# Patient Record
Sex: FEMALE | Race: ASIAN | NOT HISPANIC OR LATINO | Employment: OTHER | URBAN - METROPOLITAN AREA
[De-identification: names, ages, dates, MRNs, and addresses within clinical notes are randomized per-mention and may not be internally consistent; named-entity substitution may affect disease eponyms.]

---

## 2017-11-07 ENCOUNTER — OFFICE VISIT (OUTPATIENT)
Dept: NEUROSURGERY | Facility: CLINIC | Age: 37
End: 2017-11-07

## 2017-11-07 VITALS
WEIGHT: 81 LBS | BODY MASS INDEX: 15.9 KG/M2 | HEIGHT: 60 IN | SYSTOLIC BLOOD PRESSURE: 110 MMHG | HEART RATE: 111 BPM | DIASTOLIC BLOOD PRESSURE: 84 MMHG

## 2017-11-07 DIAGNOSIS — M50.120 CERVICAL DISC DISORDER WITH RADICULOPATHY OF MID-CERVICAL REGION: ICD-10-CM

## 2017-11-07 DIAGNOSIS — M54.2 NECK PAIN: Primary | ICD-10-CM

## 2017-11-07 PROCEDURE — 99213 OFFICE O/P EST LOW 20 MIN: CPT | Performed by: NURSE PRACTITIONER

## 2017-11-07 RX ORDER — TRAMADOL HYDROCHLORIDE 50 MG/1
50 TABLET ORAL EVERY 8 HOURS PRN
Qty: 21 TABLET | Refills: 0 | Status: SHIPPED | OUTPATIENT
Start: 2017-11-07 | End: 2018-08-06 | Stop reason: HOSPADM

## 2017-11-07 RX ORDER — DEXTROAMPHETAMINE SACCHARATE, AMPHETAMINE ASPARTATE, DEXTROAMPHETAMINE SULFATE AND AMPHETAMINE SULFATE 5; 5; 5; 5 MG/1; MG/1; MG/1; MG/1
15 TABLET ORAL DAILY
Refills: 0 | Status: ON HOLD | COMMUNITY
Start: 2017-10-30 | End: 2020-08-11

## 2017-11-07 RX ORDER — DOXEPIN HYDROCHLORIDE 10 MG/1
CAPSULE ORAL
Refills: 1 | COMMUNITY
Start: 2017-10-25 | End: 2018-08-06 | Stop reason: HOSPADM

## 2017-11-07 NOTE — PROGRESS NOTES
Subjective   Patient ID: Bienvenido Carter is a 37 y.o. female is here today for follow-up. She complains of a flare up of neck, arm, back pain. She completed PT last year with mild relief.  She has not had any recent treatments. Ms. Carter takes Gabapentin 100 mg HS for pain.     HPI Comments: Ms. Carter returns to the office today for reevaluation.  She has been evaluated by Dr. Hills several times in the past.  Her last visit was in 2016.  She is not had any cervical imaging since 2013 are 2014.  She has tried Flexeril and gabapentin as well as cervical epidural steroid injections and physical therapy.  Her symptoms have begun to worsen over the last several months.  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  The pain is located in her neck particularly on the right side.  She finds it difficult to turn her head left and right.  She is also noticing increasing pain in the bilateral trapezius region, interscapular region, and left arm.    Neck Pain    This is a recurrent problem. The current episode started more than 1 year ago. The problem occurs constantly. The problem has been gradually worsening. The pain is associated with nothing. The quality of the pain is described as aching. The pain is at a severity of 9/10. The pain is severe. The symptoms are aggravated by position.   Arm Pain    The pain is present in the left shoulder. The quality of the pain is described as aching. The pain radiates to the left arm and left hand. The pain is at a severity of 5/10. The pain is moderate. The pain has been intermittent since the incident. Treatments tried: PT 2016; cervical epidural steroid injections;       The following portions of the patient's history were reviewed and updated as appropriate: allergies, current medications, past family history, past medical history, past social history, past surgical history and problem  list.    Review of Systems   Musculoskeletal: Positive for arthralgias and neck pain (left arm pain).   All other systems reviewed and are negative.      Objective   Physical Exam   Constitutional: She is oriented to person, place, and time. She appears well-developed and well-nourished. She is cooperative. No distress.   HENT:   Head: Normocephalic and atraumatic.   Eyes: Conjunctivae are normal.   Neck: Normal range of motion. Neck supple.   Cardiovascular: Normal rate.    Pulmonary/Chest: Effort normal. No respiratory distress.   Abdominal: Soft. She exhibits no distension. There is no tenderness.   Musculoskeletal: Normal range of motion. She exhibits no edema.   Neurological: She is alert and oriented to person, place, and time. She has normal strength and normal reflexes. She displays normal reflexes. No sensory deficit. Coordination normal. GCS eye subscore is 4. GCS verbal subscore is 5. GCS motor subscore is 6.   Reflex Scores:       Tricep reflexes are 2+ on the right side and 2+ on the left side.       Bicep reflexes are 2+ on the right side and 2+ on the left side.       Brachioradialis reflexes are 2+ on the right side and 2+ on the left side.       Patellar reflexes are 2+ on the right side and 2+ on the left side.       Achilles reflexes are 2+ on the right side and 2+ on the left side.  Good upper and lower extremity motor strength with the exception of the left  which is 4+/5.  The patient describes decreased sensation in all the fingers of her left hand.  Negative Roy's; negative clonus   Skin: Skin is warm and dry. No rash noted. She is not diaphoretic.   Psychiatric: She has a normal mood and affect. Thought content normal.   Vitals reviewed.    Neurologic Exam     Mental Status   Oriented to person, place, and time.     Motor Exam     Strength   Strength 5/5 throughout.     Gait, Coordination, and Reflexes     Reflexes   Right brachioradialis: 2+  Left brachioradialis: 2+  Right biceps:  2+  Left biceps: 2+  Right triceps: 2+  Left triceps: 2+  Right patellar: 2+  Left patellar: 2+  Right achilles: 2+  Left achilles: 2+      Assessment/Plan     Medical Decision Making:      Ms. Carter was seen for the above complaints.  She is having worsening cervical pain which has not of gotten better with muscle relaxers.  The pain is inhibiting her from sleeping and she is having difficulty keeping up with her studies because of it.  Dr. Hills has given her tramadol in the past with good results.  I we will give her a prescription with a limited supply to try for now.  We will get an MRI of the cervical spine without contrast and she will return to the office thereafter for reevaluation.      Bienvenido was seen today for neck pain, arm pain, back pain and leg pain.    Diagnoses and all orders for this visit:    Neck pain  -     MRI Cervical Spine Without Contrast; Future    Cervical disc disorder with radiculopathy of mid-cervical region  -     MRI Cervical Spine Without Contrast; Future    Other orders  -     traMADol (ULTRAM) 50 MG tablet; Take 1 tablet by mouth Every 8 (Eight) Hours As Needed for Moderate Pain .      No Follow-up on file.

## 2017-11-19 ENCOUNTER — HOSPITAL ENCOUNTER (OUTPATIENT)
Dept: MRI IMAGING | Facility: HOSPITAL | Age: 37
Discharge: HOME | End: 2017-11-19

## 2017-11-29 ENCOUNTER — HOSPITAL ENCOUNTER (OUTPATIENT)
Dept: MRI IMAGING | Facility: HOSPITAL | Age: 37
Discharge: HOME | End: 2017-11-29
Admitting: NURSE PRACTITIONER

## 2017-11-29 DIAGNOSIS — M54.2 NECK PAIN: ICD-10-CM

## 2017-11-29 DIAGNOSIS — M50.120 CERVICAL DISC DISORDER WITH RADICULOPATHY OF MID-CERVICAL REGION: ICD-10-CM

## 2017-11-29 PROCEDURE — 72141 MRI NECK SPINE W/O DYE: CPT

## 2017-12-05 ENCOUNTER — OFFICE VISIT (OUTPATIENT)
Dept: NEUROSURGERY | Facility: CLINIC | Age: 37
End: 2017-12-05

## 2017-12-05 VITALS
HEART RATE: 93 BPM | BODY MASS INDEX: 15.31 KG/M2 | HEIGHT: 60 IN | SYSTOLIC BLOOD PRESSURE: 103 MMHG | WEIGHT: 78 LBS | DIASTOLIC BLOOD PRESSURE: 66 MMHG

## 2017-12-05 DIAGNOSIS — M54.2 NECK PAIN: ICD-10-CM

## 2017-12-05 DIAGNOSIS — M48.02 SPINAL STENOSIS OF CERVICAL REGION: Primary | ICD-10-CM

## 2017-12-05 PROCEDURE — 99214 OFFICE O/P EST MOD 30 MIN: CPT | Performed by: NURSE PRACTITIONER

## 2017-12-05 RX ORDER — CLOBETASOL PROPIONATE 0.5 MG/G
OINTMENT TOPICAL
Refills: 1 | COMMUNITY
Start: 2017-11-21 | End: 2018-08-06 | Stop reason: HOSPADM

## 2017-12-05 RX ORDER — ALBUTEROL SULFATE 90 UG/1
2 AEROSOL, METERED RESPIRATORY (INHALATION) EVERY 6 HOURS PRN
COMMUNITY
Start: 2017-11-10

## 2017-12-05 RX ORDER — MONTELUKAST SODIUM 10 MG/1
TABLET ORAL
Refills: 5 | COMMUNITY
Start: 2017-11-10 | End: 2017-12-05 | Stop reason: SDUPTHER

## 2017-12-05 RX ORDER — MONTELUKAST SODIUM 10 MG/1
10 TABLET ORAL
COMMUNITY
Start: 2017-11-10 | End: 2018-08-06 | Stop reason: HOSPADM

## 2017-12-05 NOTE — PROGRESS NOTES
Subjective   Patient ID: Bienvenido Carter is a 37 y.o. female is here today for follow-up on neck, arm and back pain. New MRI Cervical Spine scan on 11/29/2017. Pt is currently taking Gabapentin 100 mg prn for pain and Tramadol 50 mg prn pain. Pt states the neck pain is now starting to radiate to her thoracic spine area. Pt denies any injury.    HPI Comments: Ms. Carter returns to the office for follow-up with a new cervical MRI.  She has been taking the tramadol with no real relief in her neck pain symptoms.      Neck Pain    This is a recurrent problem. The current episode started more than 1 month ago. The problem occurs constantly. The problem has been gradually worsening. The pain is associated with nothing. The pain is present in the left side, right side and midline. The quality of the pain is described as aching and stabbing. The pain is at a severity of 9/10. The pain is severe. The symptoms are aggravated by coughing, sneezing, twisting and position. The pain is worse during the day. Stiffness is present all day. Associated symptoms include headaches, numbness (Left hand and arm) and tingling (Left arm and hand). Pertinent negatives include no chest pain. She has tried heat and ice for the symptoms. The treatment provided mild relief.       The following portions of the patient's history were reviewed and updated as appropriate: allergies, current medications, past family history, past medical history, past social history, past surgical history and problem list.    Review of Systems   Respiratory: Negative for chest tightness and shortness of breath.    Cardiovascular: Negative for chest pain.   Musculoskeletal: Positive for neck pain and neck stiffness.   Neurological: Positive for tingling (Left arm and hand), numbness (Left hand and arm) and headaches.   All other systems reviewed and are negative.      Objective   Physical Exam   Constitutional: She is oriented to person, place, and time. She  appears well-developed and well-nourished. She is cooperative. No distress.   HENT:   Head: Normocephalic and atraumatic.   Eyes: Conjunctivae and EOM are normal. Pupils are equal, round, and reactive to light.   Neck: Normal range of motion. Neck supple.   Cardiovascular: Normal rate.    Pulmonary/Chest: Effort normal. No respiratory distress.   Abdominal: Soft. She exhibits no distension. There is no tenderness.   Musculoskeletal: Normal range of motion. She exhibits no edema.   Neurological: She is alert and oriented to person, place, and time. She has normal strength and normal reflexes. She displays normal reflexes. No cranial nerve deficit or sensory deficit. Coordination and gait normal. GCS eye subscore is 4. GCS verbal subscore is 5. GCS motor subscore is 6.   Reflex Scores:       Tricep reflexes are 2+ on the right side and 2+ on the left side.       Bicep reflexes are 2+ on the right side and 2+ on the left side.       Brachioradialis reflexes are 2+ on the right side and 2+ on the left side.       Patellar reflexes are 2+ on the right side and 2+ on the left side.       Achilles reflexes are 2+ on the right side and 2+ on the left side.  Negative Roy's; negative clonus.   Skin: Skin is warm and dry. No rash noted. She is not diaphoretic.   Psychiatric: She has a normal mood and affect. Thought content normal.   Vitals reviewed.    Neurologic Exam     Mental Status   Oriented to person, place, and time.     Cranial Nerves     CN III, IV, VI   Pupils are equal, round, and reactive to light.  Extraocular motions are normal.     Motor Exam     Strength   Strength 5/5 throughout.     Gait, Coordination, and Reflexes     Reflexes   Right brachioradialis: 2+  Left brachioradialis: 2+  Right biceps: 2+  Left biceps: 2+  Right triceps: 2+  Left triceps: 2+  Right patellar: 2+  Left patellar: 2+  Right achilles: 2+  Left achilles: 2+      Assessment/Plan   Independent Review of Radiographic Studies:      I  reviewed the MRI images of the cervical spine today in the office and reviewed them with the patient.  The MRI showed straightening of the usual cervical lordosis as well as multilevel degenerative changes which were not significantly changed when compared to a previous MRI image 3 years ago.  There is a C4-5 disc osteophyte complex with a small left disc protrusion contributing to some moderate canal narrowing.  There is also mild cord flattening at C5-C6 with him mild to moderate canal narrowing.  There is also a right sided disc osteophyte complex at C3-4.  No abnormal cord signal.    Medical Decision Making:     Ms. Carter returns the office today with a new cervical MRI.  I reviewed the results with her.  There is certainly nothing surgical on this MRI.  The findings on exam do not indicate need for surgery either.  She is right handed.  Her symptoms are more significant on the left side of her neck and into the left trapezius and somewhat into the left scapular region.    Ms. Carter is a bit reluctant to consider injection therapies at this time.  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  She would like a referral to pain management for consideration of injection therapies and medical management for her nonoperative cervical pain.  I we will make a referral for physical therapy as well.  Ms. Contreras will call the office if she has any worsening symptoms otherwise I will see her back in 4 months.    Bienvenido was seen today for neck pain.    Diagnoses and all orders for this visit:    Spinal stenosis of cervical region    Neck pain      Return in about 4 months (around 4/5/2018).

## 2018-01-05 ENCOUNTER — HOSPITAL ENCOUNTER (OUTPATIENT)
Dept: PHYSICAL THERAPY | Facility: HOSPITAL | Age: 38
Setting detail: THERAPIES SERIES
Discharge: HOME | End: 2018-01-05

## 2018-01-05 DIAGNOSIS — M54.12 CERVICAL RADICULAR PAIN: ICD-10-CM

## 2018-01-05 DIAGNOSIS — M54.2 NECK PAIN ON LEFT SIDE: Primary | ICD-10-CM

## 2018-01-05 PROCEDURE — 97161 PT EVAL LOW COMPLEX 20 MIN: CPT | Performed by: PHYSICAL THERAPIST

## 2018-01-05 PROCEDURE — 97110 THERAPEUTIC EXERCISES: CPT | Performed by: PHYSICAL THERAPIST

## 2018-01-05 NOTE — THERAPY EVALUATION
Outpatient Physical Therapy Ortho Initial Evaluation  [facility]     Patient Name: Bienvenido Carter  : 1980  MRN: [id number]  Today's Date: 2018      Visit Date: 2018    Patient Active Problem List   Diagnosis   • Spinal stenosis of cervical region   • Cervical disc disorder with radiculopathy of mid-cervical region        Past Medical History:   Diagnosis Date   • Anxiety    • Depression    • H/O emotional problems         No past surgical history on file.    Visit Dx:     ICD-10-CM ICD-9-CM   1. Neck pain on left side M54.2 723.1             Patient History       18 0800          History    Chief Complaint Difficulty with daily activities;Headache;Joint stiffness;Muscle tenderness;Pain  -      Type of Pain Neck pain  -      Date Current Problem(s) Began 14  -      Brief Description of Current Complaint Pt has a history of neck pain, now bilaterally. 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 She is having injections today, but not epidurals.   -      Previous treatment for THIS PROBLEM Injections;Rehabilitation;Medication  -      Onset Date- PT 18  -      Patient/Caregiver Goals Relieve pain;Return to prior level of function;Improve mobility;Improve strength  -      Patient's Rating of General Health Very good  -      Hand Dominance right-handed  -      Occupation/sports/leisure activities student  -      How has patient tried to help current problem? meds, ice  -      What clinical tests have you had for this problem? MRI  -      Pain     Pain Location Neck  -      Pain at Present 8  -LH      Pain at Best 5  -LH      Pain at Worst 9  -      Pain Frequency Constant/continuous  -      Pain Description Aching;Throbbing;Tightness;Headache  -      What Performance Factors Make the Current Problem(s) WORSE? studying,  lifting, showering, looking up  -LH      What Performance Factors Make the Current Problem(s) BETTER? meds  -LH      Is your sleep disturbed? Yes  -LH      Is medication used to assist with sleep? Yes  -LH      Fall Risk Assessment    Any falls in the past year: Yes  -LH      Services    Prior Rehab/Home Health Experiences Yes  -LH      Daily Activities    Primary Language English  -      Teaching needs identified Home Exercise Program;Management of Condition  -      Patient is concerned about/has problems with Performing home management (household chores, shopping, care of dependents);Performing job responsibilities/community activities (work, school,  -LH      Pt Participated in POC and Goals Yes  -LH      Safety    Are you being hurt, hit, or frightened by anyone at home or in your life? No  -LH      Are you being neglected by a caregiver No  -        User Key  (r) = Recorded By, (t) = Taken By, (c) = Cosigned By    Initials Name Provider Type     Katie Medel, PT Physical Therapist                PT Ortho       01/05/18 0800    Posture/Observations    Cervical Lordosis Decreased  -LH    Thoracic Kyphosis Decreased  -LH    Rounded Shoulders Mild  -LH    Sensory Screen for Light Touch- Upper Quarter Clearing    C4 (posterior shoulder) Bilateral:;Intact  -LH    C5 (lateral upper arm) Left:;Diminished  -LH    C6 (tip of thumb) Left:;Diminished  -LH    C7 (tip of 3rd finger) Left:;Diminished  -LH    C8 (tip of 5th finger) Left:;Diminished  -LH    T1 (medial lower arm) Left:;Diminished  -LH    Myotomal Screen- Upper Quarter Clearing    Shoulder flexion (C5) Bilateral:;4 (Good)  -LH    Elbow flexion/wrist extension (C6) Bilateral:;4 (Good)  -LH    Elbow extension/wrist flexion (C7) Bilateral:;4 (Good)  -LH    Finger flexion/ (C8) Bilateral:;4 (Good)  -LH    Finger abduction (T1) Bilateral:;4 (Good)  -LH    Cervical Palpation    Suboccipital Bilateral:;Tender;Guarded/taut  -    SCM  Bilateral:;Tender;Guarded/taut;Trigger point  -LH    Levator Scapula Bilateral:;Tender;Guarded/taut;Trigger point  -LH    Upper Traps Bilateral:;Tender;Guarded/taut;Trigger point  -LH    Cervical Accessory Motions    Cervical Accessory Motions Tested? --   decreased mobility lower cervical, painful throughout  -    Neck    Flexion ROM Details 50%   painful base of neck, across UTs  -    Extension ROM Details 10%   painful base of skull  -    Lt Lat Flexion ROM Details 10%   painful and stiff on L  -LH    Rt Lateral Flexion ROM Details 30%   pain on R  -LH    Lt Rotation ROM Details 60%  -    Rt Rotation ROM Details 60%  -    MMT (Manual Muscle Testing)    General MMT Assessment Detail neck strength=4-/5  -      User Key  (r) = Recorded By, (t) = Taken By, (c) = Cosigned By    Initials Name Provider Type     Katie Medel, PT Physical Therapist                      Therapy Education  Given: HEP, Symptoms/condition management, Pain management  Program: New  How Provided: Verbal, Written  Provided to: Patient  Level of Understanding: Teach back education performed, Verbalized, Demonstrated           PT OP Goals       01/05/18 1200       PT Short Term Goals    STG 1 Patient will be independent with education for symptom management, joint protection and strategies to minimize stress on affected tissues  -     STG 1 Progress New  [facility]     STG 2 Pt to improve pain complaints to no more than 6/10 with ADLs and work duties  -     STG 2 Progress [facility]     Long Term Goals    LTG 1 Pt to improve pain complaints to no more than 4/10 with ADLs and work duties  -     LTG 1 Progress New  [facility]     LTG 2 Pt to improve NDI score by 20% for overall functional improvement  -     LTG 2 Progress New  [facility]     LTG 3 Pt to be independent with HEP for symptom management and strengthening  -     LTG 3 Progress New  -LH     Time Calculation    PT Goal Re-Cert Due Date 04/05/18  -       User Key  (r) = Recorded By,  (t) = Taken By, (c) = Cosigned By    Initials Name Provider Type     Katie Medel, PT Physical Therapist                PT Assessment/Plan       01/05/18 1245       PT Assessment    Functional Limitations Limitation in home management;Limitations in community activities;Limitations in functional capacity and performance;Performance in leisure activities;Performance in work activities  -     Impairments Range of motion;Sensation;Muscle strength;Impaired muscle length;Pain;Poor body mechanics;Posture;Joint mobility  -     Assessment Comments Pt presents to therapy with complaints of neck pain. She has a long history of neck issues. Most recently she has been in school and the reading/studying has flared her up. Also she has issues with lifting, grooming, and sleeping causing her pain. She does have a reduced cervical lordosis and thoracic kyphosis. She has decreased cervical ROM and pain with active flexion, ext, and R>L lateral flexion. She has decreased passive mobility of the spine with pain in the lower levels. She has tenderness and tightness over the UT, levators, SCM, and suboccipitals. Pt has decreased sensation on the L UE from C5-T1. Pt scored a 62% on the NDI, wth 0% being no disability. Pt would benefit from therapy to address these issues to help her improve her function.   -     Please refer to paper survey for additional self-reported information Yes  -     Rehab Potential Good  -     Patient/caregiver participated in establishment of treatment plan and goals Yes  -     Patient would benefit from skilled therapy intervention Yes  -     PT Plan    PT Frequency 2x/week  -     Predicted Duration of Therapy Intervention (days/wks) 4-6 weeks  -     Planned CPT's? PT EVAL LOW COMPLEXITY: 85550;PT THER PROC EA 15 MIN: 21402;PT HOT/COLD PACK WC NONMCARE: 16458;PT MANUAL THERAPY EA 15 MIN: 68046;PT ULTRASOUND EA 15 MIN: 49837  -     Physical Therapy Interventions (Optional Details) ROM  (Range of Motion);manual therapy techniques;strengthening;modalities;stretching;dry needling;home exercise program;joint mobilization;postural re-education;patient/family education  -     PT Plan Comments plan to see pt 2x week for ROM, strength, pain control and postural education  -       User Key  (r) = Recorded By, (t) = Taken By, (c) = Cosigned By    Initials Name Provider Type     Katie Medel, PT Physical Therapist                  Exercises       01/05/18 0900          Exercise 1    Exercise Name 1 cervical nodding and rotation supine  -      Sets 1 1  -LH      Reps 1 10  -LH      Time (Seconds) 1 5  -LH      Exercise 2    Exercise Name 2 scap ret and shoulder rolls  -      Sets 2 1  -LH      Reps 2 10  -LH      Exercise 3    Exercise Name 3 UT and levator stretches  -      Sets 3 1  -LH      Reps 3 3  -LH      Time (Seconds) 3 20  -LH        User Key  (r) = Recorded By, (t) = Taken By, (c) = Cosigned By    Initials Name Provider Type     Katie Medel, PT Physical Therapist                        Outcome Measure Options: Neck Disability Index (NDI)  Neck Disability Index  Neck Disability Index Comments: 62%      Time Calculation:   Start Time: 0800  Stop Time: 0845  Time Calculation (min): 45 min     Therapy Charges for Today     Code Description Service Date Service Provider Modifiers Qty    [id number]  PT EVAL LOW COMPLEXITY 2 1/5/2018 Katie Medel, PT GP 1    [id number]  PT THER PROC EA 15 MIN 1/5/2018 Katie Medel, PT GP 1          PT G-Codes  Outcome Measure Options: Neck Disability Index (NDI)         Katie Medel, PT  1/5/2018

## 2018-01-19 ENCOUNTER — HOSPITAL ENCOUNTER (OUTPATIENT)
Dept: PHYSICAL THERAPY | Facility: HOSPITAL | Age: 38
Setting detail: THERAPIES SERIES
Discharge: HOME | End: 2018-01-19

## 2018-01-19 DIAGNOSIS — M54.12 CERVICAL RADICULAR PAIN: ICD-10-CM

## 2018-01-19 DIAGNOSIS — M48.02 CERVICAL SPINAL STENOSIS: ICD-10-CM

## 2018-01-19 DIAGNOSIS — M54.2 NECK PAIN ON LEFT SIDE: Primary | ICD-10-CM

## 2018-01-19 PROCEDURE — 97110 THERAPEUTIC EXERCISES: CPT | Performed by: PHYSICAL THERAPIST

## 2018-01-19 PROCEDURE — 97035 APP MDLTY 1+ULTRASOUND EA 15: CPT | Performed by: PHYSICAL THERAPIST

## 2018-01-19 PROCEDURE — 97140 MANUAL THERAPY 1/> REGIONS: CPT | Performed by: PHYSICAL THERAPIST

## 2018-01-19 NOTE — THERAPY TREATMENT NOTE
Outpatient Physical Therapy Ortho Treatment Note  [facility]     Patient Name: Bienvenido Carter  : 1980  MRN: [id number]  Today's Date: 2018      Visit Date: 2018    Visit Dx:    ICD-10-CM ICD-9-CM   1. Neck pain on left side M54.2 723.1   2. Cervical radicular pain M54.12 723.4   3. Cervical spinal stenosis M48.02 723.0       Patient Active Problem List   Diagnosis   • Spinal stenosis of cervical region   • Cervical disc disorder with radiculopathy of mid-cervical region        Past Medical History:   Diagnosis Date   • Anxiety    • Depression    • H/O emotional problems         No past surgical history on file.          PT Ortho       18 0800    Subjective Comments    Subjective Comments pt had injections about 2 weeks ago.She had some relief for a few days. She has another round today. She didn't sleep well and she is sore and painful today.   -    Subjective Pain    Able to rate subjective pain? yes  -    Pre-Treatment Pain Level 8  -      User Key  (r) = Recorded By, (t) = Taken By, (c) = Cosigned By    Initials Name Provider Type     Katie Medel, PT Physical Therapist                            PT Assessment/Plan       18 0846       PT Assessment    Assessment Comments Pt had injections in her neck 2 weeks ago. She did hve some relief for several days, but her pain is still high. 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 and has neck pain increased from this  -     PT Plan    PT Frequency 1x/week  -     PT Plan Comments cont  -       User Key  (r) = Recorded By, (t) = Taken By, (c) = Cosigned By    Initials Name Provider Type     Katie Medel, PT Physical Therapist                Modalities       18 0800          Ultrasound 91620    Location B UT  -LH      Rx Minutes 9  -      Duty Cycle 100  -      Frequency 1.0 MHz  -      Intensity - Wts/cm 1.4  -        User Key  (r) = Recorded By, (t) = Taken By, (c) =  Cosigned By    Initials Name Provider Type     Katie Medel PT Physical Therapist                Exercises       01/19/18 0800          Subjective Comments    Subjective Comments pt had injections about 2 weeks ago.She had some relief for a few days. She has another round today. She didn't sleep well and she is sore and painful today.   -      Subjective Pain    Able to rate subjective pain? yes  -      Pre-Treatment Pain Level 8  -LH      Exercise 1    Exercise Name 1 cervical nodding and rotation supine  -LH      Sets 1 1  -LH      Reps 1 10  -LH      Time (Seconds) 1 5  -LH      Exercise 2    Exercise Name 2 scap ret and shoulder rolls  -LH      Sets 2 1  -LH      Reps 2 10  -LH      Exercise 3    Exercise Name 3 UT and levator stretches  -LH      Sets 3 1  -LH      Reps 3 3  -LH      Time (Seconds) 3 20  -LH      Exercise 4    Exercise Name 4 scap ret and shoulder ext  -LH      Sets 4 1  -LH      Reps 4 2  -LH      Additional Comments red  -LH        User Key  (r) = Recorded By, (t) = Taken By, (c) = Cosigned By    Initials Name Provider Type     Katie Medel PT Physical Therapist                        Manual Rx (last 36 hours)      Manual Treatments       01/19/18 0700          Manual Rx 1    Manual Rx 1 Location B UT, levator, cervical PVMs  -      Manual Rx 1 Type STM to above with ischemic pressure for trigger points and light manual distraction  -      Manual Rx 1 Duration 15  -LH        User Key  (r) = Recorded By, (t) = Taken By, (c) = Cosigned By    Initials Name Provider Type     Katie Medel PT Physical Therapist              Therapy Education  Given: HEP, Symptoms/condition management, Pain management  Program: New  How Provided: Verbal  Provided to: Patient  Level of Understanding: Teach back education performed              Time Calculation:   Start Time: 0800  Stop Time: 0845  Time Calculation (min): 45 min    Therapy Charges for Today     Code Description Service Date  Service Provider Modifiers Qty    92430337010  PT THER PROC EA 15 MIN 1/19/2018 Katie Medel, PT GP 1    24508203933 HC PT MANUAL THERAPY EA 15 MIN 1/19/2018 Katie Medel, PT GP 1    [id number]  PT ULTRASOUND EA 15 MIN 1/19/2018 Katie Medel, PT GP 1                    Katie Medel, PT  1/19/2018

## 2018-02-02 ENCOUNTER — HOSPITAL ENCOUNTER (OUTPATIENT)
Dept: PHYSICAL THERAPY | Facility: HOSPITAL | Age: 38
Setting detail: THERAPIES SERIES
Discharge: HOME | End: 2018-02-02

## 2018-02-02 DIAGNOSIS — M48.02 CERVICAL SPINAL STENOSIS: ICD-10-CM

## 2018-02-02 DIAGNOSIS — M54.2 NECK PAIN ON LEFT SIDE: Primary | ICD-10-CM

## 2018-02-02 DIAGNOSIS — M54.12 CERVICAL RADICULAR PAIN: ICD-10-CM

## 2018-02-02 PROCEDURE — 97035 APP MDLTY 1+ULTRASOUND EA 15: CPT | Performed by: PHYSICAL THERAPIST

## 2018-02-02 PROCEDURE — 97110 THERAPEUTIC EXERCISES: CPT | Performed by: PHYSICAL THERAPIST

## 2018-02-02 PROCEDURE — 97140 MANUAL THERAPY 1/> REGIONS: CPT | Performed by: PHYSICAL THERAPIST

## 2018-02-02 NOTE — THERAPY TREATMENT NOTE
Outpatient Physical Therapy Ortho Treatment Note  [facility]     Patient Name: Bienvenido Carter  : 1980  MRN: [id number]  Today's Date: 2018      Visit Date: 2018    Visit Dx:    ICD-10-CM ICD-9-CM   1. Neck pain on left side M54.2 723.1   2. Cervical radicular pain M54.12 723.4   3. Cervical spinal stenosis M48.02 723.0       Patient Active Problem List   Diagnosis   • Spinal stenosis of cervical region   • Cervical disc disorder with radiculopathy of mid-cervical region        Past Medical History:   Diagnosis Date   • Anxiety    • Depression    • H/O emotional problems         No past surgical history on file.          PT Ortho       18 0800    Subjective Comments    Subjective Comments Pt is having an ablation today at 11. The injections have helped for about a day or two when she has gotten then.   -    Subjective Pain    Able to rate subjective pain? yes  -    Pre-Treatment Pain Level 5  -      User Key  (r) = Recorded By, (t) = Taken By, (c) = Cosigned By    Initials Name Provider Type     Katie Medel, PT Physical Therapist                            PT Assessment/Plan       18 0846       PT Assessment    Assessment Comments Pt has been having injection over the past few weeks and is now having an ablation done today in the cervical spine. Her pain levels have been 5-7/10 on avg. She has increased pain with studying and computer work  -     PT Plan    PT Frequency 1x/week  -     PT Plan Comments cont with skilled therapy for ROM, strength, and pain control  -       User Key  (r) = Recorded By, (t) = Taken By, (c) = Cosigned By    Initials Name Provider Type     Katie Medel, PT Physical Therapist                Modalities       18 0800          Ultrasound 92644    Location B UT  -      Rx Minutes 8  -      Duty Cycle 100  -      Frequency 1.0 MHz  -      Intensity - Wts/cm 1.4  -        User Key  (r) = Recorded By, (t) = Taken  By, (c) = Cosigned By    Initials Name Provider Type     Katie Medel PT Physical Therapist                Exercises       02/02/18 0800          Subjective Comments    Subjective Comments Pt is having an ablation today at 11. The injections have helped for about a day or two when she has gotten then.   -LH      Subjective Pain    Able to rate subjective pain? yes  -LH      Pre-Treatment Pain Level 5  -LH      Exercise 1    Exercise Name 1 cervical nodding and rotation supine  -LH      Sets 1 1  -LH      Reps 1 10  -LH      Time (Seconds) 1 5  -LH      Exercise 2    Exercise Name 2 scap ret and shoulder rolls  -LH      Sets 2 1  -LH      Reps 2 10  -LH      Exercise 3    Exercise Name 3 UT and levator stretches  -LH      Sets 3 1  -LH      Reps 3 3  -LH      Time (Seconds) 3 20  -LH      Exercise 4    Exercise Name 4 scap ret and shoulder ext  -LH      Sets 4 1  -LH      Reps 4 20  -LH      Additional Comments red  -LH      Exercise 5    Exercise Name 5 supine horiz abd and D2 flexion  -LH      Sets 5 1  -LH      Reps 5 20  -LH      Additional Comments red  -LH        User Key  (r) = Recorded By, (t) = Taken By, (c) = Cosigned By    Initials Name Provider Type     Katie Medel PT Physical Therapist                        Manual Rx (last 36 hours)      Manual Treatments       02/02/18 0700          Manual Rx 1    Manual Rx 1 Location B UT, levator, cervical PVMs  -      Manual Rx 1 Type STM to above with ischemic pressure for trigger points and light manual distraction  -      Manual Rx 1 Duration 10  -LH        User Key  (r) = Recorded By, (t) = Taken By, (c) = Cosigned By    Initials Name Provider Type     Katie Medel PT Physical Therapist              Therapy Education  Given: HEP, Symptoms/condition management, Pain management  Program: New  How Provided: Verbal, Written  Provided to: Patient  Level of Understanding: Teach back education performed              Time Calculation:   Start  Time: 0800  Stop Time: 0845  Time Calculation (min): 45 min    Therapy Charges for Today     Code Description Service Date Service Provider Modifiers Qty    10734802921  PT THER PROC EA 15 MIN 2/2/2018 Katie Medel, PT GP 1    [id number]  PT MANUAL THERAPY EA 15 MIN 2/2/2018 Katie Medel, PT GP 1    [id number]  PT ULTRASOUND EA 15 MIN 2/2/2018 Katie Medel, PT GP 1                    Katie Medel, PT  2/2/2018

## 2018-02-16 ENCOUNTER — APPOINTMENT (OUTPATIENT)
Dept: PHYSICAL THERAPY | Facility: HOSPITAL | Age: 38
End: 2018-02-16

## 2018-03-21 ENCOUNTER — DOCUMENTATION (OUTPATIENT)
Dept: PHYSICAL THERAPY | Facility: HOSPITAL | Age: 38
End: 2018-03-21

## 2018-03-21 DIAGNOSIS — M54.12 CERVICAL RADICULAR PAIN: ICD-10-CM

## 2018-03-21 DIAGNOSIS — M54.2 NECK PAIN ON LEFT SIDE: Primary | ICD-10-CM

## 2018-03-21 DIAGNOSIS — M48.02 CERVICAL SPINAL STENOSIS: ICD-10-CM

## 2018-03-21 NOTE — THERAPY DISCHARGE NOTE
Outpatient Physical Therapy Discharge Summary         Patient Name: Bienvenido Carter  : 1980  MRN: [id number]    Today's Date: 3/21/2018    Visit Dx:    ICD-10-CM ICD-9-CM   1. Neck pain on left side M54.2 723.1   2. Cervical radicular pain M54.12 723.4   3. Cervical spinal stenosis M48.02 723.0             PT OP Goals     Row Name 18 1300          PT Short Term Goals    STG 1 Patient will be independent with education for symptom management, joint protection and strategies to minimize stress on affected tissues  -     STG 1 Progress Partially Met  -     STG 2 Pt to improve pain complaints to no more than 6/10 with ADLs and work duties  -     STG 2 Progress Not Met  -        Long Term Goals    LTG 1 Pt to improve pain complaints to no more than 4/10 with ADLs and work duties  -     LTG 1 Progress Not Met  -     LTG 2 Pt to improve NDI score by 20% for overall functional improvement  -     LTG 2 Progress Not Met  -     LTG 3 Pt to be independent with HEP for symptom management and strengthening  -     LTG 3 Progress Not Met  -       User Key  (r) = Recorded By, (t) = Taken By, (c) = Cosigned By    Initials Name Provider Type     Katie Medel, PT Physical Therapist          OP PT Discharge Summary  Date of Discharge: 18  Reason for Discharge: Independent  Outcomes Achieved: Unable to make functional progress toward goals at this time  Discharge Destination: Unknown  Discharge Instructions/Additional Comments: Pt was treated for 3 physical therapy visits. Her sessions included dry needling, manual therapy, US, and HEP for posture/body mechanics. She cancelled/no show her last 2 appointments and did not reschedule.       Time Calculation:                    Katie Medel, PT  3/21/2018

## 2018-04-11 NOTE — PROGRESS NOTES
Subjective   Patient ID: Bienvenido Carter is a 37 y.o. female is here today for a 4 month follow-up on neck, arm, back pain. She had a cervical spine MRI on 11/29/2017. Patient has been going to physical therapy and has been discharged.  She is still working with Dr. Solares for pain management.  She has completed right-sided facet injections and radiofrequency ablation.  She reports that she is feeling better.     Patient reports that she still is having neck, arm and back pain. She is taking Percocet  mg PRN.     Ms. Carter is here today for follow-up on cervical stenosis.  She has continued neck pain but it has gotten better with a radiofrequency ablation procedure.  She reports that she is now having more left sided neck pain whereas before the pain was worse on the right.  He has very little if any arm pain.  At any rate she states that the pain symptoms are not as severe as they were when I saw her last in December.  She is still working with Dr. Solares and has an appointment with him in May.  She still takes Percocet which is prescribed by Dr. Solares for intermittent neck and low back pain.      Neck Pain    This is a chronic problem. The current episode started more than 1 year ago. The problem occurs daily. The problem has been gradually worsening. The pain is associated with nothing. The quality of the pain is described as shooting and stabbing. The pain is at a severity of 5/10. The pain is moderate. The symptoms are aggravated by position. The pain is worse during the day. Associated symptoms include headaches, numbness and tingling. Pertinent negatives include no chest pain, visual change or weakness. Treatments tried: PT.   Back Pain   This is a chronic problem. The current episode started more than 1 year ago. The problem occurs daily. The problem has been gradually worsening since onset. The quality of the pain is described as shooting and stabbing. The pain does not radiate. The pain  is at a severity of 5/10. The pain is worse during the day. Associated symptoms include bladder incontinence, headaches, numbness and tingling. Pertinent negatives include no chest pain or weakness. Treatments tried: PT. The treatment provided mild relief.   Arm Pain    The incident occurred more than 1 week ago. There was no injury mechanism. The pain is present in the left clavicle, left elbow, left fingers, left forearm, left hand, left shoulder, left wrist and upper left arm. The quality of the pain is described as aching. The pain does not radiate. The pain is at a severity of 5/10. The pain has been worsening since the incident. Associated symptoms include muscle weakness, numbness and tingling. Pertinent negatives include no chest pain. The symptoms are aggravated by lifting. Treatments tried: PT. The treatment provided mild relief.       The following portions of the patient's history were reviewed and updated as appropriate: allergies, current medications, past family history, past medical history, past social history, past surgical history and problem list.    Review of Systems   Respiratory: Negative for chest tightness and shortness of breath.    Cardiovascular: Negative for chest pain.   Genitourinary: Positive for bladder incontinence.   Musculoskeletal: Positive for back pain and neck pain.   Neurological: Positive for tingling, numbness and headaches. Negative for weakness.   All other systems reviewed and are negative.      Objective   Physical Exam   Constitutional: She is oriented to person, place, and time. She appears well-developed and well-nourished. She is cooperative. No distress.   HENT:   Head: Normocephalic and atraumatic.   Eyes: Conjunctivae are normal.   Neck: Normal range of motion. Neck supple.   Cardiovascular: Normal rate.    Pulmonary/Chest: Effort normal. No respiratory distress.   Abdominal: Soft. She exhibits no distension. There is no tenderness.   Musculoskeletal: Normal  range of motion. She exhibits no edema.   Neurological: She is alert and oriented to person, place, and time. She has normal strength and normal reflexes. She displays normal reflexes. No sensory deficit. She exhibits normal muscle tone. Coordination normal. GCS eye subscore is 4. GCS verbal subscore is 5. GCS motor subscore is 6.   No motor deficits in the upper or lower extremities on exam. The patient does have some mild numbness to light touch in all 5 digits of the right hand.  Patient exhibits good cervical range of motion.  Spurling's is negative bilaterally.  DTRs are normal in the upper and lower extremities bilaterally.  Negative Roy's; negative clonus.    Skin: Skin is warm and dry. No rash noted. She is not diaphoretic.   Psychiatric: She has a normal mood and affect. Thought content normal.   Vitals reviewed.      Assessment/Plan   Independent Review of Radiographic Studies:     The previous MRI of the cervical spine performed November 29, 2017 today in the office.  There are multilevel degenerative changes particularly at C4-5 and C5-6 secondary to disc osteophyte complexes.  There is a small central, left disc protrusion with a moderate degree of canal narrowing.  Mild, moderate canal narrowing noted at C5-6.  Although there is some evidence of cord contact at C4-5 and C5-C6 there is no evidence of abnormal cord signal.     Medical Decision Making:      Ms. Carter returns to the office today for follow-up on cervical stenosis.  Her primary complaint has been of neck pain.  She has some intermittent numbness and tingling in the right fingers and also intermittent pain in the left arm in the C6 distribution.  The symptoms are not at all as severe as they were at the time of her last office visit.  Since that time she has completed cervical injections and ablation procedure with Dr. Solares.  She states that it has helped.  He has completed physical therapy which has also helped.  She does admit  that she has slacked off on her exercises and realizes the importance of keeping up with them.    Ms. Carter will continue working with Dr. Solares for pain management.  She has a follow-up in his office in May.  She may discuss injection therapies if the symptoms on the left side of her neck worsen.  She was offered a follow-up appointment the preferred to just call us if needed.    Bienvenido was seen today for follow-up, neck pain, back pain and arm pain.    Diagnoses and all orders for this visit:    Spinal stenosis of cervical region    Neck pain      Return if symptoms worsen or fail to improve.

## 2018-04-16 ENCOUNTER — OFFICE VISIT (OUTPATIENT)
Dept: NEUROSURGERY | Facility: CLINIC | Age: 38
End: 2018-04-16

## 2018-04-16 VITALS — HEIGHT: 60 IN | HEART RATE: 101 BPM | SYSTOLIC BLOOD PRESSURE: 117 MMHG | DIASTOLIC BLOOD PRESSURE: 83 MMHG

## 2018-04-16 DIAGNOSIS — M54.2 NECK PAIN: ICD-10-CM

## 2018-04-16 DIAGNOSIS — M48.02 SPINAL STENOSIS OF CERVICAL REGION: Primary | ICD-10-CM

## 2018-04-16 PROCEDURE — 99213 OFFICE O/P EST LOW 20 MIN: CPT | Performed by: NURSE PRACTITIONER

## 2018-04-16 RX ORDER — OXYCODONE AND ACETAMINOPHEN 10; 325 MG/1; MG/1
TABLET ORAL
COMMUNITY
Start: 2018-04-12 | End: 2018-08-06 | Stop reason: HOSPADM

## 2018-04-16 RX ORDER — BACLOFEN 10 MG/1
TABLET ORAL
Refills: 0 | COMMUNITY
Start: 2018-02-05 | End: 2018-08-06 | Stop reason: HOSPADM

## 2018-04-16 RX ORDER — NAPROXEN 500 MG/1
TABLET ORAL
Refills: 0 | COMMUNITY
Start: 2018-04-04 | End: 2018-08-06 | Stop reason: HOSPADM

## 2018-05-14 ENCOUNTER — APPOINTMENT (OUTPATIENT)
Dept: CT IMAGING | Facility: HOSPITAL | Age: 38
End: 2018-05-14

## 2018-05-14 ENCOUNTER — HOSPITAL ENCOUNTER (EMERGENCY)
Facility: HOSPITAL | Age: 38
Discharge: HOME | End: 2018-05-14
Attending: EMERGENCY MEDICINE | Admitting: EMERGENCY MEDICINE

## 2018-05-14 VITALS
BODY MASS INDEX: 15.55 KG/M2 | HEIGHT: 60 IN | SYSTOLIC BLOOD PRESSURE: 132 MMHG | RESPIRATION RATE: 15 BRPM | WEIGHT: 79.2 LBS | OXYGEN SATURATION: 96 % | HEART RATE: 67 BPM | DIASTOLIC BLOOD PRESSURE: 99 MMHG | TEMPERATURE: 97.9 F

## 2018-05-14 DIAGNOSIS — R10.84 GENERALIZED ABDOMINAL PAIN: Primary | ICD-10-CM

## 2018-05-14 DIAGNOSIS — N83.202 CYSTS OF BOTH OVARIES: ICD-10-CM

## 2018-05-14 DIAGNOSIS — N83.201 CYSTS OF BOTH OVARIES: ICD-10-CM

## 2018-05-14 DIAGNOSIS — R11.2 NON-INTRACTABLE VOMITING WITH NAUSEA, UNSPECIFIED VOMITING TYPE: ICD-10-CM

## 2018-05-14 LAB
ALBUMIN SERPL-MCNC: 4.2 G/DL (ref 3.5–5.2)
ALBUMIN/GLOB SERPL: 1.7 G/DL
ALP SERPL-CCNC: 38 U/L (ref 39–117)
ALT SERPL W P-5'-P-CCNC: 6 U/L (ref 1–33)
ANION GAP SERPL CALCULATED.3IONS-SCNC: 11.4 MMOL/L
AST SERPL-CCNC: 11 U/L (ref 1–32)
BASOPHILS # BLD AUTO: 0.06 10*3/MM3 (ref 0–0.2)
BASOPHILS NFR BLD AUTO: 1 % (ref 0–1.5)
BILIRUB SERPL-MCNC: 0.3 MG/DL (ref 0.1–1.2)
BILIRUB UR QL STRIP: NEGATIVE
BUN BLD-MCNC: 10 MG/DL (ref 6–20)
BUN/CREAT SERPL: 14.5 (ref 7–25)
CALCIUM SPEC-SCNC: 9.6 MG/DL (ref 8.6–10.5)
CHLORIDE SERPL-SCNC: 96 MMOL/L (ref 98–107)
CLARITY UR: CLEAR
CO2 SERPL-SCNC: 27.6 MMOL/L (ref 22–29)
COLOR UR: YELLOW
CREAT BLD-MCNC: 0.69 MG/DL (ref 0.57–1)
DEPRECATED RDW RBC AUTO: 40.9 FL (ref 37–54)
EOSINOPHIL # BLD AUTO: 0.14 10*3/MM3 (ref 0–0.7)
EOSINOPHIL NFR BLD AUTO: 2.3 % (ref 0.3–6.2)
ERYTHROCYTE [DISTWIDTH] IN BLOOD BY AUTOMATED COUNT: 12.7 % (ref 11.7–13)
GFR SERPL CREATININE-BSD FRML MDRD: 115 ML/MIN/1.73
GFR SERPL CREATININE-BSD FRML MDRD: 95 ML/MIN/1.73
GLOBULIN UR ELPH-MCNC: 2.5 GM/DL
GLUCOSE BLD-MCNC: 102 MG/DL (ref 65–99)
GLUCOSE UR STRIP-MCNC: NEGATIVE MG/DL
HCG SERPL QL: NEGATIVE
HCT VFR BLD AUTO: 40.1 % (ref 35.6–45.5)
HGB BLD-MCNC: 13.3 G/DL (ref 11.9–15.5)
HGB UR QL STRIP.AUTO: NEGATIVE
HOLD SPECIMEN: NORMAL
IMM GRANULOCYTES # BLD: 0 10*3/MM3 (ref 0–0.03)
IMM GRANULOCYTES NFR BLD: 0 % (ref 0–0.5)
KETONES UR QL STRIP: ABNORMAL
LEUKOCYTE ESTERASE UR QL STRIP.AUTO: NEGATIVE
LIPASE SERPL-CCNC: 14 U/L (ref 13–60)
LYMPHOCYTES # BLD AUTO: 2.76 10*3/MM3 (ref 0.9–4.8)
LYMPHOCYTES NFR BLD AUTO: 45.5 % (ref 19.6–45.3)
MCH RBC QN AUTO: 29.6 PG (ref 26.9–32)
MCHC RBC AUTO-ENTMCNC: 33.2 G/DL (ref 32.4–36.3)
MCV RBC AUTO: 89.1 FL (ref 80.5–98.2)
MONOCYTES # BLD AUTO: 0.72 10*3/MM3 (ref 0.2–1.2)
MONOCYTES NFR BLD AUTO: 11.9 % (ref 5–12)
NEUTROPHILS # BLD AUTO: 2.38 10*3/MM3 (ref 1.9–8.1)
NEUTROPHILS NFR BLD AUTO: 39.3 % (ref 42.7–76)
NITRITE UR QL STRIP: NEGATIVE
PH UR STRIP.AUTO: 7 [PH] (ref 5–8)
PLATELET # BLD AUTO: 210 10*3/MM3 (ref 140–500)
PMV BLD AUTO: 11.7 FL (ref 6–12)
POTASSIUM BLD-SCNC: 4.3 MMOL/L (ref 3.5–5.2)
PROT SERPL-MCNC: 6.7 G/DL (ref 6–8.5)
PROT UR QL STRIP: NEGATIVE
RBC # BLD AUTO: 4.5 10*6/MM3 (ref 3.9–5.2)
SODIUM BLD-SCNC: 135 MMOL/L (ref 136–145)
SP GR UR STRIP: 1.02 (ref 1–1.03)
UROBILINOGEN UR QL STRIP: ABNORMAL
WBC NRBC COR # BLD: 6.06 10*3/MM3 (ref 4.5–10.7)
WHOLE BLOOD HOLD SPECIMEN: NORMAL
WHOLE BLOOD HOLD SPECIMEN: NORMAL

## 2018-05-14 PROCEDURE — 84703 CHORIONIC GONADOTROPIN ASSAY: CPT

## 2018-05-14 PROCEDURE — 83690 ASSAY OF LIPASE: CPT

## 2018-05-14 PROCEDURE — 25010000002 IOPAMIDOL 61 % SOLUTION: Performed by: EMERGENCY MEDICINE

## 2018-05-14 PROCEDURE — 96375 TX/PRO/DX INJ NEW DRUG ADDON: CPT

## 2018-05-14 PROCEDURE — 74177 CT ABD & PELVIS W/CONTRAST: CPT

## 2018-05-14 PROCEDURE — 80053 COMPREHEN METABOLIC PANEL: CPT

## 2018-05-14 PROCEDURE — 96361 HYDRATE IV INFUSION ADD-ON: CPT

## 2018-05-14 PROCEDURE — 85025 COMPLETE CBC W/AUTO DIFF WBC: CPT

## 2018-05-14 PROCEDURE — 96374 THER/PROPH/DIAG INJ IV PUSH: CPT

## 2018-05-14 PROCEDURE — 25010000002 ONDANSETRON PER 1 MG: Performed by: NURSE PRACTITIONER

## 2018-05-14 PROCEDURE — 81003 URINALYSIS AUTO W/O SCOPE: CPT

## 2018-05-14 PROCEDURE — 99284 EMERGENCY DEPT VISIT MOD MDM: CPT

## 2018-05-14 PROCEDURE — 36415 COLL VENOUS BLD VENIPUNCTURE: CPT

## 2018-05-14 RX ORDER — DICYCLOMINE HCL 20 MG
20 TABLET ORAL EVERY 6 HOURS PRN
Qty: 20 TABLET | Refills: 0 | Status: SHIPPED | OUTPATIENT
Start: 2018-05-14 | End: 2018-08-06 | Stop reason: HOSPADM

## 2018-05-14 RX ORDER — SODIUM CHLORIDE 0.9 % (FLUSH) 0.9 %
10 SYRINGE (ML) INJECTION AS NEEDED
Status: DISCONTINUED | OUTPATIENT
Start: 2018-05-14 | End: 2018-05-14 | Stop reason: HOSPADM

## 2018-05-14 RX ORDER — ONDANSETRON 2 MG/ML
4 INJECTION INTRAMUSCULAR; INTRAVENOUS ONCE
Status: COMPLETED | OUTPATIENT
Start: 2018-05-14 | End: 2018-05-14

## 2018-05-14 RX ORDER — ONDANSETRON 4 MG/1
4 TABLET, ORALLY DISINTEGRATING ORAL EVERY 6 HOURS PRN
Qty: 10 TABLET | Refills: 0 | Status: SHIPPED | OUTPATIENT
Start: 2018-05-14 | End: 2018-08-06 | Stop reason: HOSPADM

## 2018-05-14 RX ORDER — FAMOTIDINE 10 MG/ML
20 INJECTION, SOLUTION INTRAVENOUS ONCE
Status: COMPLETED | OUTPATIENT
Start: 2018-05-14 | End: 2018-05-14

## 2018-05-14 RX ADMIN — SODIUM CHLORIDE 1000 ML: 9 INJECTION, SOLUTION INTRAVENOUS at 16:40

## 2018-05-14 RX ADMIN — FAMOTIDINE 20 MG: 10 INJECTION, SOLUTION INTRAVENOUS at 16:45

## 2018-05-14 RX ADMIN — ONDANSETRON 4 MG: 2 SOLUTION INTRAMUSCULAR; INTRAVENOUS at 16:42

## 2018-05-14 RX ADMIN — IOPAMIDOL 80 ML: 612 INJECTION, SOLUTION INTRAVENOUS at 17:37

## 2018-05-14 NOTE — ED PROVIDER NOTES
"EMERGENCY DEPARTMENT ENCOUNTER    Room Number:  13/13  Date seen:  5/14/2018  Time seen: 3:58 PM  PCP: Geovani Mcfadden MD    HPI:  Chief complaint:Dark Stools  Context:Bienvenido Carter is a 37 y.o. female with a h/o UTI who presents to the ED with c/o \"dark\" stools for the past several days, last episode earlier today. Patient also c/o abd pain, decreased sleep, night sweats, vomiting and generalized myalgias. Last week she was evaluated by Dr. Mcfadden for chest pain and loss of appetite. She was d/c with pepcid, which she states has not relieved her sx. When patient attempts to eat, she states it feels like \"it won't stay down.\" Patient smokes tobacco and drinks occasionally, but denies recreational drug use.     Timing:episodic  Duration: several days  Quality:\"dark\" stool  Intensity/Severity:moderate  Associated Symptoms: abd pain, decreased sleep, night sweats, generalized myalgias, appetite loss, chest pain, vomiting  Aggravating Factors:none stated  Alleviating Factors:none stated  Previous Episodes:patient has a h/o UTI  Treatment before arrival:patient has taken pepcid without symptoms relief    ALLERGIES  Review of patient's allergies indicates no known allergies.    PAST MEDICAL HISTORY  Active Ambulatory Problems     Diagnosis Date Noted   • Spinal stenosis of cervical region 05/27/2016   • Cervical disc disorder with radiculopathy of mid-cervical region 05/27/2016     Resolved Ambulatory Problems     Diagnosis Date Noted   • No Resolved Ambulatory Problems     Past Medical History:   Diagnosis Date   • Anxiety    • Bipolar    • Borderline personality disorder in adult    • Depression    • H/O emotional problems        PAST SURGICAL HISTORY  History reviewed. No pertinent surgical history.    FAMILY HISTORY  Family History   Problem Relation Age of Onset   • Adopted: Yes       SOCIAL HISTORY  Social History     Social History   • Marital status: Single     Spouse name: N/A   • Number of " "children: N/A   • Years of education: some college     Occupational History   • Not on file.     Social History Main Topics   • Smoking status: Current Every Day Smoker     Packs/day: 0.50     Types: Cigarettes   • Smokeless tobacco: Never Used   • Alcohol use Yes      Comment: rarely   • Drug use: No   • Sexual activity: Defer     Other Topics Concern   • Not on file     Social History Narrative   • No narrative on file       REVIEW OF SYSTEMS  Review of Systems   Constitutional: Positive for activity change (decreased sleep), appetite change (decreased) and diaphoresis (night sweats). Negative for fever.   HENT: Negative for trouble swallowing.    Eyes: Negative for visual disturbance.   Respiratory: Negative for cough, chest tightness, shortness of breath and wheezing.    Cardiovascular: Positive for chest pain. Negative for palpitations and leg swelling.   Gastrointestinal: Positive for abdominal pain, blood in stool (\"dark\" stools) and vomiting. Negative for diarrhea and nausea.   Genitourinary: Negative for dysuria.   Musculoskeletal: Positive for myalgias (generalized). Negative for back pain.   Skin: Negative for rash.   Neurological: Negative for dizziness, speech difficulty and light-headedness.       PHYSICAL EXAM  ED Triage Vitals   Temp Heart Rate Resp BP SpO2   05/14/18 1136 05/14/18 1136 05/14/18 1136 05/14/18 1143 05/14/18 1136   98 °F (36.7 °C) 88 14 104/72 98 %      Temp src Heart Rate Source Patient Position BP Location FiO2 (%)   05/14/18 1136 05/14/18 1136 05/14/18 1143 05/14/18 1143 --   Tympanic Monitor Sitting Right arm      Physical Exam   Constitutional: She is oriented to person, place, and time and well-developed, well-nourished, and in no distress. No distress.   HENT:   Head: Normocephalic and atraumatic.   Mouth/Throat: Uvula is midline and mucous membranes are normal.   Neck: Normal range of motion. Neck supple.   Cardiovascular: Normal rate, regular rhythm, S1 normal, S2 normal and " normal heart sounds.  Exam reveals no gallop and no friction rub.    No murmur heard.  Pulmonary/Chest: Effort normal and breath sounds normal. No respiratory distress. She has no decreased breath sounds. She has no wheezes. She has no rhonchi. She has no rales.   Abdominal: Soft. Normal appearance. There is tenderness (slight, generalized). There is no rebound and no guarding.   Genitourinary: Rectal exam shows no fissure, no mass and guaiac negative stool.   Genitourinary Comments: Rectal exam chaperoned by RN. No stool felt in rectal vault   Musculoskeletal: Normal range of motion.   Neurological: She is alert and oriented to person, place, and time.   Skin: Skin is warm, dry and intact.   Psychiatric: Affect and judgment normal.   Nursing note and vitals reviewed.      LAB RESULTS  Recent Results (from the past 24 hour(s))   Comprehensive Metabolic Panel    Collection Time: 05/14/18 12:05 PM   Result Value Ref Range    Glucose 102 (H) 65 - 99 mg/dL    BUN 10 6 - 20 mg/dL    Creatinine 0.69 0.57 - 1.00 mg/dL    Sodium 135 (L) 136 - 145 mmol/L    Potassium 4.3 3.5 - 5.2 mmol/L    Chloride 96 (L) 98 - 107 mmol/L    CO2 27.6 22.0 - 29.0 mmol/L    Calcium 9.6 8.6 - 10.5 mg/dL    Total Protein 6.7 6.0 - 8.5 g/dL    Albumin 4.20 3.50 - 5.20 g/dL    ALT (SGPT) 6 1 - 33 U/L    AST (SGOT) 11 1 - 32 U/L    Alkaline Phosphatase 38 (L) 39 - 117 U/L    Total Bilirubin 0.3 0.1 - 1.2 mg/dL    eGFR Non African Amer 95 >60 mL/min/1.73    eGFR  African Amer 115 >60 mL/min/1.73    Globulin 2.5 gm/dL    A/G Ratio 1.7 g/dL    BUN/Creatinine Ratio 14.5 7.0 - 25.0    Anion Gap 11.4 mmol/L   Lipase    Collection Time: 05/14/18 12:05 PM   Result Value Ref Range    Lipase 14 13 - 60 U/L   hCG, Serum, Qualitative    Collection Time: 05/14/18 12:05 PM   Result Value Ref Range    HCG Qualitative Negative Indeterminate, Negative   Light Blue Top    Collection Time: 05/14/18 12:05 PM   Result Value Ref Range    Extra Tube hold for add-on     Green Top (Gel)    Collection Time: 05/14/18 12:05 PM   Result Value Ref Range    Extra Tube Hold for add-ons.    Lavender Top    Collection Time: 05/14/18 12:05 PM   Result Value Ref Range    Extra Tube hold for add-on    CBC Auto Differential    Collection Time: 05/14/18 12:05 PM   Result Value Ref Range    WBC 6.06 4.50 - 10.70 10*3/mm3    RBC 4.50 3.90 - 5.20 10*6/mm3    Hemoglobin 13.3 11.9 - 15.5 g/dL    Hematocrit 40.1 35.6 - 45.5 %    MCV 89.1 80.5 - 98.2 fL    MCH 29.6 26.9 - 32.0 pg    MCHC 33.2 32.4 - 36.3 g/dL    RDW 12.7 11.7 - 13.0 %    RDW-SD 40.9 37.0 - 54.0 fl    MPV 11.7 6.0 - 12.0 fL    Platelets 210 140 - 500 10*3/mm3    Neutrophil % 39.3 (L) 42.7 - 76.0 %    Lymphocyte % 45.5 (H) 19.6 - 45.3 %    Monocyte % 11.9 5.0 - 12.0 %    Eosinophil % 2.3 0.3 - 6.2 %    Basophil % 1.0 0.0 - 1.5 %    Immature Grans % 0.0 0.0 - 0.5 %    Neutrophils, Absolute 2.38 1.90 - 8.10 10*3/mm3    Lymphocytes, Absolute 2.76 0.90 - 4.80 10*3/mm3    Monocytes, Absolute 0.72 0.20 - 1.20 10*3/mm3    Eosinophils, Absolute 0.14 0.00 - 0.70 10*3/mm3    Basophils, Absolute 0.06 0.00 - 0.20 10*3/mm3    Immature Grans, Absolute 0.00 0.00 - 0.03 10*3/mm3   Urinalysis With / Culture If Indicated - Urine, Clean Catch    Collection Time: 05/14/18 12:10 PM   Result Value Ref Range    Color, UA Yellow Yellow, Straw    Appearance, UA Clear Clear    pH, UA 7.0 5.0 - 8.0    Specific Gravity, UA 1.016 1.005 - 1.030    Glucose, UA Negative Negative    Ketones, UA Trace (A) Negative    Bilirubin, UA Negative Negative    Blood, UA Negative Negative    Protein, UA Negative Negative    Leuk Esterase, UA Negative Negative    Nitrite, UA Negative Negative    Urobilinogen, UA 0.2 E.U./dL 0.2 - 1.0 E.U./dL       I ordered the above labs and reviewed the results    RADIOLOGY  CT Abdomen Pelvis With Contrast   Preliminary Result   1. Nonspecific bowel gas with air-filled colonic loops. No wall   thickening or evidence for obstruction.   2. Two  adjacent right adnexal cystic lesions measuring 2.6 and 2.1 cm.   These could represent functional cysts, recommend follow-up with pelvic   and transvaginal sonogram in 4-6 weeks. There is mild free fluid in the   pelvis and may be physiologic.   3. Prominent veins within the left hemipelvis. This may be asymptomatic   though can also indicate a pelvic congestion syndrome in the proper   clinical setting.       Discussed with Skylar Castle in the emergency department on 05/14/2018 at   5:50 PM.        Radiation dose reduction techniques were utilized, including automated   exposure control and exposure modulation based on body size.                  I ordered the above noted radiological studies and reviewed the images on the PACS system.      MEDICATIONS GIVEN IN ER  Medications   sodium chloride 0.9 % flush 10 mL (not administered)   sodium chloride 0.9 % flush 10 mL (not administered)   sodium chloride 0.9 % bolus 1,000 mL (0 mL Intravenous Stopped 5/14/18 1810)   ondansetron (ZOFRAN) injection 4 mg (4 mg Intravenous Given 5/14/18 1642)   famotidine (PEPCID) injection 20 mg (20 mg Intravenous Given 5/14/18 1645)   iopamidol (ISOVUE-300) 61 % injection 100 mL (80 mL Intravenous Given 5/14/18 1737)     PROCEDURES  Procedures    COURSE & MEDICAL DECISION MAKING  Pertinent Labs and Imaging studies that were ordered and reviewed are noted above.  Results were reviewed/discussed with the patient and they were also made aware of online access.  Pt also made aware that some labs, such as cultures, will not be resulted during ER visit and follow up with PMD is necessary.     PROGRESS AND CONSULTS    Progress Notes:    ED Course     6:23 PM  Reviewed pt's history and workup with Dr. Gifford.  After a bedside evaluation, Dr. Gifford agrees with the plan of care.    6:28 PM  The patient's history, physical exam, and lab findings were discussed with the physician, who also performed a face to face history and physical exam.   "I discussed all results and noted any abnormalities with patient including ovarian cysts noted on CT Abd/Pelvis.  Discussed absoute need to recheck abnormalities with their family physician in 4-6 weeks.  I answered any of the patient's questions.  Discussed plan for discharge, as there is no emergent indication for admission.  Pt is agreeable and understands need for follow up and repeat testing.  Pt is aware that discharge does not mean that nothing is wrong but it indicates no emergency is present and they must continue care with their family physician.  Pt is discharged with instructions to follow up with primary care doctor to have their blood pressure rechecked.     Disposition vitals:  /88 (BP Location: Right arm)   Pulse 61   Temp 98 °F (36.7 °C) (Tympanic)   Resp 15   Ht 152.4 cm (60\")   Wt 35.9 kg (79 lb 3.2 oz)   SpO2 100%   BMI 15.47 kg/m²       DIAGNOSIS  Final diagnoses:   Generalized abdominal pain   Non-intractable vomiting with nausea, unspecified vomiting type   Cysts of both ovaries       FOLLOW UP   Geovani Mcfadden MD  [address] [facility] [address]  [facility] [id number] [phone]    Schedule an appointment as soon as possible for a visit in 1 week      Jeovanny Perez MD  [address] [facility] [place] [facility]  [phone]    Schedule an appointment as soon as possible for a visit   to be seen for evaluation      RX     Medication List      New Prescriptions    dicyclomine 20 MG tablet  Commonly known as:  BENTYL  Take 1 tablet by mouth Every 6 (Six) Hours As Needed (abdominal spasms).     ondansetron ODT 4 MG disintegrating tablet  Commonly known as:  ZOFRAN-ODT  Take 1 tablet by mouth Every 6 (Six) Hours As Needed for Nausea or   Vomiting.        Documentation assistance provided by cade Cordova for ZOHRA Sheffield.  Information recorded by the cade was done at my direction and has been verified and validated by me.  Electronically signed by Demi Cordova on " 5/14/2018 at time 6:29 PM       Demi Cordova  05/14/18 1831       Skylar Castle, ZOHRA  05/14/18 1837

## 2018-05-14 NOTE — ED PROVIDER NOTES
The MANUEL and I have discussed this patient's history, physical exam, and treatment plan.  I have reviewed the documentation and personally had a face to face interaction with the patient. I affirm the documentation and agree with the treatment and plan.  The attached note describes my personal findings.    HPI: Pt is a 37 y.o. female who presents to the ED c/o abd pain that began two weeks ago. She has associated N/V and malaise. Patient is only able to tolerate water. Patient saw her PCP and was prescribed Zanaflex. Patient has had recent weight loss, but is on Adderall. On exam, pt has RRR, no respiratory distres, mild diffuse suprapubic abd pain with no rebound, no guarding.  Neurologically patient has no weakness or sensory changes.  Patient looks well and in no acute distress    ED Course        I reviewed Lab fingings and radiological studies performed in the ED.    Discussion: I discussed lab results in the ED and need for outpatient follow up with PCP for outpatient follow up.     Documentation assistance provided by cade Palacios for Tonio Gifford MD.  Information recorded by the scribe was done at my direction and has been verified and validated by me.     Toshia Palacios  05/14/18 1152       Tonio Gifford MD  05/14/18 4012

## 2018-08-01 ENCOUNTER — TELEPHONE (OUTPATIENT)
Dept: NEUROSURGERY | Facility: CLINIC | Age: 38
End: 2018-08-01

## 2018-08-01 NOTE — TELEPHONE ENCOUNTER
Patient called and said she has been out of town for the past week. She said in the past week she has had increased neck pain along with bladder incontinence. She said that she always feels the urge to pee and she has actually urinated on herself a few times she said. I advised her to go to the ER and be seen. She was very hesitant about that because she said that they never do anything to help her. Per verbal conversation with Lindsey, I let her know what was going on and Lindsey said that she needs to go to the ER as well. Patient asked if she could go in the morning and I told her she needs to go today. She verbalized understanding and said she will go to the ER today and said she will call us tomorrow to tell us what is going on and to schedule an appointment.

## 2018-08-02 NOTE — TELEPHONE ENCOUNTER
I called to check on patient. She did not go to the ER. She said that he father is a neurologist and is friends with Dr. Hills. Her father said that everything was fine and she doesn't need to go to the ER. She said that she is feeling better. She is scheduled to come into the office on Monday, August 6th.

## 2018-08-06 ENCOUNTER — OFFICE VISIT (OUTPATIENT)
Dept: NEUROSURGERY | Facility: CLINIC | Age: 38
End: 2018-08-06

## 2018-08-06 VITALS — DIASTOLIC BLOOD PRESSURE: 80 MMHG | HEIGHT: 60 IN | SYSTOLIC BLOOD PRESSURE: 133 MMHG | HEART RATE: 89 BPM

## 2018-08-06 DIAGNOSIS — R20.0 NUMBNESS AND TINGLING OF BOTH LEGS: ICD-10-CM

## 2018-08-06 DIAGNOSIS — M54.16 RIGHT LUMBAR RADICULITIS: Primary | ICD-10-CM

## 2018-08-06 DIAGNOSIS — M54.50 LUMBAR SPINE PAINFUL ON MOVEMENT: ICD-10-CM

## 2018-08-06 DIAGNOSIS — R20.2 NUMBNESS AND TINGLING OF BOTH LEGS: ICD-10-CM

## 2018-08-06 PROCEDURE — 99213 OFFICE O/P EST LOW 20 MIN: CPT | Performed by: NURSE PRACTITIONER

## 2018-08-06 RX ORDER — OXYCODONE AND ACETAMINOPHEN 7.5; 325 MG/1; MG/1
TABLET ORAL
Refills: 0 | COMMUNITY
Start: 2018-07-19 | End: 2019-10-02

## 2018-08-06 NOTE — PROGRESS NOTES
Subjective   Patient ID: Bienvenido Carter is a 37 y.o. female is here today for follow-up on increased neck pain. Ms. Carter reports bladder incontinence issues. Patient currently takes Percocet 7.5-325 mg BID. Patient is complaining of having severe headaches 2-3 times a week.    Ms. Carter returns the office today for persistent complaints of neck pain and radiating shoulder and right arm pain.  She has been working with pain management and has undergone injection therapies.  She is in the process of proceeding with radiofrequency ablations.  She is here today for a new concern.  While visiting a friend in [place], she developed a sudden onset of urinary incontinence.  She also notes worsening pain in the low back with radiation into the right buttock and right leg.  The pain has been persistent and unrelenting.  She is still taking Percocet 7.5/325 mg prescribed by her pain management physician.  She states that since that time she has been having some episodes of urinary urgency but no incontinence. She denies any episodes of dysuria or symptoms of urinary retention.  She has some occasional leakage of bowel material but no incontinence there.  She does note persistent aversion to food and stomach upset.  She is scheduled for endoscopy procedures with GI in the future.  She also reports a weight loss but was able to gain about 7 pounds while vacationing in [place].She has also had some difficulties with managing her psychiatric medications.  She is seeing a new psychiatrist, Dr. Vida Otero.  She had been taking Librium which was not working for her and is now back on her previous medication regimen which includes Depakote and trazodone at night for sleep.        Neck Pain    This is a chronic problem. The current episode started more than 1 year ago. The problem occurs constantly. The problem has been gradually worsening. The pain is associated with nothing. The pain is present in the  right side and anterior neck. The quality of the pain is described as shooting and stabbing. The pain is moderate. The symptoms are aggravated by position. The pain is same all the time. Stiffness is present all day. Associated symptoms include headaches, numbness, pain with swallowing, tingling and trouble swallowing. Pertinent negatives include no chest pain, visual change or weakness. Treatments tried: PT, pain medication. The treatment provided mild relief.       The following portions of the patient's history were reviewed and updated as appropriate: allergies, current medications, past family history, past medical history, past social history, past surgical history and problem list.    Review of Systems   Constitutional: Positive for activity change and unexpected weight change.   HENT: Positive for trouble swallowing.    Respiratory: Negative for chest tightness and shortness of breath.    Cardiovascular: Negative for chest pain.   Musculoskeletal: Positive for arthralgias, back pain and neck pain.   Neurological: Positive for tingling, numbness and headaches. Negative for weakness.   Psychiatric/Behavioral: Positive for agitation and sleep disturbance. The patient is hyperactive.    All other systems reviewed and are negative.      Objective   Physical Exam   Constitutional: She is oriented to person, place, and time. She appears well-developed and well-nourished. She is cooperative. No distress.   HENT:   Head: Normocephalic and atraumatic.   Eyes: Conjunctivae are normal.   Neck: Normal range of motion. Neck supple.   Cardiovascular: Normal rate.    Pulmonary/Chest: Effort normal. No respiratory distress.   Abdominal: Soft. She exhibits no distension. There is no tenderness.   Musculoskeletal: Normal range of motion. She exhibits no edema.   Neurological: She is alert and oriented to person, place, and time. She has normal strength. She displays abnormal reflex. No cranial nerve deficit or sensory deficit.  She exhibits normal muscle tone. Coordination normal. GCS eye subscore is 4. GCS verbal subscore is 5. GCS motor subscore is 6.   Reflex Scores:       Tricep reflexes are 3+ on the right side and 3+ on the left side.       Bicep reflexes are 3+ on the right side and 3+ on the left side.       Brachioradialis reflexes are 3+ on the right side and 3+ on the left side.       Patellar reflexes are 3+ on the right side and 3+ on the left side.       Achilles reflexes are 3+ on the right side and 3+ on the left side.  No motor deficits on exam. Tender to palpation in the R low back, upper buttock. Some difficulty with heel and toe walking bilaterally. SLR + on the R at 30 degrees. Tender to palpation in the lower posterior cervical spine. DTR's 3+ in UE and LE. Negative Roy's; negative clonus.    Skin: Skin is warm and dry. No rash noted. She is not diaphoretic.   Psychiatric: She has a normal mood and affect. Thought content normal.   Vitals reviewed.    Neurologic Exam     Mental Status   Oriented to person, place, and time.     Motor Exam     Strength   Strength 5/5 throughout.     Gait, Coordination, and Reflexes     Reflexes   Right brachioradialis: 3+  Left brachioradialis: 3+  Right biceps: 3+  Left biceps: 3+  Right triceps: 3+  Left triceps: 3+  Right patellar: 3+  Left patellar: 3+  Right achilles: 3+  Left achilles: 3+      Assessment/Plan   Independent Review of Radiographic Studies:     Reviewed the previous MRI images of the cervical spine without contrast dated November 29, 2017 today in the office.  The MRI continues to show degenerative changes with a left central disc protrusion at C4-5 contributing to moderate canal narrowing.  There is also some canal narrowing at C5-C6 with very minimal cord flattening anteriorly.  No evidence of abnormal cord signal.      Medical Decision Making:      Ms. Carter returns to the office today for the above complaints.  She is very concerned about the episode of  urinary incontinence that she experienced while vacationing in [place].  She had been on a new regimen of psychiatric medications which included Librium.  I am not certain whether this plate any role in this or not.  At any rate the patient is no longer taking Librium and the episodes have diminished.  She denies any episodes of complete loss of bladder control other than the one that occurred in [place].  She is also experiencing worsening low back pain and worsening right-sided buttock and leg pain.  Is also having numbness and tingling in both legs from the knees down.  Her cervical issues remain constant problem but at this point these are the less of her worries right now.      I have ordered an MRI of the lumbar spine without contrast.  Ms. cruising normal return to the office thereafter for reevaluation.       Bienvenido was seen today for follow-up and neck pain.    Diagnoses and all orders for this visit:    Right lumbar radiculitis  -     MRI Lumbar Spine Without Contrast; Future    Lumbar spine painful on movement  -     MRI Lumbar Spine Without Contrast; Future    Numbness and tingling of both legs  -     MRI Lumbar Spine Without Contrast; Future      Return for after radiographic imaging.

## 2018-08-10 ENCOUNTER — HOSPITAL ENCOUNTER (OUTPATIENT)
Dept: MRI IMAGING | Facility: HOSPITAL | Age: 38
Discharge: HOME | End: 2018-08-10
Admitting: NURSE PRACTITIONER

## 2018-08-10 DIAGNOSIS — M54.16 RIGHT LUMBAR RADICULITIS: ICD-10-CM

## 2018-08-10 DIAGNOSIS — R20.2 NUMBNESS AND TINGLING OF BOTH LEGS: ICD-10-CM

## 2018-08-10 DIAGNOSIS — R20.0 NUMBNESS AND TINGLING OF BOTH LEGS: ICD-10-CM

## 2018-08-10 DIAGNOSIS — M54.50 LUMBAR SPINE PAINFUL ON MOVEMENT: ICD-10-CM

## 2018-08-10 PROCEDURE — 72148 MRI LUMBAR SPINE W/O DYE: CPT

## 2018-08-15 NOTE — PROGRESS NOTES
Subjective   Patient ID: Bienvenido Carter is a 37 y.o. female is here today for follow-up on neck pain. Patient is currently taking Percocet 7.5-325 mg BID. Ms. Carter is here today with a new lumbar MRI. Patient reports increase in neck pain, which goes into right shoulder and down arm, has numbness and tingling in right arm and hand. She also c/o headache   .      Ms. Carter returns to the office for re-evaluation with a new lumbar MRI. She continues to complain of rather severe R sided neck, R trapezius and R posterior arm pain. She is also noting weakness in the R arm and constant numbness and tingling in the last two fingers of the R hand and the R thumb. She is very concerned about these symptoms.       Neck Pain    This is a chronic problem. The current episode started more than 1 year ago. The problem occurs constantly. The problem has been gradually worsening. The pain is associated with nothing. The pain is present in the right side and anterior neck (Right trapezius, scapular regions and posterior right arm.). The quality of the pain is described as aching and stabbing. The pain is at a severity of 9/10. The pain is severe. The symptoms are aggravated by position. The pain is same all the time. Stiffness is present all day. Associated symptoms include headaches, numbness, pain with swallowing, tingling and weakness. Pertinent negatives include no visual change. Treatments tried: pain medication, PT, injection therapy. The treatment provided no relief.     The following portions of the patient's history were reviewed and updated as appropriate: allergies, current medications, past family history, past medical history, past social history, past surgical history and problem list.    Review of Systems   Constitutional: Positive for activity change.   Musculoskeletal: Positive for arthralgias, neck pain and neck stiffness.   Neurological: Positive for tingling, weakness, numbness and headaches.   All  other systems reviewed and are negative.      Objective   Physical Exam   Constitutional: She is oriented to person, place, and time. She appears well-developed and well-nourished. She is cooperative. No distress.   HENT:   Head: Normocephalic and atraumatic.   Eyes: Conjunctivae are normal.   Neck: Normal range of motion. Neck supple.   Cardiovascular: Normal rate.    Pulmonary/Chest: Effort normal. No respiratory distress.   Abdominal: Soft. She exhibits no distension. There is no tenderness.   Musculoskeletal: Normal range of motion. She exhibits tenderness (Tender to palpation in the right posterior cervical region and right trapezius region.). She exhibits no edema.   Neurological: She is alert and oriented to person, place, and time. She has normal strength. She displays normal reflexes. A sensory deficit (sensation is decreased to light touch in the right thumb and the last 2 fingers of the right hand.) is present. She exhibits normal muscle tone. Coordination normal. GCS eye subscore is 4. GCS verbal subscore is 5. GCS motor subscore is 6.   Right deltoid and right tricep weakness 4/5.  Giveaway weakness noted in the right .  Decreased sensation to light touch in the right thumb and last 2 fingers of the right hand.  Spurling's positive bilaterally; more so on the right.  DTR's normal. Negative Roy's; negative clonus. SLR and Noe's negative bilaterally.      Skin: Skin is warm and dry. No rash noted. She is not diaphoretic.   Psychiatric: She has a normal mood and affect. Thought content normal.   Vitals reviewed.    Neurologic Exam     Mental Status   Oriented to person, place, and time.     Motor Exam     Strength   Strength 5/5 throughout.       Assessment/Plan   Independent Review of Radiographic Studies:      I have independently reviewed the MRI images of the lumbar spine without contrast dated August 10, 2018 today in the office.  The lumbar MRI shows some multilevel mild degenerative  facet arthritis.  No evidence of disc herniation or severe canal narrowing.      I also reviewed the previous cervical MRI images from November 2017 today in the office.  The cervical MRI showed no significant changes when compared to previous MRI in 2014.  Multilevel degenerative changes and disc osteophyte complexes were noted in the cervical spine.  There is a broad-based X bulge at C5-C6 contributing to moderate canal narrowing.    Medical Decision Making:      Ms. Samuel returns to the office today for reevaluation with a new lumbar MRI.  Although she is still having some pain in the right low back and buttock, she is now complaining more so of right-sided neck and right arm pain.  She was relieved to know that there is nothing surgical in the lumbar spine.    Exam findings reveal some weakness in the right deltoid and right tricep region.  There is also sensory deficit in the last 2 fingers of the right hand as well as the right thumb.  Given these findings, I have described and recommended cervical myelography.    I have discussed the myelogram procedure at length with the patient. The risks of the procedure were explained. There is a risk of infection, bleeding, increased pain and positional headache possibly requiring a blood patch. The best way to avoid the positional headache is by taking it easy and participating in no strenuous activity for a couple of days following the myelogram. Drinking plenty of fluids including caffeinated beverages was encouraged. Should the patient develop a positional headache, I recommended calling the office for further instructions. The patient verbalized understanding of these risks and asked to proceed.           Bienvenido was seen today for neck pain.    Diagnoses and all orders for this visit:    Radiculopathy of cervicothoracic region  -     IR Myelogram Cervical Spine; Future  -     CT Cervical Spine Without Contrast; Future  -     XR Spine Cervical Flex & Ext  Only; Future  -     No Lab Testing Needed; Standing    Numbness and tingling in right hand  -     IR Myelogram Cervical Spine; Future  -     CT Cervical Spine Without Contrast; Future  -     XR Spine Cervical Flex & Ext Only; Future  -     No Lab Testing Needed; Standing    Right arm weakness      Return for after radiographic imaging with Dr. Hills.

## 2018-08-20 ENCOUNTER — OFFICE VISIT (OUTPATIENT)
Dept: NEUROSURGERY | Facility: CLINIC | Age: 38
End: 2018-08-20

## 2018-08-20 VITALS
WEIGHT: 78 LBS | DIASTOLIC BLOOD PRESSURE: 74 MMHG | SYSTOLIC BLOOD PRESSURE: 110 MMHG | BODY MASS INDEX: 15.31 KG/M2 | HEIGHT: 60 IN

## 2018-08-20 DIAGNOSIS — R20.0 NUMBNESS AND TINGLING IN RIGHT HAND: ICD-10-CM

## 2018-08-20 DIAGNOSIS — R29.898 RIGHT ARM WEAKNESS: ICD-10-CM

## 2018-08-20 DIAGNOSIS — R20.2 NUMBNESS AND TINGLING IN RIGHT HAND: ICD-10-CM

## 2018-08-20 DIAGNOSIS — M54.13 RADICULOPATHY OF CERVICOTHORACIC REGION: Primary | ICD-10-CM

## 2018-08-20 PROCEDURE — 99214 OFFICE O/P EST MOD 30 MIN: CPT | Performed by: NURSE PRACTITIONER

## 2018-09-11 ENCOUNTER — TELEPHONE (OUTPATIENT)
Dept: NEUROSURGERY | Facility: CLINIC | Age: 38
End: 2018-09-11

## 2018-09-11 NOTE — TELEPHONE ENCOUNTER
As we discussed today, patient can cancel myelogram and then f/u with Dr. Hills to discuss. She should already have an appointment.

## 2018-09-11 NOTE — TELEPHONE ENCOUNTER
----- Message from Bienvenido Carter sent at 9/11/2018  3:53 PM EDT -----  Regarding: Test Results Question  Hello, this is [place]. I recently, through my PCP, had an MRI of my cervical done due to much more increased pain, not only to my neck, but to my back, as well as to my right shoulder. My PCP has referred me to another center to be checked out. I do not have an appt with them yet, but will receive a call in the next week or two. I will be cancelling my myelogram, as my father has said I should not have that done just yet. I am getting a second opinion. Also, when I called the office just a few minutes ago, the  told me that I cannot see either you or Dr. Hills and just have the procedure done and then go to my appt. with the doctor. Can you please call me back today or this week to talk???

## 2018-09-11 NOTE — TELEPHONE ENCOUNTER
Patient is aware and wants to cancel her Myelogram still and will keep her appointment with Dr. Hills for now and will call to cancel if she decides she does not need the appointment.

## 2018-09-11 NOTE — TELEPHONE ENCOUNTER
I called the patient back she is not completley sure who she spoke with in our office but the lady was very hateful to her. Her symptoms have changed since seeing Katie on 8/20/2018. She is having difficulty with ADL's. She has had some changes in her symptoms. She had a new MRI at [facility] since her MRI here at [facility] that her PCP ordered. Her PCP sent a referral for her to see [facility] since this new imaging. The patient does not want to proceed with the Myelogram since she has had new imaging. The patient wanted to schedule an appointment to follow up on these new images she had done. She is now wanting to know if we can look at these new images and see what she should do.

## 2018-09-12 ENCOUNTER — TRANSCRIBE ORDERS (OUTPATIENT)
Dept: PHYSICAL THERAPY | Facility: HOSPITAL | Age: 38
End: 2018-09-12

## 2018-09-12 DIAGNOSIS — M75.41 IMPINGEMENT SYNDROME OF RIGHT SHOULDER: Primary | ICD-10-CM

## 2018-09-13 ENCOUNTER — HOSPITAL ENCOUNTER (OUTPATIENT)
Dept: PHYSICAL THERAPY | Facility: HOSPITAL | Age: 38
Setting detail: THERAPIES SERIES
Discharge: HOME | End: 2018-09-13

## 2018-09-13 DIAGNOSIS — M75.41 IMPINGEMENT SYNDROME OF RIGHT SHOULDER: ICD-10-CM

## 2018-09-13 DIAGNOSIS — G89.29 CHRONIC RIGHT SHOULDER PAIN: Primary | ICD-10-CM

## 2018-09-13 DIAGNOSIS — M25.511 CHRONIC RIGHT SHOULDER PAIN: Primary | ICD-10-CM

## 2018-09-13 PROCEDURE — 97162 PT EVAL MOD COMPLEX 30 MIN: CPT | Performed by: PHYSICAL THERAPIST

## 2018-09-13 PROCEDURE — 97110 THERAPEUTIC EXERCISES: CPT | Performed by: PHYSICAL THERAPIST

## 2018-09-13 NOTE — THERAPY EVALUATION
Outpatient Physical Therapy Ortho Initial Evaluation  [facility]     Patient Name: Bienvenido Carter  : 1980  MRN: [id number]  Today's Date: 2018      Visit Date: 2018    Patient Active Problem List   Diagnosis   • Spinal stenosis of cervical region   • Cervical disc disorder with radiculopathy of mid-cervical region        Past Medical History:   Diagnosis Date   • Anxiety    • Bipolar    • Borderline personality disorder in adult    • Depression    • H/O emotional problems         No past surgical history on file.    Visit Dx:     ICD-10-CM ICD-9-CM   1. Chronic right shoulder pain M25.511 719.41    G89.29 338.29   2. Impingement syndrome of right shoulder M75.41 726.2             Patient History     Row Name 18 0800             History    Chief Complaint Difficulty with daily activities;Pain;Muscle weakness;Muscle tenderness  -      Date Current Problem(s) Began 18  -      Brief Description of Current Complaint Pt started with R shoulder pain several months ago. She has a lot of neck issues and she thought that maybe it was that. She went to ortho. He is ordering possibly other tests. She is having pain down to the hands. She has some numbness into the hands  -      Previous treatment for THIS PROBLEM Injections;Medication  -      Patient/Caregiver Goals Relieve pain;Return to prior level of function;Improve mobility;Improve strength  -      Patient's Rating of General Health Very good  -      Hand Dominance right-handed  -      Patient seeing anyone else for problem(s)? ortho  -      How has patient tried to help current problem? injection, meds  -      What clinical tests have you had for this problem? X-ray  -         Pain     Pain Location Shoulder  -      Pain at Present 4  -LH      Pain at Best 3  -LH      Pain at Worst 8  -LH      Pain Frequency Constant/continuous;Intermittent  -      Pain Description Aching;Sharp;Shooting;Numbness  -       What Performance Factors Make the Current Problem(s) WORSE? driving, every movement, sleeping, dressing  -      What Performance Factors Make the Current Problem(s) BETTER? not much  -      Is your sleep disturbed? Yes  -         Fall Risk Assessment    Any falls in the past year: No  -LH         Services    Prior Rehab/Home Health Experiences Yes  -LH      Are you currently receiving Home Health services No  -LH         Daily Activities    Primary Language English  -      How does patient learn best? Reading  -      Teaching needs identified Home Exercise Program;Management of Condition  -      Patient is concerned about/has problems with Difficulty with self care (i.e. bathing, dressing, toileting:;Performing home management (household chores, shopping, care of dependents);Reaching over head;Repetitive movements of the hand, arm, shoulder  -      Does patient have problems with the following? Depression;Anxiety  -      Barriers to learning None  -      Pt Participated in POC and Goals Yes  -         Safety    Are you being hurt, hit, or frightened by anyone at home or in your life? No  -LH      Are you being neglected by a caregiver No  -        User Key  (r) = Recorded By, (t) = Taken By, (c) = Cosigned By    Initials Name Provider Type     Katie Medel, PT Physical Therapist                PT Ortho     Row Name 09/13/18 0800       Posture/Observations    Forward Head Mild;Moderate  -LH    Thoracic Kyphosis Decreased  -LH    Rounded Shoulders Mild  -LH       Shoulder Impingement/Rotator Cuff Special Tests    Pollock-Omar Test (RC Lesion vs. Bursitis) Right:;Positive  -    Neer Impingement Test (RC Lesion vs. Bursitis) Right:;Positive  -    Empty Can Test (RC Lesion) Right:;Positive  -    Drop Arm Test (Full Thickness RC Lesion) Right:;Negative  -LH       Shoulder Girdle Palpation    Subacromial Space Right:;Tender  -LH    Supraspinatus Insertion Right:;Tender  -LH    Long  Head of Biceps Right:;Tender  -LH       Right Upper Ext    Rt Shoulder Abduction AROM 70 deg  -LH    Rt Shoulder Abduction PROM 160 deg  -LH    Rt Shoulder Flexion AROM 80 deg  -LH    Rt Shoulder Flexion PROM 140 deg  -LH    Rt Shoulder External Rotation AROM side of head  -LH    Rt Shoulder External Rotation PROM 75 deg  -LH    Rt Shoulder Internal Rotation AROM ipsilateral side of buttock  -LH    Rt Shoulder Internal Rotation PROM 70 deg  -LH    Rt Upper Extremity Comments  pain with all active and passive motions  -LH       Left Upper Ext    Lt Shoulder Abduction AROM 153 deg  -LH    Lt Shoulder Abduction PROM 165 deg  -LH    Lt Shoulder Flexion AROM 140 deg  -LH    Lt Shoulder Flexion PROM 70 deg  -LH    Lt Shoulder External Rotation AROM T2  -LH    Lt Shoulder External Rotation PROM 90 deg  -LH    Lt Shoulder Internal Rotation AROM T10  -LH    Lt Shoulder Internal Rotation PROM 80 deg  -LH       MMT Right Upper Ext    Rt Upper Extremity Comments  MMT in neutral 3/5 with pain  -       MMT Left Upper Ext    Lt Shoulder Flexion MMT, Gross Movement (4/5) good  -LH    Lt Shoulder ABduction MMT, Gross Movement (4/5) good  -LH    Lt Shoulder Internal Rotation MMT, Gross Movement (4/5) good  -LH    Lt Shoulder External Rotation MMT, Gross Movement (4/5) good  -LH      User Key  (r) = Recorded By, (t) = Taken By, (c) = Cosigned By    Initials Name Provider Type     Katie Medel, PT Physical Therapist                      Therapy Education  Given: HEP, Symptoms/condition management, Pain management, Posture/body mechanics  Program: New  How Provided: Verbal, Demonstration, Written  Provided to: Patient  Level of Understanding: Teach back education performed, Verbalized, Demonstrated           PT OP Goals     Row Name 09/13/18 0800          PT Short Term Goals    STG 1 Patient will be independent with education for symptom management, joint protection and strategies to minimize stress on affected tissues  -      STG 1 Progress [facility]     STG 2 Pt to improve R shoulder ROM in flex=110/160 deg, abd=110/165 deg, ER=neck/80 deg and IR=L5/75 deg  -     STG 2 Progress [facility]     STG 3 Pt to improve pain complaints to no more than 6/10 with ADLs  -     STG 3 Progress [facility]        Long Term Goals    LTG 1 Pt to improve R shoulder ROM in flex=140/160 deg, abd=140/168 deg, ER=T1/85 deg and IR=L3/80 deg  -     LTG 1 Progress New  [facility]     LTG 2 Pt to improve pain complaints to no more than 4/10 with ADLs  -     LTG 2 Progress [facility]     LTG 3 Pt to improve DASH score from 73% to 40% for overall functional improvement  -     LTG 3 Progress [facility]     LTG 4 Pt to improve shoulder strength to 4/5  -     LTG 4 Progress [facility]     LTG 5 Patient will be independent and compliant with advanced home exercise program to facilitate self-management of symptoms.  -     LTG 5 Progress New  -LH        Time Calculation    PT Goal Re-Cert Due Date 12/13/18  [facility]       User Key  (r) = Recorded By, (t) = Taken By, (c) = Cosigned By    Initials Name Provider Type     Katie Medel, PT Physical Therapist                PT Assessment/Plan     Row Name 09/13/18 1231          PT Assessment    Functional Limitations Limitations in community activities;Limitation in home management;Performance in self-care ADL;Performance in leisure activities;Limitations in functional capacity and performance;Performance in work activities  -     Impairments Muscle strength;Pain;Joint mobility;Range of motion;Posture  -     Assessment Comments Pt presents to therapy with complaints of R shoulder pain that has been evolving over the past several months. She has a history of cervical stenosis as well and is R handed. Pt has decreased ROM and pain with all active and passive motions. She has decreased sterngth, tenderness over the subacromial area and LHbiceps tendon, and speciall tests that indicate shoulder impingement. Pt scred a 73% on the  DASH, with 0% being no disability. Pt would benefit from therapy to address her difficulty and pain with sleeping, dressing, driving, and lifting.   -     Please refer to paper survey for additional self-reported information Yes  -     Rehab Potential Good  -     Patient/caregiver participated in establishment of treatment plan and goals Yes  -     Patient would benefit from skilled therapy intervention Yes  -LH        PT Plan    PT Frequency 2x/week  -     Predicted Duration of Therapy Intervention (Therapy Eval) 4 wks  -     Planned CPT's? PT EVAL MOD COMPLELITY: 54595;PT THER PROC EA 15 MIN: 54260;PT MANUAL THERAPY EA 15 MIN: 76992;PT ELECTRICAL STIM ATTD EA 15 MIN: 31766;PT ULTRASOUND EA 15 MIN: 60606  -     Physical Therapy Interventions (Optional Details) joint mobilization;patient/family education;postural re-education;ROM (Range of Motion);modalities;strengthening;stretching;dry needling;home exercise program  -     PT Plan Comments plan to see pt 2x wk for ROM, sterngth, and pain reduction  -       User Key  (r) = Recorded By, (t) = Taken By, (c) = Cosigned By    Initials Name Provider Type     Katie Medel, PT Physical Therapist                  Exercises     Row Name 09/13/18 0800             Total Minutes    71268 - PT Therapeutic Exercise Minutes 10  -LH         Exercise 1    Exercise Name 1 shoulder rolls and scap ret  -      Sets 1 1  -      Reps 1 10  -LH         Exercise 2    Exercise Name 2 AAROM Er with cane  -      Sets 2 1  -LH      Reps 2 10  -LH      Time 2 10 sec  -LH         Exercise 3    Exercise Name 3 table slides  -      Sets 3 1  -      Reps 3 10  -LH      Time 3 sec  -LH         Exercise 4    Exercise Name 4 pendulums  -        User Key  (r) = Recorded By, (t) = Taken By, (c) = Cosigned By    Initials Name Provider Type     Katie Medel PT Physical Therapist                        Outcome Measure Options: Disabilities of the Arm, Shoulder,  and Hand (DASH)  DASH  Open a tight or new jar.: Moderate Difficulty  Write: Moderate Difficulty  Turn a key: Moderate Difficulty  Prepare a meal: Mild Difficulty  Push open a heavy door: Mild Difficulty  Place an object on a shelf above your head: Severe Difficulty  Do heavy household chores (e.g., wash walls, wash floors): Unable  Garden or do yard work: Severe Difficulty  Make a bed: Moderate Difficulty  Carry a shopping bag or briefcase: Moderate Difficulty  Carry a heavy object (over 10 lbs): Unable  Change a lightbulb overhead: Unable  Wash or blow dry your hair: Severe Difficulty  Wash your back: Unable  Put on a pullover sweater: Severe Difficulty  Use a knife to cut food: Mild Difficulty  Recreational activities in which require little effort (e.g., cardplaying, knitting, etc.): Mild Difficulty  Recreational activities in which you take some force or impact through your arm, should or hand (e.g. golf, hammering, tennis, etc.): Unable  Recreational Activities in which you move your arm freely (e.g., frisbee, badminton, etc.): Unable  Manage transportation needs (getting from one place to another): Moderate Difficulty  During the past week, to what extent has your arm, shoulder, or hand problem interfered with your normal social activites with family, friends, neighbors or groups?: Extremely  During the past week, were you limited in your work or other regular daily activities as a result of your arm, shoulder or hand problem?: Unable  Arm, Shoulder, or hand pain: Severe  Arm, shoulder or hand pain when you performed any specific activity: Extreme  Tingling (pins and needles) in your arm, shoulder, or hand: Extreme  Weakness in your arm, shoulder or hand: Extreme  Stiffness in your arm, shoulder or hand: Extreme  During the past week, how much difficulty have you had sleeping because of the pain in your arm, shoulder or hand?: Moderate Difficiculty  I feel less capable, less confident or less useful because  of my arm, shoulder or hand problem: Strongly Agree  DASH Sum : 114  Number of Questions Answered: 29  DASH Score: 73.28         Time Calculation:     Therapy Suggested Charges     Code   Minutes Charges    96718 (CPT®) Hc Pt Neuromusc Re Education Ea 15 Min      12564 (CPT®) Hc Pt Ther Proc Ea 15 Min 10 1    53965 (CPT®) Hc Gait Training Ea 15 Min      86877 (CPT®) Hc Pt Therapeutic Act Ea 15 Min      10534 (CPT®) Hc Pt Manual Therapy Ea 15 Min      26234 (CPT®) Hc Pt Ther Massage- Per 15 Min      59632 (CPT®) Hc Pt Iontophoresis Ea 15 Min      74950 (CPT®) Hc Pt Elec Stim Ea-Per 15 Min      92617 (CPT®) Hc Pt Ultrasound Ea 15 Min      77313 (CPT®) Hc Pt Self Care/Mgmt/Train Ea 15 Min      12185 (CPT®) Hc Pt Prosthetic (S) Train Initial Encounter, Each 15 Min      48385 (CPT®) Hc Orthotic(S) Mgmt/Train Initial Encounter, Each 15min      18661 (CPT®) Hc Pt Aquatic Therapy Ea 15 Min      04644 (CPT®) Hc Pt Orthotic(S)/Prosthetic(S) Encounter, Each 15 Min      Total  10 1          Start Time: 0800  Stop Time: 0845  Time Calculation (min): 45 min  Total Timed Code Minutes- PT: 43 minute(s)     Therapy Charges for Today     Code Description Service Date Service Provider Modifiers Qty    11182758370 HC PT THER PROC EA 15 MIN 9/13/2018 Katie Medel, PT GP 1    81330201260 HC PT EVAL MOD COMPLEXITY 2 9/13/2018 Katie Medel, PT GP 1          PT G-Codes  Outcome Measure Options: Disabilities of the Arm, Shoulder, and Hand (DASH)         Katie Medel, PT  9/13/2018

## 2018-09-18 ENCOUNTER — HOSPITAL ENCOUNTER (OUTPATIENT)
Dept: PHYSICAL THERAPY | Facility: HOSPITAL | Age: 38
Setting detail: THERAPIES SERIES
Discharge: HOME | End: 2018-09-18

## 2018-09-18 DIAGNOSIS — M75.41 IMPINGEMENT SYNDROME OF RIGHT SHOULDER: ICD-10-CM

## 2018-09-18 DIAGNOSIS — M25.511 CHRONIC RIGHT SHOULDER PAIN: Primary | ICD-10-CM

## 2018-09-18 DIAGNOSIS — G89.29 CHRONIC RIGHT SHOULDER PAIN: Primary | ICD-10-CM

## 2018-09-18 PROCEDURE — 97110 THERAPEUTIC EXERCISES: CPT | Performed by: PHYSICAL THERAPIST

## 2018-09-18 PROCEDURE — 97140 MANUAL THERAPY 1/> REGIONS: CPT | Performed by: PHYSICAL THERAPIST

## 2018-09-18 NOTE — THERAPY TREATMENT NOTE
Outpatient Physical Therapy Ortho Treatment Note  [facility]     Patient Name: Bienvenido Carter  : 1980  MRN: [id number]  Today's Date: 2018      Visit Date: 2018    Visit Dx:    ICD-10-CM ICD-9-CM   1. Chronic right shoulder pain M25.511 719.41    G89.29 338.29   2. Impingement syndrome of right shoulder M75.41 726.2       Patient Active Problem List   Diagnosis   • Spinal stenosis of cervical region   • Cervical disc disorder with radiculopathy of mid-cervical region        Past Medical History:   Diagnosis Date   • Anxiety    • Bipolar    • Borderline personality disorder in adult    • Depression    • H/O emotional problems         No past surgical history on file.          PT Ortho     Row Name 18 0800       Right Upper Ext    Rt Shoulder Abduction PROM 165 deg  -LH    Rt Shoulder Flexion PROM 160 deg  -LH    Rt Shoulder External Rotation PROM 80 deg  -LH    Rt Shoulder Internal Rotation PROM 80 deg  -LH      User Key  (r) = Recorded By, (t) = Taken By, (c) = Cosigned By    Initials Name Provider Type     Katie eMdel, PT Physical Therapist                            PT Assessment/Plan     Row Name 18 0941          PT Assessment    Assessment Comments Pt is starting to feel a little less pain with her activity, but sleeping on her R side is still painful. She has improved her PROM over the past week. Started light resistance for RC strengthening  -        PT Plan    PT Plan Comments cont  -LH       User Key  (r) = Recorded By, (t) = Taken By, (c) = Cosigned By    Initials Name Provider Type     Katie Medel, PT Physical Therapist                    Exercises     Row Name 18 0918 0800          Subjective Comments    Subjective Comments  -- I woke up this morning on my right side and it is sore. in general the pain is better from the injection  -        Subjective Pain    Able to rate subjective pain?  -- yes  -     Pre-Treatment Pain  Level  -- 4  -LH        Total Minutes    34334 - PT Therapeutic Exercise Minutes  -- 17  -LH     46567 - PT Manual Therapy Minutes 23  -LH  --        Exercise 1    Exercise Name 1  -- shoulder rolls and scap ret  -     Sets 1  -- 2  -LH     Reps 1  -- 10  -LH        Exercise 2    Exercise Name 2  -- AAROM Er with cane  -     Sets 2  -- 1  -LH     Reps 2  -- 10  -LH     Time 2  -- 10 sec  -LH        Exercise 3    Exercise Name 3  -- table slides  -     Cueing 3  -- Verbal;Demo  -     Sets 3  -- 1  -LH     Reps 3  -- 10  -LH     Time 3  -- sec  -LH        Exercise 4    Exercise Name 4  -- pendulums  -        Exercise 5    Exercise Name 5  -- isometrics with towel under arm(flex, Er, ext)  -     Cueing 5  -- Demo  -LH     Sets 5  -- 1  -LH     Reps 5  -- 10  -LH     Time 5  -- 5 sec  -LH       User Key  (r) = Recorded By, (t) = Taken By, (c) = Cosigned By    Initials Name Provider Type     Katie Medel, PT Physical Therapist                        Manual Rx (last 36 hours)      Manual Treatments     Row Name 09/18/18 0900             Total Minutes    87012 - PT Manual Therapy Minutes 23  -         Manual Rx 1    Manual Rx 1 Location R shoulder  -      Manual Rx 1 Type PROM in all planes with oscillations for pain and relaxation  -      Manual Rx 1 Duration 23  -LH        User Key  (r) = Recorded By, (t) = Taken By, (c) = Cosigned By    Initials Name Provider Type     Katie Medel PT Physical Therapist                             Time Calculation:   Start Time: 0845  Stop Time: 0925  Time Calculation (min): 40 min  Total Timed Code Minutes- PT: 40 minute(s)  Therapy Suggested Charges     Code   Minutes Charges    03578 (CPT®)  Pt Neuromusc Re Education Ea 15 Min      15454 (CPT®) Hc Pt Ther Proc Ea 15 Min 17 1    56864 (CPT®) Hc Gait Training Ea 15 Min      15663 (CPT®) Hc Pt Therapeutic Act Ea 15 Min      38710 (CPT®) Hc Pt Manual Therapy Ea 15 Min 23 2    33223 (CPT®)  Pt Ther  Massage- Per 15 Min      75026 (CPT®) Hc Pt Iontophoresis Ea 15 Min      68369 (CPT®) Hc Pt Elec Stim Ea-Per 15 Min      76176 (CPT®) Hc Pt Ultrasound Ea 15 Min      02517 (CPT®) Hc Pt Self Care/Mgmt/Train Ea 15 Min      92075 (CPT®) Hc Pt Prosthetic (S) Train Initial Encounter, Each 15 Min      54849 (CPT®) Hc Orthotic(S) Mgmt/Train Initial Encounter, Each 15min      42428 (CPT®) Hc Pt Aquatic Therapy Ea 15 Min      80750 (CPT®) Hc Pt Orthotic(S)/Prosthetic(S) Encounter, Each 15 Min      Total  40 3        Therapy Charges for Today     Code Description Service Date Service Provider Modifiers Qty    01540357635 HC PT THER PROC EA 15 MIN 9/18/2018 Katie Medel, PT GP 1    45457263160 HC PT MANUAL THERAPY EA 15 MIN 9/18/2018 Katie Medel, PT GP 2                    Katie Medel, PT  9/18/2018

## 2018-09-20 ENCOUNTER — HOSPITAL ENCOUNTER (OUTPATIENT)
Dept: PHYSICAL THERAPY | Facility: HOSPITAL | Age: 38
Setting detail: THERAPIES SERIES
Discharge: HOME | End: 2018-09-20

## 2018-09-20 DIAGNOSIS — M75.41 IMPINGEMENT SYNDROME OF RIGHT SHOULDER: ICD-10-CM

## 2018-09-20 DIAGNOSIS — M25.511 CHRONIC RIGHT SHOULDER PAIN: Primary | ICD-10-CM

## 2018-09-20 DIAGNOSIS — G89.29 CHRONIC RIGHT SHOULDER PAIN: Primary | ICD-10-CM

## 2018-09-20 PROCEDURE — 97035 APP MDLTY 1+ULTRASOUND EA 15: CPT | Performed by: PHYSICAL THERAPIST

## 2018-09-20 PROCEDURE — 97110 THERAPEUTIC EXERCISES: CPT | Performed by: PHYSICAL THERAPIST

## 2018-09-20 PROCEDURE — 97140 MANUAL THERAPY 1/> REGIONS: CPT | Performed by: PHYSICAL THERAPIST

## 2018-09-20 NOTE — THERAPY TREATMENT NOTE
Outpatient Physical Therapy Ortho Treatment Note  [facility]     Patient Name: Bienvenido Carter  : 1980  MRN: [id number]  Today's Date: 2018      Visit Date: 2018    Visit Dx:    ICD-10-CM ICD-9-CM   1. Chronic right shoulder pain M25.511 719.41    G89.29 338.29   2. Impingement syndrome of right shoulder M75.41 726.2       Patient Active Problem List   Diagnosis   • Spinal stenosis of cervical region   • Cervical disc disorder with radiculopathy of mid-cervical region        Past Medical History:   Diagnosis Date   • Anxiety    • Bipolar    • Borderline personality disorder in adult    • Depression    • H/O emotional problems         No past surgical history on file.          PT Ortho     Row Name 18 0800       Right Upper Ext    Rt Shoulder Abduction PROM 165 deg  -LH    Rt Shoulder Flexion PROM 160 deg  -LH    Rt Shoulder External Rotation PROM 80 deg  -LH    Rt Shoulder Internal Rotation PROM 80 deg  -LH      User Key  (r) = Recorded By, (t) = Taken By, (c) = Cosigned By    Initials Name Provider Type     Katie Medel, PT Physical Therapist                            PT Assessment/Plan     Row Name 18 0938          PT Assessment    Assessment Comments pt still c/o sharp pains in the shoulder with certain activities. She is improving her PROM without increased symptoms but h as tenderness over the subacromial area.  -        PT Plan    PT Plan Comments cont to work on shoulder strength, ROM, and pain reduction  -       User Key  (r) = Recorded By, (t) = Taken By, (c) = Cosigned By    Initials Name Provider Type    Katie Mares, PT Physical Therapist                Modalities     Row Name 18 0800             Ultrasound 22309    Location  R shoulder  -      Duty Cycle 100  -      Frequency 1.0 MHz  -      Intensity - Wts/cm 1.4  -      38181 - PT Ultrasound Minutes 9  -        User Key  (r) = Recorded By, (t) = Taken By, (c) = Cosigned By     Initials Name Provider Type     Katie Medel, PT Physical Therapist                Exercises     Row Name 09/20/18 0900 09/20/18 0800          Subjective Comments    Subjective Comments  -- I was driving and got it in a certain position and it really hurt  -        Subjective Pain    Able to rate subjective pain?  -- yes  -     Pre-Treatment Pain Level  -- 5  -LH        Total Minutes    51838 - PT Therapeutic Exercise Minutes  -- 15  -LH     27313 - PT Manual Therapy Minutes 18  -LH  --        Exercise 1    Exercise Name 1  -- shoulder rolls and scap ret  -     Sets 1  -- 2  -LH     Reps 1  -- 10  -LH        Exercise 2    Exercise Name 2  -- AAROM Er with cane  -     Sets 2  -- 1  -LH     Reps 2  -- 10  -LH     Time 2  -- 10 sec  -LH        Exercise 3    Exercise Name 3  -- table slides  -     Cueing 3  -- Verbal;Demo  -     Sets 3  -- 1  -LH     Reps 3  -- 10  -LH     Time 3  -- sec  -LH        Exercise 4    Exercise Name 4  -- pendulums  -        Exercise 5    Exercise Name 5  -- isometrics with towel under arm(flex, Er, ext)  -     Cueing 5  -- Demo  -     Sets 5  -- 1  -LH     Reps 5  -- 10  -LH     Time 5  -- 5 sec  -LH       User Key  (r) = Recorded By, (t) = Taken By, (c) = Cosigned By    Initials Name Provider Type     Katie Medel, PT Physical Therapist                        Manual Rx (last 36 hours)      Manual Treatments     Row Name 09/20/18 0900             Total Minutes    71203 - PT Manual Therapy Minutes 18  -LH         Manual Rx 1    Manual Rx 1 Location R shoulder  -      Manual Rx 1 Type PROM in all planes with oscillations for pain and relaxation  -      Manual Rx 1 Duration 18  -LH        User Key  (r) = Recorded By, (t) = Taken By, (c) = Cosigned By    Initials Name Provider Type     Katie Medel, PT Physical Therapist              Therapy Education  Given: HEP, Symptoms/condition management, Pain management, Posture/body mechanics  Program:  Reinforced  How Provided: Verbal  Provided to: Patient  Level of Understanding: Teach back education performed              Time Calculation:   Start Time: 0845  Stop Time: 0930  Time Calculation (min): 45 min  Total Timed Code Minutes- PT: 43 minute(s)  Therapy Suggested Charges     Code   Minutes Charges    11687 (CPT®) Hc Pt Neuromusc Re Education Ea 15 Min      63374 (CPT®) Hc Pt Ther Proc Ea 15 Min 15 1    99614 (CPT®) Hc Gait Training Ea 15 Min      83392 (CPT®) Hc Pt Therapeutic Act Ea 15 Min      00970 (CPT®) Hc Pt Manual Therapy Ea 15 Min 18 1    38192 (CPT®) Hc Pt Ther Massage- Per 15 Min      12850 (CPT®) Hc Pt Iontophoresis Ea 15 Min      19146 (CPT®) Hc Pt Elec Stim Ea-Per 15 Min      06233 (CPT®) Hc Pt Ultrasound Ea 15 Min 9 1    10037 (CPT®) Hc Pt Self Care/Mgmt/Train Ea 15 Min      21286 (CPT®) Hc Pt Prosthetic (S) Train Initial Encounter, Each 15 Min      50147 (CPT®) Hc Orthotic(S) Mgmt/Train Initial Encounter, Each 15min      34307 (CPT®) Hc Pt Aquatic Therapy Ea 15 Min      45704 (CPT®) Hc Pt Orthotic(S)/Prosthetic(S) Encounter, Each 15 Min      Total  42 3        Therapy Charges for Today     Code Description Service Date Service Provider Modifiers Qty    50845628727 HC PT THER PROC EA 15 MIN 9/20/2018 Katie Medel, PT GP 1    12760730451 HC PT MANUAL THERAPY EA 15 MIN 9/20/2018 Katie Medel, PT GP 1    85626644574 HC PT ULTRASOUND EA 15 MIN 9/20/2018 Katie Medel, PT GP 1                    Katie Medel, PT  9/20/2018

## 2018-09-25 ENCOUNTER — HOSPITAL ENCOUNTER (OUTPATIENT)
Dept: PHYSICAL THERAPY | Facility: HOSPITAL | Age: 38
Setting detail: THERAPIES SERIES
Discharge: HOME | End: 2018-09-25

## 2018-09-25 DIAGNOSIS — G89.29 CHRONIC RIGHT SHOULDER PAIN: Primary | ICD-10-CM

## 2018-09-25 DIAGNOSIS — M25.511 CHRONIC RIGHT SHOULDER PAIN: Primary | ICD-10-CM

## 2018-09-25 DIAGNOSIS — M75.41 IMPINGEMENT SYNDROME OF RIGHT SHOULDER: ICD-10-CM

## 2018-09-25 PROCEDURE — 97140 MANUAL THERAPY 1/> REGIONS: CPT | Performed by: PHYSICAL THERAPIST

## 2018-09-25 PROCEDURE — 97035 APP MDLTY 1+ULTRASOUND EA 15: CPT | Performed by: PHYSICAL THERAPIST

## 2018-09-25 PROCEDURE — 97110 THERAPEUTIC EXERCISES: CPT | Performed by: PHYSICAL THERAPIST

## 2018-09-25 NOTE — THERAPY TREATMENT NOTE
Outpatient Physical Therapy Ortho Treatment Note  [facility]     Patient Name: Bienvenido Carter  : 1980  MRN: [id number]  Today's Date: 2018      Visit Date: 2018    Visit Dx:    ICD-10-CM ICD-9-CM   1. Chronic right shoulder pain M25.511 719.41    G89.29 338.29   2. Impingement syndrome of right shoulder M75.41 726.2       Patient Active Problem List   Diagnosis   • Spinal stenosis of cervical region   • Cervical disc disorder with radiculopathy of mid-cervical region        Past Medical History:   Diagnosis Date   • Anxiety    • Bipolar    • Borderline personality disorder in adult    • Depression    • H/O emotional problems         No past surgical history on file.          PT Ortho     Row Name 18 0800       Right Upper Ext    Rt Shoulder Abduction PROM 165 deg  -LH    Rt Shoulder Flexion PROM 165 deg  -LH    Rt Shoulder External Rotation PROM 85 deg  -LH    Rt Shoulder Internal Rotation PROM 80 deg  -LH      User Key  (r) = Recorded By, (t) = Taken By, (c) = Cosigned By    Initials Name Provider Type    Katie Mares, PT Physical Therapist                            PT Assessment/Plan     Row Name 18 0937          PT Assessment    Assessment Comments Pt having intermittent sharp pains in the shoulder and c/o pain around the scapular area. Started with light resistive exercises and AROM today with minimal pain complaints  -LH        PT Plan    PT Plan Comments cont  -LH       User Key  (r) = Recorded By, (t) = Taken By, (c) = Cosigned By    Initials Name Provider Type    Katie Mares, PT Physical Therapist                Modalities     Row Name 18 0800             Ultrasound 64798    Location  R shoulder  -      Duty Cycle 100  -      Frequency 1.0 MHz  -      Intensity - Wts/cm 1.2  -      83409 - PT Ultrasound Minutes 9  -LH        User Key  (r) = Recorded By, (t) = Taken By, (c) = Cosigned By    Initials Name Provider Type    MANUEL Medel  Katie NICHOLS, PT Physical Therapist                Exercises     Row Name 09/25/18 0900 09/25/18 0800          Subjective Comments    Subjective Comments  -- neck is a little flared up right now. Some pain in the shoulder today  -        Subjective Pain    Able to rate subjective pain?  -- yes  -     Pre-Treatment Pain Level  -- 5  -LH        Total Minutes    98887 - PT Therapeutic Exercise Minutes  -- 20  -LH     02129 - PT Manual Therapy Minutes 12  -LH  --        Exercise 1    Exercise Name 1  -- shoulder rolls and scap ret  -LH     Sets 1  -- 2  -LH     Reps 1  -- 10  -LH        Exercise 2    Exercise Name 2  -- AAROM Er with cane  -     Sets 2  -- 1  -LH     Reps 2  -- 10  -LH     Time 2  -- 10 sec  -LH        Exercise 3    Exercise Name 3  -- table slides  -     Cueing 3  -- Verbal;Demo  -LH     Sets 3  -- 1  -LH     Reps 3  -- 10  -LH     Time 3  -- sec  -LH        Exercise 4    Exercise Name 4  -- scap ret and shoulder ext  -LH     Sets 4  -- 1  -LH     Reps 4  -- 12  -LH     Additional Comments  -- yellow  -LH        Exercise 5    Exercise Name 5  -- SL ER with towel  -     Cueing 5  -- Verbal  -LH     Sets 5  -- 2  -LH     Reps 5  -- 10  -LH        Exercise 6    Exercise Name 6  -- supine press ups  -LH     Sets 6  -- 2  -LH     Reps 6  -- 10  -LH       User Key  (r) = Recorded By, (t) = Taken By, (c) = Cosigned By    Initials Name Provider Type     Katie Medel, PT Physical Therapist                        Manual Rx (last 36 hours)      Manual Treatments     Row Name 09/25/18 0900             Total Minutes    90478 - PT Manual Therapy Minutes 12  -LH         Manual Rx 1    Manual Rx 1 Location R shoulder  -      Manual Rx 1 Type PROM in all planes with oscillations for pain and relaxation  -      Manual Rx 1 Duration 12  -LH        User Key  (r) = Recorded By, (t) = Taken By, (c) = Cosigned By    Initials Name Provider Type     Katie Medel, PT Physical Therapist               Therapy Education  Given: HEP, Symptoms/condition management, Pain management, Posture/body mechanics  Program: New  How Provided: Verbal, Written  Provided to: Patient  Level of Understanding: Teach back education performed, Verbalized, Demonstrated              Time Calculation:   Start Time: 0845  Stop Time: 0930  Time Calculation (min): 45 min  Total Timed Code Minutes- PT: 41 minute(s)  Therapy Suggested Charges     Code   Minutes Charges    84447 (CPT®) Hc Pt Neuromusc Re Education Ea 15 Min      41723 (CPT®) Hc Pt Ther Proc Ea 15 Min 20 1    00295 (CPT®) Hc Gait Training Ea 15 Min      45439 (CPT®) Hc Pt Therapeutic Act Ea 15 Min      27496 (CPT®) Hc Pt Manual Therapy Ea 15 Min 12 1    29139 (CPT®) Hc Pt Ther Massage- Per 15 Min      02382 (CPT®) Hc Pt Iontophoresis Ea 15 Min      32392 (CPT®) Hc Pt Elec Stim Ea-Per 15 Min      78400 (CPT®) Hc Pt Ultrasound Ea 15 Min 9 1    17657 (CPT®) Hc Pt Self Care/Mgmt/Train Ea 15 Min      46799 (CPT®) Hc Pt Prosthetic (S) Train Initial Encounter, Each 15 Min      82441 (CPT®) Hc Orthotic(S) Mgmt/Train Initial Encounter, Each 15min      55563 (CPT®) Hc Pt Aquatic Therapy Ea 15 Min      07588 (CPT®) Hc Pt Orthotic(S)/Prosthetic(S) Encounter, Each 15 Min      Total  41 3        Therapy Charges for Today     Code Description Service Date Service Provider Modifiers Qty    37671119909 HC PT THER PROC EA 15 MIN 9/25/2018 Katie Medel, PT GP 1    36639951577 HC PT MANUAL THERAPY EA 15 MIN 9/25/2018 Katie Medel, PT GP 1    67074662490 HC PT ULTRASOUND EA 15 MIN 9/25/2018 Katie Medel, PT GP 1                    Katie Medel, PT  9/25/2018

## 2018-09-27 ENCOUNTER — HOSPITAL ENCOUNTER (OUTPATIENT)
Dept: PHYSICAL THERAPY | Facility: HOSPITAL | Age: 38
Setting detail: THERAPIES SERIES
Discharge: HOME | End: 2018-09-27

## 2018-09-27 DIAGNOSIS — G89.29 CHRONIC RIGHT SHOULDER PAIN: Primary | ICD-10-CM

## 2018-09-27 DIAGNOSIS — M75.41 IMPINGEMENT SYNDROME OF RIGHT SHOULDER: ICD-10-CM

## 2018-09-27 DIAGNOSIS — M25.511 CHRONIC RIGHT SHOULDER PAIN: Primary | ICD-10-CM

## 2018-09-27 PROCEDURE — 97035 APP MDLTY 1+ULTRASOUND EA 15: CPT | Performed by: PHYSICAL THERAPIST

## 2018-09-27 PROCEDURE — 97110 THERAPEUTIC EXERCISES: CPT | Performed by: PHYSICAL THERAPIST

## 2018-09-27 PROCEDURE — 97140 MANUAL THERAPY 1/> REGIONS: CPT | Performed by: PHYSICAL THERAPIST

## 2018-09-27 NOTE — THERAPY TREATMENT NOTE
Outpatient Physical Therapy Ortho Treatment Note  [facility]     Patient Name: Bienvenido Carter  : 1980  MRN: [id number]  Today's Date: 2018      Visit Date: 2018    Visit Dx:    ICD-10-CM ICD-9-CM   1. Chronic right shoulder pain M25.511 719.41    G89.29 338.29   2. Impingement syndrome of right shoulder M75.41 726.2       Patient Active Problem List   Diagnosis   • Spinal stenosis of cervical region   • Cervical disc disorder with radiculopathy of mid-cervical region        Past Medical History:   Diagnosis Date   • Anxiety    • Bipolar    • Borderline personality disorder in adult    • Depression    • H/O emotional problems         No past surgical history on file.          PT Ortho     Row Name 18 0800       Right Upper Ext    Rt Shoulder Abduction PROM 165 deg  -LH    Rt Shoulder Flexion PROM 165 deg  -LH    Rt Shoulder External Rotation PROM 85 deg  -LH    Rt Shoulder Internal Rotation PROM 80 deg  -LH      User Key  (r) = Recorded By, (t) = Taken By, (c) = Cosigned By    Initials Name Provider Type     Katie Medel, PT Physical Therapist                            PT Assessment/Plan     Row Name 18 0958          PT Assessment    Assessment Comments Pt still having pain up to 8-9/10 at times. She has been doing some more computer work, studying. Discussed her posture while doing that. She has tightness in the UT/levator from compensation with activity  -        PT Plan    PT Plan Comments cont  -LH       User Key  (r) = Recorded By, (t) = Taken By, (c) = Cosigned By    Initials Name Provider Type     Katie Medel, PT Physical Therapist                Modalities     Row Name 18 0800             Ultrasound 73166    Location  R shoulder  -      Duty Cycle 100  -      Frequency 1.0 MHz  -      Intensity - Wts/cm 1.2  -      92464 - PT Ultrasound Minutes 9  -LH        User Key  (r) = Recorded By, (t) = Taken By, (c) = Cosigned By    Initials  Name Provider Type     Katie Medel, PT Physical Therapist                Exercises     Row Name 09/27/18 0900 09/27/18 0800          Subjective Comments    Subjective Comments  -- I was sore after the last session, not sure if it was the stretching or what  -        Subjective Pain    Able to rate subjective pain?  -- yes  -     Pre-Treatment Pain Level  -- 6  -LH        Total Minutes    10295 - PT Therapeutic Exercise Minutes  -- 20  -LH     11087 - PT Manual Therapy Minutes 12  -LH  --        Exercise 1    Exercise Name 1  -- shoulder rolls and scap ret  -LH     Sets 1  -- 2  -LH     Reps 1  -- 10  -LH        Exercise 2    Exercise Name 2  -- AAROM Er with cane  -     Sets 2  -- 1  -LH     Reps 2  -- 10  -LH     Time 2  -- 10 sec  -LH        Exercise 3    Exercise Name 3  -- table slides  -     Cueing 3  -- Verbal;Demo  -LH     Sets 3  -- 1  -LH     Reps 3  -- 10  -LH     Time 3  -- sec  -LH        Exercise 4    Exercise Name 4  -- scap ret and shoulder ext  -LH     Sets 4  -- 1  -LH     Reps 4  -- 12  -LH     Additional Comments  -- yellow  -LH        Exercise 5    Exercise Name 5  -- SL ER with towel  -     Cueing 5  -- Verbal  -LH     Sets 5  -- 2  -LH     Reps 5  -- 10  -LH        Exercise 6    Exercise Name 6  -- supine press ups  -     Sets 6  -- 2  -LH     Reps 6  -- 10  -LH       User Key  (r) = Recorded By, (t) = Taken By, (c) = Cosigned By    Initials Name Provider Type     Katie Medel, PT Physical Therapist                        Manual Rx (last 36 hours)      Manual Treatments     Row Name 09/27/18 0900             Total Minutes    63996 - PT Manual Therapy Minutes 12  -LH         Manual Rx 1    Manual Rx 1 Location R shoulder  -      Manual Rx 1 Type PROM in all planes with oscillations for pain and relaxation  -      Manual Rx 1 Duration 12  -LH        User Key  (r) = Recorded By, (t) = Taken By, (c) = Cosigned By    Initials Name Provider Type     Katie Medel,  PT Physical Therapist                PT OP Goals     Row Name 09/27/18 0800          PT Short Term Goals    STG 1 Patient will be independent with education for symptom management, joint protection and strategies to minimize stress on affected tissues  -     STG 1 Progress Ongoing  -     STG 2 Pt to improve R shoulder ROM in flex=110/160 deg, abd=110/165 deg, ER=neck/80 deg and IR=L5/75 deg  -     STG 2 Progress Partially Met  -     STG 3 Pt to improve pain complaints to no more than 6/10 with ADLs  -     STG 3 Progress Ongoing  -     STG 3 Progress Comments up to 8-9/10 at times over the past couple of days  -        Long Term Goals    LTG 1 Pt to improve R shoulder ROM in flex=140/165 deg, abd=140/168 deg, ER=T1/85 deg and IR=L3/80 deg  -     LTG 1 Progress Ongoing  -     LTG 2 Pt to improve pain complaints to no more than 4/10 with ADLs  -     LTG 2 Progress Ongoing  [facility]     LTG 3 Pt to improve DASH score from 73% to 40% for overall functional improvement  -     LTG 3 Progress Ongoing  -     LTG 4 Pt to improve shoulder strength to 4/5  -     LTG 4 Progress Ongoing  [facility]     LTG 5 Patient will be independent and compliant with advanced home exercise program to facilitate self-management of symptoms.  -     LTG 5 Progress Ongoing  -       User Key  (r) = Recorded By, (t) = Taken By, (c) = Cosigned By    Initials Name Provider Type     Katie Medel, PT Physical Therapist                         Time Calculation:   Start Time: 0845  Stop Time: 0930  Time Calculation (min): 45 min  Therapy Suggested Charges     Code   Minutes Charges    22077 (CPT®)  Pt Neuromusc Re Education Ea 15 Min      08448 (CPT®) Hc Pt Ther Proc Ea 15 Min 20 1    52204 (CPT®) Hc Gait Training Ea 15 Min      09892 (CPT®) Hc Pt Therapeutic Act Ea 15 Min      12895 (CPT®) Hc Pt Manual Therapy Ea 15 Min 12 1    57124 (CPT®) Hc Pt Ther Massage- Per 15 Min      11477 (CPT®)  Pt Iontophoresis Ea 15 Min       50131 (CPT®) Hc Pt Elec Stim Ea-Per 15 Min      65071 (CPT®) Hc Pt Ultrasound Ea 15 Min 9 1    21232 (CPT®) Hc Pt Self Care/Mgmt/Train Ea 15 Min      97599 (CPT®) Hc Pt Prosthetic (S) Train Initial Encounter, Each 15 Min      22791 (CPT®) Hc Orthotic(S) Mgmt/Train Initial Encounter, Each 15min      08159 (CPT®) Hc Pt Aquatic Therapy Ea 15 Min      57089 (CPT®) Hc Pt Orthotic(S)/Prosthetic(S) Encounter, Each 15 Min      Total  41 3        Therapy Charges for Today     Code Description Service Date Service Provider Modifiers Qty    71999324388 HC PT THER PROC EA 15 MIN 9/27/2018 Katie Medel, PT GP 1    33947547282 HC PT ULTRASOUND EA 15 MIN 9/27/2018 Katie Medel, PT GP 1    45056281477 HC PT MANUAL THERAPY EA 15 MIN 9/27/2018 Katie Medel, PT GP 1                    Katie Medel, PT  9/27/2018

## 2018-10-02 ENCOUNTER — HOSPITAL ENCOUNTER (OUTPATIENT)
Dept: PHYSICAL THERAPY | Facility: HOSPITAL | Age: 38
Setting detail: THERAPIES SERIES
End: 2018-10-02

## 2018-10-04 ENCOUNTER — HOSPITAL ENCOUNTER (OUTPATIENT)
Dept: PHYSICAL THERAPY | Facility: HOSPITAL | Age: 38
Setting detail: THERAPIES SERIES
Discharge: HOME | End: 2018-10-04

## 2018-10-04 DIAGNOSIS — M75.41 IMPINGEMENT SYNDROME OF RIGHT SHOULDER: ICD-10-CM

## 2018-10-04 DIAGNOSIS — G89.29 CHRONIC RIGHT SHOULDER PAIN: Primary | ICD-10-CM

## 2018-10-04 DIAGNOSIS — M25.511 CHRONIC RIGHT SHOULDER PAIN: Primary | ICD-10-CM

## 2018-10-04 PROCEDURE — 97140 MANUAL THERAPY 1/> REGIONS: CPT | Performed by: PHYSICAL THERAPIST

## 2018-10-04 PROCEDURE — 97110 THERAPEUTIC EXERCISES: CPT | Performed by: PHYSICAL THERAPIST

## 2018-10-04 NOTE — THERAPY TREATMENT NOTE
Outpatient Physical Therapy Ortho Treatment Note  [facility]     Patient Name: Bienvenido Carter  : 1980  MRN: [id number]  Today's Date: 10/4/2018      Visit Date: 10/04/2018    Visit Dx:    ICD-10-CM ICD-9-CM   1. Chronic right shoulder pain M25.511 719.41    G89.29 338.29   2. Impingement syndrome of right shoulder M75.41 726.2       Patient Active Problem List   Diagnosis   • Spinal stenosis of cervical region   • Cervical disc disorder with radiculopathy of mid-cervical region        Past Medical History:   Diagnosis Date   • Anxiety    • Bipolar    • Borderline personality disorder in adult    • Depression    • H/O emotional problems         No past surgical history on file.                          PT Assessment/Plan     Row Name 10/04/18 0913          PT Assessment    Assessment Comments Patient reports having trouble sleeping secondary to pain waking her (states 9/10 when it woke her up early this am).  She is progressing with shoulder PROM and note some improvement in active movement but still limited secondary to pain  -RA        PT Plan    PT Plan Comments Continue skilled therapy working on ROM, strength, and pain reduction   -RA       User Key  (r) = Recorded By, (t) = Taken By, (c) = Cosigned By    Initials Name Provider Type    RA Subha Tai, PT Physical Therapist                    Exercises     Row Name 10/04/18 0900 10/04/18 0800          Subjective Comments    Subjective Comments  — Still having some trouble sleeping, pain 9/10 when I woke up   -RA        Subjective Pain    Able to rate subjective pain?  — yes  -RA     Pre-Treatment Pain Level  — 3  -RA     Subjective Pain Comment  — with pain meds  -RA        Total Minutes    63910 - PT Therapeutic Exercise Minutes  — 21  -RA     78846 - PT Manual Therapy Minutes 13  -RA  —        Exercise 1    Exercise Name 1  — shoulder rolls and scap ret  -RA     Sets 1  — 2  -RA     Reps 1  — 10  -RA        Exercise 2    Exercise Name 2   — AAROM ER with cane  -RA     Sets 2  — 1  -RA     Reps 2  — 10  -RA     Time 2  — 10 sec  -RA        Exercise 3    Exercise Name 3  — table slides  -RA     Cueing 3  — Verbal;Demo  -RA     Sets 3  — 1  -RA     Reps 3  — 10  -RA     Time 3  — 10sec  -RA        Exercise 4    Exercise Name 4  — scap ret and shoulder ext  -RA     Reps 4  — 15  -RA     Additional Comments  — yellow  -RA        Exercise 5    Exercise Name 5  — SL ER with towel  -RA     Cueing 5  — Verbal  -RA     Sets 5  — 2  -RA     Reps 5  — 10  -RA        Exercise 6    Exercise Name 6  — supine press ups  -RA     Sets 6  — 2  -RA     Reps 6  — 10  -RA       User Key  (r) = Recorded By, (t) = Taken By, (c) = Cosigned By    Initials Name Provider Type    Subha Eagle, PT Physical Therapist                        Manual Rx (last 36 hours)      Manual Treatments     Row Name 10/04/18 0900             Total Minutes    65209 - PT Manual Therapy Minutes 13  -RA         Manual Rx 1    Manual Rx 1 Location R shoulder  -RA      Manual Rx 1 Type PROM in all planes with oscillations for pain and relaxation  -RA        User Key  (r) = Recorded By, (t) = Taken By, (c) = Cosigned By    Initials Name Provider Type    Subha Eagle, PT Physical Therapist              Therapy Education  Given: HEP, Symptoms/condition management, Pain management, Posture/body mechanics  Program: Reinforced  How Provided: Verbal, Demonstration  Provided to: Patient  Level of Understanding: Teach back education performed, Verbalized              Time Calculation:   Start Time: 0855  Stop Time: 0930  Time Calculation (min): 35 min  Therapy Suggested Charges     Code   Minutes Charges    05673 (CPT®) Hc Pt Neuromusc Re Education Ea 15 Min      41083 (CPT®) Hc Pt Ther Proc Ea 15 Min 21 1    81135 (CPT®) Hc Gait Training Ea 15 Min      55715 (CPT®) Hc Pt Therapeutic Act Ea 15 Min      85272 (CPT®) Hc Pt Manual Therapy Ea 15 Min 13 1    27907 (CPT®) Hc Pt Ther Massage- Per 15 Min       22265 (CPT®) Hc Pt Iontophoresis Ea 15 Min      99093 (CPT®) Hc Pt Elec Stim Ea-Per 15 Min      87864 (CPT®) Hc Pt Ultrasound Ea 15 Min      51686 (CPT®) Hc Pt Self Care/Mgmt/Train Ea 15 Min      59221 (CPT®) Hc Pt Prosthetic (S) Train Initial Encounter, Each 15 Min      32376 (CPT®) Hc Orthotic(S) Mgmt/Train Initial Encounter, Each 15min      00087 (CPT®) Hc Pt Aquatic Therapy Ea 15 Min      85061 (CPT®) Hc Pt Orthotic(S)/Prosthetic(S) Encounter, Each 15 Min       (CPT®) Hc Pt Electrical Stim Unattended      Total  34 2        Therapy Charges for Today     Code Description Service Date Service Provider Modifiers Qty    54264988217 HC PT THER PROC EA 15 MIN 10/4/2018 Subha Tai, PT GP 1    72442980623 HC PT MANUAL THERAPY EA 15 MIN 10/4/2018 Subha Tai, PT GP 1                    Subha Tai, PT  10/4/2018

## 2018-10-04 NOTE — THERAPY TREATMENT NOTE
Outpatient Physical Therapy Ortho Treatment Note  [facility]     Patient Name: Bienvenido Carter  : 1980  MRN: [id number]  Today's Date: 10/4/2018      Visit Date: 10/04/2018    Visit Dx:    ICD-10-CM ICD-9-CM   1. Chronic right shoulder pain M25.511 719.41    G89.29 338.29   2. Impingement syndrome of right shoulder M75.41 726.2       Patient Active Problem List   Diagnosis   • Spinal stenosis of cervical region   • Cervical disc disorder with radiculopathy of mid-cervical region        Past Medical History:   Diagnosis Date   • Anxiety    • Bipolar    • Borderline personality disorder in adult    • Depression    • H/O emotional problems         No past surgical history on file.                          PT Assessment/Plan     Row Name 10/04/18 0913          PT Assessment    Assessment Comments Patient reports having trouble sleeping secondary to pain waking her (states 9/10 when it woke her up early this am).  She is progressing with shoulder PROM and note some improvement in active movement but still limited secondary to pain  -RA        PT Plan    PT Plan Comments Continue skilled therapy working on ROM, strength, and pain reduction   -RA       User Key  (r) = Recorded By, (t) = Taken By, (c) = Cosigned By    Initials Name Provider Type    RA Subha Tai, PT Physical Therapist                    Exercises     Row Name 10/04/18 0900 10/04/18 0800          Subjective Comments    Subjective Comments  -- Still having some trouble sleeping, pain 9/10 when I woke up   -RA        Subjective Pain    Able to rate subjective pain?  -- yes  -RA     Pre-Treatment Pain Level  -- 3  -RA     Subjective Pain Comment  -- with pain meds  -RA        Total Minutes    25031 - PT Therapeutic Exercise Minutes  -- 21  -RA     93700 - PT Manual Therapy Minutes 13  -RA  --        Exercise 1    Exercise Name 1  -- shoulder rolls and scap ret  -RA     Sets 1  -- 2  -RA     Reps 1  -- 10  -RA        Exercise 2     Exercise Name 2  -- AAROM ER with cane  -RA     Sets 2  -- 1  -RA     Reps 2  -- 10  -RA     Time 2  -- 10 sec  -RA        Exercise 3    Exercise Name 3  -- table slides  -RA     Cueing 3  -- Verbal;Demo  -RA     Sets 3  -- 1  -RA     Reps 3  -- 10  -RA     Time 3  -- 10sec  -RA        Exercise 4    Exercise Name 4  -- scap ret and shoulder ext  -RA     Reps 4  -- 15  -RA     Additional Comments  -- yellow  -RA        Exercise 5    Exercise Name 5  -- SL ER with towel  -RA     Cueing 5  -- Verbal  -RA     Sets 5  -- 2  -RA     Reps 5  -- 10  -RA        Exercise 6    Exercise Name 6  -- supine press ups  -RA     Sets 6  -- 2  -RA     Reps 6  -- 10  -RA       User Key  (r) = Recorded By, (t) = Taken By, (c) = Cosigned By    Initials Name Provider Type    Subha Eagle, PT Physical Therapist                        Manual Rx (last 36 hours)      Manual Treatments     Row Name 10/04/18 0900             Total Minutes    00582 - PT Manual Therapy Minutes 13  -RA         Manual Rx 1    Manual Rx 1 Location R shoulder  -RA      Manual Rx 1 Type PROM in all planes with oscillations for pain and relaxation  -RA        User Key  (r) = Recorded By, (t) = Taken By, (c) = Cosigned By    Initials Name Provider Type    Subha Eagle PT Physical Therapist              Therapy Education  Given: HEP, Symptoms/condition management, Pain management, Posture/body mechanics  Program: Reinforced  How Provided: Verbal, Demonstration  Provided to: Patient  Level of Understanding: Teach back education performed, Verbalized              Time Calculation:   Start Time: 0855  Stop Time: 0930  Time Calculation (min): 35 min  Therapy Suggested Charges     Code   Minutes Charges    55256 (CPT®) Hc Pt Neuromusc Re Education Ea 15 Min      26795 (CPT®) Hc Pt Ther Proc Ea 15 Min 21 1    36593 (CPT®) Hc Gait Training Ea 15 Min      07553 (CPT®) Hc Pt Therapeutic Act Ea 15 Min      50815 (CPT®) Hc Pt Manual Therapy Ea 15 Min 13 1    69718  (CPT®) Hc Pt Ther Massage- Per 15 Min      39828 (CPT®) Hc Pt Iontophoresis Ea 15 Min      52084 (CPT®) Hc Pt Elec Stim Ea-Per 15 Min      69089 (CPT®) Hc Pt Ultrasound Ea 15 Min      70621 (CPT®) Hc Pt Self Care/Mgmt/Train Ea 15 Min      07074 (CPT®) Hc Pt Prosthetic (S) Train Initial Encounter, Each 15 Min      95825 (CPT®) Hc Orthotic(S) Mgmt/Train Initial Encounter, Each 15min      55551 (CPT®) Hc Pt Aquatic Therapy Ea 15 Min      59665 (CPT®) Hc Pt Orthotic(S)/Prosthetic(S) Encounter, Each 15 Min       (CPT®) Hc Pt Electrical Stim Unattended      Total  34 2        Therapy Charges for Today     Code Description Service Date Service Provider Modifiers Qty    36719247922 HC PT THER PROC EA 15 MIN 10/4/2018 Subha Tai, PT GP 1    32613912835 HC PT MANUAL THERAPY EA 15 MIN 10/4/2018 Subha Tai, PT GP 1                    Subha Tai, PT  10/4/2018

## 2018-10-18 NOTE — PROGRESS NOTES
Subjective   Patient ID: Bienvenido Carter is a 38 y.o. female is here today for follow-up. Neck pain which goes down into right shoulder and down arm , has numbness and tingling in right arm and hand, she is here today with a new MRI    Today patient reports, no change in neck pain has has had some flare up with the neck pain since end of July and has had bladder incontinence     Neck Pain    This is a chronic problem. The current episode started more than 1 month ago. The problem has been unchanged. The pain is at a severity of 2/10. The pain is mild. The symptoms are aggravated by coughing, sneezing, twisting and bending. Associated symptoms include numbness, tingling and weakness. She has tried oral narcotics for the symptoms. The treatment provided moderate relief.       The following portions of the patient's history were reviewed and updated as appropriate: allergies, current medications, past family history, past medical history, past social history, past surgical history and problem list.    Review of Systems   Constitutional: Positive for fatigue.   Genitourinary:        Incontinence of bladder   Musculoskeletal: Positive for neck pain.   Neurological: Positive for tingling, weakness and numbness.   Psychiatric/Behavioral: Positive for sleep disturbance.   All other systems reviewed and are negative.    She is here with her father, Dr. Carter. I have not seen her in 2 years although she did see our nurse practitioner a few weeks ago. When I saw her I had been following her for cervical stenosis and mainly chronic mechanical neck pain. She would have intermittent symptoms under her left arm. Since then, she had an episode when she was in [place] a few months ago of increased pain in her arms and her hands with numbness and tingling. There were also some episodes of bladder incontinence. We discussed the cervical MRI. She does also have some superimposed right shoulder problems and is seeing   Chris about this. There is a lot of tenderness in the shoulder area itself but she does have radiating pain down both arms which I think is neuropathic. She is not myelopathic and has no hyperreflexia or pathological reflexes. I told her I am not quite sure what to make of the bladder problem. I certainly do not think it is from the neck and she may want to touch base with her gynecologist. A urogynecology appointment might also be needed. No doubt, the radiating arm pain as well as neck pain is coming from the neck itself. She had a previous very negative experience with cervical epidural blocks so we will not go that route again. She has recently been put back on 100 mg t.i.d. by her pain group at [facility] for the neuropathic pain. She has not had any physical therapy anytime recently for the neck and so will go back to basics and try that. I told her to be patient as benefits from PT are not going to be immediate. Will see her in 2 months. Again, if worse comes to worse, we can consider an ACDF at C4-C5 and C5-C6. I think those are the 2 levels that are causing most of the root entrapment.       Objective   Physical Exam   Constitutional: She is oriented to person, place, and time. She appears well-developed and well-nourished.   HENT:   Head: Normocephalic and atraumatic.   Eyes: Pupils are equal, round, and reactive to light. Conjunctivae and EOM are normal.   Fundoscopic exam:       The right eye shows no papilledema. The right eye shows venous pulsations.        The left eye shows no papilledema. The left eye shows venous pulsations.   Neck: Carotid bruit is not present.   Neurological: She is oriented to person, place, and time. She has a normal Finger-Nose-Finger Test and a normal Heel to Shin Test. Gait normal.   Reflex Scores:       Tricep reflexes are 2+ on the right side and 2+ on the left side.       Bicep reflexes are 2+ on the right side and 2+ on the left side.       Brachioradialis reflexes are  2+ on the right side and 2+ on the left side.       Patellar reflexes are 2+ on the right side and 2+ on the left side.       Achilles reflexes are 2+ on the right side and 2+ on the left side.  Psychiatric: Her speech is normal.     Neurologic Exam     Mental Status   Oriented to person, place, and time.   Registration of memory: Good recent and remote memory.   Attention: normal. Concentration: normal.   Speech: speech is normal   Level of consciousness: alert  Knowledge: consistent with education.     Cranial Nerves     CN II   Visual fields full to confrontation.   Visual acuity: normal    CN III, IV, VI   Pupils are equal, round, and reactive to light.  Extraocular motions are normal.     CN V   Facial sensation intact.   Right corneal reflex: normal  Left corneal reflex: normal    CN VII   Facial expression full, symmetric.   Right facial weakness: none  Left facial weakness: none    CN VIII   Hearing: intact    CN IX, X   Palate: symmetric    CN XI   Right sternocleidomastoid strength: normal  Left sternocleidomastoid strength: normal    CN XII   Tongue: not atrophic  Tongue deviation: none    Motor Exam   Muscle bulk: normal  Right arm tone: normal  Left arm tone: normal  Right leg tone: normal  Left leg tone: normal    Strength   Strength 5/5 except as noted.     Sensory Exam   Light touch normal.     Gait, Coordination, and Reflexes     Gait  Gait: normal    Coordination   Finger to nose coordination: normal  Heel to shin coordination: normal    Reflexes   Right brachioradialis: 2+  Left brachioradialis: 2+  Right biceps: 2+  Left biceps: 2+  Right triceps: 2+  Left triceps: 2+  Right patellar: 2+  Left patellar: 2+  Right achilles: 2+  Left achilles: 2+  Right : 2+  Left : 2+      Assessment/Plan   Independent Review of Radiographic Studies:    I reviewed the new cervical MRI which did show some progression of the cervical disc disease at C3-C4, C4-C5, and C5-C6 in comparison to the 2014 scan.   There does seem to be bilateral root entrapment at C5-C6 and a little bit more left-sided entrapment at C4-C5.  Agree with the report.      Medical Decision Making:    Because she is mainly radiculopathic at this point without any motor deficits we will try avoidance of surgical treatment if we can.  We'll try some physical therapy and she'll continue the gabapentin at 100 mg 3 times a day.  Because of her previous negative experience with epidural blocks will hold off on that.  And we'll see her in 2 months.  If worse comes to worse, we can consider a two-level ACDF at C4-C5 and C5-C6 and lead C3-C4 alone.  But hopefully won't come to that.      Bienvenido was seen today for neck pain.    Diagnoses and all orders for this visit:    Cervical disc disorder at C4-C5 level with radiculopathy  -     Ambulatory Referral to Physical Therapy Evaluate and treat    Cervical disc disorder at C5-C6 level with radiculopathy  -     Ambulatory Referral to Physical Therapy Evaluate and treat    Chronic neck pain  -     Ambulatory Referral to Physical Therapy Evaluate and treat      Return in about 2 months (around 12/22/2018).

## 2018-10-22 ENCOUNTER — OFFICE VISIT (OUTPATIENT)
Dept: NEUROSURGERY | Facility: CLINIC | Age: 38
End: 2018-10-22

## 2018-10-22 VITALS
BODY MASS INDEX: 14.53 KG/M2 | DIASTOLIC BLOOD PRESSURE: 80 MMHG | HEIGHT: 60 IN | WEIGHT: 74 LBS | SYSTOLIC BLOOD PRESSURE: 107 MMHG | HEART RATE: 100 BPM

## 2018-10-22 DIAGNOSIS — M54.2 CHRONIC NECK PAIN: ICD-10-CM

## 2018-10-22 DIAGNOSIS — G89.29 CHRONIC NECK PAIN: ICD-10-CM

## 2018-10-22 DIAGNOSIS — M50.121 CERVICAL DISC DISORDER AT C4-C5 LEVEL WITH RADICULOPATHY: Primary | ICD-10-CM

## 2018-10-22 DIAGNOSIS — M50.122 CERVICAL DISC DISORDER AT C5-C6 LEVEL WITH RADICULOPATHY: ICD-10-CM

## 2018-10-22 PROCEDURE — 99214 OFFICE O/P EST MOD 30 MIN: CPT | Performed by: NEUROLOGICAL SURGERY

## 2018-10-22 RX ORDER — MELOXICAM 7.5 MG/1
7.5 TABLET ORAL DAILY
Status: ON HOLD | COMMUNITY
End: 2020-08-11

## 2018-10-22 RX ORDER — GABAPENTIN 600 MG/1
600 TABLET ORAL 3 TIMES DAILY
COMMUNITY
End: 2020-08-26

## 2018-10-22 RX ORDER — ARIPIPRAZOLE 20 MG/1
20 TABLET ORAL NIGHTLY
COMMUNITY
End: 2020-07-15

## 2018-11-01 ENCOUNTER — HOSPITAL ENCOUNTER (OUTPATIENT)
Dept: PHYSICAL THERAPY | Facility: HOSPITAL | Age: 38
Discharge: HOME | End: 2018-11-01
Attending: NEUROLOGICAL SURGERY | Admitting: NEUROLOGICAL SURGERY

## 2018-11-01 DIAGNOSIS — M54.12 CERVICAL RADICULAR PAIN: ICD-10-CM

## 2018-11-01 DIAGNOSIS — M54.2 NECK PAIN: Primary | ICD-10-CM

## 2018-11-01 PROCEDURE — 97110 THERAPEUTIC EXERCISES: CPT | Performed by: PHYSICAL THERAPIST

## 2018-11-01 PROCEDURE — 97162 PT EVAL MOD COMPLEX 30 MIN: CPT | Performed by: PHYSICAL THERAPIST

## 2018-11-01 NOTE — THERAPY EVALUATION
Outpatient Physical Therapy Ortho Initial Evaluation  [facility]     Patient Name: Bienvenido Carter  : 1980  MRN: [id number]  Today's Date: 2018      Visit Date: 2018    Patient Active Problem List   Diagnosis   • Spinal stenosis of cervical region   • Cervical disc disorder at C5-C6 level with radiculopathy   • Chronic neck pain        Past Medical History:   Diagnosis Date   • Anxiety    • Bipolar    • Borderline personality disorder in adult (CMS/HCC)    • Depression    • H/O emotional problems         No past surgical history on file.    Visit Dx:     ICD-10-CM ICD-9-CM   1. Neck pain M54.2 723.1   2. Cervical radicular pain M54.12 723.4             Patient History     Row Name 18 0800             History    Chief Complaint Difficulty with daily activities;Numbness;Tinglings;Pain;Muscle weakness;Joint stiffness;Headache  -      Date Current Problem(s) Began --   chronic  -      Brief Description of Current Complaint Pt has a long history of chronic neck pain that has progressed over the years. She has N&T into B hands, R>L, with tingling in all finger tips. She does drop things at times. She has pain in the R shoulder area down into the arm to the hand.   -      Previous treatment for THIS PROBLEM Injections;Rehabilitation;Medication;Pain Management   ablation  -      Patient/Caregiver Goals Relieve pain;Return to prior level of function;Improve mobility  -      Patient's Rating of General Health Good  -      Occupation/sports/leisure activities student  -      Patient seeing anyone else for problem(s)? pain management  -      What clinical tests have you had for this problem? MRI  -         Pain     Pain Location Neck;Arm  -      Pain at Present 5  -      Pain at Worst 9  -      Pain Frequency Constant/continuous  -      Pain Description Aching;Tingling;Numbness;Shooting  -      What Performance Factors Make the Current Problem(s) WORSE? studying,  sitting, computer, sleeping sometimes, lifting,   -LH      What Performance Factors Make the Current Problem(s) BETTER? meds help some, ice PRN to shoulder  -      Is your sleep disturbed? Yes  -LH      Is medication used to assist with sleep? Yes  -LH         Fall Risk Assessment    Any falls in the past year: No  -LH         Services    Prior Rehab/Home Health Experiences Yes  -LH         Daily Activities    Primary Language English  -      How does patient learn best? Reading  -      Teaching needs identified Home Exercise Program;Management of Condition  -      Patient is concerned about/has problems with Difficulty with self care (i.e. bathing, dressing, toileting:;Performing job responsibilities/community activities (work, school,;Performing home management (household chores, shopping, care of dependents);Reaching over head;Repetitive movements of the hand, arm, shoulder  -LH      Does patient have problems with the following? Depression;Anxiety;Panic Attack  -      Barriers to learning None  -LH      Pt Participated in POC and Goals Yes  -         Safety    Are you being hurt, hit, or frightened by anyone at home or in your life? No  -LH      Are you being neglected by a caregiver No  -        User Key  (r) = Recorded By, (t) = Taken By, (c) = Cosigned By    Initials Name Provider Type     Katie Medel, PT Physical Therapist                PT Ortho     Row Name 11/01/18 0800       Posture/Observations    Forward Head Mild;Moderate  -LH    Cervical Lordosis Decreased  -LH    Thoracic Kyphosis Decreased  -LH       Sensory Screen for Light Touch- Upper Quarter Clearing    C4 (posterior shoulder) Right:;Diminished;Left:;Intact  -LH    C5 (lateral upper arm) Right:;Diminished;Left:;Intact  -LH    C6 (tip of thumb) Bilateral:;Intact  -LH    C7 (tip of 3rd finger) Right:;Intact;Left:;Diminished  -LH    C8 (tip of 5th finger) Left:;Diminished;Right:;Intact  -LH    T1 (medial lower arm)  Bilateral:;Intact  -LH       Myotomal Screen- Upper Quarter Clearing    Shoulder flexion (C5) 4 (Good)  -LH    Elbow flexion/wrist extension (C6) 4 (Good)  -LH    Elbow extension/wrist flexion (C7) 4 (Good)  -LH    Finger flexion/ (C8) 4 (Good)  -LH    Finger abduction (T1) 4 (Good)  -LH       Cervical/Shoulder ROM Screen    Cervical flexion Impaired   impaired 50%, painful  -LH    Cervical extension Impaired   impaired 75%, painful at occiput and lower cervical  -LH    Cervical lateral flexion Impaired   impaired by 50%, painful on contralateral sides  -LH    Cervical rotation Impaired   20%, painful at end ROM  -       Cervical Palpation    Suboccipital Bilateral:;Tender;Guarded/taut  -    SCM Bilateral:;Tender;Guarded/taut;Trigger point  -    Scalenes Guarded/taut  -    Levator Scapula Bilateral:;Tender;Guarded/taut;Trigger point  -LH    Upper Traps Bilateral:;Tender;Guarded/taut;Trigger point  -LH       Cervical Accessory Motions    Sideglide- C1 WNL  -LH    Sideglide- C2 WNL  -LH    Sideglide- C3 WNL  -LH    Sideglide- C4 Hypomobile  -LH    Sideglide- C5 Hypomobile;Bilateral pain  -LH    Sideglide- C6 Hypomobile;Bilateral pain  -LH    Sideglide- C7 Hypomobile;Bilateral pain  -LH    PA glide- C1 WNL  -LH    PA glide- C2 WNL  -LH    PA glide- C3 WNL  -LH    PA glide- C4 Hypomobile;Bilateral pain  -LH    PA glide- C5 Hypomobile;Bilateral pain  -LH    PA glide- C6 Hypomobile;Bilateral pain  -LH       Cervical/Thoracic Special Tests    Spurlings (Foraminal Compression) Negative  -LH    Cervical Compression (Forarminal Compression vs. Facet Pain) Positive   increased pain in lower cervical  -LH    Cervical Distraction (Foraminal Compression vs. Facet Pain) Positive   pain relief  -       MMT Neck/Trunk    Neck/Trunk Comments  neck strength 4-/5  -      User Key  (r) = Recorded By, (t) = Taken By, (c) = Cosigned By    Initials Name Provider Type     Katie Medel, PT Physical Therapist                       Therapy Education  Given: HEP, Symptoms/condition management, Pain management, Posture/body mechanics  Program: New  How Provided: Verbal, Demonstration, Written  Provided to: Patient  Level of Understanding: Teach back education performed, Verbalized, Demonstrated           PT OP Goals     Row Name 11/01/18 1200          PT Short Term Goals    STG 1 Patient will be independent with education for symptom management, joint protection and strategies to minimize stress on affected tissues  -     STG 1 Progress New  -     STG 2 Pt to improve pain complaints to no more than 7/10 with ADLs  -     STG 2 Progress [facility]        Long Term Goals    LTG 1 Pt to improve pain complaints to no more than 5/10 with ADLs  -     LTG 1 Progress New  [facility]     LTG 2 Pt to improve NDI score from 58% to 40% for overall functional improvement  -     LTG 2 Progress [facility]     LTG 3 Pt to improve neck and shoulder strength to 4 to 4+/5  -     LTG 3 Progress [facility]     LTG 4 Pt to be independent with HEP for symptom management and strengthening  -     LTG 4 Progress New  -LH        Time Calculation    PT Goal Re-Cert Due Date 01/01/18  -       User Key  (r) = Recorded By, (t) = Taken By, (c) = Cosigned By    Initials Name Provider Type     Katie Medel, PT Physical Therapist                PT Assessment/Plan     Row Name 11/01/18 1239          PT Assessment    Functional Limitations Limitations in community activities;Limitation in home management;Performance in self-care ADL;Performance in leisure activities;Limitations in functional capacity and performance  -     Impairments Muscle strength;Pain;Joint mobility;Posture;Range of motion;Poor body mechanics  -     Assessment Comments Pt is a 37 y/o female  with complaints of chronic neck pain with radiating pain into R>L UEs. She has tried therapy and injections over the years. Pt has decreased neck ROM and pain with all motions. She has  decreased mobiility and pain passively of the lower cervical spine. She has decreased neck and shoulder strength, poor postural awareness, and decreased sensation in B UEs. Pt scored a 58% on the Neck Disability Index, with 0% being no disability. Pt would benefit from therapy to address these issues to help her improve function.   -     Please refer to paper survey for additional self-reported information Yes  -     Rehab Potential Good  -     Patient/caregiver participated in establishment of treatment plan and goals Yes  -     Patient would benefit from skilled therapy intervention Yes  -        PT Plan    PT Frequency 1x/week;2x/week  -     Planned CPT's? PT EVAL MOD COMPLELITY: 02681;PT THER PROC EA 15 MIN: 20987;PT MANUAL THERAPY EA 15 MIN: 67868;PT ULTRASOUND EA 15 MIN: 79981;PT HOT/COLD PACK WC NONMCARE: 25811;PT TRACTION CERVICAL: 67632  -     Physical Therapy Interventions (Optional Details) joint mobilization;patient/family education;manual therapy techniques;ROM (Range of Motion);modalities;strengthening;stretching;dry needling;home exercise program;postural re-education  -     PT Plan Comments plan to see pt for skilled therapy for ROm, strength, and pain reduction  -       User Key  (r) = Recorded By, (t) = Taken By, (c) = Cosigned By    Initials Name Provider Type     Katie Medel, PT Physical Therapist                  Exercises     Row Name 11/01/18 0900             Total Minutes    70583 - PT Therapeutic Exercise Minutes 12  -         Exercise 1    Exercise Name 1 supine cervical nodding  -      Sets 1 1  -      Reps 1 10  -      Time 1 5 sec  -         Exercise 2    Exercise Name 2 cervical isometrics  -      Sets 2 1  -      Reps 2 10  -      Time 2 5 sec hold  -         Exercise 3    Exercise Name 3 UT and levator stretching  -      Sets 3 1  -      Reps 3 3  -      Time 3 20 sec  -        User Key  (r) = Recorded By, (t) = Taken By, (c) =  Cosigned By    Initials Name Provider Type     Katie Medel, PT Physical Therapist                        Outcome Measure Options: Neck Disability Index (NDI)  Neck Disability Index  Neck Disability Index Comments: 58%      Time Calculation:     Therapy Suggested Charges     Code   Minutes Charges    86749 (CPT®) Hc Pt Neuromusc Re Education Ea 15 Min      40437 (CPT®) Hc Pt Ther Proc Ea 15 Min 12 1    51360 (CPT®) Hc Gait Training Ea 15 Min      20814 (CPT®) Hc Pt Therapeutic Act Ea 15 Min      11105 (CPT®) Hc Pt Manual Therapy Ea 15 Min      68237 (CPT®) Hc Pt Ther Massage- Per 15 Min      36312 (CPT®) Hc Pt Iontophoresis Ea 15 Min      25853 (CPT®) Hc Pt Elec Stim Ea-Per 15 Min      49923 (CPT®) Hc Pt Ultrasound Ea 15 Min      60066 (CPT®) Hc Pt Self Care/Mgmt/Train Ea 15 Min      39407 (CPT®) Hc Pt Prosthetic (S) Train Initial Encounter, Each 15 Min      55797 (CPT®) Hc Orthotic(S) Mgmt/Train Initial Encounter, Each 15min      51704 (CPT®) Hc Pt Aquatic Therapy Ea 15 Min      93635 (CPT®) Hc Pt Orthotic(S)/Prosthetic(S) Encounter, Each 15 Min       (CPT®) Hc Pt Electrical Stim Unattended      Total  12 1          Start Time: 0845  Stop Time: 0930  Time Calculation (min): 45 min  Total Timed Code Minutes- PT: 43 minute(s)     Therapy Charges for Today     Code Description Service Date Service Provider Modifiers Qty    07481758420 HC PT THER PROC EA 15 MIN 11/1/2018 Katie Medel, PT GP 1    72683743733 HC PT EVAL MOD COMPLEXITY 2 11/1/2018 Katie Medel, PT GP 1          PT G-Codes  Outcome Measure Options: Neck Disability Index (NDI)         Katie Medel, PT  11/1/2018

## 2018-11-08 ENCOUNTER — TRANSCRIBE ORDERS (OUTPATIENT)
Dept: PHYSICAL THERAPY | Facility: HOSPITAL | Age: 38
End: 2018-11-08

## 2018-11-08 ENCOUNTER — HOSPITAL ENCOUNTER (OUTPATIENT)
Dept: PHYSICAL THERAPY | Facility: HOSPITAL | Age: 38
Discharge: HOME | End: 2018-11-08
Admitting: NEUROLOGICAL SURGERY

## 2018-11-08 DIAGNOSIS — M75.31 CALCIFIC TENDINITIS OF RIGHT SHOULDER: Primary | ICD-10-CM

## 2018-11-08 DIAGNOSIS — M54.2 NECK PAIN: Primary | ICD-10-CM

## 2018-11-08 DIAGNOSIS — M54.12 CERVICAL RADICULAR PAIN: ICD-10-CM

## 2018-11-08 PROCEDURE — 97140 MANUAL THERAPY 1/> REGIONS: CPT | Performed by: PHYSICAL THERAPIST

## 2018-11-08 NOTE — THERAPY TREATMENT NOTE
Outpatient Physical Therapy Ortho Treatment Note  [facility]     Patient Name: Bienvenido Carter  : 1980  MRN: [id number]  Today's Date: 2018      Visit Date: 2018    Visit Dx:    ICD-10-CM ICD-9-CM   1. Neck pain M54.2 723.1   2. Cervical radicular pain M54.12 723.4       Patient Active Problem List   Diagnosis   • Spinal stenosis of cervical region   • Cervical disc disorder at C5-C6 level with radiculopathy   • Chronic neck pain        Past Medical History:   Diagnosis Date   • Anxiety    • Bipolar    • Borderline personality disorder in adult (CMS/HCC)    • Depression    • H/O emotional problems         No past surgical history on file.          PT Ortho     Row Name 18       Subjective Comments    Subjective Comments I had an MRI of the shoulder on 10/25. She has osteopenia  -       Subjective Pain    Able to rate subjective pain? yes  -    Pre-Treatment Pain Level 10   took gabapentin this am  -    Post-Treatment Pain Level 5  -      User Key  (r) = Recorded By, (t) = Taken By, (c) = Cosigned By    Initials Name Provider Type     Katie Medel, PT Physical Therapist                            PT Assessment/Plan     Row Name 18          PT Assessment    Assessment Comments Pt has been in a lot of pain over the past couple of days and has not been able to get much relief. She does have active trigger points that responded well to dry needling and manual therapy today. Discussed posture at length  -        PT Plan    PT Plan Comments cont  -       User Key  (r) = Recorded By, (t) = Taken By, (c) = Cosigned By    Initials Name Provider Type     Katie Medel, PT Physical Therapist                    Exercises     Row Name 18          Subjective Comments    Subjective Comments  -- I had an MRI of the shoulder on 10/25. She has osteopenia  -        Subjective Pain    Able to rate subjective pain?  -- yes  -      Pre-Treatment Pain Level  -- 10   took gabapentin this am  -     Post-Treatment Pain Level  -- 5  -LH        Total Minutes    75104 - PT Manual Therapy Minutes 40  -LH  --       User Key  (r) = Recorded By, (t) = Taken By, (c) = Cosigned By    Initials Name Provider Type     Katie Medel PT Physical Therapist                        Manual Rx (last 36 hours)      Manual Treatments     Row Name 11/08/18 0900             Total Minutes    33569 - PT Manual Therapy Minutes 40  -LH         Manual Rx 1    Manual Rx 1 Location B UT, R levator, R suboccipitals  -      Manual Rx 1 Type intramuscular manual therapy using direct and threading techniques. LTRs acheived. Palpation was done before, during, and after tx. Obtained written and verbal  consent to treat  -      Manual Rx 1 Duration 12  -LH         Manual Rx 2    Manual Rx 2 Location B  UT, levator, cervical PVMs  -      Manual Rx 2 Type STM to above with manual distraction and manual strtching to R UT and levator, B pects  -        User Key  (r) = Recorded By, (t) = Taken By, (c) = Cosigned By    Initials Name Provider Type     Katie Medel PT Physical Therapist              Therapy Education  Given: Posture/body mechanics, Pain management  Program: Reinforced  How Provided: Verbal  Provided to: Patient  Level of Understanding: Teach back education performed              Time Calculation:   Start Time: 0845  Stop Time: 0928  Time Calculation (min): 43 min  Total Timed Code Minutes- PT: 40 minute(s)  Therapy Suggested Charges     Code   Minutes Charges    04691 (CPT®)  Pt Neuromusc Re Education Ea 15 Min      00224 (CPT®) Hc Pt Ther Proc Ea 15 Min      80468 (CPT®) Hc Gait Training Ea 15 Min      45115 (CPT®) Hc Pt Therapeutic Act Ea 15 Min      24665 (CPT®) Hc Pt Manual Therapy Ea 15 Min 40 3    60497 (CPT®) Hc Pt Ther Massage- Per 15 Min      04529 (CPT®) Hc Pt Iontophoresis Ea 15 Min      89829 (CPT®) Hc Pt Elec Stim Ea-Per 15 Min       64577 (CPT®) Hc Pt Ultrasound Ea 15 Min      57340 (CPT®) Hc Pt Self Care/Mgmt/Train Ea 15 Min      38654 (CPT®) Hc Pt Prosthetic (S) Train Initial Encounter, Each 15 Min      25027 (CPT®) Hc Orthotic(S) Mgmt/Train Initial Encounter, Each 15min      27505 (CPT®) Hc Pt Aquatic Therapy Ea 15 Min      99457 (CPT®) Hc Pt Orthotic(S)/Prosthetic(S) Encounter, Each 15 Min       (CPT®) Hc Pt Electrical Stim Unattended      Total  40 3        Therapy Charges for Today     Code Description Service Date Service Provider Modifiers Qty    93656910073 HC PT MANUAL THERAPY EA 15 MIN 11/8/2018 Katie Medel, PT GP 3                    Katie Medel, PT  11/8/2018

## 2018-11-15 ENCOUNTER — HOSPITAL ENCOUNTER (OUTPATIENT)
Dept: PHYSICAL THERAPY | Facility: HOSPITAL | Age: 38
Setting detail: THERAPIES SERIES
Discharge: HOME | End: 2018-11-15

## 2018-11-15 DIAGNOSIS — M54.2 NECK PAIN: Primary | ICD-10-CM

## 2018-11-15 DIAGNOSIS — M54.12 CERVICAL RADICULAR PAIN: ICD-10-CM

## 2018-11-15 PROCEDURE — 97140 MANUAL THERAPY 1/> REGIONS: CPT | Performed by: PHYSICAL THERAPIST

## 2018-11-15 NOTE — THERAPY TREATMENT NOTE
Outpatient Physical Therapy Ortho Treatment Note  [facility]     Patient Name: Bienvenido Carter  : 1980  MRN: [id number]  Today's Date: 11/15/2018      Visit Date: 11/15/2018    Visit Dx:    ICD-10-CM ICD-9-CM   1. Neck pain M54.2 723.1   2. Cervical radicular pain M54.12 723.4       Patient Active Problem List   Diagnosis   • Spinal stenosis of cervical region   • Cervical disc disorder at C5-C6 level with radiculopathy   • Chronic neck pain        Past Medical History:   Diagnosis Date   • Anxiety    • Bipolar    • Borderline personality disorder in adult (CMS/HCC)    • Depression    • H/O emotional problems         No past surgical history on file.    PT Ortho     Row Name 11/15/18 0800       Subjective Comments    Subjective Comments  Pt has been having more numbness in the R UE this past week. She has been using a topical hemp cream for pain this week.   -       Subjective Pain    Able to rate subjective pain?  yes  -    Pre-Treatment Pain Level  8  -      User Key  (r) = Recorded By, (t) = Taken By, (c) = Cosigned By    Initials Name Provider Type     Katie Medel PT Physical Therapist                      PT Assessment/Plan     Row Name 11/15/18 1220          PT Assessment    Assessment Comments  Pt did have some relief from the dry needling, but that was short lived. Sheis having more pain into the R UE and numbness in both hands intermittently. pt has ordered a pneumatic traction device for home. Showed pt the unit in the clinic and reveiwed use of it.   -        PT Plan    PT Plan Comments  cont  -       User Key  (r) = Recorded By, (t) = Taken By, (c) = Cosigned By    Initials Name Provider Type     Katie Medel PT Physical Therapist              Exercises     Row Name 11/15/18 0900 11/15/18 0800          Subjective Comments    Subjective Comments  --  Pt has been having more numbness in the R UE this past week. She has been using a topical hemp cream for pain  this week.   -        Subjective Pain    Able to rate subjective pain?  --  yes  -     Pre-Treatment Pain Level  --  8  -LH        Total Minutes    46126 - PT Manual Therapy Minutes  40  -LH  --       User Key  (r) = Recorded By, (t) = Taken By, (c) = Cosigned By    Initials Name Provider Type    Katie Mares PT Physical Therapist                        Manual Rx (last 36 hours)      Manual Treatments     Row Name 11/15/18 0900             Total Minutes    57700 - PT Manual Therapy Minutes  40  -LH         Manual Rx 1    Manual Rx 1 Location  B UT, R levator, B SCM  -      Manual Rx 1 Type  intramuscular manual therapy using direct and threading techniques. LTRs acheived. Palpation was done before, during, and after tx. Obtained verbal consent to treat  -         Manual Rx 2    Manual Rx 2 Location  B  UT, levator, cervical PVMs  -      Manual Rx 2 Type  STM to above with manual distraction and manual strtching to R UT and levator, B pects  -        User Key  (r) = Recorded By, (t) = Taken By, (c) = Cosigned By    Initials Name Provider Type     Katie Medel PT Physical Therapist              Therapy Education  Education Details: showed Urena cervical traction unit to patient  Given: Posture/body mechanics, Pain management  Program: Reinforced  How Provided: Verbal  Provided to: Patient  Level of Understanding: Teach back education performed              Time Calculation:   Start Time: 0848  Stop Time: 0930  Time Calculation (min): 42 min  Total Timed Code Minutes- PT: 40 minute(s)  Therapy Suggested Charges     Code   Minutes Charges    33242 (CPT®)  Pt Neuromusc Re Education Ea 15 Min      74906 (CPT®) Hc Pt Ther Proc Ea 15 Min      23428 (CPT®) Hc Gait Training Ea 15 Min      12743 (CPT®) Hc Pt Therapeutic Act Ea 15 Min      00169 (CPT®) Hc Pt Manual Therapy Ea 15 Min 40 3    15210 (CPT®) Hc Pt Ther Massage- Per 15 Min      37846 (CPT®) Hc Pt Iontophoresis Ea 15 Min      98923  (CPT®) Hc Pt Elec Stim Ea-Per 15 Min      16175 (CPT®) Hc Pt Ultrasound Ea 15 Min      40086 (CPT®) Hc Pt Self Care/Mgmt/Train Ea 15 Min      25988 (CPT®) Hc Pt Prosthetic (S) Train Initial Encounter, Each 15 Min      84444 (CPT®) Hc Orthotic(S) Mgmt/Train Initial Encounter, Each 15min      69841 (CPT®) Hc Pt Aquatic Therapy Ea 15 Min      77211 (CPT®) Hc Pt Orthotic(S)/Prosthetic(S) Encounter, Each 15 Min       (CPT®) Hc Pt Electrical Stim Unattended      Total  40 3        Therapy Charges for Today     Code Description Service Date Service Provider Modifiers Qty    64454543272 HC PT MANUAL THERAPY EA 15 MIN 11/15/2018 Katie Medel, PT GP 3                    Katie Medel, PT  11/15/2018

## 2018-11-26 ENCOUNTER — HOSPITAL ENCOUNTER (OUTPATIENT)
Dept: PHYSICAL THERAPY | Facility: HOSPITAL | Age: 38
Setting detail: THERAPIES SERIES
Discharge: HOME | End: 2018-11-26

## 2018-11-26 DIAGNOSIS — M54.12 CERVICAL RADICULAR PAIN: ICD-10-CM

## 2018-11-26 DIAGNOSIS — M54.2 NECK PAIN: Primary | ICD-10-CM

## 2018-11-26 PROCEDURE — 97140 MANUAL THERAPY 1/> REGIONS: CPT | Performed by: PHYSICAL THERAPIST

## 2018-11-26 PROCEDURE — 97110 THERAPEUTIC EXERCISES: CPT | Performed by: PHYSICAL THERAPIST

## 2018-11-26 NOTE — THERAPY PROGRESS REPORT/RE-CERT
Outpatient Physical Therapy Ortho Treatment Note  [facility]     Patient Name: Bienvenido Carter  : 1980  MRN: [id number]  Today's Date: 2018      Visit Date: 2018    Visit Dx:    ICD-10-CM ICD-9-CM   1. Neck pain M54.2 723.1   2. Cervical radicular pain M54.12 723.4       Patient Active Problem List   Diagnosis   • Spinal stenosis of cervical region   • Cervical disc disorder at C5-C6 level with radiculopathy   • Chronic neck pain        Past Medical History:   Diagnosis Date   • Anxiety    • Bipolar    • Borderline personality disorder in adult (CMS/HCC)    • Depression    • H/O emotional problems         No past surgical history on file.    PT Ortho     Row Name 18 1000       Subjective Comments    Subjective Comments  i got the neck traction unit for home and it is helping. Still trying to figure out the best height for it yet.   -       Subjective Pain    Able to rate subjective pain?  yes  -    Pre-Treatment Pain Level  3 still some pain shooting down the R arm  -      User Key  (r) = Recorded By, (t) = Taken By, (c) = Cosigned By    Initials Name Provider Type     Katie Medel, PT Physical Therapist                      PT Assessment/Plan     Row Name 18 1236          PT Assessment    Assessment Comments  Pt is getting several days of relief from the dry needling. She did get an home traction unit for her neck and it has been helping. Discissed her using it on a daily basis for now to see if it can further reduce her radicular pain. She is stil having pain with sleeping, grooming/showering, and repetitive use of the R UE.   -        PT Plan    PT Plan Comments  cont  -       User Key  (r) = Recorded By, (t) = Taken By, (c) = Cosigned By    Initials Name Provider Type     Katie Medel, PT Physical Therapist              Exercises     Row Name 18 1100 18 1000          Subjective Comments    Subjective Comments  --  i got the neck  traction unit for home and it is helping. Still trying to figure out the best height for it yet.   -        Subjective Pain    Able to rate subjective pain?  --  yes  -     Pre-Treatment Pain Level  --  3 still some pain shooting down the R arm  -        Total Minutes    76168 - PT Therapeutic Exercise Minutes  --  18  -LH     39539 - PT Manual Therapy Minutes  23  -LH  --        Exercise 1    Exercise Name 1  --  supine cervical nodding  -     Sets 1  --  1  -LH     Reps 1  --  10  -LH     Time 1  --  5 sec  -LH        Exercise 2    Exercise Name 2  --  cervical isometrics  -LH     Sets 2  --  1  -LH     Reps 2  --  10  -LH     Time 2  --  5 sec hold  -LH        Exercise 3    Exercise Name 3  --  UT and levator stretching  -LH     Sets 3  --  1  -LH     Reps 3  --  3  -LH     Time 3  --  20 sec  -LH        Exercise 4    Exercise Name 4  --  scap ret and shoulder ext  -     Reps 4  --  20  -LH     Additional Comments  --  yellow  -LH        Exercise 5    Exercise Name 5  --  SL ER w/towel  -     Sets 5  --  2  -LH     Reps 5  --  10  -LH        Exercise 6    Exercise Name 6  --  supine press ups  -     Sets 6  --  2  -LH     Reps 6  --  10  -LH       User Key  (r) = Recorded By, (t) = Taken By, (c) = Cosigned By    Initials Name Provider Type     Katie Medel, PT Physical Therapist                        Manual Rx (last 36 hours)      Manual Treatments     Row Name 11/26/18 1100             Total Minutes    39276 - PT Manual Therapy Minutes  23  -LH         Manual Rx 1    Manual Rx 1 Location  B UT, R levator, B SCM  -      Manual Rx 1 Type  intramuscular manual therapy using direct and threading techniques. LTRs acheived. Palpation was done before, during, and after tx. Obtained verbal consent to treat  -         Manual Rx 2    Manual Rx 2 Location  B  UT, levator, cervical PVMs  -      Manual Rx 2 Type  STM to above with manual distraction and manual strtching to R UT and levator, B  pects  -        User Key  (r) = Recorded By, (t) = Taken By, (c) = Cosigned By    Initials Name Provider Type     Katie Medel PT Physical Therapist          PT OP Goals     Row Name 11/26/18 1200          PT Short Term Goals    STG 1  Patient will be independent with education for symptom management, joint protection and strategies to minimize stress on affected tissues  -     STG 1 Progress  Ongoing  -     STG 2  Pt to improve pain complaints to no more than 7/10 with ADLs  -     STG 2 [facility]  [facility]        Long Term Goals    LTG 1  Pt to improve pain complaints to no more than 5/10 with ADLs  -     LTG 1 Progress  Ongoing  [facility]     LTG 2  Pt to improve NDI score from 58% to 40% for overall functional improvement  -     LTG 2 [facility]  [facility]     LTG 3  Pt to improve neck and shoulder strength to 4 to 4+/5  -     LTG 3 Progress  [facility]     LTG 4  Pt to be independent with HEP for symptom management and strengthening  -     LTG 4 Progress  [facility]       User Key  (r) = Recorded By, (t) = Taken By, (c) = Cosigned By    Initials Name Provider Type     Katie Medel PT Physical Therapist          Therapy Education  Given: Posture/body mechanics, Pain management  Program: Reinforced, New  How Provided: Verbal, Written  Provided to: Patient  Level of Understanding: Teach back education performed              Time Calculation:   Start Time: 1018  Stop Time: 1100  Time Calculation (min): 42 min  Total Timed Code Minutes- PT: 41 minute(s)  Therapy Suggested Charges     Code   Minutes Charges    36168 (CPT®)  Pt Neuromusc Re Education Ea 15 Min      78732 (CPT®)  Pt Ther Proc Ea 15 Min 18 1    27728 (CPT®) Hc Gait Training Ea 15 Min      62642 (CPT®)  Pt Therapeutic Act Ea 15 Min      43566 (CPT®)  Pt Manual Therapy Ea 15 Min 23 2    74775 (CPT®)  Pt Ther Massage- Per 15 Min      96724 (CPT®)  Pt Iontophoresis Ea 15 Min      92923 (CPT®)  Pt Elec Stim  Ea-Per 15 Min      23589 (CPT®) Hc Pt Ultrasound Ea 15 Min      97684 (CPT®) Hc Pt Self Care/Mgmt/Train Ea 15 Min      52309 (CPT®) Hc Pt Prosthetic (S) Train Initial Encounter, Each 15 Min      05107 (CPT®) Hc Orthotic(S) Mgmt/Train Initial Encounter, Each 15min      68096 (CPT®) Hc Pt Aquatic Therapy Ea 15 Min      27670 (CPT®) Hc Pt Orthotic(S)/Prosthetic(S) Encounter, Each 15 Min       (CPT®) Hc Pt Electrical Stim Unattended      Total  41 3        Therapy Charges for Today     Code Description Service Date Service Provider Modifiers Qty    47558821850 HC PT THER PROC EA 15 MIN 11/26/2018 Katie Medel, PT GP 1    35156848284 HC PT MANUAL THERAPY EA 15 MIN 11/26/2018 Katie Medel, PT GP 2                    Katie Medel, PT  11/26/2018

## 2018-12-03 ENCOUNTER — HOSPITAL ENCOUNTER (OUTPATIENT)
Dept: PHYSICAL THERAPY | Facility: HOSPITAL | Age: 38
Setting detail: THERAPIES SERIES
Discharge: HOME | End: 2018-12-03

## 2018-12-03 DIAGNOSIS — M54.2 NECK PAIN: Primary | ICD-10-CM

## 2018-12-03 DIAGNOSIS — M54.12 CERVICAL RADICULAR PAIN: ICD-10-CM

## 2018-12-03 PROCEDURE — 97110 THERAPEUTIC EXERCISES: CPT | Performed by: PHYSICAL THERAPIST

## 2018-12-03 PROCEDURE — 97140 MANUAL THERAPY 1/> REGIONS: CPT | Performed by: PHYSICAL THERAPIST

## 2018-12-03 NOTE — THERAPY TREATMENT NOTE
Outpatient Physical Therapy Ortho Treatment Note  [facility]     Patient Name: Bienvenido Carter  : 1980  MRN: [id number]  Today's Date: 12/3/2018      Visit Date: 2018    Visit Dx:    ICD-10-CM ICD-9-CM   1. Neck pain M54.2 723.1   2. Cervical radicular pain M54.12 723.4       Patient Active Problem List   Diagnosis   • Spinal stenosis of cervical region   • Cervical disc disorder at C5-C6 level with radiculopathy   • Chronic neck pain        Past Medical History:   Diagnosis Date   • Anxiety    • Bipolar    • Borderline personality disorder in adult (CMS/HCC)    • Depression    • H/O emotional problems         No past surgical history on file.    PT Ortho     Row Name 18 09       Subjective Comments    Subjective Comments  I haven't been as stressed lately so my neck has been better. Traction unit has been helping  -       Subjective Pain    Able to rate subjective pain?  yes  -    Pre-Treatment Pain Level  3  -      User Key  (r) = Recorded By, (t) = Taken By, (c) = Cosigned By    Initials Name Provider Type     Katie Medel, PT Physical Therapist                      PT Assessment/Plan     Row Name 18 1015          PT Assessment    Assessment Comments  Pt is having relief from the needling and using her home traction unit. She still has c/o N&T into the R UE, but it is intermittent. Pain overall has been lower the past 2 weeks, 3/10 on avg  -        PT Plan    PT Plan Comments  cont  -       User Key  (r) = Recorded By, (t) = Taken By, (c) = Cosigned By    Initials Name Provider Type     Katie Medel, PT Physical Therapist              Exercises     Row Name 18 0900             Subjective Comments    Subjective Comments  I haven't been as stressed lately so my neck has been better. Traction unit has been helping  -         Subjective Pain    Able to rate subjective pain?  yes  -      Pre-Treatment Pain Level  3  -      Post-Treatment Pain  Level  2  -LH         Total Minutes    24587 - PT Therapeutic Exercise Minutes  17  -LH      30703 - PT Manual Therapy Minutes  23  -LH         Exercise 1    Exercise Name 1  supine cervical nodding  -LH      Sets 1  1  -LH      Reps 1  10  -LH      Time 1  5 sec  -LH         Exercise 2    Exercise Name 2  cervical isometrics  -LH      Sets 2  1  -LH      Reps 2  10  -LH      Time 2  5 sec hold  -LH         Exercise 3    Exercise Name 3  UT and levator stretching  -LH      Sets 3  1  -LH      Reps 3  3  -LH      Time 3  20 sec  -LH         Exercise 4    Exercise Name 4  scap ret and shoulder ext  -LH      Cueing 4  Tactile;Verbal  -LH      Reps 4  20  -LH      Additional Comments  yellow  -LH         Exercise 5    Exercise Name 5  SL ER w/towel  -LH      Sets 5  2  -LH      Reps 5  10  -LH         Exercise 6    Exercise Name 6  supine press ups  -LH      Sets 6  2  -LH      Reps 6  10  -LH        User Key  (r) = Recorded By, (t) = Taken By, (c) = Cosigned By    Initials Name Provider Type     Katie Medel PT Physical Therapist                        Manual Rx (last 36 hours)      Manual Treatments     Row Name 12/03/18 0900             Total Minutes    63180 - PT Manual Therapy Minutes  23  -LH         Manual Rx 1    Manual Rx 1 Location  B UT, R levator, B Suboccipitals  -LH      Manual Rx 1 Type  intramuscular manual therapy using direct and threading techniques. LTRs acheived. Palpation was done before, during, and after tx. Obtained verbal consent to treat  -LH         Manual Rx 2    Manual Rx 2 Location  B  UT, levator, cervical PVMs  -LH      Manual Rx 2 Type  STM to above with manual distraction and manual strtching to R UT and levator, B pects  -LH        User Key  (r) = Recorded By, (t) = Taken By, (c) = Cosigned By    Initials Name Provider Type     Katie Medel PT Physical Therapist              Therapy Education  Given: Posture/body mechanics, Pain management  Program: Reinforced  How  Provided: Verbal  Provided to: Patient  Level of Understanding: Teach back education performed              Time Calculation:   Start Time: 0932  Stop Time: 1014  Time Calculation (min): 42 min  Total Timed Code Minutes- PT: 40 minute(s)  Therapy Suggested Charges     Code   Minutes Charges    94523 (CPT®) Hc Pt Neuromusc Re Education Ea 15 Min      14842 (CPT®) Hc Pt Ther Proc Ea 15 Min 17 1    23577 (CPT®) Hc Gait Training Ea 15 Min      29906 (CPT®) Hc Pt Therapeutic Act Ea 15 Min      95409 (CPT®) Hc Pt Manual Therapy Ea 15 Min 23 2    67178 (CPT®) Hc Pt Ther Massage- Per 15 Min      54874 (CPT®) Hc Pt Iontophoresis Ea 15 Min      15638 (CPT®) Hc Pt Elec Stim Ea-Per 15 Min      62282 (CPT®) Hc Pt Ultrasound Ea 15 Min      56439 (CPT®) Hc Pt Self Care/Mgmt/Train Ea 15 Min      82104 (CPT®) Hc Pt Prosthetic (S) Train Initial Encounter, Each 15 Min      47845 (CPT®) Hc Orthotic(S) Mgmt/Train Initial Encounter, Each 15min      63176 (CPT®) Hc Pt Aquatic Therapy Ea 15 Min      25807 (CPT®) Hc Pt Orthotic(S)/Prosthetic(S) Encounter, Each 15 Min       (CPT®) Hc Pt Electrical Stim Unattended      Total  40 3        Therapy Charges for Today     Code Description Service Date Service Provider Modifiers Qty    08737739825 HC PT THER PROC EA 15 MIN 12/3/2018 Katie Medel, PT GP 1    40580633415 HC PT MANUAL THERAPY EA 15 MIN 12/3/2018 Katie Medel, PT GP 2                    Katie Medel, PT  12/3/2018

## 2018-12-10 ENCOUNTER — HOSPITAL ENCOUNTER (OUTPATIENT)
Dept: PHYSICAL THERAPY | Facility: HOSPITAL | Age: 38
Setting detail: THERAPIES SERIES
Discharge: HOME | End: 2018-12-10

## 2018-12-10 DIAGNOSIS — M54.12 CERVICAL RADICULAR PAIN: ICD-10-CM

## 2018-12-10 DIAGNOSIS — M54.2 NECK PAIN: Primary | ICD-10-CM

## 2018-12-10 PROCEDURE — 97140 MANUAL THERAPY 1/> REGIONS: CPT | Performed by: PHYSICAL THERAPIST

## 2018-12-10 PROCEDURE — 97110 THERAPEUTIC EXERCISES: CPT | Performed by: PHYSICAL THERAPIST

## 2018-12-10 PROCEDURE — 97035 APP MDLTY 1+ULTRASOUND EA 15: CPT | Performed by: PHYSICAL THERAPIST

## 2018-12-10 NOTE — THERAPY TREATMENT NOTE
Outpatient Physical Therapy Ortho Treatment Note  [facility]     Patient Name: Bienvenido Carter  : 1980  MRN: [id number]  Today's Date: 12/10/2018      Visit Date: 12/10/2018    Visit Dx:    ICD-10-CM ICD-9-CM   1. Neck pain M54.2 723.1   2. Cervical radicular pain M54.12 723.4       Patient Active Problem List   Diagnosis   • Spinal stenosis of cervical region   • Cervical disc disorder at C5-C6 level with radiculopathy   • Chronic neck pain        Past Medical History:   Diagnosis Date   • Anxiety    • Bipolar    • Borderline personality disorder in adult (CMS/HCC)    • Depression    • H/O emotional problems         No past surgical history on file.                    PT Assessment/Plan     Row Name 12/10/18 1019          PT Assessment    Assessment Comments  Pt is haviing more R shoulder pain today from being at pain management this am and them moving her arm around more. She has more tenderness today over  the biiceps tendon and anterior shoulder. PROM near full  -LH        PT Plan    PT Plan Comments  cont  -       User Key  (r) = Recorded By, (t) = Taken By, (c) = Cosigned By    Initials Name Provider Type     Katie Medel, PT Physical Therapist          Modalities     Row Name 12/10/18 0900             Ultrasound 62021    Location   R shoulder  -      Duty Cycle  100  -      Frequency  1.0 MHz  -      Intensity - Wts/cm  1.2  -      82895 - PT Ultrasound Minutes  10  -LH        User Key  (r) = Recorded By, (t) = Taken By, (c) = Cosigned By    Initials Name Provider Type     Katie Medel, PT Physical Therapist          Exercises     Row Name 12/10/18 0900             Subjective Comments    Subjective Comments  Pt went to pain managment today and they asked her to doo a lot with her R arm and it is sore today.   -         Subjective Pain    Able to rate subjective pain?  yes  -      Pre-Treatment Pain Level  8 more in shoulder than neck  -      Post-Treatment Pain  Level  5  -LH         Total Minutes    88170 - PT Therapeutic Exercise Minutes  20  -LH      41187 - PT Manual Therapy Minutes  12  -LH         Exercise 1    Exercise Name 1  supine cervical nodding  -LH      Sets 1  1  -LH      Reps 1  10  -LH      Time 1  5 sec  -LH         Exercise 2    Exercise Name 2  cervical isometrics  -LH      Sets 2  1  -LH      Reps 2  10  -LH      Time 2  5 sec hold  -LH         Exercise 3    Exercise Name 3  UT and levator stretching  -LH      Sets 3  1  -LH      Reps 3  3  -LH      Time 3  20 sec  -LH         Exercise 4    Exercise Name 4  scap ret and shoulder ext  -LH      Cueing 4  Verbal  -LH      Reps 4  20  -LH      Additional Comments  yellow  -LH         Exercise 5    Exercise Name 5  SL ER w/towel  -LH      Sets 5  2  -LH      Reps 5  10  -LH         Exercise 6    Exercise Name 6  supine press ups  -LH      Sets 6  2  -LH      Reps 6  10  -LH        User Key  (r) = Recorded By, (t) = Taken By, (c) = Cosigned By    Initials Name Provider Type     Katie Medel PT Physical Therapist                        Manual Rx (last 36 hours)      Manual Treatments     Row Name 12/10/18 0900             Total Minutes    77143 - PT Manual Therapy Minutes  12  -LH         Manual Rx 3    Manual Rx 3 Location  R shoulder PROM in all planes with oscillations for pain control, pect lift, STM to upper trap/levator  -LH        User Key  (r) = Recorded By, (t) = Taken By, (c) = Cosigned By    Initials Name Provider Type     Katie Medel PT Physical Therapist              Therapy Education  Given: Posture/body mechanics, Pain management, HEP, Symptoms/condition management  Program: Reinforced  How Provided: Verbal  Provided to: Patient  Level of Understanding: Teach back education performed              Time Calculation:   Start Time: 0930  Stop Time: 1015  Time Calculation (min): 45 min  Total Timed Code Minutes- PT: 42 minute(s)  Therapy Suggested Charges     Code   Minutes Charges     70999 (CPT®) Hc Pt Neuromusc Re Education Ea 15 Min      79767 (CPT®) Hc Pt Ther Proc Ea 15 Min 20 1    53823 (CPT®) Hc Gait Training Ea 15 Min      83283 (CPT®) Hc Pt Therapeutic Act Ea 15 Min      33498 (CPT®) Hc Pt Manual Therapy Ea 15 Min 12 1    70851 (CPT®) Hc Pt Ther Massage- Per 15 Min      43518 (CPT®) Hc Pt Iontophoresis Ea 15 Min      94044 (CPT®) Hc Pt Elec Stim Ea-Per 15 Min      28637 (CPT®) Hc Pt Ultrasound Ea 15 Min 10 1    17724 (CPT®) Hc Pt Self Care/Mgmt/Train Ea 15 Min      36352 (CPT®) Hc Pt Prosthetic (S) Train Initial Encounter, Each 15 Min      73188 (CPT®) Hc Orthotic(S) Mgmt/Train Initial Encounter, Each 15min      14813 (CPT®) Hc Pt Aquatic Therapy Ea 15 Min      08673 (CPT®) Hc Pt Orthotic(S)/Prosthetic(S) Encounter, Each 15 Min       (CPT®) Hc Pt Electrical Stim Unattended      Total  42 3        Therapy Charges for Today     Code Description Service Date Service Provider Modifiers Qty    77279536823 HC PT THER PROC EA 15 MIN 12/10/2018 Katie Medel, PT GP 1    84622013894 HC PT MANUAL THERAPY EA 15 MIN 12/10/2018 Katie Medel, PT GP 1    64010563878 HC PT ULTRASOUND EA 15 MIN 12/10/2018 Katie Medel, PT GP 1                    Katie Medel, PT  12/10/2018

## 2018-12-14 ENCOUNTER — TELEPHONE (OUTPATIENT)
Dept: NEUROSURGERY | Facility: CLINIC | Age: 38
End: 2018-12-14

## 2018-12-14 NOTE — TELEPHONE ENCOUNTER
Per Dr SANCHEZ he cannot fill out and sign any forms for student loan deferrment because she is going to PT.    Pt informed, she will  the forms on Monday and take to her PCP to have completed.

## 2018-12-17 ENCOUNTER — HOSPITAL ENCOUNTER (OUTPATIENT)
Dept: PHYSICAL THERAPY | Facility: HOSPITAL | Age: 38
Setting detail: THERAPIES SERIES
Discharge: HOME | End: 2018-12-17

## 2018-12-17 DIAGNOSIS — M54.2 NECK PAIN: Primary | ICD-10-CM

## 2018-12-17 DIAGNOSIS — G89.29 CHRONIC RIGHT SHOULDER PAIN: ICD-10-CM

## 2018-12-17 DIAGNOSIS — M75.41 IMPINGEMENT SYNDROME OF RIGHT SHOULDER: ICD-10-CM

## 2018-12-17 DIAGNOSIS — M25.511 CHRONIC RIGHT SHOULDER PAIN: ICD-10-CM

## 2018-12-17 DIAGNOSIS — M54.12 CERVICAL RADICULAR PAIN: ICD-10-CM

## 2018-12-17 PROCEDURE — 97035 APP MDLTY 1+ULTRASOUND EA 15: CPT | Performed by: PHYSICAL THERAPIST

## 2018-12-17 PROCEDURE — 97140 MANUAL THERAPY 1/> REGIONS: CPT | Performed by: PHYSICAL THERAPIST

## 2018-12-17 PROCEDURE — 97110 THERAPEUTIC EXERCISES: CPT | Performed by: PHYSICAL THERAPIST

## 2018-12-17 NOTE — THERAPY TREATMENT NOTE
Outpatient Physical Therapy Ortho Treatment Note  [facility]     Patient Name: Bienvenido Carter  : 1980  MRN: [id number]  Today's Date: 2018      Visit Date: 2018    Visit Dx:    ICD-10-CM ICD-9-CM   1. Neck pain M54.2 723.1   2. Cervical radicular pain M54.12 723.4   3. Chronic right shoulder pain M25.511 719.41    G89.29 338.29   4. Impingement syndrome of right shoulder M75.41 726.2       Patient Active Problem List   Diagnosis   • Spinal stenosis of cervical region   • Cervical disc disorder at C5-C6 level with radiculopathy   • Chronic neck pain        Past Medical History:   Diagnosis Date   • Anxiety    • Bipolar    • Borderline personality disorder in adult (CMS/HCC)    • Depression    • H/O emotional problems         No past surgical history on file.                    PT Assessment/Plan     Row Name 18 1107          PT Assessment    Assessment Comments  Pt still having quite a bit of R shoulder pain at rest and with activity. 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 She has been using the traction unit PRN and it does help her.   -        PT Plan    PT Plan Comments  cont to see for pain control, strength, and posture  -       User Key  (r) = Recorded By, (t) = Taken By, (c) = Cosigned By    Initials Name Provider Type     Katie Medel, PT Physical Therapist          Modalities     Row Name 18 0900             Ultrasound 74188    Location   R shoulder  -      Duty Cycle  100  -      Frequency  1.0 MHz  -      Intensity - Wts/cm  1.2  -      66160 - PT Ultrasound Minutes  9  -        User Key  (r) = Recorded By, (t) = Taken By, (c) = Cosigned By    Initials Name Provider Type     Katie Medel, PT Physical Therapist          Exercises     Row Name 18 1100 18 0900          Subjective Comments    Subjective Comments  --  Shoulder pretty sore today. Neck pain was bad  yesterday. Had a lot nerve pain into the R UE.   -        Subjective Pain    Able to rate subjective pain?  --  yes  -     Pre-Treatment Pain Level  --  6  -LH     Post-Treatment Pain Level  --  4  -LH        Total Minutes    16383 - PT Therapeutic Exercise Minutes  --  20  -LH     94685 - PT Manual Therapy Minutes  12  -LH  --        Exercise 1    Exercise Name 1  --  supine cervical nodding  -     Sets 1  --  1  -LH     Reps 1  --  10  -LH     Time 1  --  5 sec  -LH        Exercise 2    Exercise Name 2  --  cervical isometrics  -     Sets 2  --  1  -LH     Reps 2  --  10  -LH     Time 2  --  5 sec hold  -LH        Exercise 3    Exercise Name 3  --  UT and levator stretching  -     Sets 3  --  1  -LH     Reps 3  --  3  -LH     Time 3  --  20 sec  -LH        Exercise 4    Exercise Name 4  --  scap ret and shoulder ext  -     Cueing 4  --  Verbal  -     Reps 4  --  20  -LH     Additional Comments  --  yellow  -LH        Exercise 5    Exercise Name 5  --  SL ER w/towel  -     Sets 5  --  2  -LH     Reps 5  --  10  -LH        Exercise 6    Exercise Name 6  --  supine press ups  -     Sets 6  --  2  -LH     Reps 6  --  10  -LH       User Key  (r) = Recorded By, (t) = Taken By, (c) = Cosigned By    Initials Name Provider Type     Katie Medel, PT Physical Therapist                        Manual Rx (last 36 hours)      Manual Treatments     Row Name 12/17/18 1100             Total Minutes    62458 - PT Manual Therapy Minutes  12  -LH         Manual Rx 1    Manual Rx 1 Location  B UT  -      Manual Rx 1 Type  intramuscular manual therapy using threading techniques. LTRs acheived. Palpation was done before, during, and after tx. Obtained verbal consent to treat  -      Manual Rx 1 Duration  3  -LH         Manual Rx 2    Manual Rx 2 Location  B  UT, levator, cervical PVMs  -      Manual Rx 2 Type  STM to above with manual distraction and manual strtching to R UT and levator, B pects  -       Manual Rx 2 Duration  12  -        User Key  (r) = Recorded By, (t) = Taken By, (c) = Cosigned By    Initials Name Provider Type     Katie Medel, PT Physical Therapist              Therapy Education  Given: Posture/body mechanics, Pain management, HEP, Symptoms/condition management  Program: Reinforced  How Provided: Verbal  Provided to: Patient  Level of Understanding: Teach back education performed              Time Calculation:   Start Time: 0935  Stop Time: 1017  Time Calculation (min): 42 min  Total Timed Code Minutes- PT: 41 minute(s)  Therapy Suggested Charges     Code   Minutes Charges    91595 (CPT®) Hc Pt Neuromusc Re Education Ea 15 Min      78378 (CPT®) Hc Pt Ther Proc Ea 15 Min 20 1    67633 (CPT®) Hc Gait Training Ea 15 Min      72121 (CPT®) Hc Pt Therapeutic Act Ea 15 Min      89126 (CPT®) Hc Pt Manual Therapy Ea 15 Min 12 1    66192 (CPT®) Hc Pt Ther Massage- Per 15 Min      39912 (CPT®) Hc Pt Iontophoresis Ea 15 Min      83335 (CPT®) Hc Pt Elec Stim Ea-Per 15 Min      93545 (CPT®) Hc Pt Ultrasound Ea 15 Min 9 1    75212 (CPT®) Hc Pt Self Care/Mgmt/Train Ea 15 Min      66029 (CPT®) Hc Pt Prosthetic (S) Train Initial Encounter, Each 15 Min      81714 (CPT®) Hc Orthotic(S) Mgmt/Train Initial Encounter, Each 15min      19637 (CPT®) Hc Pt Aquatic Therapy Ea 15 Min      39228 (CPT®) Hc Pt Orthotic(S)/Prosthetic(S) Encounter, Each 15 Min       (CPT®) Hc Pt Electrical Stim Unattended      Total  41 3        Therapy Charges for Today     Code Description Service Date Service Provider Modifiers Qty    22987413557 HC PT THER PROC EA 15 MIN 12/17/2018 Katie Medel, PT GP 1    21635636892 HC PT MANUAL THERAPY EA 15 MIN 12/17/2018 Katie Medel, PT GP 1    59502350727 HC PT ULTRASOUND EA 15 MIN 12/17/2018 Katie Medel, PT GP 1                    Katie Medel, PT  12/17/2018

## 2019-01-24 NOTE — PROGRESS NOTES
Subjective   Patient ID: Bienvenido Carter is a 38 y.o. female is here today for follow-up after PT/dry needling.  She states that it helped temporarily.   Patient was last seen 10.22.18 for neck, right arm and shoulder pain, N/T right arm and hand.  She is seeing Dr Solares at [facility] and has cervical injections and RFA, all helped temporarily.  She is scheduled for a follow up in a couple months.  She is taking Gabapentin 300mg TID and Percocet 7.5/325 BID as prescribed by pain management.    She is scheduled for right shoulder/ bicep surgery with Dr Perez 2.7.19.    She is currently having constant moderate to severe neck pain, right arm and hand N/T, right arm pain and right arm weakness.     Neck Pain    The problem occurs constantly. The pain is present in the right side. The pain is at a severity of 7/10. The pain is moderate. Associated symptoms include numbness, tingling and weakness.   Arm Pain    The pain is present in the right fingers, right elbow, right forearm and right hand. The pain is at a severity of 7/10. The pain is moderate. Associated symptoms include numbness and tingling.   Extremity Weakness    The pain is present in the right arm. The problem occurs constantly. The pain is at a severity of 7/10. The pain is moderate. Associated symptoms include numbness and tingling.       The following portions of the patient's history were reviewed and updated as appropriate: allergies, current medications, past family history, past medical history, past social history, past surgical history and problem list.    Review of Systems   Musculoskeletal: Positive for arthralgias, extremity weakness, myalgias and neck pain.   Neurological: Positive for tingling, weakness and numbness.   All other systems reviewed and are negative.  This patient has had neck pain and bilateral arm pain, right worse than left, due to cervical disk disease at C4-C5 and C5-C6. She has some superimposed right  shoulder problems and Dr. Perez is going to be doing shoulder surgery in early 02/2019. Her right shoulder is really quite tender. I think we need to let her get through this before we assess where we are going with the neck. The physical therapy helped just a bit, particularly the dry needling. She is now on gabapentin at 300 mg t.i.d. Will have her come back in early 04/2019 after and during the recovery from her shoulder surgery to assess where we are at. I would like to avoid an ACDF at C4-C5 and C5-C6 if possible.         Objective   Physical Exam   Constitutional: She is oriented to person, place, and time. She appears well-developed and well-nourished.   HENT:   Head: Normocephalic and atraumatic.   Eyes: Conjunctivae and EOM are normal. Pupils are equal, round, and reactive to light.   Fundoscopic exam:       The right eye shows no papilledema. The right eye shows venous pulsations.        The left eye shows no papilledema. The left eye shows venous pulsations.   Neck: Carotid bruit is not present.   Neurological: She is oriented to person, place, and time. She has a normal Finger-Nose-Finger Test and a normal Heel to Shin Test. Gait normal.   Reflex Scores:       Tricep reflexes are 2+ on the right side and 2+ on the left side.       Bicep reflexes are 2+ on the right side and 2+ on the left side.       Brachioradialis reflexes are 2+ on the right side and 2+ on the left side.       Patellar reflexes are 2+ on the right side and 2+ on the left side.       Achilles reflexes are 2+ on the right side and 2+ on the left side.  Psychiatric: Her speech is normal.     Neurologic Exam     Mental Status   Oriented to person, place, and time.   Registration of memory: Good recent and remote memory.   Attention: normal. Concentration: normal.   Speech: speech is normal   Level of consciousness: alert  Knowledge: consistent with education.     Cranial Nerves     CN II   Visual fields full to confrontation.   Visual  acuity: normal    CN III, IV, VI   Pupils are equal, round, and reactive to light.  Extraocular motions are normal.     CN V   Facial sensation intact.   Right corneal reflex: normal  Left corneal reflex: normal    CN VII   Facial expression full, symmetric.   Right facial weakness: none  Left facial weakness: none    CN VIII   Hearing: intact    CN IX, X   Palate: symmetric    CN XI   Right sternocleidomastoid strength: normal  Left sternocleidomastoid strength: normal    CN XII   Tongue: not atrophic  Tongue deviation: none    Motor Exam   Muscle bulk: normal  Right arm tone: normal  Left arm tone: normal  Right leg tone: normal  Left leg tone: normal    Strength   Strength 5/5 except as noted.     Sensory Exam   Light touch normal.     Gait, Coordination, and Reflexes     Gait  Gait: normal    Coordination   Finger to nose coordination: normal  Heel to shin coordination: normal    Reflexes   Right brachioradialis: 2+  Left brachioradialis: 2+  Right biceps: 2+  Left biceps: 2+  Right triceps: 2+  Left triceps: 2+  Right patellar: 2+  Left patellar: 2+  Right achilles: 2+  Left achilles: 2+  Right : 2+  Left : 2+      Assessment/Plan   Independent Review of Radiographic Studies:    I reviewed the new cervical MRI that did show disc disease and root entrapment bilaterally at C4-C5 and C5-C6.  Agree with the report.      Medical Decision Making:    I think we need to let her get to the shoulder surgery before we can assess where were going with the neck.  So I will see her in early April 2 months after her shoulder surgery can assess what next to do with the neck.  She will continue her own exercises in the meantime.      There are no diagnoses linked to this encounter.  No Follow-up on file.

## 2019-01-30 ENCOUNTER — OFFICE VISIT (OUTPATIENT)
Dept: NEUROSURGERY | Facility: CLINIC | Age: 39
End: 2019-01-30

## 2019-01-30 VITALS
HEIGHT: 60 IN | DIASTOLIC BLOOD PRESSURE: 60 MMHG | HEART RATE: 78 BPM | SYSTOLIC BLOOD PRESSURE: 90 MMHG | BODY MASS INDEX: 16.14 KG/M2 | WEIGHT: 82.2 LBS

## 2019-01-30 DIAGNOSIS — G89.29 CHRONIC NECK PAIN: ICD-10-CM

## 2019-01-30 DIAGNOSIS — M50.122 CERVICAL DISC DISORDER AT C5-C6 LEVEL WITH RADICULOPATHY: ICD-10-CM

## 2019-01-30 DIAGNOSIS — M54.2 CHRONIC NECK PAIN: ICD-10-CM

## 2019-01-30 DIAGNOSIS — M50.121 CERVICAL DISC DISORDER AT C4-C5 LEVEL WITH RADICULOPATHY: Primary | ICD-10-CM

## 2019-01-30 PROCEDURE — 99213 OFFICE O/P EST LOW 20 MIN: CPT | Performed by: NEUROLOGICAL SURGERY

## 2019-02-05 ENCOUNTER — DOCUMENTATION (OUTPATIENT)
Dept: PHYSICAL THERAPY | Facility: HOSPITAL | Age: 39
End: 2019-02-05

## 2019-02-05 DIAGNOSIS — M75.41 IMPINGEMENT SYNDROME OF RIGHT SHOULDER: ICD-10-CM

## 2019-02-05 DIAGNOSIS — M54.12 CERVICAL RADICULAR PAIN: ICD-10-CM

## 2019-02-05 DIAGNOSIS — M54.2 NECK PAIN: Primary | ICD-10-CM

## 2019-02-05 DIAGNOSIS — M25.511 CHRONIC RIGHT SHOULDER PAIN: ICD-10-CM

## 2019-02-05 DIAGNOSIS — G89.29 CHRONIC RIGHT SHOULDER PAIN: ICD-10-CM

## 2019-02-05 NOTE — THERAPY DISCHARGE NOTE
Outpatient Physical Therapy Discharge Summary         Patient Name: Bienvenido Carter  : 1980  MRN: [id number]    Today's Date: 2019    Visit Dx:    ICD-10-CM ICD-9-CM   1. Neck pain M54.2 723.1   2. Cervical radicular pain M54.12 723.4   3. Chronic right shoulder pain M25.511 719.41    G89.29 338.29   4. Impingement syndrome of right shoulder M75.41 726.2       PT OP Goals     Row Name 19 1400          PT Short Term Goals    STG Date to Achieve  16  -     STG 1  Patient will be independent with education for symptom management, joint protection and strategies to minimize stress on affected tissues  -     STG 1 Progress  Met  -     STG 2  Pt to improve pain complaints to no more than 7/10 with ADLs  -     STG 2 Progress  Not Met  -     STG 3  Pt to improve pain complaints to no more than 6/10 with ADLs  -     STG 3 Progress  Not Met  -        Long Term Goals    LTG Date to Achieve  16  -     LTG 1  Pt to improve pain complaints to no more than 5/10 with ADLs  -     LTG 1 Progress  Not Met  -     LTG 2  Pt to improve NDI score from 58% to 40% for overall functional improvement  -     LTG 2 Progress  Not Met  -     LTG 3  Pt to improve neck and shoulder strength to 4 to 4+/5  -     LTG 3 Progress  Not Met  -     LTG 4  Pt to be independent with HEP for symptom management and strengthening  -     LTG 4 Progress  Partially Met  -       User Key  (r) = Recorded By, (t) = Taken By, (c) = Cosigned By    Initials Name Provider Type     Katie Medel, PT Physical Therapist          OP PT Discharge Summary  Date of Discharge: 19  Reason for Discharge: other (comment)  Outcomes Achieved: Refer to plan of care for updates on goals achieved  Discharge Destination: Home with home program  Discharge Instructions/Additional Comments: Bienvenido Carter was seen for 10 physical therapy sessions for neck and shoulder pain.  Treatment included  therapeutic exercise, manual therapy, ultrasound , patient education with home exercise program  and dry needling. Progress to physical therapy goals was fair. She was still having a lot of pain in the neck and shoulders and we decided to have her RTMD.  She was discharged to an independent [facility] and provided patient education to self-manage condition      Time Calculation:        Therapy Suggested Charges     Code   Minutes Charges    None                       Katie Medel, PT  2/5/2019

## 2019-02-08 ENCOUNTER — TRANSCRIBE ORDERS (OUTPATIENT)
Dept: PHYSICAL THERAPY | Facility: HOSPITAL | Age: 39
End: 2019-02-08

## 2019-02-08 DIAGNOSIS — Z98.890 S/P SHOULDER SURGERY: Primary | ICD-10-CM

## 2019-02-14 ENCOUNTER — HOSPITAL ENCOUNTER (OUTPATIENT)
Dept: PHYSICAL THERAPY | Facility: HOSPITAL | Age: 39
Setting detail: THERAPIES SERIES
Discharge: HOME | End: 2019-02-14

## 2019-02-14 DIAGNOSIS — M25.511 ACUTE PAIN OF RIGHT SHOULDER: Primary | ICD-10-CM

## 2019-02-14 DIAGNOSIS — Z98.890 S/P SHOULDER SURGERY: ICD-10-CM

## 2019-02-14 PROCEDURE — 97110 THERAPEUTIC EXERCISES: CPT | Performed by: PHYSICAL THERAPIST

## 2019-02-14 PROCEDURE — 97161 PT EVAL LOW COMPLEX 20 MIN: CPT | Performed by: PHYSICAL THERAPIST
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
## 2019-02-20 ENCOUNTER — HOSPITAL ENCOUNTER (OUTPATIENT)
Dept: PHYSICAL THERAPY | Facility: HOSPITAL | Age: 39
Setting detail: THERAPIES SERIES
Discharge: HOME | End: 2019-02-20

## 2019-02-20 DIAGNOSIS — M25.511 ACUTE PAIN OF RIGHT SHOULDER: Primary | ICD-10-CM

## 2019-02-20 DIAGNOSIS — Z98.890 S/P SHOULDER SURGERY: ICD-10-CM

## 2019-02-20 PROCEDURE — 97010 HOT OR COLD PACKS THERAPY: CPT

## 2019-02-20 PROCEDURE — 97110 THERAPEUTIC EXERCISES: CPT

## 2019-02-20 PROCEDURE — 97140 MANUAL THERAPY 1/> REGIONS: CPT

## 2019-02-20 NOTE — THERAPY TREATMENT NOTE
Outpatient Physical Therapy Ortho Treatment Note  [facility]     Patient Name: Bienvenido Carter  : 1980  MRN: [id number]  Today's Date: 2019      Visit Date: 2019    Visit Dx:    ICD-10-CM ICD-9-CM   1. Acute pain of right shoulder M25.511 719.41   2. S/P shoulder surgery Z98.890 V45.89       Patient Active Problem List   Diagnosis   • Spinal stenosis of cervical region   • Cervical disc disorder at C5-C6 level with radiculopathy   • Chronic neck pain        Past Medical History:   Diagnosis Date   • Anxiety    • Bipolar    • Borderline personality disorder in adult (CMS/HCC)    • Depression    • H/O emotional problems         No past surgical history on file.                    PT Assessment/Plan     Row Name 19 0917          PT Assessment    Assessment Comments  Ms. Carter presents today with increased pain in R shoulder. 8/10. Pt was able to tolerate PROM to R shoulder without increased pain and minimal to no muscle guarding; endfeel is normal.  Pt needs cuing to perform most of her exercises still, but then is able to do without difficulty.   -LP       User Key  (r) = Recorded By, (t) = Taken By, (c) = Cosigned By    Initials Name Provider Type    LP Jessi Mckeon PT Physical Therapist          Modalities     Row Name 19 0800             Ice    Ice Applied  Yes  -LP      Rx Minutes  10 mins  -LP      Ice S/P Rx  Yes  -LP        User Key  (r) = Recorded By, (t) = Taken By, (c) = Cosigned By    Initials Name Provider Type    LP Jessi Mckeon PT Physical Therapist          Exercises     Row Name 19 0919 0800          Subjective Comments    Subjective Comments  --  very sore-couldn't sleep  -LP        Subjective Pain    Able to rate subjective pain?  --  yes  -LP     Pre-Treatment Pain Level  --  8  -LP     Post-Treatment Pain Level  --  6  -LP        Total Minutes    02523 - PT Therapeutic Exercise Minutes  --  20  -LP     31889 - PT Manual  Therapy Minutes  12  -LP  --        Exercise 1    Exercise Name 1  --  AAROM Er with cane  -LP     Sets 1  --  1  -LP     Reps 1  --  10  -LP        Exercise 2    Exercise Name 2  --  table slides with A  -LP     Sets 2  --  1  -LP     Reps 2  --  10  -LP     Time 2  --  10 sec  -LP        Exercise 3    Exercise Name 3  --  elbow flex/ext in supine  -LP     Sets 3  --  1  -LP     Reps 3  --  10  -LP     Additional Comments  --  1#  -LP        Exercise 4    Exercise Name 4  --  scap retraction seated and standing  -LP     Sets 4  --  2  -LP     Reps 4  --  10  -LP        Exercise 5    Exercise Name 5  --  shoulder rolls seated and standing  -LP     Sets 5  --  2  -LP     Reps 5  --  10  -LP       User Key  (r) = Recorded By, (t) = Taken By, (c) = Cosigned By    Initials Name Provider Type    LP Jessi Mckeon PT Physical Therapist                        Manual Rx (last 36 hours)      Manual Treatments     Row Name 02/20/19 0900             Total Minutes    74850 - PT Manual Therapy Minutes  12  -LP         Manual Rx 1    Manual Rx 1 Location  R shoulder  -LP      Manual Rx 1 Type  oscillations, distraction, PROM  -LP      Manual Rx 1 Grade  gentle  -LP      Manual Rx 1 Duration  12 min  -LP        User Key  (r) = Recorded By, (t) = Taken By, (c) = Cosigned By    Initials Name Provider Type    LP Jessi Mckeon PT Physical Therapist              Therapy Education  Education Details: doing ex's a little troughout the day  Given: HEP, Symptoms/condition management, Posture/body mechanics  Program: New, Reinforced  How Provided: Verbal, Demonstration, Written  Provided to: Patient  Level of Understanding: Teach back education performed              Time Calculation:   Start Time: 0830  Stop Time: 0915  Time Calculation (min): 45 min  Therapy Suggested Charges     Code   Minutes Charges    02294 (CPT®) Hc Pt Neuromusc Re Education Ea 15 Min      44727 (CPT®) Hc Pt Ther Proc Ea 15 Min 20 1    17952 (CPT®) Hc Gait  Training Ea 15 Min      10151 (CPT®) Hc Pt Therapeutic Act Ea 15 Min      77143 (CPT®) Hc Pt Manual Therapy Ea 15 Min 12 1    18739 (CPT®) Hc Pt Ther Massage- Per 15 Min      04879 (CPT®) Hc Pt Iontophoresis Ea 15 Min      97626 (CPT®) Hc Pt Elec Stim Ea-Per 15 Min      20052 (CPT®) Hc Pt Ultrasound Ea 15 Min      96089 (CPT®) Hc Pt Self Care/Mgmt/Train Ea 15 Min      43089 (CPT®) Hc Pt Prosthetic (S) Train Initial Encounter, Each 15 Min      76036 (CPT®) Hc Orthotic(S) Mgmt/Train Initial Encounter, Each 15min      48402 (CPT®) Hc Pt Aquatic Therapy Ea 15 Min      12496 (CPT®) Hc Pt Orthotic(S)/Prosthetic(S) Encounter, Each 15 Min       (CPT®) Hc Pt Electrical Stim Unattended      Total  32 2        Therapy Charges for Today     Code Description Service Date Service Provider Modifiers Qty    36444599977 HC PT MANUAL THERAPY EA 15 MIN 2/20/2019 Jessi Mckeon, PT GP 1    17772212347 HC PT THER PROC EA 15 MIN 2/20/2019 Jessi Mckeon, PT GP 2    66763550775 HC PT HOT/COLD PACK WC NONMCARE 2/20/2019 Jessi Mckeon, PT GP 1                    Jessi Mckeon, PT  2/20/2019

## 2019-02-25 ENCOUNTER — HOSPITAL ENCOUNTER (OUTPATIENT)
Dept: PHYSICAL THERAPY | Facility: HOSPITAL | Age: 39
Setting detail: THERAPIES SERIES
Discharge: HOME | End: 2019-02-25

## 2019-02-25 DIAGNOSIS — Z98.890 S/P SHOULDER SURGERY: ICD-10-CM

## 2019-02-25 DIAGNOSIS — M25.511 ACUTE PAIN OF RIGHT SHOULDER: Primary | ICD-10-CM

## 2019-02-25 PROCEDURE — 97110 THERAPEUTIC EXERCISES: CPT | Performed by: PHYSICAL THERAPIST

## 2019-02-25 PROCEDURE — 97140 MANUAL THERAPY 1/> REGIONS: CPT | Performed by: PHYSICAL THERAPIST

## 2019-02-25 NOTE — THERAPY TREATMENT NOTE
Outpatient Physical Therapy Ortho Treatment Note  [facility]     Patient Name: Bienvenido Carter  : 1980  MRN: [id number]  Today's Date: 2019      Visit Date: 2019    Visit Dx:    ICD-10-CM ICD-9-CM   1. Acute pain of right shoulder M25.511 719.41   2. S/P shoulder surgery Z98.890 V45.89       Patient Active Problem List   Diagnosis   • Spinal stenosis of cervical region   • Cervical disc disorder at C5-C6 level with radiculopathy   • Chronic neck pain        Past Medical History:   Diagnosis Date   • Anxiety    • Bipolar    • Borderline personality disorder in adult (CMS/HCC)    • Depression    • H/O emotional problems         No past surgical history on file.    PT Ortho     Row Name 19 1300       Right Upper Ext    Rt Shoulder Abduction PROM  110 deg  -LH    Rt Shoulder Flexion PROM  130 deg  -LH    Rt Shoulder External Rotation PROM  60 deg  -LH    Rt Shoulder Internal Rotation PROM  65 deg  -LH      User Key  (r) = Recorded By, (t) = Taken By, (c) = Cosigned By    Initials Name Provider Type     Katie Medel, PT Physical Therapist                      PT Assessment/Plan     Row Name 19 1400          PT Assessment    Assessment Comments  Pt with good progress with her PROM over the past 2 weeks. Still with healing bruising down the arm and into the posterior forearm. Able to tolerates light AROM and AAROm today  -        PT Plan    PT Plan Comments  cont  -LH       User Key  (r) = Recorded By, (t) = Taken By, (c) = Cosigned By    Initials Name Provider Type     Katie Medel, PT Physical Therapist              Exercises     Row Name 19 1300             Subjective Comments    Subjective Comments  The pain is a little better. Bruising from her incident a couple of weeks ago is starting to heal. Dc sling and able to drive now  -         Subjective Pain    Able to rate subjective pain?  yes  -      Pre-Treatment Pain Level  7  -LH      Post-Treatment  Pain Level  5  -LH         Total Minutes    22062 - PT Therapeutic Exercise Minutes  20  -LH      98299 - PT Manual Therapy Minutes  23  -LH         Exercise 1    Exercise Name 1  AAROM Er with cane  -LH      Sets 1  1  -LH      Reps 1  10  -LH         Exercise 2    Exercise Name 2  table slides with A  -LH      Sets 2  1  -LH      Reps 2  10  -LH      Time 2  10 sec  -LH         Exercise 3    Exercise Name 3  elbow flex/ext in supine  -LH      Sets 3  1  -LH      Reps 3  10  -LH      Additional Comments  1lb  -LH         Exercise 4    Exercise Name 4  scap retraction seated and standing  -LH      Sets 4  2  -LH      Reps 4  10  -LH         Exercise 5    Exercise Name 5  shoulder rolls seated and standing  -LH      Sets 5  2  -LH      Reps 5  10  -LH         Exercise 6    Exercise Name 6  supine press ups with cane  -LH      Sets 6  2  -LH      Reps 6  10  -LH        User Key  (r) = Recorded By, (t) = Taken By, (c) = Cosigned By    Initials Name Provider Type     Katie Medel, PT Physical Therapist                        Manual Rx (last 36 hours)      Manual Treatments     Row Name 02/25/19 1300             Total Minutes    16743 - PT Manual Therapy Minutes  23  -LH         Manual Rx 1    Manual Rx 1 Location  R shoulder  -      Manual Rx 1 Type  PROM in all planes with osciallations for pain, gentle distraction, STM to UT/levator for tissue relaxation  -      Manual Rx 1 Grade  gentle  -      Manual Rx 1 Duration  23  -LH        User Key  (r) = Recorded By, (t) = Taken By, (c) = Cosigned By    Initials Name Provider Type     Katie Medel, PT Physical Therapist              Therapy Education  Given: HEP, Symptoms/condition management, Posture/body mechanics  Program: New  How Provided: Verbal, Demonstration, Written  Provided to: Patient  Level of Understanding: Teach back education performed              Time Calculation:   Start Time: 1315  Stop Time: 1400  Time Calculation (min): 45 min  Total  Timed Code Minutes- PT: 43 minute(s)  Therapy Suggested Charges     Code   Minutes Charges    16343 (CPT®) Hc Pt Neuromusc Re Education Ea 15 Min      60551 (CPT®) Hc Pt Ther Proc Ea 15 Min 20 1    65598 (CPT®) Hc Gait Training Ea 15 Min      52852 (CPT®) Hc Pt Therapeutic Act Ea 15 Min      34406 (CPT®) Hc Pt Manual Therapy Ea 15 Min 23 2    56480 (CPT®) Hc Pt Ther Massage- Per 15 Min      08347 (CPT®) Hc Pt Iontophoresis Ea 15 Min      65855 (CPT®) Hc Pt Elec Stim Ea-Per 15 Min      42536 (CPT®) Hc Pt Ultrasound Ea 15 Min      84276 (CPT®) Hc Pt Self Care/Mgmt/Train Ea 15 Min      15307 (CPT®) Hc Pt Prosthetic (S) Train Initial Encounter, Each 15 Min      12083 (CPT®) Hc Orthotic(S) Mgmt/Train Initial Encounter, Each 15min      38687 (CPT®) Hc Pt Aquatic Therapy Ea 15 Min      48417 (CPT®) Hc Pt Orthotic(S)/Prosthetic(S) Encounter, Each 15 Min       (CPT®) Hc Pt Electrical Stim Unattended      Total  43 3        Therapy Charges for Today     Code Description Service Date Service Provider Modifiers Qty    12614757753 HC PT THER PROC EA 15 MIN 2/25/2019 Katie Medel, PT GP 1    46846077758 HC PT MANUAL THERAPY EA 15 MIN 2/25/2019 Katie Medel, PT GP 2                    Katie Medel, PT  2/25/2019

## 2019-02-28 ENCOUNTER — HOSPITAL ENCOUNTER (OUTPATIENT)
Dept: PHYSICAL THERAPY | Facility: HOSPITAL | Age: 39
Discharge: HOME | End: 2019-02-28
Admitting: ORTHOPAEDIC SURGERY

## 2019-02-28 DIAGNOSIS — M25.511 ACUTE PAIN OF RIGHT SHOULDER: Primary | ICD-10-CM

## 2019-02-28 DIAGNOSIS — Z98.890 S/P SHOULDER SURGERY: ICD-10-CM

## 2019-02-28 PROCEDURE — 97140 MANUAL THERAPY 1/> REGIONS: CPT | Performed by: PHYSICAL THERAPIST

## 2019-02-28 PROCEDURE — 97110 THERAPEUTIC EXERCISES: CPT | Performed by: PHYSICAL THERAPIST

## 2019-03-04 ENCOUNTER — HOSPITAL ENCOUNTER (OUTPATIENT)
Dept: PHYSICAL THERAPY | Facility: HOSPITAL | Age: 39
Setting detail: THERAPIES SERIES
Discharge: HOME | End: 2019-03-04

## 2019-03-04 DIAGNOSIS — M25.511 ACUTE PAIN OF RIGHT SHOULDER: Primary | ICD-10-CM

## 2019-03-04 DIAGNOSIS — Z98.890 S/P SHOULDER SURGERY: ICD-10-CM

## 2019-03-04 PROCEDURE — 97140 MANUAL THERAPY 1/> REGIONS: CPT | Performed by: PHYSICAL THERAPIST

## 2019-03-04 PROCEDURE — 97110 THERAPEUTIC EXERCISES: CPT | Performed by: PHYSICAL THERAPIST

## 2019-03-04 NOTE — THERAPY TREATMENT NOTE
Outpatient Physical Therapy Ortho Treatment Note  [facility]     Patient Name: Bienvenido Carter  : 1980  MRN: [id number]  Today's Date: 3/4/2019      Visit Date: 2019    Visit Dx:    ICD-10-CM ICD-9-CM   1. Acute pain of right shoulder M25.511 719.41   2. S/P shoulder surgery Z98.890 V45.89       Patient Active Problem List   Diagnosis   • Spinal stenosis of cervical region   • Cervical disc disorder at C5-C6 level with radiculopathy   • Chronic neck pain        Past Medical History:   Diagnosis Date   • Anxiety    • Bipolar    • Borderline personality disorder in adult (CMS/HCC)    • Depression    • H/O emotional problems         No past surgical history on file.    PT Ortho     Row Name 19 1300       Right Upper Ext    Rt Shoulder Abduction PROM  155 deg  -LH    Rt Shoulder Flexion PROM  155 deg  -LH    Rt Shoulder External Rotation PROM  77 deg  -LH    Rt Shoulder Internal Rotation PROM  78 deg  -LH      User Key  (r) = Recorded By, (t) = Taken By, (c) = Cosigned By    Initials Name Provider Type     Katie Medel, PT Physical Therapist                      PT Assessment/Plan     Row Name 19 1400          PT Assessment    Assessment Comments  pt continues with higher levels of pain today. She has been using the arnica gel and her bruising is healing more.. She has improved her PROM very well over the past week  -        PT Plan    PT Plan Comments  cont  -       User Key  (r) = Recorded By, (t) = Taken By, (c) = Cosigned By    Initials Name Provider Type     Katie Medel, PT Physical Therapist              Exercises     Row Name 19 1300             Subjective Comments    Subjective Comments  Woke up and it is pretty sore today, must've slept on it funny  -         Subjective Pain    Able to rate subjective pain?  yes  -      Pre-Treatment Pain Level  8  -         Total Minutes    15194 - PT Therapeutic Exercise Minutes  23  -LH      15641 - PT  Manual Therapy Minutes  20  -LH         Exercise 1    Exercise Name 1  AAROM Er with cane  -LH      Sets 1  1  -LH      Reps 1  10  -LH         Exercise 2    Exercise Name 2  table slides with A  -LH      Sets 2  1  -LH      Reps 2  10  -LH      Time 2  10 sec  -LH         Exercise 3    Exercise Name 3  elbow flex/ext in supine  -LH      Sets 3  2  -LH      Reps 3  10  -LH         Exercise 4    Exercise Name 4  scap retraction shoulder ext  -LH      Sets 4  2  -LH      Reps 4  10  -LH      Time 4  yellow  -LH         Exercise 5    Exercise Name 5  shoulder rolls seated and standing  -LH      Sets 5  2  -LH      Reps 5  10  -LH         Exercise 6    Exercise Name 6  supine press ups with cane  -LH      Cueing 6  Verbal  -LH      Sets 6  2  -LH      Reps 6  10  -LH         Exercise 7    Exercise Name 7  isometrics - ER and abd  -LH      Cueing 7  Verbal;Tactile  -LH      Sets 7  1  -LH      Reps 7  10  -LH      Time 7  5 sec  -LH        User Key  (r) = Recorded By, (t) = Taken By, (c) = Cosigned By    Initials Name Provider Type     Katie Medel PT Physical Therapist                        Manual Rx (last 36 hours)      Manual Treatments     Row Name 03/04/19 1300             Total Minutes    03175 - PT Manual Therapy Minutes  20  -LH         Manual Rx 1    Manual Rx 1 Location  R shoulder  -LH      Manual Rx 1 Type  PROM in all planes with osciallations for pain, gentle distraction, STM to UT/levator for tissue relaxation, lighr retrograde massage to forearm and upper for edema, pain, and bruising  -LH      Manual Rx 1 Grade  gentle  -LH      Manual Rx 1 Duration  20  -LH        User Key  (r) = Recorded By, (t) = Taken By, (c) = Cosigned By    Initials Name Provider Type     Katie Medel PT Physical Therapist          PT OP Goals     Row Name 03/04/19 1400          PT Short Term Goals    STG 1  Patient will be independent with education for symptom management, joint protection and strategies to  minimize stress on affected tissues  -     STG 1 Progress  Partially Met;Ongoing  -     STG 2  Pt to improve pain complaints to no more than 6/10 with ADLs  -     STG 2 Progress  Ongoing  -     STG 3  Pt to improve R shoulder ROM in flex=110/135 deg, abd=100/130 deg, ER=back of head/55 deg and IR=L5/70 deg  -     STG 3 Progress  Partially Met;Ongoing  -        Long Term Goals    LTG 1  Pt to improve pain complaints to no more than 3/10 with ADLs  -     LTG 1 Progress  Ongoing  -     LTG 2  Pt to improve R shoulder ROM in flex=150/160 deg, abd=150/160 deg, ER=T3/80 deg and IR=T10/80 deg  -     LTG 2 Progress  Ongoing  -     LTG 3  Pt to improve shoulder strength to 4/5  -     LTG 3 Progress  Ongoing  [facility]     LTG 4  Pt to improve DASH score from 93% to 30% for overall functional improvement  -     LTG 4 Progress  Ongoing  [facility]     LTG 5  Patient will be independent and compliant with advanced home exercise program to facilitate self-management of symptoms.  -     LTG 5 Progress  Ongoing  -       User Key  (r) = Recorded By, (t) = Taken By, (c) = Cosigned By    Initials Name Provider Type     Katie Medel, PT Physical Therapist          Therapy Education  Given: HEP, Symptoms/condition management, Posture/body mechanics  Program: New  How Provided: Verbal, Demonstration, Written  Provided to: Patient  Level of Understanding: Teach back education performed              Time Calculation:   Start Time: 1315  Stop Time: 1400  Time Calculation (min): 45 min  Total Timed Code Minutes- PT: 43 minute(s)  Therapy Suggested Charges     Code   Minutes Charges    02026 (CPT®) Hc Pt Neuromusc Re Education Ea 15 Min      13049 (CPT®) Hc Pt Ther Proc Ea 15 Min 23 2    18352 (CPT®) Hc Gait Training Ea 15 Min      47740 (CPT®) Hc Pt Therapeutic Act Ea 15 Min      55532 (CPT®) Hc Pt Manual Therapy Ea 15 Min 20 1    52792 (CPT®) Hc Pt Ther Massage- Per 15 Min      32822 (CPT®) Hc Pt Iontophoresis Ea 15  Min      06541 (CPT®) Hc Pt Elec Stim Ea-Per 15 Min      88628 (CPT®) Hc Pt Ultrasound Ea 15 Min      81367 (CPT®) Hc Pt Self Care/Mgmt/Train Ea 15 Min      28127 (CPT®) Hc Pt Prosthetic (S) Train Initial Encounter, Each 15 Min      45688 (CPT®) Hc Orthotic(S) Mgmt/Train Initial Encounter, Each 15min      26018 (CPT®) Hc Pt Aquatic Therapy Ea 15 Min      16256 (CPT®) Hc Pt Orthotic(S)/Prosthetic(S) Encounter, Each 15 Min       (CPT®) Hc Pt Electrical Stim Unattended      Total  43 3        Therapy Charges for Today     Code Description Service Date Service Provider Modifiers Qty    06540916922 HC PT THER PROC EA 15 MIN 3/4/2019 Katie Medel, PT GP 2    42529971821 HC PT MANUAL THERAPY EA 15 MIN 3/4/2019 Katie Medel, PT GP 1                    Katie Medel, PT  3/4/2019

## 2019-03-07 ENCOUNTER — HOSPITAL ENCOUNTER (OUTPATIENT)
Dept: PHYSICAL THERAPY | Facility: HOSPITAL | Age: 39
Setting detail: THERAPIES SERIES
Discharge: HOME | End: 2019-03-07

## 2019-03-07 DIAGNOSIS — Z98.890 S/P SHOULDER SURGERY: ICD-10-CM

## 2019-03-07 DIAGNOSIS — M25.511 ACUTE PAIN OF RIGHT SHOULDER: Primary | ICD-10-CM

## 2019-03-07 PROCEDURE — 97110 THERAPEUTIC EXERCISES: CPT | Performed by: PHYSICAL THERAPIST

## 2019-03-07 PROCEDURE — 97140 MANUAL THERAPY 1/> REGIONS: CPT | Performed by: PHYSICAL THERAPIST

## 2019-03-07 NOTE — THERAPY TREATMENT NOTE
Outpatient Physical Therapy Ortho Treatment Note  [facility]     Patient Name: Bienvenido Carter  : 1980  MRN: [id number]  Today's Date: 3/7/2019      Visit Date: 2019    Visit Dx:    ICD-10-CM ICD-9-CM   1. Acute pain of right shoulder M25.511 719.41   2. S/P shoulder surgery Z98.890 V45.89       Patient Active Problem List   Diagnosis   • Spinal stenosis of cervical region   • Cervical disc disorder at C5-C6 level with radiculopathy   • Chronic neck pain        Past Medical History:   Diagnosis Date   • Anxiety    • Bipolar    • Borderline personality disorder in adult (CMS/HCC)    • Depression    • H/O emotional problems         No past surgical history on file.                    PT Assessment/Plan     Row Name 19 1400          PT Assessment    Assessment Comments  Pt had increased pain levels over the past few days with c/o N&T down the R arm to the hand. She was very tender and taut in the R pects, UT and levator muscles, also 1st rib was elevated today. After the session, her pain levels had reduced from 9/10 to 4/10. Still with some numbness, but much improved. Cautionned her on posture, as she has been studying and coloring (for stress relief) more lately  -        PT Plan    PT Plan Comments  cont to work on shoulder ROM, strength and pain control  -       User Key  (r) = Recorded By, (t) = Taken By, (c) = Cosigned By    Initials Name Provider Type     Katie Medel, PT Physical Therapist              Exercises     Row Name 19 1300             Subjective Comments    Subjective Comments  It has been really painful this week, having pain from the neck with N&T down the arm to the hand. Pain around the shoulder as well.   -         Subjective Pain    Able to rate subjective pain?  yes  -      Pre-Treatment Pain Level  9  -         Total Minutes    47118 - PT Therapeutic Exercise Minutes  20  -      39526 - PT Manual Therapy Minutes  23  -         Exercise  1    Exercise Name 1  AAROM Er with cane  -LH      Sets 1  1  -LH      Reps 1  10  -LH         Exercise 2    Exercise Name 2  table slides with A  -LH      Sets 2  1  -LH      Reps 2  10  -LH      Time 2  10 sec  -LH         Exercise 3    Exercise Name 3  elbow flex/ext in sitting  -LH      Sets 3  2  -LH      Reps 3  10  -LH         Exercise 4    Exercise Name 4  scap retraction shoulder ext  -LH      Sets 4  2  -LH      Reps 4  10  -LH      Time 4  yellow  -LH         Exercise 5    Exercise Name 5  shoulder rolls seated and standing  -LH      Sets 5  2  -LH      Reps 5  10  -LH         Exercise 6    Exercise Name 6  supine press ups   -LH      Cueing 6  Verbal  -LH      Sets 6  2  -LH      Reps 6  10  -LH         Exercise 7    Exercise Name 7  SL ER with towel  -LH      Cueing 7  Verbal;Tactile  -LH      Sets 7  2  -LH      Reps 7  10  -LH      Time 7  --  -LH        User Key  (r) = Recorded By, (t) = Taken By, (c) = Cosigned By    Initials Name Provider Type     Katie Medel, PT Physical Therapist                        Manual Rx (last 36 hours)      Manual Treatments     Row Name 03/07/19 1300             Total Minutes    74249 - PT Manual Therapy Minutes  23  -LH         Manual Rx 1    Manual Rx 1 Location  R shoulder  -LH      Manual Rx 1 Type  PROM in all planes with osciallations for pain, gentle distraction, STM to UT/levator for tissue relaxation, lighr retrograde massage to forearm and upper for edema, pain, and bruising  -LH      Manual Rx 1 Grade  gentle  -LH         Manual Rx 2    Manual Rx 2 Location  R shoulder  -LH      Manual Rx 2 Type  pect lift, 1st rib mob, manual stretching  -        User Key  (r) = Recorded By, (t) = Taken By, (c) = Cosigned By    Initials Name Provider Type     Katie Medel, PT Physical Therapist              Therapy Education  Given: HEP, Symptoms/condition management, Posture/body mechanics  Program: New  How Provided: Verbal, Written  Provided to:  Patient  Level of Understanding: Teach back education performed              Time Calculation:   Start Time: 1317  Stop Time: 1400  Time Calculation (min): 43 min  Total Timed Code Minutes- PT: 43 minute(s)  Therapy Suggested Charges     Code   Minutes Charges    13016 (CPT®) Hc Pt Neuromusc Re Education Ea 15 Min      86580 (CPT®) Hc Pt Ther Proc Ea 15 Min 20 1    15126 (CPT®) Hc Gait Training Ea 15 Min      87434 (CPT®) Hc Pt Therapeutic Act Ea 15 Min      87468 (CPT®) Hc Pt Manual Therapy Ea 15 Min 23 2    08207 (CPT®) Hc Pt Ther Massage- Per 15 Min      08306 (CPT®) Hc Pt Iontophoresis Ea 15 Min      78144 (CPT®) Hc Pt Elec Stim Ea-Per 15 Min      08684 (CPT®) Hc Pt Ultrasound Ea 15 Min      12040 (CPT®) Hc Pt Self Care/Mgmt/Train Ea 15 Min      88160 (CPT®) Hc Pt Prosthetic (S) Train Initial Encounter, Each 15 Min      13297 (CPT®) Hc Orthotic(S) Mgmt/Train Initial Encounter, Each 15min      25933 (CPT®) Hc Pt Aquatic Therapy Ea 15 Min      28228 (CPT®) Hc Pt Orthotic(S)/Prosthetic(S) Encounter, Each 15 Min       (CPT®) Hc Pt Electrical Stim Unattended      Total  43 3        Therapy Charges for Today     Code Description Service Date Service Provider Modifiers Qty    69768259101 HC PT THER PROC EA 15 MIN 3/7/2019 Katie Medel, PT GP 1    86195694516 HC PT MANUAL THERAPY EA 15 MIN 3/7/2019 Katie Medel, PT GP 2                    Katie Medel, PT  3/7/2019

## 2019-03-11 ENCOUNTER — HOSPITAL ENCOUNTER (OUTPATIENT)
Dept: PHYSICAL THERAPY | Facility: HOSPITAL | Age: 39
Setting detail: THERAPIES SERIES
Discharge: HOME | End: 2019-03-11

## 2019-03-11 PROCEDURE — 97140 MANUAL THERAPY 1/> REGIONS: CPT

## 2019-03-11 PROCEDURE — 97110 THERAPEUTIC EXERCISES: CPT

## 2019-03-11 NOTE — THERAPY TREATMENT NOTE
Outpatient Physical Therapy Ortho Treatment Note  [facility]     Patient Name: Bienveindo Carter  : 1980  MRN: [id number]  Today's Date: 3/11/2019      Visit Date: 2019    Visit Dx:  No diagnosis found.    Patient Active Problem List   Diagnosis   • Spinal stenosis of cervical region   • Cervical disc disorder at C5-C6 level with radiculopathy   • Chronic neck pain        Past Medical History:   Diagnosis Date   • Anxiety    • Bipolar    • Borderline personality disorder in adult (CMS/HCC)    • Depression    • H/O emotional problems         No past surgical history on file.                    PT Assessment/Plan     Row Name 19 1300          PT Assessment    Assessment Comments  Pt reports improved pain relief with STM and 1st rib mobility - cont with this today for pain relief. Progress shoulder girdle strengthening with addition of supine serratus anterior punch. Pt reports pain decreased from 5/10 to 1/10 at end of session.  -CA        PT Plan    PT Plan Comments  cont to progress skilled therapy for ROM, strength, and pain control  -CA       User Key  (r) = Recorded By, (t) = Taken By, (c) = Cosigned By    Initials Name Provider Type    Sienna Chow, PT Physical Therapist              Exercises     Row Name 19 1300 19 1200 19 1100       Subjective Comments    Subjective Comments  It's pretty sore today, although I feel like it's usually most sore in the mornings.  -CA  --  -CA  --       Subjective Pain    Able to rate subjective pain?  yes  -CA  --  -CA  --    Pre-Treatment Pain Level  5  -CA  --  -CA  --    Post-Treatment Pain Level  1  -CA  --  --       Total Minutes    35751 - PT Therapeutic Exercise Minutes  20  -CA  --  --    65124 - PT Manual Therapy Minutes  --  --  23  -CA       Exercise 1    Exercise Name 1  AAROM Er with cane  -CA  --  --    Sets 1  1  -CA  --  --    Reps 1  10  -CA  --  --    Time 1  10 sec  -CA  --  --       Exercise 2    Exercise  Name 2  table slides with A  -CA  --  --    Sets 2  1  -CA  --  --    Reps 2  10  -CA  --  --    Time 2  10 sec  -CA  --  --       Exercise 4    Exercise Name 4  scap retraction and shoulder ext  -CA  --  --    Sets 4  2  -CA  --  --    Reps 4  10  -CA  --  --    Additional Comments  red; each  -CA  --  --       Exercise 6    Exercise Name 6  supine press ups   -CA  --  --    Cueing 6  Verbal  -CA  --  --    Sets 6  2  -CA  --  --    Reps 6  10  -CA  --  --       Exercise 7    Exercise Name 7  SL ER with towel  -CA  --  --    Cueing 7  Verbal;Tactile  -CA  --  --    Sets 7  2  -CA  --  --    Reps 7  10  -CA  --  --    Additional Comments  1#  -CA  --  --       Exercise 8    Exercise Name 8  SA punch  -CA  --  --    Cueing 8  Verbal  -CA  --  --    Sets 8  2  -CA  --  --    Reps 8  8  -CA  --  --    Additional Comments  1#  -CA  --  --       Exercise 9    Exercise Name 9  horz abduction standing  -CA  --  --    Cueing 9  Verbal;Demo  -CA  --  --    Reps 9  10  -CA  --  --    Additional Comments  RTB  -CA  --  --      User Key  (r) = Recorded By, (t) = Taken By, (c) = Cosigned By    Initials Name Provider Type    Sienna Chow, KANE Physical Therapist                        Manual Rx (last 36 hours)      Manual Treatments     Row Name 03/11/19 1100             Total Minutes    79067 - PT Manual Therapy Minutes  23  -CA         Manual Rx 1    Manual Rx 1 Location  R shoulder  -CA      Manual Rx 1 Type  PROM in all planes with osciallations for pain, gentle distraction, STM to UT/levator for tissue relaxation, lighr retrograde massage to forearm and upper for edema, pain, and bruising  -CA      Manual Rx 1 Grade  gentle  -CA         Manual Rx 2    Manual Rx 2 Location  R shoulder  -CA      Manual Rx 2 Type  pect lift, 1st rib mob, manual stretching  -CA        User Key  (r) = Recorded By, (t) = Taken By, (c) = Cosigned By    Initials Name Provider Type    Sienna Chow, KANE Physical Therapist                              Time Calculation:   Start Time: 1245  Stop Time: 1328  Time Calculation (min): 43 min  Total Timed Code Minutes- PT: 43 minute(s)  Therapy Suggested Charges     Code   Minutes Charges    34161 (CPT®) Hc Pt Neuromusc Re Education Ea 15 Min      06226 (CPT®) Hc Pt Ther Proc Ea 15 Min 20 1    11536 (CPT®) Hc Gait Training Ea 15 Min      78221 (CPT®) Hc Pt Therapeutic Act Ea 15 Min      74948 (CPT®) Hc Pt Manual Therapy Ea 15 Min 23 2    82746 (CPT®) Hc Pt Ther Massage- Per 15 Min      97007 (CPT®) Hc Pt Iontophoresis Ea 15 Min      14336 (CPT®) Hc Pt Elec Stim Ea-Per 15 Min      39279 (CPT®) Hc Pt Ultrasound Ea 15 Min      93066 (CPT®) Hc Pt Self Care/Mgmt/Train Ea 15 Min      08993 (CPT®) Hc Pt Prosthetic (S) Train Initial Encounter, Each 15 Min      67799 (CPT®) Hc Orthotic(S) Mgmt/Train Initial Encounter, Each 15min      19007 (CPT®) Hc Pt Aquatic Therapy Ea 15 Min      25596 (CPT®) Hc Pt Orthotic(S)/Prosthetic(S) Encounter, Each 15 Min       (CPT®) Hc Pt Electrical Stim Unattended      Total  43 3        Therapy Charges for Today     Code Description Service Date Service Provider Modifiers Qty    27419390823 HC PT MANUAL THERAPY EA 15 MIN 3/11/2019 Sienna Wang, PT GP 2    99350288169 HC PT THER PROC EA 15 MIN 3/11/2019 Sienna Wang, PT GP 1                    Sienna Wang, PT  3/11/2019

## 2019-03-14 ENCOUNTER — APPOINTMENT (OUTPATIENT)
Dept: PHYSICAL THERAPY | Facility: HOSPITAL | Age: 39
End: 2019-03-14

## 2019-03-18 ENCOUNTER — HOSPITAL ENCOUNTER (OUTPATIENT)
Dept: PHYSICAL THERAPY | Facility: HOSPITAL | Age: 39
Setting detail: THERAPIES SERIES
Discharge: HOME | End: 2019-03-18

## 2019-03-18 DIAGNOSIS — M54.2 NECK PAIN: ICD-10-CM

## 2019-03-18 DIAGNOSIS — M25.511 ACUTE PAIN OF RIGHT SHOULDER: Primary | ICD-10-CM

## 2019-03-18 DIAGNOSIS — Z98.890 S/P SHOULDER SURGERY: ICD-10-CM

## 2019-03-18 DIAGNOSIS — G89.29 CHRONIC RIGHT SHOULDER PAIN: ICD-10-CM

## 2019-03-18 DIAGNOSIS — M54.12 CERVICAL RADICULAR PAIN: ICD-10-CM

## 2019-03-18 DIAGNOSIS — M25.511 CHRONIC RIGHT SHOULDER PAIN: ICD-10-CM

## 2019-03-18 PROCEDURE — 97110 THERAPEUTIC EXERCISES: CPT

## 2019-03-18 PROCEDURE — 97140 MANUAL THERAPY 1/> REGIONS: CPT
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
## 2019-03-21 ENCOUNTER — HOSPITAL ENCOUNTER (OUTPATIENT)
Dept: PHYSICAL THERAPY | Facility: HOSPITAL | Age: 39
Setting detail: THERAPIES SERIES
Discharge: HOME | End: 2019-03-21

## 2019-03-21 DIAGNOSIS — M25.511 ACUTE PAIN OF RIGHT SHOULDER: Primary | ICD-10-CM

## 2019-03-21 DIAGNOSIS — Z98.890 S/P SHOULDER SURGERY: ICD-10-CM

## 2019-03-21 PROCEDURE — 97140 MANUAL THERAPY 1/> REGIONS: CPT | Performed by: PHYSICAL THERAPIST
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
## 2019-03-21 NOTE — THERAPY PROGRESS REPORT/RE-CERT
Outpatient Physical Therapy Ortho Progress Note  [facility]     Patient Name: Bienvenido Carter  : 1980  MRN: [id number]  Today's Date: 3/21/2019      Visit Date: 2019    Visit Dx:    ICD-10-CM ICD-9-CM   1. Acute pain of right shoulder M25.511 719.41   2. S/P shoulder surgery Z98.890 V45.89       Patient Active Problem List   Diagnosis   • Spinal stenosis of cervical region   • Cervical disc disorder at C5-C6 level with radiculopathy   • Chronic neck pain        Past Medical History:   Diagnosis Date   • Anxiety    • Bipolar    • Borderline personality disorder in adult (CMS/HCC)    • Depression    • H/O emotional problems         No past surgical history on file.    PT Ortho     Row Name 19 1300       Right Upper Ext    Rt Shoulder Abduction PROM  too painful  -LH    Rt Shoulder Flexion PROM  130 deg  -LH    Rt Shoulder External Rotation PROM  60 deg  -LH    Rt Shoulder Internal Rotation PROM  70 deg  -LH    Rt Upper Extremity Comments   pain with all, fernando with lowering  -      User Key  (r) = Recorded By, (t) = Taken By, (c) = Cosigned By    Initials Name Provider Type     Katie Medel, PT Physical Therapist                      PT Assessment/Plan     Row Name 19 1400          PT Assessment    Assessment Comments  Bienvenido Carter has been seen for 9 physical therapy sessions for R shoulder SAD.  Treatment has included therapeutic exercise, manual therapy and patient education with home exercise program . Progress to physical therapy goals is fair. Pt was progressing well with her ROM and strength, until she had a set back on 3/19. She was in the car and reached for her phone in its bean on the dash. She had a loud pop and severe pain in the shoulder and down the arm to the elbow. Today she presents in her sling and pain 10/10. She is very guarded, with tightness of the UT, pect, levator, and biceps (with trigger points). She is point tender over the LhBiceps  tendon in the groove as well, with pain felt deep in the socket with PROM. Her PROM is very limited today, as it is painful. Unable to do any special testing.  She will benefit from continued skilled physical therapy to address remaining impairments and functional limitations.   -        PT Plan    PT Plan Comments  RTMD tomorrow for reassess  -       User Key  (r) = Recorded By, (t) = Taken By, (c) = Cosigned By    Initials Name Provider Type     Katie Medel PT Physical Therapist            Exercises     Row Name 03/21/19 1300             Subjective Comments    Subjective Comments  pt was in the car on 3/19. She was reaching to get her  phone. It got stuck and as she was trying to get it out, she had a big painful pop in the shoulder. She has been in severe pain every since. Wearing the sling.   -         Subjective Pain    Able to rate subjective pain?  yes  -      Pre-Treatment Pain Level  10  -      Post-Treatment Pain Level  8  -         Total Minutes    27571 - PT Manual Therapy Minutes  40  -        User Key  (r) = Recorded By, (t) = Taken By, (c) = Cosigned By    Initials Name Provider Type     Katie Medel, PT Physical Therapist                      Manual Rx (last 36 hours)      Manual Treatments     Row Name 03/21/19 1300             Total Minutes    01043 - PT Manual Therapy Minutes  40  -         Manual Rx 1    Manual Rx 1 Location  R shoulder  -      Manual Rx 1 Type  PROM in all planes with osciallations for pain, gentle distraction.  -      Manual Rx 1 Grade  gentle  -         Manual Rx 2    Manual Rx 2 Location  STM to R UT levator, pect with lift, biceps, stretching to UT, pect   -        User Key  (r) = Recorded By, (t) = Taken By, (c) = Cosigned By    Initials Name Provider Type     Katie Medel PT Physical Therapist          PT OP Goals     Row Name 03/21/19 1400          PT Short Term Goals    STG Date to Achieve  08/08/16  -     STG 1  Patient  will be independent with education for symptom management, joint protection and strategies to minimize stress on affected tissues  -     STG 1 Progress  Partially Met  -     STG 2  Pt to improve pain complaints to no more than 6/10 with ADLs  -     STG 2 Progress  Ongoing  -     STG 3  Pt to improve R shoulder ROM in flex=110/135 deg, abd=100/130 deg, ER=back of head/55 deg and IR=L5/70 deg  -     STG 3 Progress  Progressing  -        Long Term Goals    LTG Date to Achieve  08/22/16  -     LTG 1  Pt to improve pain complaints to no more than 3/10 with ADLs  -     LTG 1 Progress  Ongoing  -     LTG 2  Pt to improve R shoulder ROM in flex=150/160 deg, abd=150/160 deg, ER=T3/80 deg and IR=T10/80 deg  -     LTG 2 Progress  Progressing  -     LTG 3  Pt to improve shoulder strength to 4/5  -     LTG 3 Progress  Ongoing  -     LTG 4  Pt to improve DASH score from 93% to 30% for overall functional improvement  -     LTG 4 Progress  Ongoing  -     LTG 5  Patient will be independent and compliant with advanced home exercise program to facilitate self-management of symptoms.  -     LTG 5 Progress  Ongoing  -       User Key  (r) = Recorded By, (t) = Taken By, (c) = Cosigned By    Initials Name Provider Type     Katie Medel, PT Physical Therapist          Therapy Education  Education Details: pt to do pendulums and scap ret, work on relaxation of shoulder girdle  Given: HEP, Posture/body mechanics, Symptoms/condition management  Program: Reinforced  How Provided: Verbal  Provided to: Patient  Level of Understanding: Teach back education performed              Time Calculation:   Start Time: 1315  Stop Time: 1400  Time Calculation (min): 45 min  Total Timed Code Minutes- PT: 40 minute(s)  Therapy Charges for Today     Code Description Service Date Service Provider Modifiers Qty    19696863564 HC PT MANUAL THERAPY EA 15 MIN 3/21/2019 Katie Medel, PT GP 3                    Katie MAST  Gianfranco, PT  3/21/2019

## 2019-03-25 ENCOUNTER — HOSPITAL ENCOUNTER (OUTPATIENT)
Dept: PHYSICAL THERAPY | Facility: HOSPITAL | Age: 39
Setting detail: THERAPIES SERIES
Discharge: HOME | End: 2019-03-25

## 2019-03-25 DIAGNOSIS — Z98.890 S/P SHOULDER SURGERY: ICD-10-CM

## 2019-03-25 DIAGNOSIS — M25.511 ACUTE PAIN OF RIGHT SHOULDER: Primary | ICD-10-CM

## 2019-03-25 PROCEDURE — 97110 THERAPEUTIC EXERCISES: CPT | Performed by: PHYSICAL THERAPIST

## 2019-03-25 PROCEDURE — 97140 MANUAL THERAPY 1/> REGIONS: CPT | Performed by: PHYSICAL THERAPIST

## 2019-03-25 NOTE — THERAPY TREATMENT NOTE
Outpatient Physical Therapy Ortho Treatment Note  [facility]     Patient Name: Bienvenido Carter  : 1980  MRN: [id number]  Today's Date: 3/25/2019      Visit Date: 2019    Visit Dx:    ICD-10-CM ICD-9-CM   1. Acute pain of right shoulder M25.511 719.41   2. S/P shoulder surgery Z98.890 V45.89       Patient Active Problem List   Diagnosis   • Spinal stenosis of cervical region   • Cervical disc disorder at C5-C6 level with radiculopathy   • Chronic neck pain        Past Medical History:   Diagnosis Date   • Anxiety    • Bipolar    • Borderline personality disorder in adult (CMS/HCC)    • Depression    • H/O emotional problems         No past surgical history on file.                    PT Assessment/Plan     Row Name 19 1400          PT Assessment    Assessment Comments  Pt went to MD and didhave a cortisone injection and got some topical pain cream. She was doing better and forgot and closed the consol with her R arm, causing some more pain. her pain levels and ROM were better after therapy, able to tolerate some AROM in a small ROm.   -        PT Plan    PT Plan Comments  cont  -       User Key  (r) = Recorded By, (t) = Taken By, (c) = Cosigned By    Initials Name Provider Type     Katie Medel, PT Physical Therapist            Exercises     Row Name 19 1400 19 1300          Subjective Comments    Subjective Comments  I got an injection on Friday and it helped some, and have a topical rub for pain. Was driving and forget and closed the glove box with the arm -it is sore  -  --        Subjective Pain    Able to rate subjective pain?  yes  -  --     Pre-Treatment Pain Level  7  -LH  --     Post-Treatment Pain Level  6  -LH  --        Total Minutes    60957 - PT Therapeutic Exercise Minutes  15  -LH  --     81088 - PT Manual Therapy Minutes  --  25  -LH        Exercise 1    Exercise Name 1  GET [place] with cane  -  --     Sets 1  --  -  --     Reps 1  --  -   --        Exercise 2    Exercise Name 2  table slides with A  -LH  --     Sets 2  1  -LH  --     Reps 2  10  -LH  --     Time 2  10 sec  -LH  --        Exercise 3    Exercise Name 3  elbow flex/ext in sitting  -LH  --     Sets 3  2  -LH  --     Reps 3  10  -LH  --        Exercise 4    Exercise Name 4  scap retraction shoulder ext  -LH  --     Sets 4  2  -LH  --     Reps 4  --  -LH  --     Time 4  --  -LH  --        Exercise 5    Exercise Name 5  AROM scap ret  -LH  --     Sets 5  2  -LH  --     Reps 5  10  -LH  --        Exercise 6    Exercise Name 6  supine press ups   -LH  --     Cueing 6  Verbal  -LH  --     Sets 6  2  -LH  --     Reps 6  10  -LH  --     Additional Comments  small motion  -LH  --        Exercise 7    Exercise Name 7  SL ER with towel  -LH  --     Cueing 7  Verbal;Tactile  -LH  --     Sets 7  2  -LH  --     Reps 7  10  -LH  --     Additional Comments  small motion  -LH  --       User Key  (r) = Recorded By, (t) = Taken By, (c) = Cosigned By    Initials Name Provider Type     Katie Medel, PT Physical Therapist                      Manual Rx (last 36 hours)      Manual Treatments     Row Name 03/25/19 1300             Total Minutes    86211 - PT Manual Therapy Minutes  25  -LH         Manual Rx 1    Manual Rx 1 Location  R shoulder  -      Manual Rx 1 Type  PROM in all planes with osciallations for pain, gentle distraction.  -      Manual Rx 1 Grade  gentle  -         Manual Rx 2    Manual Rx 2 Location  STM to R UT levator, pect with lift, biceps, stretching to UT, pect   -LH        User Key  (r) = Recorded By, (t) = Taken By, (c) = Cosigned By    Initials Name Provider Type     Katie Medel, PT Physical Therapist              Therapy Education  Given: HEP, Posture/body mechanics, Symptoms/condition management  Program: Reinforced  How Provided: Verbal  Provided to: Patient  Level of Understanding: Teach back education performed              Time Calculation:   Start Time:  1400  Stop Time: 1440  Time Calculation (min): 40 min  Total Timed Code Minutes- PT: 40 minute(s)  Therapy Charges for Today     Code Description Service Date Service Provider Modifiers Qty    48557760725  PT THER PROC EA 15 MIN 3/25/2019 Katie Medel, PT GP 1    [id number]  PT MANUAL THERAPY EA 15 MIN 3/25/2019 Katie Medel, PT GP 2                    Katie Medel, PT  3/25/2019

## 2019-03-28 ENCOUNTER — HOSPITAL ENCOUNTER (OUTPATIENT)
Dept: PHYSICAL THERAPY | Facility: HOSPITAL | Age: 39
Setting detail: THERAPIES SERIES
Discharge: HOME | End: 2019-03-28

## 2019-03-28 DIAGNOSIS — M25.511 ACUTE PAIN OF RIGHT SHOULDER: Primary | ICD-10-CM

## 2019-03-28 DIAGNOSIS — Z98.890 S/P SHOULDER SURGERY: ICD-10-CM

## 2019-03-28 PROCEDURE — 97110 THERAPEUTIC EXERCISES: CPT | Performed by: PHYSICAL THERAPIST

## 2019-03-28 PROCEDURE — 97140 MANUAL THERAPY 1/> REGIONS: CPT | Performed by: PHYSICAL THERAPIST

## 2019-03-28 NOTE — THERAPY TREATMENT NOTE
Outpatient Physical Therapy Ortho Treatment Note  [facility]     Patient Name: Bienvenido Carter  : 1980  MRN: [id number]  Today's Date: 3/28/2019      Visit Date: 2019    Visit Dx:    ICD-10-CM ICD-9-CM   1. Acute pain of right shoulder M25.511 719.41   2. S/P shoulder surgery Z98.890 V45.89       Patient Active Problem List   Diagnosis   • Spinal stenosis of cervical region   • Cervical disc disorder at C5-C6 level with radiculopathy   • Chronic neck pain        Past Medical History:   Diagnosis Date   • Anxiety    • Bipolar    • Borderline personality disorder in adult (CMS/HCC)    • Depression    • H/O emotional problems         No past surgical history on file.    PT Ortho     Row Name 19 0800       Subjective Pain    Post-Treatment Pain Level  5  -LH       Right Upper Ext    Rt Shoulder Abduction PROM  140 deg  -LH    Rt Shoulder Flexion PROM  140 deg  -LH    Rt Shoulder External Rotation PROM  60 deg  -LH    Rt Shoulder Internal Rotation PROM  75 deg  -LH      User Key  (r) = Recorded By, (t) = Taken By, (c) = Cosigned By    Initials Name Provider Type     Katie Medel, PT Physical Therapist                      PT Assessment/Plan     Row Name 19 0800          PT Assessment    Assessment Comments  Pt is still having high levels of pain. Discussed activity modifications and rest for the arm, pt does like to draw or color for relaxation. Her PROM was stopped more by pain, not tightness, but it has improved.   -        PT Plan    PT Plan Comments  cont  -LH       User Key  (r) = Recorded By, (t) = Taken By, (c) = Cosigned By    Initials Name Provider Type     Katie Medel, PT Physical Therapist            Exercises     Row Name 19 0800 19 0700          Subjective Comments    Subjective Comments  Didn't sleep well last night. Arm is sore  -  --        Subjective Pain    Able to rate subjective pain?  yes  -  --     Pre-Treatment Pain Level  8  -   --     Post-Treatment Pain Level  5  -LH  --        Total Minutes    98604 - PT Manual Therapy Minutes  --  25  -LH        Exercise 1    Exercise Name 1  AAROM Er with cane  -LH  --        Exercise 2    Exercise Name 2  table slides with A  -LH  --     Sets 2  1  -LH  --     Reps 2  10  -LH  --     Time 2  10 sec  -LH  --        Exercise 3    Exercise Name 3  elbow flex/ext in sitting  -LH  --     Sets 3  2  -LH  --     Reps 3  10  -LH  --        Exercise 4    Exercise Name 4  scap retraction shoulder ext  -LH  --     Sets 4  --  -LH  --        Exercise 5    Exercise Name 5  AROM scap ret  -LH  --     Sets 5  2  -LH  --     Reps 5  10  -LH  --        Exercise 6    Exercise Name 6  supine press ups   -LH  --     Cueing 6  Verbal  -LH  --     Sets 6  2  -LH  --     Reps 6  10  -LH  --        Exercise 7    Exercise Name 7  SL ER with towel  -  --     Cueing 7  Verbal;Tactile  -LH  --     Sets 7  2  -LH  --     Reps 7  10  -LH  --     Additional Comments  small motion  -LH  --       User Key  (r) = Recorded By, (t) = Taken By, (c) = Cosigned By    Initials Name Provider Type     Katie Medel, PT Physical Therapist                      Manual Rx (last 36 hours)      Manual Treatments     Row Name 03/28/19 0700             Total Minutes    45647 - PT Manual Therapy Minutes  25  -LH         Manual Rx 1    Manual Rx 1 Location  R shoulder  -LH      Manual Rx 1 Type  PROM in all planes with osciallations for pain, gentle distraction.  -LH      Manual Rx 1 Grade  gentle  -         Manual Rx 2    Manual Rx 2 Location  STM to R UT levator, pect with lift, biceps, stretching to UT, pect   -LH        User Key  (r) = Recorded By, (t) = Taken By, (c) = Cosigned By    Initials Name Provider Type     Katie Medel PT Physical Therapist              Therapy Education  Given: HEP, Posture/body mechanics, Symptoms/condition management, Pain management  Program: Reinforced  How Provided: Verbal  Provided to:  Patient  Level of Understanding: Teach back education performed              Time Calculation:   Start Time: 0815  Stop Time: 0857  Time Calculation (min): 42 min  Total Timed Code Minutes- PT: 40 minute(s)  Therapy Charges for Today     Code Description Service Date Service Provider Modifiers Qty    72033661607 HC PT MANUAL THERAPY EA 15 MIN 3/28/2019 Katie Medel, PT GP 2    92112934442 HC PT THER PROC EA 15 MIN 3/28/2019 Katie Medel, PT GP 1                    Katie Medel, PT  3/28/2019

## 2019-03-29 ENCOUNTER — OFFICE VISIT (OUTPATIENT)
Dept: NEUROSURGERY | Facility: CLINIC | Age: 39
End: 2019-03-29

## 2019-03-29 VITALS
DIASTOLIC BLOOD PRESSURE: 81 MMHG | SYSTOLIC BLOOD PRESSURE: 112 MMHG | HEIGHT: 60 IN | BODY MASS INDEX: 16.69 KG/M2 | WEIGHT: 85 LBS | HEART RATE: 91 BPM

## 2019-03-29 DIAGNOSIS — M50.121 CERVICAL DISC DISORDER AT C4-C5 LEVEL WITH RADICULOPATHY: Primary | ICD-10-CM

## 2019-03-29 DIAGNOSIS — M50.122 CERVICAL DISC DISORDER AT C5-C6 LEVEL WITH RADICULOPATHY: ICD-10-CM

## 2019-03-29 PROCEDURE — 99213 OFFICE O/P EST LOW 20 MIN: CPT | Performed by: NEUROLOGICAL SURGERY

## 2019-03-29 RX ORDER — MELATONIN
1000 DAILY
COMMUNITY
End: 2021-04-16

## 2019-04-02 ENCOUNTER — HOSPITAL ENCOUNTER (OUTPATIENT)
Dept: PHYSICAL THERAPY | Facility: HOSPITAL | Age: 39
Setting detail: THERAPIES SERIES
Discharge: HOME | End: 2019-04-02

## 2019-04-02 DIAGNOSIS — M25.511 ACUTE PAIN OF RIGHT SHOULDER: Primary | ICD-10-CM

## 2019-04-02 DIAGNOSIS — Z98.890 S/P SHOULDER SURGERY: ICD-10-CM

## 2019-04-02 PROCEDURE — 97110 THERAPEUTIC EXERCISES: CPT | Performed by: PHYSICAL THERAPIST

## 2019-04-02 PROCEDURE — 97140 MANUAL THERAPY 1/> REGIONS: CPT | Performed by: PHYSICAL THERAPIST

## 2019-04-02 NOTE — THERAPY TREATMENT NOTE
Outpatient Physical Therapy Ortho Treatment Note  [facility]     Patient Name: Bienvenido Carter  : 1980  MRN: [id number]  Today's Date: 2019      Visit Date: 2019    Visit Dx:    ICD-10-CM ICD-9-CM   1. Acute pain of right shoulder M25.511 719.41   2. S/P shoulder surgery Z98.890 V45.89       Patient Active Problem List   Diagnosis   • Spinal stenosis of cervical region   • Cervical disc disorder at C4-C5 level with radiculopathy   • Chronic neck pain        Past Medical History:   Diagnosis Date   • Anxiety    • Bipolar    • Borderline personality disorder in adult (CMS/HCC)    • Depression    • H/O emotional problems         No past surgical history on file.                    PT Assessment/Plan     Row Name 19 1100          PT Assessment    Assessment Comments  Pt pain levels have been reducing over the weekend. She went to pain management for neck and shoulder pain. Her PROM is improving and her toleracnce to her strength program is as well.   -        PT Plan    PT Plan Comments  cont to work on ROM and strength per pt tolerance  -       User Key  (r) = Recorded By, (t) = Taken By, (c) = Cosigned By    Initials Name Provider Type     Katie Medel, PT Physical Therapist            Exercises     Row Name 19 1100             Subjective Comments    Subjective Comments  Pain is getting better. Haven't had any meds this am and it is still lower  -         Subjective Pain    Able to rate subjective pain?  yes  -      Pre-Treatment Pain Level  5  -         Total Minutes    10991 - PT Therapeutic Exercise Minutes  25  -LH      48380 - PT Manual Therapy Minutes  18  -LH         Exercise 1    Exercise Name 1  AAROM Er with cane  -      Sets 1  1  -      Reps 1  10  -      Time 1  10 sec  -LH         Exercise 2    Exercise Name 2  table slides with A  -LH      Sets 2  1  -      Reps 2  10  -LH      Time 2  10 sec  -LH         Exercise 3    Exercise Name 3   elbow flex/ext in sitting  -LH      Sets 3  2  -LH      Reps 3  10  -LH         Exercise 4    Exercise Name 4  scap retraction shoulder ext  -LH      Sets 4  2  -LH      Reps 4  10  -LH      Time 4  red  -LH         Exercise 5    Exercise Name 5  AROM scap ret  -LH      Sets 5  --  -LH      Reps 5  --  -LH         Exercise 6    Exercise Name 6  supine press ups   -LH      Cueing 6  Verbal  -LH      Sets 6  2  -LH      Reps 6  10  -LH         Exercise 7    Exercise Name 7  SL ER with towel  -LH      Cueing 7  Verbal;Tactile  -LH      Sets 7  2  -LH      Reps 7  10  -LH        User Key  (r) = Recorded By, (t) = Taken By, (c) = Cosigned By    Initials Name Provider Type     Katie Medel PT Physical Therapist                      Manual Rx (last 36 hours)      Manual Treatments     Row Name 04/02/19 1100             Total Minutes    63478 - PT Manual Therapy Minutes  18  -LH         Manual Rx 1    Manual Rx 1 Location  R shoulder  -LH      Manual Rx 1 Type  PROM in all planes with osciallations for pain, gentle distraction.  -LH      Manual Rx 1 Grade  gentle  -LH         Manual Rx 2    Manual Rx 2 Location  STM to R UT levator, pect with lift, biceps, stretching to UT, pect   -LH        User Key  (r) = Recorded By, (t) = Taken By, (c) = Cosigned By    Initials Name Provider Type     Katie Medel, PT Physical Therapist              Therapy Education  Given: HEP, Posture/body mechanics, Symptoms/condition management, Pain management  Program: Reinforced  How Provided: Verbal  Provided to: Patient  Level of Understanding: Teach back education performed              Time Calculation:   Start Time: 1115  Stop Time: 1159  Time Calculation (min): 44 min  Total Timed Code Minutes- PT: 43 minute(s)  Therapy Charges for Today     Code Description Service Date Service Provider Modifiers Qty    54886741276  PT THER PROC EA 15 MIN 4/2/2019 Katie Medel, PT GP 2    [id number]  PT MANUAL THERAPY EA 15 MIN  4/2/2019 Katie Medel, PT GP 1                    Katie Medel, PT  4/2/2019

## 2019-04-04 ENCOUNTER — HOSPITAL ENCOUNTER (OUTPATIENT)
Dept: PHYSICAL THERAPY | Facility: HOSPITAL | Age: 39
Setting detail: THERAPIES SERIES
Discharge: HOME | End: 2019-04-04

## 2019-04-04 DIAGNOSIS — M25.511 ACUTE PAIN OF RIGHT SHOULDER: Primary | ICD-10-CM

## 2019-04-04 DIAGNOSIS — Z98.890 S/P SHOULDER SURGERY: ICD-10-CM

## 2019-04-04 PROCEDURE — 97110 THERAPEUTIC EXERCISES: CPT | Performed by: PHYSICAL THERAPIST

## 2019-04-04 PROCEDURE — 97140 MANUAL THERAPY 1/> REGIONS: CPT | Performed by: PHYSICAL THERAPIST

## 2019-04-04 NOTE — THERAPY TREATMENT NOTE
Outpatient Physical Therapy Ortho Treatment Note  [facility]     Patient Name: Bienvenido Carter  : 1980  MRN: [id number]  Today's Date: 2019      Visit Date: 2019    Visit Dx:    ICD-10-CM ICD-9-CM   1. Acute pain of right shoulder M25.511 719.41   2. S/P shoulder surgery Z98.890 V45.89       Patient Active Problem List   Diagnosis   • Spinal stenosis of cervical region   • Cervical disc disorder at C4-C5 level with radiculopathy   • Chronic neck pain        Past Medical History:   Diagnosis Date   • Anxiety    • Bipolar    • Borderline personality disorder in adult (CMS/HCC)    • Depression    • H/O emotional problems         No past surgical history on file.    PT Ortho     Row Name 19 1300       Right Upper Ext    Rt Shoulder Abduction AROM  90 deg (painful)  -LH    Rt Shoulder Abduction PROM  160 deg  -LH    Rt Shoulder Flexion AROM  135 deg  -LH    Rt Shoulder Flexion PROM  150 deg  -LH    Rt Shoulder External Rotation AROM  back of head  -LH    Rt Shoulder External Rotation PROM  70 deg  -LH    Rt Shoulder Internal Rotation AROM  ipsilateral buttocks  -LH    Rt Shoulder Internal Rotation PROM  78 deg  -LH      User Key  (r) = Recorded By, (t) = Taken By, (c) = Cosigned By    Initials Name Provider Type    Katie Mares, PT Physical Therapist                      PT Assessment/Plan     Row Name 19 1300          PT Assessment    Assessment Comments  Pt ROM has progress since the injection, more tolerant. She is painful at end range passively and has the most pain actively with abd and IR.   -LH        PT Plan    PT Plan Comments  cont to progress ROM and strength per pt tolerance  -       User Key  (r) = Recorded By, (t) = Taken By, (c) = Cosigned By    Initials Name Provider Type    Katie Mares, PT Physical Therapist            Exercises     Row Name 19 1300             Subjective Comments    Subjective Comments  It was really hurtiing when I woke  up this am, but took meds and it is better now  -         Subjective Pain    Able to rate subjective pain?  yes  -LH      Pre-Treatment Pain Level  4  -LH         Total Minutes    57269 - PT Therapeutic Exercise Minutes  25  -LH      93510 - PT Manual Therapy Minutes  18  -LH         Exercise 1    Exercise Name 1  AAROM Er with cane  -LH      Sets 1  1  -LH      Reps 1  10  -LH      Time 1  10 sec  -LH         Exercise 2    Exercise Name 2  table slides with A  -LH      Sets 2  1  -LH      Reps 2  10  -LH      Time 2  10 sec  -LH         Exercise 3    Exercise Name 3  elbow flex/ext in sitting  -LH      Sets 3  2  -LH      Reps 3  10  -LH      Time 3  1lb  -LH         Exercise 4    Exercise Name 4  scap retraction shoulder ext  -LH      Sets 4  2  -LH      Reps 4  10  -LH      Time 4  red  -LH         Exercise 5    Exercise Name 5  AROM scap ret  -LH         Exercise 6    Exercise Name 6  supine press ups   -LH      Cueing 6  Verbal  -LH      Sets 6  2  -LH      Reps 6  10  -LH         Exercise 7    Exercise Name 7  SL ER with towel  -LH      Cueing 7  Verbal;Tactile  -LH      Sets 7  2  -LH      Reps 7  10  -LH        User Key  (r) = Recorded By, (t) = Taken By, (c) = Cosigned By    Initials Name Provider Type     Katie Medel PT Physical Therapist                      Manual Rx (last 36 hours)      Manual Treatments     Row Name 04/04/19 1300             Total Minutes    13300 - PT Manual Therapy Minutes  18  -LH         Manual Rx 1    Manual Rx 1 Location  R shoulder  -LH      Manual Rx 1 Type  PROM in all planes with osciallations for pain, gentle distraction.  -LH      Manual Rx 1 Grade  gentle  -LH         Manual Rx 2    Manual Rx 2 Location  STM to R UT levator, pect with lift, biceps, stretching to UT, pect   -LH        User Key  (r) = Recorded By, (t) = Taken By, (c) = Cosigned By    Initials Name Provider Type     Katie Medel PT Physical Therapist          PT OP Goals     Row Name  04/04/19 1300          PT Short Term Goals    STG Date to Achieve  08/08/16  -     STG 1  Patient will be independent with education for symptom management, joint protection and strategies to minimize stress on affected tissues  -     STG 1 Progress  Partially Met  -     STG 2  Pt to improve pain complaints to no more than 6/10 with ADLs  -     STG 2 Progress  Ongoing  -     STG 3  Pt to improve R shoulder ROM in flex=110/135 deg, abd=100/130 deg, ER=back of head/55 deg and IR=L5/70 deg  -     STG 3 Progress  Progressing;Partially Met  -        Long Term Goals    LTG Date to Achieve  08/22/16  -     LTG 1  Pt to improve pain complaints to no more than 3/10 with ADLs  -     LTG 1 Progress  Ongoing  -     LTG 2  Pt to improve R shoulder ROM in flex=150/160 deg, abd=150/160 deg, ER=T3/80 deg and IR=T10/80 deg  -     LTG 2 Progress  Progressing;Ongoing  -     LTG 3  Pt to improve shoulder strength to 4/5  -     LTG 3 Progress  Ongoing  -     LTG 4  Pt to improve DASH score from 93% to 30% for overall functional improvement  -     LTG 4 Progress  Ongoing  -     LTG 5  Patient will be independent and compliant with advanced home exercise program to facilitate self-management of symptoms.  -     LTG 5 Progress  Ongoing  -       User Key  (r) = Recorded By, (t) = Taken By, (c) = Cosigned By    Initials Name Provider Type     Katie Medel, PT Physical Therapist          Therapy Education  Given: HEP, Posture/body mechanics, Symptoms/condition management, Pain management  Program: Reinforced, Progressed  How Provided: Verbal  Provided to: Patient  Level of Understanding: Teach back education performed              Time Calculation:   Start Time: 1313  Stop Time: 1355  Time Calculation (min): 42 min  Total Timed Code Minutes- PT: 42 minute(s)  Therapy Charges for Today     Code Description Service Date Service Provider Modifiers Qty    62041260708  PT THER PROC EA 15 MIN 4/4/2019  Katie Medel, PT GP 2    [id number]  PT MANUAL THERAPY EA 15 MIN 4/4/2019 Katie Medel, PT GP 1                    Katie Medel, PT  4/4/2019

## 2019-04-09 ENCOUNTER — HOSPITAL ENCOUNTER (OUTPATIENT)
Dept: PHYSICAL THERAPY | Facility: HOSPITAL | Age: 39
Setting detail: THERAPIES SERIES
Discharge: HOME | End: 2019-04-09

## 2019-04-09 DIAGNOSIS — Z98.890 S/P SHOULDER SURGERY: ICD-10-CM

## 2019-04-09 DIAGNOSIS — M25.511 ACUTE PAIN OF RIGHT SHOULDER: Primary | ICD-10-CM

## 2019-04-09 PROCEDURE — 97110 THERAPEUTIC EXERCISES: CPT | Performed by: PHYSICAL THERAPIST

## 2019-04-09 PROCEDURE — 97140 MANUAL THERAPY 1/> REGIONS: CPT | Performed by: PHYSICAL THERAPIST

## 2019-04-09 NOTE — THERAPY TREATMENT NOTE
Outpatient Physical Therapy Ortho Treatment Note  [facility]     Patient Name: Bienvenido Carter  : 1980  MRN: [id number]  Today's Date: 2019      Visit Date: 2019    Visit Dx:    ICD-10-CM ICD-9-CM   1. Acute pain of right shoulder M25.511 719.41   2. S/P shoulder surgery Z98.890 V45.89       Patient Active Problem List   Diagnosis   • Spinal stenosis of cervical region   • Cervical disc disorder at C4-C5 level with radiculopathy   • Chronic neck pain        Past Medical History:   Diagnosis Date   • Anxiety    • Bipolar    • Borderline personality disorder in adult (CMS/HCC)    • Depression    • H/O emotional problems         No past surgical history on file.                    PT Assessment/Plan     Row Name 19 1359          PT Assessment    Assessment Comments  Pt continues to progress with her ROM and tolerance to activity with decreased pain levels. She was able to increase her resistance today without increased pain  -        PT Plan    PT Plan Comments  cont  -       User Key  (r) = Recorded By, (t) = Taken By, (c) = Cosigned By    Initials Name Provider Type     Katie Medel, PT Physical Therapist            Exercises     Row Name 19 1300             Subjective Comments    Subjective Comments  It has been feeling better over the weekend. Calmed down a lot  -         Subjective Pain    Able to rate subjective pain?  yes  -      Pre-Treatment Pain Level  3  -         Total Minutes    49145 - PT Manual Therapy Minutes  15  -LH         Exercise 1    Exercise Name 1  AAROM Er with cane  -      Sets 1  1  -      Reps 1  10  -LH      Time 1  10 sec  -LH         Exercise 2    Exercise Name 2  table slides with A  -LH      Sets 2  1  -      Reps 2  10  -LH      Time 2  10 sec  -LH         Exercise 3    Exercise Name 3  elbow flex/ext in sitting  -      Sets 3  2  -LH      Reps 3  10  -LH      Time 3  2lb  -LH         Exercise 4    Exercise Name 4  scap  retraction shoulder ext  -LH      Sets 4  2  -LH      Reps 4  10  -LH      Time 4  red  -LH         Exercise 5    Exercise Name 5  AROM scap ret  -LH         Exercise 6    Exercise Name 6  supine press ups   -LH      Cueing 6  Verbal  -LH      Sets 6  2  -LH      Reps 6  10  -LH      Time 6  1 lb  -LH         Exercise 7    Exercise Name 7  SL ER with towel  -LH      Cueing 7  Verbal;Tactile  -LH      Sets 7  2  -LH      Reps 7  10  -LH      Time 7  1lb  -LH        User Key  (r) = Recorded By, (t) = Taken By, (c) = Cosigned By    Initials Name Provider Type     Katie Medel, PT Physical Therapist                      Manual Rx (last 36 hours)      Manual Treatments     Row Name 04/09/19 1300             Total Minutes    77968 - PT Manual Therapy Minutes  15  -LH         Manual Rx 1    Manual Rx 1 Location  R shoulder  -LH      Manual Rx 1 Type  PROM in all planes with osciallations for pain, gentle distraction.  -LH      Manual Rx 1 Grade  gentle  -LH         Manual Rx 2    Manual Rx 2 Location  STM to R UT levator, pect with lift, biceps, stretching to UT, pect   -LH        User Key  (r) = Recorded By, (t) = Taken By, (c) = Cosigned By    Initials Name Provider Type     Katie Medel PT Physical Therapist              Therapy Education  Given: HEP, Posture/body mechanics, Symptoms/condition management, Pain management  Program: Reinforced, Progressed  How Provided: Verbal  Provided to: Patient  Level of Understanding: Teach back education performed              Time Calculation:   Start Time: 1320  Stop Time: 1400  Time Calculation (min): 40 min  Total Timed Code Minutes- PT: 40 minute(s)  Therapy Charges for Today     Code Description Service Date Service Provider Modifiers Qty    57368917490 HC PT MANUAL THERAPY EA 15 MIN 4/9/2019 Katie Medel, PT GP 1    23139606847 HC PT THER PROC EA 15 MIN 4/9/2019 Katie Medel, PT GP 2                    Katie Medel PT  4/9/2019

## 2019-04-11 ENCOUNTER — HOSPITAL ENCOUNTER (OUTPATIENT)
Dept: PHYSICAL THERAPY | Facility: HOSPITAL | Age: 39
Setting detail: THERAPIES SERIES
Discharge: HOME | End: 2019-04-11

## 2019-04-11 DIAGNOSIS — Z98.890 S/P SHOULDER SURGERY: ICD-10-CM

## 2019-04-11 DIAGNOSIS — M25.511 ACUTE PAIN OF RIGHT SHOULDER: Primary | ICD-10-CM

## 2019-04-11 PROCEDURE — 97140 MANUAL THERAPY 1/> REGIONS: CPT | Performed by: PHYSICAL THERAPIST

## 2019-04-11 PROCEDURE — 97110 THERAPEUTIC EXERCISES: CPT | Performed by: PHYSICAL THERAPIST

## 2019-04-11 NOTE — THERAPY TREATMENT NOTE
Outpatient Physical Therapy Ortho Treatment Note  [facility]     Patient Name: Bienvenido Carter  : 1980  MRN: [id number]  Today's Date: 2019      Visit Date: 2019    Visit Dx:    ICD-10-CM ICD-9-CM   1. Acute pain of right shoulder M25.511 719.41   2. S/P shoulder surgery Z98.890 V45.89       Patient Active Problem List   Diagnosis   • Spinal stenosis of cervical region   • Cervical disc disorder at C4-C5 level with radiculopathy   • Chronic neck pain        Past Medical History:   Diagnosis Date   • Anxiety    • Bipolar    • Borderline personality disorder in adult (CMS/HCC)    • Depression    • H/O emotional problems         No past surgical history on file.    PT Ortho     Row Name 19 1300       Right Upper Ext    Rt Shoulder Abduction PROM  165 deg  -LH    Rt Shoulder Flexion PROM  153 deg  -LH    Rt Shoulder External Rotation PROM  75 deg  -LH    Rt Shoulder Internal Rotation PROM  78 deg  -LH      User Key  (r) = Recorded By, (t) = Taken By, (c) = Cosigned By    Initials Name Provider Type     Katie Medel, PT Physical Therapist                      PT Assessment/Plan     Row Name 19 1415          PT Assessment    Assessment Comments  Pt with some tightness of the subscap with pain, limiting her flexion. Worked on a subscap release. Addition of Er and IR with band today, light resistance. Still with weakness of the RC, but able to tolerate more activity at home and in therapy with less pain,  -        PT Plan    PT Plan Comments  cont to progress ROM, strength, and tolerance to functional activities  -       User Key  (r) = Recorded By, (t) = Taken By, (c) = Cosigned By    Initials Name Provider Type     Katie Medel, PT Physical Therapist            Exercises     Row Name 19 1300             Subjective Comments    Subjective Comments  its a little sore on the back of the arm and shoulder  -         Subjective Pain    Able to rate subjective  pain?  yes  -LH      Pre-Treatment Pain Level  3  -LH         Total Minutes    72290 - PT Therapeutic Exercise Minutes  25  -LH      69614 - PT Manual Therapy Minutes  15  -LH         Exercise 1    Exercise Name 1  AAROM Er with cane  -LH      Sets 1  1  -LH      Reps 1  10  -LH      Time 1  10 sec  -LH         Exercise 2    Exercise Name 2  table slides with A  -LH      Sets 2  1  -LH      Reps 2  10  -LH      Time 2  10 sec  -LH         Exercise 3    Exercise Name 3  elbow flex/ext in sitting  -LH      Sets 3  2  -LH      Reps 3  10  -LH      Time 3  2lb  -LH         Exercise 4    Exercise Name 4  scap retraction shoulder ext  -LH      Sets 4  2  -LH      Reps 4  10  -LH      Time 4  red  -LH         Exercise 5    Exercise Name 5  AROM scap ret  -LH         Exercise 6    Exercise Name 6  supine press ups   -LH      Cueing 6  Verbal  -LH      Sets 6  2  -LH      Reps 6  10  -LH      Time 6  1 lb  -LH         Exercise 7    Exercise Name 7  SL ER with towel  -LH      Cueing 7  Verbal;Tactile  -LH      Sets 7  2  -LH      Reps 7  10  -LH      Time 7  1lb  -LH         Exercise 8    Exercise Name 8  ER and IR with towel  -LH      Sets 8  2  -LH      Reps 8  10  -LH      Time 8  yellow  -LH        User Key  (r) = Recorded By, (t) = Taken By, (c) = Cosigned By    Initials Name Provider Type     Katie Medel, PT Physical Therapist                      Manual Rx (last 36 hours)      Manual Treatments     Row Name 04/11/19 1300             Total Minutes    20439 - PT Manual Therapy Minutes  15  -LH         Manual Rx 1    Manual Rx 1 Location  R shoulder  -LH      Manual Rx 1 Type  PROM in all planes with osciallations for pain, gentle distraction.  -LH      Manual Rx 1 Grade  gentle  -LH         Manual Rx 2    Manual Rx 2 Location  STM to R UT levator, pect with lift, biceps, stretching to UT, pect, sub scap release  -        User Key  (r) = Recorded By, (t) = Taken By, (c) = Cosigned By    Initials Name Provider  Type    LH Katie Medel, PT Physical Therapist              Therapy Education  Given: HEP, Posture/body mechanics, Symptoms/condition management, Pain management  Program: New, Reinforced  How Provided: Verbal, Demonstration, Written  Provided to: Patient  Level of Understanding: Teach back education performed              Time Calculation:   Start Time: 1320  Stop Time: 1400  Time Calculation (min): 40 min  Total Timed Code Minutes- PT: 40 minute(s)  Therapy Charges for Today     Code Description Service Date Service Provider Modifiers Qty    81681795257  PT THER PROC EA 15 MIN 4/11/2019 Katie Medel, PT GP 2    [id number]  PT MANUAL THERAPY EA 15 MIN 4/11/2019 Katie Medel, PT GP 1                    Katie Medel, PT  4/11/2019

## 2019-04-16 ENCOUNTER — APPOINTMENT (OUTPATIENT)
Dept: PHYSICAL THERAPY | Facility: HOSPITAL | Age: 39
End: 2019-04-16

## 2019-04-18 ENCOUNTER — HOSPITAL ENCOUNTER (OUTPATIENT)
Dept: PHYSICAL THERAPY | Facility: HOSPITAL | Age: 39
Setting detail: THERAPIES SERIES
Discharge: HOME | End: 2019-04-18

## 2019-04-18 DIAGNOSIS — Z98.890 S/P SHOULDER SURGERY: ICD-10-CM

## 2019-04-18 DIAGNOSIS — M25.511 ACUTE PAIN OF RIGHT SHOULDER: Primary | ICD-10-CM

## 2019-04-18 PROCEDURE — 97110 THERAPEUTIC EXERCISES: CPT | Performed by: PHYSICAL THERAPIST

## 2019-04-18 PROCEDURE — 97140 MANUAL THERAPY 1/> REGIONS: CPT | Performed by: PHYSICAL THERAPIST

## 2019-04-18 NOTE — THERAPY TREATMENT NOTE
Outpatient Physical Therapy Ortho Treatment Note  [facility]     Patient Name: Bienvenido Carter  : 1980  MRN: [id number]  Today's Date: 2019      Visit Date: 2019    Visit Dx:    ICD-10-CM ICD-9-CM   1. Acute pain of right shoulder M25.511 719.41   2. S/P shoulder surgery Z98.890 V45.89       Patient Active Problem List   Diagnosis   • Spinal stenosis of cervical region   • Cervical disc disorder at C4-C5 level with radiculopathy   • Chronic neck pain        Past Medical History:   Diagnosis Date   • Anxiety    • Bipolar    • Borderline personality disorder in adult (CMS/HCC)    • Depression    • H/O emotional problems         No past surgical history on file.                    PT Assessment/Plan     Row Name 19 1410          PT Assessment    Assessment Comments  Pt continues with some shoulder and arm pain, but today with c/o numbness and tingling into the R UE along with neck pain. She stil has tightness and tenderness over the UT and levator muscles. She is building her RC strength, but still compensates at this time with elevation of the R UE  -        PT Plan    PT Plan Comments  cont  -       User Key  (r) = Recorded By, (t) = Taken By, (c) = Cosigned By    Initials Name Provider Type     Katie Medel, PT Physical Therapist            Exercises     Row Name 19 1300             Subjective Comments    Subjective Comments  i was doing well, but its been sore for the past few days. Also the neck has been really hurting me this week as well.   -         Subjective Pain    Able to rate subjective pain?  yes  -      Pre-Treatment Pain Level  3  -         Total Minutes    30088 - PT Therapeutic Exercise Minutes  28  -LH      26611 - PT Manual Therapy Minutes  15  -LH         Exercise 1    Exercise Name 1  AAROM Er with cane  -LH      Sets 1  1  -      Reps 1  10  -LH      Time 1  10 sec  -         Exercise 2    Exercise Name 2  table slides with A  -LH       Sets 2  1  -LH      Reps 2  10  -LH      Time 2  10 sec  -LH         Exercise 3    Exercise Name 3  elbow flex/ext in sitting  -LH      Sets 3  2  -LH      Reps 3  10  -LH      Time 3  2lb  -LH         Exercise 4    Exercise Name 4  scap retraction shoulder ext  -LH      Sets 4  2  -LH      Reps 4  10  -LH      Time 4  red  -LH         Exercise 5    Exercise Name 5  AROM scap ret  -LH         Exercise 6    Exercise Name 6  supine press ups   -LH      Cueing 6  Verbal  -LH      Sets 6  2  -LH      Reps 6  10  -LH      Time 6  1 lb  -LH         Exercise 7    Exercise Name 7  SL ER with towel  -LH      Cueing 7  Verbal;Tactile  -LH      Sets 7  2  -LH      Reps 7  10  -LH      Time 7  1lb  -LH         Exercise 8    Exercise Name 8  ER and IR with towel  -LH      Sets 8  2  -LH      Reps 8  10  -LH      Time 8  yellow  -LH         Exercise 9    Exercise Name 9  tricep ext  -LH      Sets 9  2  -LH      Reps 9  10  -LH      Time 9  red  -LH         Exercise 10    Exercise Name 10  wall push ups  -LH      Sets 10  2  -LH      Reps 10  10  -LH        User Key  (r) = Recorded By, (t) = Taken By, (c) = Cosigned By    Initials Name Provider Type     Katie Medel, PT Physical Therapist                      Manual Rx (last 36 hours)      Manual Treatments     Row Name 04/18/19 1300             Total Minutes    89817 - PT Manual Therapy Minutes  15  -LH         Manual Rx 1    Manual Rx 1 Location  R shoulder  -LH      Manual Rx 1 Type  PROM in all planes with osciallations for pain, gentle distraction.  -LH      Manual Rx 1 Grade  gentle  -LH         Manual Rx 2    Manual Rx 2 Location  STM to R UT levator, pect with lift, biceps, stretching to UT, pect, sub scap release  -LH        User Key  (r) = Recorded By, (t) = Taken By, (c) = Cosigned By    Initials Name Provider Type     Katie Medel, PT Physical Therapist              Therapy Education  Given: HEP, Posture/body mechanics, Symptoms/condition management,  Pain management  Program: New, Reinforced  How Provided: Verbal, Demonstration, Written  Provided to: Patient  Level of Understanding: Teach back education performed              Time Calculation:   Start Time: 1315  Stop Time: 1400  Time Calculation (min): 45 min  Total Timed Code Minutes- PT: 45 minute(s)  Therapy Charges for Today     Code Description Service Date Service Provider Modifiers Qty    [id number]  PT THER PROC EA 15 MIN 4/18/2019 Katie Medel, PT GP 2    [id number]  PT MANUAL THERAPY EA 15 MIN 4/18/2019 Katie Medel, PT GP 1                    Katie Medel, PT  4/18/2019

## 2019-04-23 ENCOUNTER — HOSPITAL ENCOUNTER (OUTPATIENT)
Dept: PHYSICAL THERAPY | Facility: HOSPITAL | Age: 39
Setting detail: THERAPIES SERIES
Discharge: HOME | End: 2019-04-23

## 2019-04-23 DIAGNOSIS — M25.511 CHRONIC RIGHT SHOULDER PAIN: Primary | ICD-10-CM

## 2019-04-23 DIAGNOSIS — Z98.890 S/P SHOULDER SURGERY: ICD-10-CM

## 2019-04-23 DIAGNOSIS — G89.29 CHRONIC RIGHT SHOULDER PAIN: Primary | ICD-10-CM

## 2019-04-23 PROCEDURE — 97110 THERAPEUTIC EXERCISES: CPT | Performed by: PHYSICAL THERAPIST

## 2019-04-23 PROCEDURE — 97140 MANUAL THERAPY 1/> REGIONS: CPT | Performed by: PHYSICAL THERAPIST

## 2019-04-23 NOTE — THERAPY PROGRESS REPORT/RE-CERT
Outpatient Physical Therapy Ortho Progress Note  [facility]     Patient Name: Bienvenido Carter  : 1980  MRN: [id number]  Today's Date: 2019      Visit Date: 2019    Visit Dx:    ICD-10-CM ICD-9-CM   1. Chronic right shoulder pain M25.511 719.41    G89.29 338.29   2. S/P shoulder surgery Z98.890 V45.89       Patient Active Problem List   Diagnosis   • Spinal stenosis of cervical region   • Cervical disc disorder at C4-C5 level with radiculopathy   • Chronic neck pain        Past Medical History:   Diagnosis Date   • Anxiety    • Bipolar    • Borderline personality disorder in adult (CMS/HCC)    • Depression    • H/O emotional problems         No past surgical history on file.    PT Ortho     Row Name 19 1300       Right Upper Ext    Rt Shoulder Abduction AROM  125 deg  -LH    Rt Shoulder Abduction PROM  168 deg  -LH    Rt Shoulder Flexion AROM  145 deg  -LH    Rt Shoulder Flexion PROM  155 deg  -LH    Rt Shoulder External Rotation AROM  T1  -LH    Rt Shoulder External Rotation PROM  73 deg  -LH    Rt Shoulder Internal Rotation AROM  L2  -LH    Rt Shoulder Internal Rotation PROM  75 deg  -LH       MMT Right Upper Ext    Rt Shoulder Flexion MMT, Gross Movement  (3+/5) fair plus  -LH    Rt Shoulder ABduction MMT, Gross Movement  (3+/5) fair plus  -LH    Rt Shoulder Internal Rotation MMT, Gross Movement  (4/5) good  -LH    Rt Shoulder External Rotation MMT, Gross Movement  (4-/5) good minus  -LH      User Key  (r) = Recorded By, (t) = Taken By, (c) = Cosigned By    Initials Name Provider Type     Katie Medel PT Physical Therapist                      PT Assessment/Plan     Row Name 19 1400          PT Assessment    Assessment Comments  Bienvenido Carter has been seen for 18 physical therapy sessions for s/p R shoulder scope.  Treatment has included therapeutic exercise, manual therapy and patient education with home exercise program . Progress to physical therapy goals  is good. She has improved her ROM in flex=145/155 deg, abd=125/168 deg, ER=T1/73 deg, and IR=L2/75 deg, and strength is 3+ to 4-/5. Her pain levels are 3-4/10 on avg, with increases at times to 6-7/10.  She will benefit from continued skilled physical therapy to address remaining impairments and functional limitations.  -        PT Plan    PT Plan Comments  cont with skilled therapy  -       User Key  (r) = Recorded By, (t) = Taken By, (c) = Cosigned By    Initials Name Provider Type     Katie Medel, PT Physical Therapist            Exercises     Row Name 04/23/19 1300             Subjective Pain    Able to rate subjective pain?  yes  -      Pre-Treatment Pain Level  3  -         Total Minutes    25980 - PT Therapeutic Exercise Minutes  28  -LH      35090 - PT Manual Therapy Minutes  15  -LH         Exercise 1    Exercise Name 1  AAROM Er with cane  -LH      Sets 1  1  -LH      Reps 1  10  -LH      Time 1  10 sec  -LH         Exercise 2    Exercise Name 2  table slides with A  -LH      Sets 2  1  -LH      Reps 2  10  -LH      Time 2  10 sec  -LH         Exercise 3    Exercise Name 3  elbow flex/ext in sitting  -LH      Sets 3  2  -LH      Reps 3  10  -LH      Time 3  2lb  -LH         Exercise 4    Exercise Name 4  scap retraction shoulder ext  -LH      Sets 4  2  -LH      Reps 4  10  -LH      Time 4  red  -LH         Exercise 5    Exercise Name 5  AROM scap ret  -LH         Exercise 6    Exercise Name 6  supine press ups   -LH      Cueing 6  Verbal  -LH      Sets 6  2  -LH      Reps 6  10  -LH      Time 6  1 lb  -LH         Exercise 7    Exercise Name 7  SL ER with towel  -LH      Cueing 7  Verbal;Tactile  -LH      Sets 7  2  -LH      Reps 7  10  -LH      Time 7  1lb  -LH         Exercise 8    Exercise Name 8  ER and IR with towel  -LH      Sets 8  2  -LH      Reps 8  10  -LH      Time 8  yellow  -LH         Exercise 9    Exercise Name 9  tricep ext  -LH      Sets 9  2  -LH      Reps 9  10  -LH       Time 9  red  -         Exercise 10    Exercise Name 10  wall push ups  -      Sets 10  2  -      Reps 10  10  -        User Key  (r) = Recorded By, (t) = Taken By, (c) = Cosigned By    Initials Name Provider Type     Katie Medel, PT Physical Therapist                      Manual Rx (last 36 hours)      Manual Treatments     Row Name 04/23/19 1300             Total Minutes    17712 - PT Manual Therapy Minutes  15  -         Manual Rx 1    Manual Rx 1 Location  R shoulder  -      Manual Rx 1 Type  PROM in all planes with osciallations for pain, gentle distraction.  -      Manual Rx 1 Grade  gentle  -         Manual Rx 2    Manual Rx 2 Location  STM to R UT levator, pect with lift, biceps, stretching to UT, pect, sub scap release  -        User Key  (r) = Recorded By, (t) = Taken By, (c) = Cosigned By    Initials Name Provider Type     Katie Medel, PT Physical Therapist          PT OP Goals     Row Name 04/23/19 1300          PT Short Term Goals    STG Date to Achieve  08/08/16  -     STG 1  Patient will be independent with education for symptom management, joint protection and strategies to minimize stress on affected tissues  -     STG 1 Progress  Partially Met  -     STG 2  Pt to improve pain complaints to no more than 6/10 with ADLs  -     STG 2 Progress  Partially Met  -     STG 3  Pt to improve R shoulder ROM in flex=110/135 deg, abd=100/130 deg, ER=back of head/55 deg and IR=L5/70 deg  -     STG 3 Progress  Met  [facility]        Long Term Goals    LTG Date to Achieve  08/22/16  -     LTG 1  Pt to improve pain complaints to no more than 3/10 with ADLs  -     LTG 1 Progress  Ongoing  -     LTG 2  Pt to improve R shoulder ROM in flex=150/160 deg, abd=150/160 deg, ER=T3/80 deg and IR=T10/80 deg  -     LTG 2 Progress  Progressing;Ongoing  -     LTG 3  Pt to improve shoulder strength to 4/5  -     LTG 3 Progress  Ongoing  -     LTG 4  Pt to improve DASH score from  93% to 30% for overall functional improvement  -     LTG 4 Progress  Ongoing  -     LTG 5  Patient will be independent and compliant with advanced home exercise program to facilitate self-management of symptoms.  -     LT 5 Progress  Ongoing  -       User Key  (r) = Recorded By, (t) = Taken By, (c) = Cosigned By    Initials Name Provider Type     Katie Medel, PT Physical Therapist                         Time Calculation:   Start Time: 1315  Stop Time: 1400  Time Calculation (min): 45 min  Total Timed Code Minutes- PT: 43 minute(s)  Therapy Charges for Today     Code Description Service Date Service Provider Modifiers Qty    49416164394  PT THER PROC EA 15 MIN 4/23/2019 Katie Medel, PT GP 2    [id number]  PT MANUAL THERAPY EA 15 MIN 4/23/2019 Katie Medel, PT GP 1                    Katie Medel, PT  4/23/2019

## 2019-04-25 ENCOUNTER — APPOINTMENT (OUTPATIENT)
Dept: PHYSICAL THERAPY | Facility: HOSPITAL | Age: 39
End: 2019-04-25

## 2019-04-30 ENCOUNTER — HOSPITAL ENCOUNTER (OUTPATIENT)
Dept: PHYSICAL THERAPY | Facility: HOSPITAL | Age: 39
Discharge: HOME | End: 2019-04-30
Admitting: ORTHOPAEDIC SURGERY

## 2019-04-30 DIAGNOSIS — G89.29 CHRONIC RIGHT SHOULDER PAIN: Primary | ICD-10-CM

## 2019-04-30 DIAGNOSIS — Z98.890 S/P SHOULDER SURGERY: ICD-10-CM

## 2019-04-30 DIAGNOSIS — M25.511 CHRONIC RIGHT SHOULDER PAIN: Primary | ICD-10-CM

## 2019-04-30 PROCEDURE — 97110 THERAPEUTIC EXERCISES: CPT | Performed by: PHYSICAL THERAPIST

## 2019-04-30 PROCEDURE — 97140 MANUAL THERAPY 1/> REGIONS: CPT | Performed by: PHYSICAL THERAPIST

## 2019-05-07 ENCOUNTER — HOSPITAL ENCOUNTER (OUTPATIENT)
Dept: PHYSICAL THERAPY | Facility: HOSPITAL | Age: 39
Setting detail: THERAPIES SERIES
Discharge: HOME | End: 2019-05-07

## 2019-05-07 DIAGNOSIS — Z98.890 S/P SHOULDER SURGERY: ICD-10-CM

## 2019-05-07 DIAGNOSIS — M25.511 CHRONIC RIGHT SHOULDER PAIN: Primary | ICD-10-CM

## 2019-05-07 DIAGNOSIS — G89.29 CHRONIC RIGHT SHOULDER PAIN: Primary | ICD-10-CM

## 2019-05-07 PROCEDURE — 97110 THERAPEUTIC EXERCISES: CPT | Performed by: PHYSICAL THERAPIST

## 2019-05-07 PROCEDURE — 97140 MANUAL THERAPY 1/> REGIONS: CPT | Performed by: PHYSICAL THERAPIST

## 2019-05-07 NOTE — THERAPY TREATMENT NOTE
Outpatient Physical Therapy Ortho Treatment Note  [facility]     Patient Name: Bienvenido Carter  : 1980  MRN: [id number]  Today's Date: 2019      Visit Date: 2019    Visit Dx:    ICD-10-CM ICD-9-CM   1. Chronic right shoulder pain M25.511 719.41    G89.29 338.29   2. S/P shoulder surgery Z98.890 V45.89       Patient Active Problem List   Diagnosis   • Spinal stenosis of cervical region   • Cervical disc disorder at C4-C5 level with radiculopathy   • Chronic neck pain        Past Medical History:   Diagnosis Date   • Anxiety    • Bipolar    • Borderline personality disorder in adult (CMS/HCC)    • Depression    • H/O emotional problems         No past surgical history on file.                    PT Assessment/Plan     Row Name 19 1400          PT Assessment    Assessment Comments  pain has been better lately, not having to take as much pain medicine. Traveled over the weekend and did well. Able to progress her strength exerciese today without increased pain  -        PT Plan    PT Plan Comments  cont  -       User Key  (r) = Recorded By, (t) = Taken By, (c) = Cosigned By    Initials Name Provider Type     Katie Medel, PT Physical Therapist            Exercises     Row Name 19 1300             Subjective Comments    Subjective Comments  Was travelling and did pretty well. Only had a few times I had to take meds  -         Subjective Pain    Able to rate subjective pain?  yes  -      Pre-Treatment Pain Level  3  -         Total Minutes    51211 - PT Therapeutic Exercise Minutes  28  -LH      13060 - PT Manual Therapy Minutes  15  -LH         Exercise 1    Exercise Name 1  AAROM Er with cane  -      Sets 1  1  -      Reps 1  10  -LH      Time 1  10 sec  -LH         Exercise 2    Exercise Name 2  table slides with A  -      Sets 2  1  -      Reps 2  10  -LH      Time 2  10 sec  -LH         Exercise 3    Exercise Name 3  elbow flex/ext in sitting  -       Sets 3  2  -LH      Reps 3  10  -LH      Time 3  3 lb  -LH         Exercise 4    Exercise Name 4  scap retraction shoulder ext  -LH      Sets 4  2  -LH      Reps 4  10  -LH      Time 4  green  -LH         Exercise 5    Exercise Name 5  AROM scap ret  -LH         Exercise 6    Exercise Name 6  supine press ups   -LH      Cueing 6  Verbal  -LH      Sets 6  2  -LH      Reps 6  10  -LH      Time 6  2 lb  -LH         Exercise 7    Exercise Name 7  SL ER with towel  -LH      Cueing 7  Verbal;Tactile  -LH      Sets 7  2  -LH      Reps 7  10  -LH      Time 7  2 lb  -LH         Exercise 8    Exercise Name 8  ER and IR with towel  -LH      Sets 8  2  -LH      Reps 8  10  -LH      Time 8  red  -LH         Exercise 9    Exercise Name 9  tricep ext  -LH      Sets 9  2  -LH      Reps 9  10  -LH      Time 9  red  -LH         Exercise 10    Exercise Name 10  wall push ups  -LH      Sets 10  2  -LH      Reps 10  10  -LH        User Key  (r) = Recorded By, (t) = Taken By, (c) = Cosigned By    Initials Name Provider Type     Katie Medel PT Physical Therapist                      Manual Rx (last 36 hours)      Manual Treatments     Row Name 05/07/19 1300             Total Minutes    78358 - PT Manual Therapy Minutes  15  -LH         Manual Rx 1    Manual Rx 1 Location  R shoulder  -LH      Manual Rx 1 Type  PROM in all planes with osciallations for pain, gentle distraction.  -LH        User Key  (r) = Recorded By, (t) = Taken By, (c) = Cosigned By    Initials Name Provider Type     Katie Medel PT Physical Therapist                             Time Calculation:   Start Time: 1315  Stop Time: 1400  Time Calculation (min): 45 min  Total Timed Code Minutes- PT: 43 minute(s)  Therapy Charges for Today     Code Description Service Date Service Provider Modifiers Qty    87586044428 HC PT THER PROC EA 15 MIN 5/7/2019 Katie Medel, PT GP 2    91785929102 HC PT MANUAL THERAPY EA 15 MIN 5/7/2019 Katie Medel, PT GP 1                     Katie Medel, PT  5/7/2019

## 2019-05-09 ENCOUNTER — HOSPITAL ENCOUNTER (OUTPATIENT)
Dept: PHYSICAL THERAPY | Facility: HOSPITAL | Age: 39
Setting detail: THERAPIES SERIES
Discharge: HOME | End: 2019-05-09

## 2019-05-09 DIAGNOSIS — M25.511 CHRONIC RIGHT SHOULDER PAIN: Primary | ICD-10-CM

## 2019-05-09 DIAGNOSIS — G89.29 CHRONIC RIGHT SHOULDER PAIN: Primary | ICD-10-CM

## 2019-05-09 DIAGNOSIS — Z98.890 S/P SHOULDER SURGERY: ICD-10-CM

## 2019-05-09 PROCEDURE — 97140 MANUAL THERAPY 1/> REGIONS: CPT | Performed by: PHYSICAL THERAPIST

## 2019-05-09 PROCEDURE — 97110 THERAPEUTIC EXERCISES: CPT | Performed by: PHYSICAL THERAPIST

## 2019-05-09 NOTE — THERAPY TREATMENT NOTE
Outpatient Physical Therapy Ortho Treatment Note  [facility]     Patient Name: Bienvenido Carter  : 1980  MRN: [id number]  Today's Date: 2019      Visit Date: 2019    Visit Dx:    ICD-10-CM ICD-9-CM   1. Chronic right shoulder pain M25.511 719.41    G89.29 338.29   2. S/P shoulder surgery Z98.890 V45.89       Patient Active Problem List   Diagnosis   • Spinal stenosis of cervical region   • Cervical disc disorder at C4-C5 level with radiculopathy   • Chronic neck pain        Past Medical History:   Diagnosis Date   • Anxiety    • Bipolar    • Borderline personality disorder in adult (CMS/[facility])    • Depression    • H/O emotional problems         No past surgical history on file.                    PT Assessment/Plan     Row Name 19 1300          PT Assessment    Assessment Comments  pt pain levels are still staying lower. She has had to take meds a couple of times since she returned from her trip. She did not have much increase in soreness following last sessions progression of her program  -        PT Plan    PT Plan Comments  cont  -       User Key  (r) = Recorded By, (t) = Taken By, (c) = Cosigned By    Initials Name Provider Type     Katie Medel, PT Physical Therapist            Exercises     Row Name 19 1300             Subjective Comments    Subjective Comments  A little sore from the last session, but not bad  -         Subjective Pain    Able to rate subjective pain?  yes  -      Pre-Treatment Pain Level  2  -         Total Minutes    11264 - PT Therapeutic Exercise Minutes  28  -LH      39811 - PT Manual Therapy Minutes  15  -LH         Exercise 1    Exercise Name 1  AAROM Er with cane  -      Sets 1  1  -      Reps 1  10  -      Time 1  10 sec  -LH         Exercise 2    Exercise Name 2  table slides with A  -      Sets 2  1  -      Reps 2  10  -      Time 2  10 sec  -LH         Exercise 3    Exercise Name 3  elbow flex/ext in sitting  -       Sets 3  2  -LH      Reps 3  10  -LH      Time 3  3 lb  -LH         Exercise 4    Exercise Name 4  scap retraction shoulder ext  -LH      Sets 4  2  -LH      Reps 4  10  -LH      Time 4  green  -LH         Exercise 5    Exercise Name 5  AROM scap ret  -LH         Exercise 6    Exercise Name 6  supine press ups   -LH      Cueing 6  Verbal  -LH      Sets 6  2  -LH      Reps 6  10  -LH      Time 6  2 lb  -LH         Exercise 7    Exercise Name 7  SL ER with towel  -LH      Cueing 7  Verbal;Tactile  -LH      Sets 7  2  -LH      Reps 7  10  -LH      Time 7  2 lb  -LH         Exercise 8    Exercise Name 8  ER and IR with towel  -LH      Sets 8  2  -LH      Reps 8  10  -LH      Time 8  red  -LH         Exercise 9    Exercise Name 9  tricep ext  -LH      Sets 9  2  -LH      Reps 9  10  -LH      Time 9  green  -LH         Exercise 10    Exercise Name 10  wall push ups  -LH      Sets 10  2  -LH      Reps 10  10  -LH        User Key  (r) = Recorded By, (t) = Taken By, (c) = Cosigned By    Initials Name Provider Type     Katie Medel, KANE Physical Therapist                      Manual Rx (last 36 hours)      Manual Treatments     Row Name 05/09/19 1300             Total Minutes    04670 - PT Manual Therapy Minutes  15  -LH         Manual Rx 1    Manual Rx 1 Location  R shoulder  -LH      Manual Rx 1 Type  PROM in all planes with osciallations for pain, gentle distraction.  -LH        User Key  (r) = Recorded By, (t) = Taken By, (c) = Cosigned By    Initials Name Provider Type     Katie Medel PT Physical Therapist              Therapy Education  Given: HEP, Symptoms/condition management, Pain management  Program: Reinforced  How Provided: Verbal  Provided to: Patient              Time Calculation:   Start Time: 1315  Stop Time: 1359  Time Calculation (min): 44 min  Total Timed Code Minutes- PT: 43 minute(s)  Therapy Charges for Today     Code Description Service Date Service Provider Modifiers Qty    70161466963  HC PT THER PROC EA 15 MIN 5/9/2019 Katie Medel, PT GP 2    20498231149 HC PT MANUAL THERAPY EA 15 MIN 5/9/2019 Katie Medel, PT GP 1                    Katie Medel, PT  5/9/2019

## 2019-05-14 ENCOUNTER — HOSPITAL ENCOUNTER (OUTPATIENT)
Dept: PHYSICAL THERAPY | Facility: HOSPITAL | Age: 39
Setting detail: THERAPIES SERIES
Discharge: HOME | End: 2019-05-14

## 2019-05-14 DIAGNOSIS — Z98.890 S/P SHOULDER SURGERY: ICD-10-CM

## 2019-05-14 DIAGNOSIS — G89.29 CHRONIC RIGHT SHOULDER PAIN: Primary | ICD-10-CM

## 2019-05-14 DIAGNOSIS — M25.511 CHRONIC RIGHT SHOULDER PAIN: Primary | ICD-10-CM

## 2019-05-14 PROCEDURE — 97140 MANUAL THERAPY 1/> REGIONS: CPT | Performed by: PHYSICAL THERAPIST

## 2019-05-14 PROCEDURE — 97110 THERAPEUTIC EXERCISES: CPT | Performed by: PHYSICAL THERAPIST

## 2019-05-14 NOTE — THERAPY TREATMENT NOTE
Outpatient Physical Therapy Ortho Treatment Note  [facility]     Patient Name: Bienvenido Carter  : 1980  MRN: [id number]  Today's Date: 2019      Visit Date: 2019    Visit Dx:    ICD-10-CM ICD-9-CM   1. Chronic right shoulder pain M25.511 719.41    G89.29 338.29   2. S/P shoulder surgery Z98.890 V45.89       Patient Active Problem List   Diagnosis   • Spinal stenosis of cervical region   • Cervical disc disorder at C4-C5 level with radiculopathy   • Chronic neck pain        Past Medical History:   Diagnosis Date   • Anxiety    • Bipolar    • Borderline personality disorder in adult (CMS/HCC)    • Depression    • H/O emotional problems         No past surgical history on file.                    PT Assessment/Plan     Row Name 19 1400          PT Assessment    Assessment Comments  Pt pain levels are about 2-3/10 on avg with normal ADLs now. With more repetitive motion things, like throwing darts over the weekend, her pain can increease to 6/10. Working more exercises today for stability around 90 deg and above  -        PT Plan    PT Plan Comments  cont  -       User Key  (r) = Recorded By, (t) = Taken By, (c) = Cosigned By    Initials Name Provider Type     Katie Medel, PT Physical Therapist            Exercises     Row Name 19 1300             Subjective Comments    Subjective Comments  I tried throwing darts over the weekend and it made it really sore  -         Subjective Pain    Able to rate subjective pain?  yes  -      Pre-Treatment Pain Level  3 6/10 prior to taking meds  -         Total Minutes    41070 - PT Therapeutic Exercise Minutes  30  -LH      35966 - PT Manual Therapy Minutes  12  -LH         Exercise 1    Exercise Name 1  AAROM Er with cane  -LH      Sets 1  1  -LH      Reps 1  10  -LH      Time 1  10 sec  -LH         Exercise 2    Exercise Name 2  table slides with A  -LH      Sets 2  1  -LH      Reps 2  10  -LH      Time 2  10 sec  -LH          Exercise 3    Exercise Name 3  elbow flex/ext in sitting  -LH      Sets 3  2  -LH      Reps 3  10  -LH      Time 3  3 lb  -LH         Exercise 4    Exercise Name 4  scap retraction shoulder ext  -LH      Sets 4  2  -LH      Reps 4  10  -LH      Time 4  green  -LH         Exercise 5    Exercise Name 5  AROM scap ret  -LH         Exercise 6    Exercise Name 6  supine press ups   -LH      Cueing 6  Verbal  -LH      Sets 6  2  -LH      Reps 6  10  -LH      Time 6  2 lb  -LH         Exercise 7    Exercise Name 7  SL ER with towel  -LH      Cueing 7  Verbal;Tactile  -LH      Sets 7  2  -LH      Reps 7  10  -LH      Time 7  2 lb  -LH         Exercise 8    Exercise Name 8  ER and IR with towel  -LH      Sets 8  2  -LH      Reps 8  10  -LH      Time 8  red  -LH         Exercise 9    Exercise Name 9  tricep ext  -LH      Sets 9  2  -LH      Reps 9  10  -LH      Time 9  green  -LH         Exercise 10    Exercise Name 10  wall push ups  -LH      Sets 10  2  -LH      Reps 10  10  -LH         Exercise 11    Exercise Name 11  ER propped on table  -LH      Cueing 11  Verbal;Demo  -LH      Sets 11  2  -LH      Reps 11  10  -LH      Time 11  1 lb  -LH         Exercise 12    Exercise Name 12  supine horiz abd and D2 flexion  -LH      Reps 12  1  -LH      Time 12  20  -LH      Additional Comments  red  -LH        User Key  (r) = Recorded By, (t) = Taken By, (c) = Cosigned By    Initials Name Provider Type     Katie Medel, PT Physical Therapist                      Manual Rx (last 36 hours)      Manual Treatments     Row Name 05/14/19 1300             Total Minutes    33900 - PT Manual Therapy Minutes  12  -LH         Manual Rx 1    Manual Rx 1 Location  R shoulder  -LH      Manual Rx 1 Type  PROM in all planes with osciallations for pain, gentle distraction.  -        User Key  (r) = Recorded By, (t) = Taken By, (c) = Cosigned By    Initials Name Provider Type     Katie Medel, PT Physical Therapist          PT  OP Goals     Row Name 05/14/19 1400          PT Short Term Goals    STG Date to Achieve  08/08/16  -     STG 1  Patient will be independent with education for symptom management, joint protection and strategies to minimize stress on affected tissues  -     STG 1 Progress  Partially Met  -     STG 2  Pt to improve pain complaints to no more than 6/10 with ADLs  -     STG 2 Progress  Partially Met  -     STG 3  Pt to improve R shoulder ROM in flex=110/135 deg, abd=100/130 deg, ER=back of head/55 deg and IR=L5/70 deg  -     STG 3 Progress  Met  -        Long Term Goals    LTG Date to Achieve  08/22/16  -     LTG 1  Pt to improve pain complaints to no more than 3/10 with ADLs  -     LTG 1 Progress  Ongoing;Progressing  -     LTG 2  Pt to improve R shoulder ROM in flex=150/160 deg, abd=150/160 deg, ER=T3/80 deg and IR=T10/80 deg  -     LTG 2 Progress  Progressing;Partially Met;Ongoing  -     LTG 3  Pt to improve shoulder strength to 4/5  -     LTG 3 Progress  Ongoing;Progressing  -     LTG 4  Pt to improve DASH score from 93% to 30% for overall functional improvement  -     LTG 4 Progress  Ongoing  -     LTG 5  Patient will be independent and compliant with advanced home exercise program to facilitate self-management of symptoms.  -     LTG 5 Progress  Ongoing  -       User Key  (r) = Recorded By, (t) = Taken By, (c) = Cosigned By    Initials Name Provider Type     Katie Medel, PT Physical Therapist          Therapy Education  Given: HEP, Symptoms/condition management, Pain management  Program: Reinforced, New  How Provided: Verbal, Demonstration, Written  Provided to: Patient  Level of Understanding: Teach back education performed              Time Calculation:   Start Time: 1316  Stop Time: 1400  Time Calculation (min): 44 min  Total Timed Code Minutes- PT: 44 minute(s)  Therapy Charges for Today     Code Description Service Date Service Provider Modifiers Qty    93476511787  HC PT THER PROC EA 15 MIN 5/14/2019 Katie Medel, PT GP 2    44082731144 HC PT MANUAL THERAPY EA 15 MIN 5/14/2019 Katie Medel, PT GP 1                    Katie Medel, PT  5/14/2019

## 2019-05-16 ENCOUNTER — HOSPITAL ENCOUNTER (OUTPATIENT)
Dept: PHYSICAL THERAPY | Facility: HOSPITAL | Age: 39
Discharge: HOME | End: 2019-05-16
Admitting: ORTHOPAEDIC SURGERY

## 2019-05-16 DIAGNOSIS — Z98.890 S/P SHOULDER SURGERY: ICD-10-CM

## 2019-05-16 DIAGNOSIS — M25.511 CHRONIC RIGHT SHOULDER PAIN: Primary | ICD-10-CM

## 2019-05-16 DIAGNOSIS — G89.29 CHRONIC RIGHT SHOULDER PAIN: Primary | ICD-10-CM

## 2019-05-16 PROCEDURE — 97110 THERAPEUTIC EXERCISES: CPT | Performed by: PHYSICAL THERAPIST

## 2019-05-16 PROCEDURE — 97140 MANUAL THERAPY 1/> REGIONS: CPT | Performed by: PHYSICAL THERAPIST

## 2019-05-16 NOTE — THERAPY TREATMENT NOTE
Outpatient Physical Therapy Ortho Treatment Note  [facility]     Patient Name: Bienvenido Carter  : 1980  MRN: [id number]  Today's Date: 2019      Visit Date: 2019    Visit Dx:    ICD-10-CM ICD-9-CM   1. Chronic right shoulder pain M25.511 719.41    G89.29 338.29   2. S/P shoulder surgery Z98.890 V45.89       Patient Active Problem List   Diagnosis   • Spinal stenosis of cervical region   • Cervical disc disorder at C4-C5 level with radiculopathy   • Chronic neck pain        Past Medical History:   Diagnosis Date   • Anxiety    • Bipolar    • Borderline personality disorder in adult (CMS/HCC)    • Depression    • H/O emotional problems         No past surgical history on file.                    PT Assessment/Plan     Row Name 19 1418          PT Assessment    Assessment Comments  Pt continues to improve her [place]elrance to activity and increases in resistance with her HEP. Her pain has been 2-3/10, min cues for form  -        PT Plan    PT Plan Comments  cont  -       User Key  (r) = Recorded By, (t) = Taken By, (c) = Cosigned By    Initials Name Provider Type     Katie Medel, PT Physical Therapist            Exercises     Row Name 19 1300             Subjective Comments    Subjective Comments  A little sore after the new exercises, but not bad  -LH         Subjective Pain    Able to rate subjective pain?  yes  -      Pre-Treatment Pain Level  2  -LH         Total Minutes    98268 - PT Therapeutic Exercise Minutes  30  -LH      13200 - PT Manual Therapy Minutes  10  -LH         Exercise 1    Exercise Name 1  AAROM Er with cane  -      Sets 1  1  -      Reps 1  10  -LH      Time 1  10 sec  -LH         Exercise 2    Exercise Name 2  table slides with A  -      Sets 2  1  -LH      Reps 2  10  -LH      Time 2  10 sec  -LH         Exercise 3    Exercise Name 3  elbow flex/ext in sitting  -      Sets 3  2  -      Reps 3  10  -LH      Time 3  3 lb  -LH          Interval History: Seen and examined at bedside. Overnights events noted. Doing much better and off BiPAP on nasal cannula. SLT evaluation => oropharyngeal dysphagia and an increased risk of aspiration. Broaden IV antibiotic coverage to cover Aspiration Pneumonia.       Review of Systems   Respiratory: Positive for cough.    All other systems reviewed and are negative.    Objective:     Vital Signs (Most Recent):  Temp: 98.4 °F (36.9 °C) (03/09/19 1413)  Pulse: 74 (03/09/19 1413)  Resp: 18 (03/09/19 1413)  BP: 128/60 (03/09/19 1413)  SpO2: (!) 93 % (03/09/19 1413) Vital Signs (24h Range):  Temp:  [98.3 °F (36.8 °C)-99.2 °F (37.3 °C)] 98.4 °F (36.9 °C)  Pulse:  [57-81] 74  Resp:  [17-32] 18  SpO2:  [93 %-96 %] 93 %  BP: (113-148)/(40-97) 128/60     Weight: 78.1 kg (172 lb 2.9 oz)  Body mass index is 27.02 kg/m².      Intake/Output Summary (Last 24 hours) at 3/9/2019 1806  Last data filed at 3/9/2019 1300  Gross per 24 hour   Intake 420 ml   Output 550 ml   Net -130 ml       Physical Exam   Constitutional: She appears well-developed and well-nourished. She is cooperative.  Non-toxic appearance. She does not have a sickly appearance. She appears ill. No distress. She is not intubated. Nasal cannula in place.   HENT:   Head: Normocephalic and atraumatic.   Mouth/Throat: Mucous membranes are dry (post wearing BiPAP).   Eyes: EOM and lids are normal. Pupils are equal, round, and reactive to light.   Neck: Trachea normal and full passive range of motion without pain. Carotid bruit is not present.   Cardiovascular: Normal rate and regular rhythm. Exam reveals distant heart sounds.   Pulses:       Radial pulses are 2+ on the right side, and 2+ on the left side.        Dorsalis pedis pulses are 1+ on the right side, and 1+ on the left side.   Pulmonary/Chest: Effort normal. No accessory muscle usage. No tachypnea. She is not intubated. No respiratory distress. She has decreased breath sounds in the right lower field and the  Exercise 4    Exercise Name 4  scap retraction shoulder ext  -LH      Sets 4  2  -LH      Reps 4  10  -LH      Time 4  green  -LH         Exercise 5    Exercise Name 5  AROM scap ret  -LH         Exercise 6    Exercise Name 6  supine press ups   -LH      Cueing 6  Verbal  -LH      Sets 6  2  -LH      Reps 6  10  -LH      Time 6  2 lb  -LH         Exercise 7    Exercise Name 7  SL ER with towel  -LH      Cueing 7  Verbal;Tactile  -LH      Sets 7  2  -LH      Reps 7  10  -LH      Time 7  2 lb  -LH         Exercise 8    Exercise Name 8  ER and IR with towel  -LH      Sets 8  2  -LH      Reps 8  10  -LH      Time 8  red  -LH         Exercise 9    Exercise Name 9  tricep ext  -LH      Sets 9  2  -LH      Reps 9  10  -LH      Time 9  green  -LH         Exercise 10    Exercise Name 10  wall push ups  -LH      Sets 10  2  -LH      Reps 10  10  -LH         Exercise 11    Exercise Name 11  ER propped on table  -LH      Cueing 11  Verbal;Demo  -LH      Sets 11  2  -LH      Reps 11  10  -LH      Time 11  1 lb  -LH         Exercise 12    Exercise Name 12  supine horiz abd and D2 flexion  -LH      Reps 12  1  -LH      Time 12  20  -LH      Additional Comments  red  -LH        User Key  (r) = Recorded By, (t) = Taken By, (c) = Cosigned By    Initials Name Provider Type     Katie Medel, PT Physical Therapist                      Manual Rx (last 36 hours)      Manual Treatments     Row Name 05/16/19 1300             Total Minutes    45680 - PT Manual Therapy Minutes  10  -LH         Manual Rx 1    Manual Rx 1 Location  R shoulder  -LH      Manual Rx 1 Type  PROM in all planes with osciallations for pain, gentle distraction.  -        User Key  (r) = Recorded By, (t) = Taken By, (c) = Cosigned By    Initials Name Provider Type     Katie Medel, PT Physical Therapist              Therapy Education  Given: HEP, Symptoms/condition management, Pain management  Program: Reinforced  How Provided: Verbal  Provided to:  left lower field. She has rales.   Abdominal: Soft. Normal appearance. She exhibits no distension. Bowel sounds are decreased. There is no tenderness.   Musculoskeletal: Normal range of motion.        Right foot: There is no deformity.        Left foot: There is no deformity.   Trace LE edema   Lymphadenopathy:     She has no cervical adenopathy.   Neurological: She is alert.   Fully awake and alert and responsive with mild to mod confusion and baseline dementia   Skin: Skin is warm, dry and intact. Capillary refill takes less than 2 seconds. No rash noted. No cyanosis.   Psychiatric: She has a normal mood and affect. Her speech is normal and behavior is normal. She exhibits abnormal recent memory.       Vents:  Oxygen Concentration (%): 34 (03/09/19 0743)    Lines/Drains/Airways     Drain            Female External Urinary Catheter 03/07/19 1900 1 day          Peripheral Intravenous Line                 Peripheral IV - Single Lumen 03/08/19 1547 Anterior;Right Forearm 1 day                Significant Labs:    CBC/Anemia Profile:  Recent Labs   Lab 03/08/19  0358 03/09/19  0405   WBC 16.69* 18.22*   HGB 12.9 13.2   HCT 41.8 42.2    384*   * 107*   RDW 14.8* 14.3        Chemistries:  Recent Labs   Lab 03/08/19  0358 03/09/19  0405    143   K 3.9 3.8   CL 98 96   CO2 35* 38*   BUN 16 17   CREATININE 0.7 0.8   CALCIUM 9.3 9.3   ALBUMIN 2.3* 2.4*   PROT 6.2 6.5   BILITOT 0.3 0.5   ALKPHOS 118 114   ALT 18 14   AST 22 24   MG 1.8 2.0       ABGs:   Recent Labs   Lab 03/08/19  0417   PH 7.349*   PCO2 72.2*   HCO3 39.8*   POCSATURATED 93*   BE 14       Significant Imaging:    X-Ray Chest 1 View 03/08/19:      1. There is a mild amount of haziness in the bases of both lungs.  This is characteristic of atelectasis or pneumonia.  2. The base of the left hemithorax is opacified.  This is characteristic of a small pleural effusion.  3. The size of the heart is prominent.  This may be secondary to  Patient  Level of Understanding: Teach back education performed              Time Calculation:   Start Time: 1318  Stop Time: 1400  Time Calculation (min): 42 min  Total Timed Code Minutes- PT: 40 minute(s)  Therapy Charges for Today     Code Description Service Date Service Provider Modifiers Qty    73496403710  PT THER PROC EA 15 MIN 5/16/2019 Katie Medel, PT GP 2    57143835149 HC PT MANUAL THERAPY EA 15 MIN 5/16/2019 Katie Medel, PT GP 1                    Katie Medel, PT  5/16/2019      magnification.

## 2019-05-21 ENCOUNTER — HOSPITAL ENCOUNTER (OUTPATIENT)
Dept: PHYSICAL THERAPY | Facility: HOSPITAL | Age: 39
Setting detail: THERAPIES SERIES
Discharge: HOME | End: 2019-05-21

## 2019-05-21 DIAGNOSIS — M25.511 CHRONIC RIGHT SHOULDER PAIN: Primary | ICD-10-CM

## 2019-05-21 DIAGNOSIS — G89.29 CHRONIC RIGHT SHOULDER PAIN: Primary | ICD-10-CM

## 2019-05-21 DIAGNOSIS — Z98.890 S/P SHOULDER SURGERY: ICD-10-CM

## 2019-05-21 PROCEDURE — 97110 THERAPEUTIC EXERCISES: CPT | Performed by: PHYSICAL THERAPIST

## 2019-05-21 PROCEDURE — 97140 MANUAL THERAPY 1/> REGIONS: CPT | Performed by: PHYSICAL THERAPIST

## 2019-05-21 NOTE — THERAPY TREATMENT NOTE
Outpatient Physical Therapy Ortho Treatment Note  [facility]     Patient Name: Bienvenido Carter  : 1980  MRN: [id number]  Today's Date: 2019      Visit Date: 2019    Visit Dx:    ICD-10-CM ICD-9-CM   1. Chronic right shoulder pain M25.511 719.41    G89.29 338.29   2. S/P shoulder surgery Z98.890 V45.89       Patient Active Problem List   Diagnosis   • Spinal stenosis of cervical region   • Cervical disc disorder at C4-C5 level with radiculopathy   • Chronic neck pain        Past Medical History:   Diagnosis Date   • Anxiety    • Bipolar    • Borderline personality disorder in adult (CMS/HCC)    • Depression    • H/O emotional problems         No past surgical history on file.    PT Ortho     Row Name 19 1300       Right Upper Ext    Rt Shoulder Abduction AROM  135 deg  -LH    Rt Shoulder Abduction PROM  173 deg  -LH    Rt Shoulder Flexion AROM  153 deg  -LH    Rt Shoulder Flexion PROM  162 deg  -LH    Rt Shoulder External Rotation AROM  T2  -LH    Rt Shoulder External Rotation PROM  75 deg  -LH    Rt Shoulder Internal Rotation AROM  T12  -LH    Rt Shoulder Internal Rotation PROM  75 deg  -LH      User Key  (r) = Recorded By, (t) = Taken By, (c) = Cosigned By    Initials Name Provider Type    Katie Mares, PT Physical Therapist                      PT Assessment/Plan     Row Name 19 1355          PT Assessment    Assessment Comments  Pt ROM both actively and passively have improved. She still has pain in certain positions, trying to reach the middle of her back and with sleeping on her R side  -LH        PT Plan    PT Plan Comments  cont  -LH       User Key  (r) = Recorded By, (t) = Taken By, (c) = Cosigned By    Initials Name Provider Type    Katie Mares, PT Physical Therapist            Exercises     Row Name 19 1300             Subjective Comments    Subjective Comments  it was hurting when I woke up because I slept on it, so took meds when I woke up.  Some days not needing meds at all for it  -LH         Subjective Pain    Able to rate subjective pain?  yes  -LH      Pre-Treatment Pain Level  3  -LH         Total Minutes    80097 - PT Therapeutic Exercise Minutes  28  -LH      47478 - PT Manual Therapy Minutes  12  -LH         Exercise 1    Exercise Name 1  AAROM Er with cane  -LH      Sets 1  1  -LH      Reps 1  10  -LH      Time 1  10 sec  -LH         Exercise 2    Exercise Name 2  table slides with A  -LH      Sets 2  1  -LH      Reps 2  10  -LH      Time 2  10 sec  -LH         Exercise 3    Exercise Name 3  elbow flex/ext in sitting  -LH      Sets 3  2  -LH      Reps 3  10  -LH      Time 3  3 lb  -LH         Exercise 4    Exercise Name 4  scap retraction shoulder ext  -LH      Sets 4  2  -LH      Reps 4  10  -LH      Time 4  green  -LH         Exercise 5    Exercise Name 5  AROM scap ret  -LH         Exercise 6    Exercise Name 6  supine press ups   -LH      Cueing 6  Verbal  -LH      Sets 6  2  -LH      Reps 6  10  -LH      Time 6  2 lb  -LH         Exercise 7    Exercise Name 7  SL ER with towel  -LH      Cueing 7  Verbal;Tactile  -LH      Sets 7  2  -LH      Reps 7  10  -LH      Time 7  2 lb  -LH         Exercise 8    Exercise Name 8  ER and IR with towel  -LH      Sets 8  2  -LH      Reps 8  10  -LH      Time 8  red  -LH         Exercise 9    Exercise Name 9  tricep ext  -LH      Sets 9  2  -LH      Reps 9  10  -LH      Time 9  green  -LH         Exercise 10    Exercise Name 10  wall push ups  -LH      Sets 10  2  -LH      Reps 10  10  -LH         Exercise 11    Exercise Name 11  ER propped on table  -LH      Cueing 11  Verbal;Demo  -LH      Sets 11  2  -LH      Reps 11  10  -LH      Time 11  1 lb  -LH         Exercise 12    Exercise Name 12  supine horiz abd and D2 flexion  -LH      Reps 12  1  -LH      Time 12  20  -LH      Additional Comments  red  -LH        User Key  (r) = Recorded By, (t) = Taken By, (c) = Cosigned By    Initials Name Provider Type      Katie Medel, PT Physical Therapist                      Manual Rx (last 36 hours)      Manual Treatments     Row Name 05/21/19 1300             Total Minutes    20590 - PT Manual Therapy Minutes  12  -         Manual Rx 1    Manual Rx 1 Location  R shoulder  -      Manual Rx 1 Type  PROM in all planes with osciallations for pain, gentle distraction.  -        User Key  (r) = Recorded By, (t) = Taken By, (c) = Cosigned By    Initials Name Provider Type     Katie Medel PT Physical Therapist              Therapy Education  Given: HEP, Symptoms/condition management, Pain management  Program: Reinforced  How Provided: Verbal  Provided to: Patient  Level of Understanding: Teach back education performed              Time Calculation:   Start Time: 1314  Stop Time: 1356  Time Calculation (min): 42 min  Total Timed Code Minutes- PT: 40 minute(s)  Therapy Charges for Today     Code Description Service Date Service Provider Modifiers Qty    17021728201  PT THER PROC EA 15 MIN 5/21/2019 Katie Medel, PT  2    [id number]  PT MANUAL THERAPY EA 15 MIN 5/21/2019 Katie Medel, PT GP 1                    Katie Medel, PT  5/21/2019

## 2019-05-23 ENCOUNTER — HOSPITAL ENCOUNTER (OUTPATIENT)
Dept: PHYSICAL THERAPY | Facility: HOSPITAL | Age: 39
Setting detail: THERAPIES SERIES
Discharge: HOME | End: 2019-05-23

## 2019-05-23 DIAGNOSIS — G89.29 CHRONIC RIGHT SHOULDER PAIN: Primary | ICD-10-CM

## 2019-05-23 DIAGNOSIS — M25.511 CHRONIC RIGHT SHOULDER PAIN: Primary | ICD-10-CM

## 2019-05-23 DIAGNOSIS — Z98.890 S/P SHOULDER SURGERY: ICD-10-CM

## 2019-05-23 PROCEDURE — 97140 MANUAL THERAPY 1/> REGIONS: CPT | Performed by: PHYSICAL THERAPIST

## 2019-05-23 PROCEDURE — 97110 THERAPEUTIC EXERCISES: CPT | Performed by: PHYSICAL THERAPIST

## 2019-05-23 NOTE — THERAPY PROGRESS REPORT/RE-CERT
Outpatient Physical Therapy Ortho Progress Note  [facility]     Patient Name: Bienvenido Carter  : 1980  MRN: [id number]  Today's Date: 2019      Visit Date: 2019    Visit Dx:    ICD-10-CM ICD-9-CM   1. Chronic right shoulder pain M25.511 719.41    G89.29 338.29   2. S/P shoulder surgery Z98.890 V45.89       Patient Active Problem List   Diagnosis   • Spinal stenosis of cervical region   • Cervical disc disorder at C4-C5 level with radiculopathy   • Chronic neck pain        Past Medical History:   Diagnosis Date   • Anxiety    • Bipolar    • Borderline personality disorder in adult (CMS/HCC)    • Depression    • H/O emotional problems         No past surgical history on file.    PT Ortho     Row Name 19 1300       Right Upper Ext    Rt Shoulder Abduction AROM  135 deg  -LH    Rt Shoulder Abduction PROM  172 deg  -LH    Rt Shoulder Flexion AROM  153 deg  -LH    Rt Shoulder Flexion PROM  161 deg  -LH    Rt Shoulder External Rotation AROM  T2  -LH    Rt Shoulder External Rotation PROM  78 deg  -LH    Rt Shoulder Internal Rotation AROM  T12  -LH    Rt Shoulder Internal Rotation PROM  77 deg  -LH       MMT Right Upper Ext    Rt Shoulder Flexion MMT, Gross Movement  (4-/5) good minus  -LH    Rt Shoulder ABduction MMT, Gross Movement  (4-/5) good minus  -LH    Rt Shoulder Internal Rotation MMT, Gross Movement  (4/5) good  -LH    Rt Shoulder External Rotation MMT, Gross Movement  (4/5) good  -LH    Row Name 19 1300       Right Upper Ext    Rt Shoulder Abduction AROM  135 deg  -LH    Rt Shoulder Abduction PROM  173 deg  -LH    Rt Shoulder Flexion AROM  153 deg  -LH    Rt Shoulder Flexion PROM  162 deg  -LH    Rt Shoulder External Rotation AROM  T2  -LH    Rt Shoulder External Rotation PROM  75 deg  -LH    Rt Shoulder Internal Rotation AROM  T12  -LH    Rt Shoulder Internal Rotation PROM  75 deg  -LH      User Key  (r) = Recorded By, (t) = Taken By, (c) = Cosigned By    Initials Name  Provider Type     Katie Medel, PT Physical Therapist                      PT Assessment/Plan     Row Name 05/23/19 1400          PT Assessment    Assessment Comments  Bienvenido Carter has been seen for 24 physical therapy sessions for s/p R shoulder scope.  Treatment has included therapeutic exercise, manual therapy and patient education with home exercise program . Progress to physical therapy goals is good. She has improved her ROM in flex=153/161 deg, oaf=321/172 deg, ER=T2/78 deg, and IR=T12/77 deg and strength is 4- to 4/5. She has improved her score on the DASH from 93% to 50% over the past 3 months, with 0% being no disability.  She will benefit from continued skilled physical therapy to address remaining impairments and functional limitations.  -        PT Plan    PT Plan Comments  cont with skilled therapy  -       User Key  (r) = Recorded By, (t) = Taken By, (c) = Cosigned By    Initials Name Provider Type     Katie Medel, PT Physical Therapist            Exercises     Row Name 05/23/19 1300             Subjective Comments    Subjective Comments  wsa pretty sore after last time  -         Subjective Pain    Able to rate subjective pain?  yes  -      Pre-Treatment Pain Level  3  -      Subjective Pain Comment  5/10 at most  -         Total Minutes    09125 - PT Therapeutic Exercise Minutes  28  -      47270 - PT Manual Therapy Minutes  13  -LH         Exercise 1    Exercise Name 1  AAROM Er with cane  -      Sets 1  1  -      Reps 1  10  -LH      Time 1  10 sec  -LH         Exercise 2    Exercise Name 2  table slides with A  -      Sets 2  1  -      Reps 2  10  -LH      Time 2  10 sec  -LH         Exercise 3    Exercise Name 3  elbow flex/ext in sitting  -      Sets 3  2  -      Reps 3  10  -LH      Time 3  3 lb  -LH         Exercise 4    Exercise Name 4  scap retraction shoulder ext  -      Sets 4  2  -LH      Reps 4  10  -LH      Time 4  green  -          Exercise 5    Exercise Name 5  AROM scap ret  -LH         Exercise 6    Exercise Name 6  supine press ups   -LH      Cueing 6  Verbal  -LH      Sets 6  2  -LH      Reps 6  10  -LH      Time 6  2 lb  -LH         Exercise 7    Exercise Name 7  SL ER with towel  -LH      Cueing 7  Verbal;Tactile  -LH      Sets 7  2  -LH      Reps 7  10  -LH      Time 7  2 lb  -LH         Exercise 8    Exercise Name 8  ER and IR with towel  -LH      Sets 8  2  -LH      Reps 8  10  -LH      Time 8  red  -LH         Exercise 9    Exercise Name 9  tricep ext  -LH      Sets 9  2  -LH      Reps 9  10  -LH      Time 9  green  -LH         Exercise 10    Exercise Name 10  wall push ups  -LH      Sets 10  2  -LH      Reps 10  10  -LH         Exercise 11    Exercise Name 11  ER propped on table  -LH      Cueing 11  Verbal;Demo  -LH      Sets 11  2  -LH      Reps 11  10  -LH      Time 11  1 lb  -LH         Exercise 12    Exercise Name 12  supine horiz abd and D2 flexion  -LH      Reps 12  1  -LH      Time 12  20  -LH        User Key  (r) = Recorded By, (t) = Taken By, (c) = Cosigned By    Initials Name Provider Type     Katie Medel, PT Physical Therapist                      Manual Rx (last 36 hours)      Manual Treatments     Row Name 05/23/19 1300             Total Minutes    54317 - PT Manual Therapy Minutes  13  -LH         Manual Rx 1    Manual Rx 1 Location  R shoulder  -LH      Manual Rx 1 Type  PROM in all planes with osciallations for pain, gentle distraction.  -        User Key  (r) = Recorded By, (t) = Taken By, (c) = Cosigned By    Initials Name Provider Type     Katie Medel PT Physical Therapist          PT OP Goals     Row Name 05/23/19 1300          PT Short Term Goals    STG Date to Achieve  08/08/16  -     STG 1  Patient will be independent with education for symptom management, joint protection and strategies to minimize stress on affected tissues  -LH     STG 1 Progress  Partially Met  -     STG 2  Pt to  improve pain complaints to no more than 6/10 with ADLs  -     STG 2 Progress  Met  -     STG 3  Pt to improve R shoulder ROM in flex=110/135 deg, abd=100/130 deg, ER=back of head/55 deg and IR=L5/70 deg  -     STG 3 Progress  Met  -        Long Term Goals    LTG Date to Achieve  08/22/16  -     LTG 1  Pt to improve pain complaints to no more than 3/10 with ADLs  -     LTG 1 Progress  Ongoing;Progressing  -     LTG 2  Pt to improve R shoulder ROM in flex=150/160 deg, abd=150/160 deg, ER=T3/80 deg and IR=T10/80 deg  -     LTG 2 Progress  Progressing;Partially Met;Ongoing  -     LTG 3  Pt to improve shoulder strength to 4/5  -     LTG 3 Progress  Ongoing;Progressing;Partially Met  -     LTG 4  Pt to improve DASH score from 93% to 30% for overall functional improvement  -     LTG 4 Progress  Ongoing;Progressing  -     LTG 4 Progress Comments  improved to 50%  -     LTG 5  Patient will be independent and compliant with advanced home exercise program to facilitate self-management of symptoms.  -     LTG 5 Progress  Ongoing  -       User Key  (r) = Recorded By, (t) = Taken By, (c) = Cosigned By    Initials Name Provider Type     Katie Medel, PT Physical Therapist          Therapy Education  Given: HEP, Symptoms/condition management, Pain management  Program: Reinforced  How Provided: Verbal  Provided to: Patient  Level of Understanding: Teach back education performed    Outcome Measure Options: Disabilities of the Arm, Shoulder, and Hand (DASH)  DASH  Open a tight or new jar.: Severe Difficulty  Write: No Difficulty  Turn a key: No Difficulty  Prepare a meal: Moderate Difficulty  Push open a heavy door: Severe Difficulty  Place an object on a shelf above your head: Severe Difficulty  Garden or do yard work: Severe Difficulty  Make a bed: Moderate Difficulty  Carry a shopping bag or briefcase: Moderate Difficulty  Carry a heavy object (over 10 lbs): Severe Difficulty  Change a  lightbulb overhead: Moderate Difficulty  Wash or blow dry your hair: Moderate Difficulty  Wash your back: Severe Difficulty  Put on a pullover sweater: Mild Difficulty  Use a knife to cut food: No Difficulty  Recreational activities in which require little effort (e.g., cardplaying, knitting, etc.): No Difficulty  Recreational activities in which you take some force or impact through your arm, should or hand (e.g. golf, hammering, tennis, etc.): Unable  Recreational Activities in which you move your arm freely (e.g., frisbee, badminton, etc.): Unable  Manage transportation needs (getting from one place to another): No Difficulty  During the past week, to what extent has your arm, shoulder, or hand problem interfered with your normal social activites with family, friends, neighbors or groups?: Moderately  During the past week, were you limited in your work or other regular daily activities as a result of your arm, shoulder or hand problem?: Moderately Limited  Arm, Shoulder, or hand pain: Moderate  Arm, shoulder or hand pain when you performed any specific activity: Moderate  Tingling (pins and needles) in your arm, shoulder, or hand: Moderate  Weakness in your arm, shoulder or hand: Moderate  Stiffness in your arm, shoulder or hand: Severe  During the past week, how much difficulty have you had sleeping because of the pain in your arm, shoulder or hand?: Mild Difficulty  I feel less capable, less confident or less useful because of my arm, shoulder or hand problem: Agree  DASH Sum : 84  Number of Questions Answered: 28  DASH Score: 50         Time Calculation:   Start Time: 1315  Stop Time: 1400  Time Calculation (min): 45 min  Total Timed Code Minutes- PT: 43 minute(s)  Therapy Charges for Today     Code Description Service Date Service Provider Modifiers Qty    43284219035 HC PT THER PROC EA 15 MIN 5/23/2019 Katie Medel, PT GP 2    38906435504 HC PT MANUAL THERAPY EA 15 MIN 5/23/2019 Katie Medel, PT GP  1          PT G-Codes  Outcome Measure Options: Disabilities of the Arm, Shoulder, and Hand (DASH)         Katie Medel, PT  5/23/2019

## 2019-05-28 ENCOUNTER — HOSPITAL ENCOUNTER (OUTPATIENT)
Dept: PHYSICAL THERAPY | Facility: HOSPITAL | Age: 39
Setting detail: THERAPIES SERIES
Discharge: HOME | End: 2019-05-28

## 2019-05-28 DIAGNOSIS — M25.511 CHRONIC RIGHT SHOULDER PAIN: Primary | ICD-10-CM

## 2019-05-28 DIAGNOSIS — Z98.890 S/P SHOULDER SURGERY: ICD-10-CM

## 2019-05-28 DIAGNOSIS — G89.29 CHRONIC RIGHT SHOULDER PAIN: Primary | ICD-10-CM

## 2019-05-28 PROCEDURE — 97140 MANUAL THERAPY 1/> REGIONS: CPT | Performed by: PHYSICAL THERAPIST

## 2019-05-28 PROCEDURE — 97110 THERAPEUTIC EXERCISES: CPT | Performed by: PHYSICAL THERAPIST

## 2019-05-28 NOTE — THERAPY TREATMENT NOTE
Outpatient Physical Therapy Ortho Treatment Note  [facility]     Patient Name: Bienvenido Carter  : 1980  MRN: [id number]  Today's Date: 2019      Visit Date: 2019    Visit Dx:    ICD-10-CM ICD-9-CM   1. Chronic right shoulder pain M25.511 719.41    G89.29 338.29   2. S/P shoulder surgery Z98.890 V45.89       Patient Active Problem List   Diagnosis   • Spinal stenosis of cervical region   • Cervical disc disorder at C4-C5 level with radiculopathy   • Chronic neck pain        Past Medical History:   Diagnosis Date   • Anxiety    • Bipolar    • Borderline personality disorder in adult (CMS/HCC)    • Depression    • H/O emotional problems         No past surgical history on file.                    PT Assessment/Plan     Row Name 19 1404          PT Assessment    Assessment Comments  Pt is having a little more pain today after sleeping at her parent's on the couch for several days. She has tenderness over the anterior shoulder, biceps tendon, and tightness in the pect. Encouraged to ice more to help irritation  -        PT Plan    PT Plan Comments  cont with skilled therapy  -       User Key  (r) = Recorded By, (t) = Taken By, (c) = Cosigned By    Initials Name Provider Type     Katie Medel, PT Physical Therapist            Exercises     Row Name 19 1300             Subjective Comments    Subjective Comments  it's been pretty sore the past couple of days, i slept on the couch at my parent's house  -         Subjective Pain    Able to rate subjective pain?  yes  -      Pre-Treatment Pain Level  4  -         Total Minutes    37763 - PT Therapeutic Exercise Minutes  28  -LH      62807 - PT Manual Therapy Minutes  15  -LH         Exercise 1    Exercise Name 1  AAROM Er with cane  -      Sets 1  1  -      Reps 1  10  -LH      Time 1  10 sec  -LH         Exercise 2    Exercise Name 2  table slides with A  -      Sets 2  1  -      Reps 2  10  -LH      Time 2   10 sec  -LH         Exercise 3    Exercise Name 3  elbow flex/ext in sitting  -LH      Sets 3  2  -LH      Reps 3  10  -LH      Time 3  3 lb  -LH         Exercise 4    Exercise Name 4  scap retraction shoulder ext  -LH      Sets 4  2  -LH      Reps 4  10  -LH      Time 4  green  -LH         Exercise 5    Exercise Name 5  AROM scap ret  -LH         Exercise 6    Exercise Name 6  supine press ups   -LH      Cueing 6  Verbal  -LH      Sets 6  2  -LH      Reps 6  10  -LH      Time 6  2 lb  -LH         Exercise 7    Exercise Name 7  SL ER with towel  -LH      Cueing 7  Verbal;Tactile  -LH      Sets 7  2  -LH      Reps 7  10  -LH      Time 7  2 lb  -LH         Exercise 8    Exercise Name 8  ER and IR with towel  -LH      Sets 8  2  -LH      Reps 8  10  -LH      Time 8  red  -LH         Exercise 9    Exercise Name 9  tricep ext  -LH      Sets 9  2  -LH      Reps 9  10  -LH      Time 9  green  -LH         Exercise 10    Exercise Name 10  wall push ups  -LH      Sets 10  2  -LH      Reps 10  10  -LH         Exercise 11    Exercise Name 11  ER propped on table  -LH      Cueing 11  Verbal;Demo  -LH      Sets 11  2  -LH      Reps 11  10  -LH      Time 11  1 lb  -LH         Exercise 12    Exercise Name 12  supine horiz abd and D2 flexion  -LH      Reps 12  --  -LH      Time 12  --  -LH        User Key  (r) = Recorded By, (t) = Taken By, (c) = Cosigned By    Initials Name Provider Type     Katie Medel, PT Physical Therapist                      Manual Rx (last 36 hours)      Manual Treatments     Row Name 05/28/19 1300             Total Minutes    94975 - PT Manual Therapy Minutes  15  -LH         Manual Rx 1    Manual Rx 1 Location  R shoulder  -LH      Manual Rx 1 Type  PROM in all planes with osciallations for pain, gentle distraction, subscap  -LH        User Key  (r) = Recorded By, (t) = Taken By, (c) = Cosigned By    Initials Name Provider Type     Katie Medel, PT Physical Therapist              Therapy  Education  Given: HEP, Symptoms/condition management, Pain management  Program: Reinforced  How Provided: Verbal  Provided to: Patient  Level of Understanding: Teach back education performed              Time Calculation:   Start Time: 1315  Stop Time: 1400  Time Calculation (min): 45 min  Total Timed Code Minutes- PT: 43 minute(s)  Therapy Charges for Today     Code Description Service Date Service Provider Modifiers Qty    96568042067  PT THER PROC EA 15 MIN 5/28/2019 Katie Medel, PT GP 2    [id number]  PT MANUAL THERAPY EA 15 MIN 5/28/2019 Katie Medel, PT GP 1                    Katie Medel, PT  5/28/2019

## 2019-06-06 ENCOUNTER — HOSPITAL ENCOUNTER (OUTPATIENT)
Dept: PHYSICAL THERAPY | Facility: HOSPITAL | Age: 39
Setting detail: THERAPIES SERIES
Discharge: HOME | End: 2019-06-06

## 2019-06-06 DIAGNOSIS — M25.511 CHRONIC RIGHT SHOULDER PAIN: Primary | ICD-10-CM

## 2019-06-06 DIAGNOSIS — Z98.890 S/P SHOULDER SURGERY: ICD-10-CM

## 2019-06-06 DIAGNOSIS — G89.29 CHRONIC RIGHT SHOULDER PAIN: Primary | ICD-10-CM

## 2019-06-06 PROCEDURE — 97110 THERAPEUTIC EXERCISES: CPT | Performed by: PHYSICAL THERAPIST

## 2019-06-06 PROCEDURE — 97140 MANUAL THERAPY 1/> REGIONS: CPT | Performed by: PHYSICAL THERAPIST

## 2019-06-06 NOTE — THERAPY TREATMENT NOTE
Outpatient Physical Therapy Ortho Treatment Note  [facility]     Patient Name: Bienvenido Carter  : 1980  MRN: [id number]  Today's Date: 2019      Visit Date: 2019    Visit Dx:    ICD-10-CM ICD-9-CM   1. Chronic right shoulder pain M25.511 719.41    G89.29 338.29   2. S/P shoulder surgery Z98.890 V45.89       Patient Active Problem List   Diagnosis   • Spinal stenosis of cervical region   • Cervical disc disorder at C4-C5 level with radiculopathy   • Chronic neck pain        Past Medical History:   Diagnosis Date   • Anxiety    • Bipolar    • Borderline personality disorder in adult (CMS/HCC)    • Depression    • H/O emotional problems         No past surgical history on file.                    PT Assessment/Plan     Row Name 19 1600          PT Assessment    Assessment Comments  Pt has had increased pain over the past several days. She does wake up at times on her R shoulder in the middle of the night. She c/o clicking and popping in the joint with activity. She has tenderness over the anterior shoulder, supraspinatus insertion  -        PT Plan    PT Plan Comments  cont  -       User Key  (r) = Recorded By, (t) = Taken By, (c) = Cosigned By    Initials Name Provider Type     Katie Medel, PT Physical Therapist            Exercises     Row Name 19 1500             Subjective Comments    Subjective Comments  it has been hurting the past few days. I think maybe i have been sleeping on it?   -         Subjective Pain    Able to rate subjective pain?  yes  -      Pre-Treatment Pain Level  7  -         Total Minutes    94066 - PT Therapeutic Exercise Minutes  28  -LH      75515 - PT Manual Therapy Minutes  15  -LH         Exercise 1    Exercise Name 1  AAROM Er with cane  -      Sets 1  1  -      Reps 1  10  -      Time 1  10 sec  -LH         Exercise 2    Exercise Name 2  table slides with A  -LH      Sets 2  1  -      Reps 2  10  -      Time 2  10 sec   -LH         Exercise 3    Exercise Name 3  elbow flex/ext in sitting  -LH      Sets 3  2  -LH      Reps 3  10  -LH      Time 3  3 lb  -LH         Exercise 4    Exercise Name 4  scap retraction shoulder ext  -LH      Sets 4  2  -LH      Reps 4  10  -LH      Time 4  green  -LH         Exercise 5    Exercise Name 5  AROM scap ret  -LH         Exercise 6    Exercise Name 6  supine press ups   -LH      Cueing 6  Verbal  -LH      Sets 6  2  -LH      Reps 6  10  -LH      Time 6  2 lb  -LH         Exercise 7    Exercise Name 7  SL ER with towel  -LH      Cueing 7  Verbal;Tactile  -LH      Sets 7  2  -LH      Reps 7  10  -LH      Time 7  1 lb  -LH         Exercise 8    Exercise Name 8  ER and IR with towel  -LH      Sets 8  2  -LH      Reps 8  10  -LH      Time 8  red  -LH         Exercise 9    Exercise Name 9  tricep ext  -LH      Sets 9  2  -LH      Reps 9  10  -LH      Time 9  green  -LH         Exercise 10    Exercise Name 10  wall push ups  -LH      Sets 10  2  -LH      Reps 10  10  -LH         Exercise 11    Exercise Name 11  ER propped on table  -LH      Cueing 11  Verbal;Demo  -LH      Sets 11  2  -LH      Reps 11  10  -LH      Time 11  1 lb  -LH         Exercise 12    Exercise Name 12  supine horiz abd and D2 flexion  -LH        User Key  (r) = Recorded By, (t) = Taken By, (c) = Cosigned By    Initials Name Provider Type     Katie Medel, PT Physical Therapist                      Manual Rx (last 36 hours)      Manual Treatments     Row Name 06/06/19 1500             Total Minutes    01722 - PT Manual Therapy Minutes  15  -LH         Manual Rx 1    Manual Rx 1 Location  R shoulder  -LH      Manual Rx 1 Type  PROM in all planes with osciallations for pain, gentle distraction, subscap release, pect lift  -LH        User Key  (r) = Recorded By, (t) = Taken By, (c) = Cosigned By    Initials Name Provider Type     Katie Medel PT Physical Therapist              Therapy Education  Given: HEP,  Symptoms/condition management, Pain management  Program: Reinforced  How Provided: Verbal  Provided to: Patient  Level of Understanding: Teach back education performed              Time Calculation:   Start Time: 1535  Stop Time: 1620  Time Calculation (min): 45 min  Total Timed Code Minutes- PT: 43 minute(s)  Therapy Charges for Today     Code Description Service Date Service Provider Modifiers Qty    83307340754  PT THER PROC EA 15 MIN 6/6/2019 Katie Medel, PT GP 2    [id number]  PT MANUAL THERAPY EA 15 MIN 6/6/2019 Katie Medel, PT GP 1                    Kaite Medel, PT  6/6/2019

## 2019-06-13 ENCOUNTER — HOSPITAL ENCOUNTER (OUTPATIENT)
Dept: PHYSICAL THERAPY | Facility: HOSPITAL | Age: 39
Setting detail: THERAPIES SERIES
Discharge: HOME | End: 2019-06-13

## 2019-06-13 DIAGNOSIS — G89.29 CHRONIC RIGHT SHOULDER PAIN: Primary | ICD-10-CM

## 2019-06-13 DIAGNOSIS — M25.511 CHRONIC RIGHT SHOULDER PAIN: Primary | ICD-10-CM

## 2019-06-13 DIAGNOSIS — Z98.890 S/P SHOULDER SURGERY: ICD-10-CM

## 2019-06-13 PROCEDURE — 97140 MANUAL THERAPY 1/> REGIONS: CPT | Performed by: PHYSICAL THERAPIST

## 2019-06-13 PROCEDURE — 97110 THERAPEUTIC EXERCISES: CPT | Performed by: PHYSICAL THERAPIST

## 2019-06-13 NOTE — THERAPY TREATMENT NOTE
Outpatient Physical Therapy Ortho Treatment Note  [facility]     Patient Name: Bienvenido Carter  : 1980  MRN: [id number]  Today's Date: 2019      Visit Date: 2019    Visit Dx:    ICD-10-CM ICD-9-CM   1. Chronic right shoulder pain M25.511 719.41    G89.29 338.29   2. S/P shoulder surgery Z98.890 V45.89       Patient Active Problem List   Diagnosis   • Spinal stenosis of cervical region   • Cervical disc disorder at C4-C5 level with radiculopathy   • Chronic neck pain        Past Medical History:   Diagnosis Date   • Anxiety    • Bipolar    • Borderline personality disorder in adult (CMS/HCC)    • Depression    • H/O emotional problems         No past surgical history on file.                    PT Assessment/Plan     Row Name 19 1402          PT Assessment    Assessment Comments  Pt has had increased pain over the past week. Her only different activity is sleeping on the couch by her dog who is ill. She has tightness and pain in the pects and the biceps. Did dry needling of the biceps today and had reduction of trigger points. Encoraged patient to ice at least 2x daily  -        PT Plan    PT Plan Comments  cont, will assess dry needling next session  -       User Key  (r) = Recorded By, (t) = Taken By, (c) = Cosigned By    Initials Name Provider Type     Katie Medel, PT Physical Therapist            Exercises     Row Name 19 1300             Subjective Comments    Subjective Comments  I have been sleeping on the couch since my dog is sick and dying. Making my shoulder sore  -         Subjective Pain    Able to rate subjective pain?  yes  -      Pre-Treatment Pain Level  7  -         Total Minutes    58262 - PT Therapeutic Exercise Minutes  27  -LH      54644 - PT Manual Therapy Minutes  13  -         Exercise 1    Exercise Name 1  AAROM Er with cane  -LH      Sets 1  1  -      Reps 1  10  -      Time 1  10 sec  -         Exercise 2    Exercise Name  2  table slides with A  -LH      Sets 2  1  -LH      Reps 2  10  -LH      Time 2  10 sec  -LH         Exercise 3    Exercise Name 3  elbow flex/ext in sitting  -LH      Sets 3  2  -LH      Reps 3  10  -LH      Time 3  3 lb  -LH         Exercise 4    Exercise Name 4  scap retraction shoulder ext  -LH      Sets 4  2  -LH      Reps 4  10  -LH      Time 4  green  -LH         Exercise 5    Exercise Name 5  AROM scap ret  -LH         Exercise 6    Exercise Name 6  supine press ups   -LH      Cueing 6  Verbal  -LH      Sets 6  2  -LH      Reps 6  10  -LH      Time 6  2 lb  -LH         Exercise 7    Exercise Name 7  SL ER with towel  -LH      Cueing 7  Verbal;Tactile  -LH      Sets 7  2  -LH      Reps 7  10  -LH      Time 7  1 lb  -LH         Exercise 8    Exercise Name 8  ER and IR with towel  -LH      Sets 8  2  -LH      Reps 8  10  -LH      Time 8  red  -LH         Exercise 9    Exercise Name 9  tricep ext  -LH      Sets 9  2  -LH      Reps 9  10  -LH      Time 9  green  -LH         Exercise 10    Exercise Name 10  wall push ups  -LH      Sets 10  --  -LH      Reps 10  --  -LH      Additional Comments  shoulder hurting today  -LH         Exercise 11    Exercise Name 11  ER propped on table  -LH      Cueing 11  Verbal;Demo  -LH      Sets 11  2  -LH      Reps 11  10  -LH      Time 11  1 lb  -LH         Exercise 12    Exercise Name 12  supine horiz abd and D2 flexion  -LH        User Key  (r) = Recorded By, (t) = Taken By, (c) = Cosigned By    Initials Name Provider Type     Katie Medel, PT Physical Therapist                      Manual Rx (last 36 hours)      Manual Treatments     Row Name 06/13/19 1300             Total Minutes    56859 - PT Manual Therapy Minutes  13  -LH         Manual Rx 1    Manual Rx 1 Location  R shoulder  -LH      Manual Rx 1 Type  PROM in all planes with osciallations for pain, gentle distraction, subscap release, pect lift  -LH      Manual Rx 1 Duration  13  -LH         Manual Rx 2     Manual Rx 2 Location  R biceps  -      Manual Rx 2 Type  dry needling to R bicep  using direct and threading techniques. LTRs acheived. Obtained written and verbal consent to treat. Palpation was done before, during, and after treatment  -      Manual Rx 2 Duration  4  -        User Key  (r) = Recorded By, (t) = Taken By, (c) = Cosigned By    Initials Name Provider Type     Katie Medel, PT Physical Therapist              Therapy Education  Given: HEP, Symptoms/condition management, Pain management  Program: Reinforced  How Provided: Verbal  Provided to: Patient  Level of Understanding: Teach back education performed              Time Calculation:   Start Time: 1315  Stop Time: 1400  Time Calculation (min): 45 min  Total Timed Code Minutes- PT: 40 minute(s)  Therapy Charges for Today     Code Description Service Date Service Provider Modifiers Qty    [id number]  PT THER PROC EA 15 MIN 6/13/2019 Katie Medel, PT GP 2    [id number]  PT MANUAL THERAPY EA 15 MIN 6/13/2019 Katie Medel, PT GP 1                    Katie Medel PT  6/13/2019

## 2019-06-20 ENCOUNTER — HOSPITAL ENCOUNTER (OUTPATIENT)
Dept: PHYSICAL THERAPY | Facility: HOSPITAL | Age: 39
Setting detail: THERAPIES SERIES
Discharge: HOME | End: 2019-06-20

## 2019-06-20 DIAGNOSIS — Z98.890 S/P SHOULDER SURGERY: ICD-10-CM

## 2019-06-20 DIAGNOSIS — G89.29 CHRONIC RIGHT SHOULDER PAIN: Primary | ICD-10-CM

## 2019-06-20 DIAGNOSIS — M25.511 CHRONIC RIGHT SHOULDER PAIN: Primary | ICD-10-CM

## 2019-06-20 PROCEDURE — 97110 THERAPEUTIC EXERCISES: CPT | Performed by: PHYSICAL THERAPIST

## 2019-06-20 PROCEDURE — 97140 MANUAL THERAPY 1/> REGIONS: CPT | Performed by: PHYSICAL THERAPIST

## 2019-06-20 NOTE — THERAPY PROGRESS REPORT/RE-CERT
Outpatient Physical Therapy Ortho Progress Note  [facility]     Patient Name: Bienvenido Carter  : 1980  MRN: [id number]  Today's Date: 2019      Visit Date: 2019    Visit Dx:    ICD-10-CM ICD-9-CM   1. Chronic right shoulder pain M25.511 719.41    G89.29 338.29   2. S/P shoulder surgery Z98.890 V45.89       Patient Active Problem List   Diagnosis   • Spinal stenosis of cervical region   • Cervical disc disorder at C4-C5 level with radiculopathy   • Chronic neck pain        Past Medical History:   Diagnosis Date   • Anxiety    • Bipolar    • Borderline personality disorder in adult (CMS/HCC)    • Depression    • H/O emotional problems         No past surgical history on file.    PT Ortho     Row Name 19 1300       Posture/Observations    Posture/Observations Comments  bruising and a small cut on the R thenar eminence   -LH       Right Upper Ext    Rt Shoulder Abduction AROM  110 deg  -LH    Rt Shoulder Abduction PROM  145 deg  -LH    Rt Shoulder Flexion AROM  110 deg  -LH    Rt Shoulder Flexion PROM  145 deg  -LH    Rt Shoulder External Rotation AROM  neck  -LH    Rt Shoulder External Rotation PROM  70 deg  -LH    Rt Shoulder Internal Rotation AROM  mid lumbar  -LH    Rt Shoulder Internal Rotation PROM  70 deg  -LH    Rt Upper Extremity Comments   very painful with ROM today  -       MMT Right Upper Ext    Rt Upper Extremity Comments   pain with testing today  -      User Key  (r) = Recorded By, (t) = Taken By, (c) = Cosigned By    Initials Name Provider Type     Katie Medel PT Physical Therapist                      PT Assessment/Plan     Row Name 19 1400          PT Assessment    Assessment Comments  Bienvenido Carter has been seen for 28 physical therapy sessions for s/p R shoulder scope.  Treatment has included therapeutic exercise, manual therapy, patient education with home exercise program  and dry needling. Progress to physical therapy goals is fair. Pt  was doing well until about 2-3 weeks ago. She has had a couple of incidents, sleeping on the couch/floor with her ill pet and then with banding a cane on her ceiling when upset about her loud neighbor. She has limited ROM today with pain, bruising on her R thenar eminence, and pain with strength testing. Pt RTMD next week. Will await orders.  -LH        PT Plan    PT Plan Comments  await orders from MD  -       User Key  (r) = Recorded By, (t) = Taken By, (c) = Cosigned By    Initials Name Provider Type     Katie Medel, PT Physical Therapist            Exercises     Row Name 06/20/19 1300             Subjective Comments    Subjective Comments  I had an incident at home last night. The upstairs neighbor was making a lot of noise and i was hitting the ceiling with my cane to try and get him to stop.   -         Subjective Pain    Able to rate subjective pain?  yes  -      Pre-Treatment Pain Level  8  -LH         Total Minutes    49006 - PT Therapeutic Exercise Minutes  23  -LH      80600 - PT Manual Therapy Minutes  20  -LH         Exercise 1    Exercise Name 1  AAROM Er with cane  -LH      Sets 1  1  -LH      Reps 1  10  -LH      Time 1  10 sec  -LH         Exercise 2    Exercise Name 2  table slides with A  -LH      Sets 2  1  -LH      Reps 2  10  -LH      Time 2  10 sec  -LH         Exercise 3    Exercise Name 3  elbow flex/ext in sitting  -LH      Sets 3  2  -LH      Reps 3  10  -LH      Time 3  3 lb  -LH         Exercise 4    Exercise Name 4  scap retraction shoulder ext  -LH      Sets 4  2  -LH      Reps 4  10  -LH      Time 4  green  -LH         Exercise 5    Exercise Name 5  prone ext and ret  -LH      Cueing 5  Verbal  -LH         Exercise 6    Exercise Name 6  supine press ups   -LH      Cueing 6  Verbal  -LH      Sets 6  2  -LH      Reps 6  10  -LH      Time 6  1 lb  -LH         Exercise 7    Exercise Name 7  SL ER with towel  -LH      Cueing 7  Verbal;Tactile  -LH      Sets 7  2  -LH       Reps 7  10  -LH      Time 7  --  -LH         Exercise 8    Exercise Name 8  ER and IR with towel  -LH      Sets 8  --  -LH      Reps 8  --  -LH      Time 8  --  -LH         Exercise 9    Exercise Name 9  tricep ext  -LH      Sets 9  --  -LH      Reps 9  --  -LH      Time 9  --  -LH         Exercise 10    Exercise Name 10  wall push ups  -LH         Exercise 11    Exercise Name 11  ER propped on table  -LH      Cueing 11  Verbal;Demo  -LH      Sets 11  --  -LH      Reps 11  --  -LH      Time 11  --  -LH         Exercise 12    Exercise Name 12  supine horiz abd and D2 flexion  -LH        User Key  (r) = Recorded By, (t) = Taken By, (c) = Cosigned By    Initials Name Provider Type     Katie Medel PT Physical Therapist                      Manual Rx (last 36 hours)      Manual Treatments     Row Name 06/20/19 1300             Total Minutes    52343 - PT Manual Therapy Minutes  20  -LH         Manual Rx 1    Manual Rx 1 Location  R shoulder  -      Manual Rx 1 Type  PROM in all planes with osciallations for pain, gentle distraction, subscap release, pect lift  -        User Key  (r) = Recorded By, (t) = Taken By, (c) = Cosigned By    Initials Name Provider Type     Katie Medel PT Physical Therapist              Therapy Education  Given: HEP, Symptoms/condition management, Pain management  Program: Reinforced  How Provided: Verbal, Demonstration, Written  Provided to: Patient  Level of Understanding: Teach back education performed              Time Calculation:   Start Time: 1315  Stop Time: 1400  Time Calculation (min): 45 min  Total Timed Code Minutes- PT: 43 minute(s)  Therapy Charges for Today     Code Description Service Date Service Provider Modifiers Qty    [id number]  PT THER PROC EA 15 MIN 6/20/2019 Katie Medel, PT GP 2    [id number]  PT MANUAL THERAPY EA 15 MIN 6/20/2019 Katie Medel, PT GP 1                    Katie Medel, PT  6/20/2019

## 2019-07-02 ENCOUNTER — TELEPHONE (OUTPATIENT)
Dept: NEUROSURGERY | Facility: CLINIC | Age: 39
End: 2019-07-02

## 2019-07-02 NOTE — PROGRESS NOTES
Subjective   Patient ID: Bienvenido Carter is a 38 y.o. female is here today for follow-up. She states that her right  shoulder is not doing well after surgery, she is scheduled for a new MRI as ordered by Dr Perez soon.    She is in pain management at [facility], they are prescribing Percocet 7.5/325 BID which is not enough to control her pain. She is taking Gabapentin 100 mg ( 3) TID    Patient was last seen 3.29.19 for neck pain, arm pain and weakness and numbness.    Patient states that she is having constant mild to moderate neck pain, intermittent right arm pain, intermittent right arm weakness and N/T.      I have been following this patient for cervical stenosis from C3 to C6, worse at C5-C6. She has been dealing with an injured shoulder and is working with Dr. Mauro Perez with regard to that. Apparently she had surgery in 02/2019, but has not been doing well and has a repeat shoulder MRI scheduled. It is still painful particularly to the touch and she is seeing Dr. Perez next week. She does have a bit of radiating arm pain, but that is actually not the thing that bothers her the most. She had been working with [facility] and asked to be switched to Dr. Ellison with the [facility]. We can certainly do that. I told her we have had to put the neck on the back burner for now. I asked her to keep us posted on what Dr. Perez is planning on doing for the shoulder, but see me in about 3 months and we can regroup.        Neck Pain    The problem occurs constantly. The problem has been gradually worsening. The pain is present in the midline. The pain is at a severity of 7/10. The pain is moderate. The symptoms are aggravated by twisting. Associated symptoms include numbness and weakness.   Arm Pain    The pain is present in the right forearm and upper right arm. The pain is at a severity of 6/10. The pain is moderate. The pain has been intermittent since the incident. Associated symptoms  include numbness.   Extremity Weakness    The pain is present in the right arm. The problem occurs intermittently. The problem has been unchanged. The pain is at a severity of 4/10. The pain is mild. Associated symptoms include numbness.       The following portions of the patient's history were reviewed and updated as appropriate: allergies, current medications, past family history, past medical history, past social history, past surgical history and problem list.    Review of Systems   Musculoskeletal: Positive for extremity weakness, neck pain and neck stiffness. Negative for gait problem.   Neurological: Positive for weakness and numbness.   All other systems reviewed and are negative.      Objective   Physical Exam   Constitutional: She is oriented to person, place, and time. She appears well-developed and well-nourished.   HENT:   Head: Normocephalic and atraumatic.   Eyes: Conjunctivae and EOM are normal. Pupils are equal, round, and reactive to light.   Fundoscopic exam:       The right eye shows no papilledema. The right eye shows venous pulsations.        The left eye shows no papilledema. The left eye shows venous pulsations.   Neck: Carotid bruit is not present.   Neurological: She is oriented to person, place, and time. She has a normal Finger-Nose-Finger Test and a normal Heel to Shin Test. Gait normal.   Reflex Scores:       Tricep reflexes are 2+ on the right side and 2+ on the left side.       Bicep reflexes are 2+ on the right side and 2+ on the left side.       Brachioradialis reflexes are 2+ on the right side and 2+ on the left side.       Patellar reflexes are 2+ on the right side and 2+ on the left side.       Achilles reflexes are 2+ on the right side and 2+ on the left side.  Psychiatric: Her speech is normal.     Neurologic Exam     Mental Status   Oriented to person, place, and time.   Registration of memory: Good recent and remote memory.   Attention: normal. Concentration: normal.    Speech: speech is normal   Level of consciousness: alert  Knowledge: consistent with education.     Cranial Nerves     CN II   Visual fields full to confrontation.   Visual acuity: normal    CN III, IV, VI   Pupils are equal, round, and reactive to light.  Extraocular motions are normal.     CN V   Facial sensation intact.   Right corneal reflex: normal  Left corneal reflex: normal    CN VII   Facial expression full, symmetric.   Right facial weakness: none  Left facial weakness: none    CN VIII   Hearing: intact    CN IX, X   Palate: symmetric    CN XI   Right sternocleidomastoid strength: normal  Left sternocleidomastoid strength: normal    CN XII   Tongue: not atrophic  Tongue deviation: none    Motor Exam   Muscle bulk: normal  Right arm tone: normal  Left arm tone: normal  Right leg tone: normal  Left leg tone: normal    Strength   Strength 5/5 except as noted.     Sensory Exam   Light touch normal.     Gait, Coordination, and Reflexes     Gait  Gait: normal    Coordination   Finger to nose coordination: normal  Heel to shin coordination: normal    Reflexes   Right brachioradialis: 2+  Left brachioradialis: 2+  Right biceps: 2+  Left biceps: 2+  Right triceps: 2+  Left triceps: 2+  Right patellar: 2+  Left patellar: 2+  Right achilles: 2+  Left achilles: 2+  Right : 2+  Left : 2+      Assessment/Plan   Independent Review of Radiographic Studies:    I reviewed the cervical MRI done 9/7/2018 which shows interval progression of her stenosis particularly at C5-C6.  But there is also some degree of stenosis at C3-C4 and C4-C5.  Agree with the report.      Medical Decision Making:    She still needs to focus on her shoulder and will continue to work with Dr. Perez.  She will be seeing him soon.  As per her request will refer her to Dr. Ellison for pain management.  I will see her in 3 months.      Bienvenido was seen today for neck pain, arm pain, extremity weakness and numbness.    Diagnoses and all orders  for this visit:    Cervical disc disorder at C5-C6 level with radiculopathy  -     Ambulatory Referral to Pain Management    Chronic neck pain  -     Ambulatory Referral to Pain Management    Chronic right shoulder pain  -     Ambulatory Referral to Pain Management      Return in about 3 months (around 10/8/2019).

## 2019-07-02 NOTE — TELEPHONE ENCOUNTER
Spoke with patient and reminded her to bring her cervical MRI from [place] with her to her appt on 7.8.19

## 2019-07-08 ENCOUNTER — OFFICE VISIT (OUTPATIENT)
Dept: NEUROSURGERY | Facility: CLINIC | Age: 39
End: 2019-07-08

## 2019-07-08 VITALS
BODY MASS INDEX: 16.3 KG/M2 | SYSTOLIC BLOOD PRESSURE: 100 MMHG | HEART RATE: 72 BPM | WEIGHT: 83 LBS | HEIGHT: 60 IN | DIASTOLIC BLOOD PRESSURE: 64 MMHG

## 2019-07-08 DIAGNOSIS — M25.511 CHRONIC RIGHT SHOULDER PAIN: ICD-10-CM

## 2019-07-08 DIAGNOSIS — G89.29 CHRONIC RIGHT SHOULDER PAIN: ICD-10-CM

## 2019-07-08 DIAGNOSIS — M50.122 CERVICAL DISC DISORDER AT C5-C6 LEVEL WITH RADICULOPATHY: Primary | ICD-10-CM

## 2019-07-08 DIAGNOSIS — G89.29 CHRONIC NECK PAIN: ICD-10-CM

## 2019-07-08 DIAGNOSIS — M54.2 CHRONIC NECK PAIN: ICD-10-CM

## 2019-07-08 PROCEDURE — 99213 OFFICE O/P EST LOW 20 MIN: CPT | Performed by: NEUROLOGICAL SURGERY

## 2019-07-09 ENCOUNTER — TRANSCRIBE ORDERS (OUTPATIENT)
Dept: PHYSICAL THERAPY | Facility: HOSPITAL | Age: 39
End: 2019-07-09

## 2019-07-09 DIAGNOSIS — Z98.890 S/P ARTHROSCOPY OF SHOULDER: Primary | ICD-10-CM

## 2019-07-11 ENCOUNTER — HOSPITAL ENCOUNTER (OUTPATIENT)
Dept: PHYSICAL THERAPY | Facility: HOSPITAL | Age: 39
Setting detail: THERAPIES SERIES
Discharge: HOME | End: 2019-07-11

## 2019-07-11 DIAGNOSIS — M25.511 CHRONIC RIGHT SHOULDER PAIN: Primary | ICD-10-CM

## 2019-07-11 DIAGNOSIS — G89.29 CHRONIC RIGHT SHOULDER PAIN: Primary | ICD-10-CM

## 2019-07-11 DIAGNOSIS — Z98.890 S/P SHOULDER SURGERY: ICD-10-CM

## 2019-07-11 PROCEDURE — 97140 MANUAL THERAPY 1/> REGIONS: CPT | Performed by: PHYSICAL THERAPIST

## 2019-07-11 PROCEDURE — 97110 THERAPEUTIC EXERCISES: CPT | Performed by: PHYSICAL THERAPIST

## 2019-07-11 NOTE — THERAPY RE-EVALUATION
Outpatient Physical Therapy Ortho Re-Evaluation  [facility]     Patient Name: Bienvenido Carter  : 1980  MRN: [id number]  Today's Date: 2019      Visit Date: 2019    Patient Active Problem List   Diagnosis   • Spinal stenosis of cervical region   • Cervical disc disorder at C5-C6 level with radiculopathy   • Chronic neck pain   • Chronic right shoulder pain        Past Medical History:   Diagnosis Date   • Anxiety    • Bipolar    • Borderline personality disorder in adult (CMS/HCC)    • Depression    • H/O emotional problems         No past surgical history on file.    Visit Dx:     ICD-10-CM ICD-9-CM   1. Chronic right shoulder pain M25.511 719.41    G89.29 338.29   2. S/P shoulder surgery Z98.890 V45.89             PT Ortho     Row Name 19 0800       Subjective Comments    Subjective Comments  Pt had MRI of shoulder. She has a follow up with surgeon (saw PA).   -LH       Subjective Pain    Able to rate subjective pain?  yes  -LH    Pre-Treatment Pain Level  7  -LH       Posture/Observations    Posture/Observations Comments  swelling in the upper arm  -LH       Shoulder Girdle Palpation    Subacromial Space  Right:;Tender  -LH    Long Head of Biceps  Right:;Tender  -LH    Pect Minor  Right:;Tender;Guarded/taut  -LH       Right Upper Ext    Rt Shoulder Abduction AROM  120 deg  -LH    Rt Shoulder Abduction PROM  150 deg  -LH    Rt Shoulder Flexion AROM  120 deg  -LH    Rt Shoulder Flexion PROM  151 deg  -LH    Rt Shoulder External Rotation AROM  nec  -LH    Rt Shoulder External Rotation PROM  72 deg  -LH    Rt Shoulder Internal Rotation AROM  lower lumbar  -LH    Rt Shoulder Internal Rotation PROM  65 deg  -LH       MMT Right Upper Ext    Rt Shoulder Flexion MMT, Gross Movement  (4-/5) good minus  -LH    Rt Shoulder ABduction MMT, Gross Movement  (4-/5) good minus  -LH    Rt Shoulder Internal Rotation MMT, Gross Movement  (4/5) good  -LH    Rt Shoulder External Rotation MMT, Gross  Movement  (4-/5) good minus  -LH    Rt Upper Extremity Comments   pain with testing in neutral  -      User Key  (r) = Recorded By, (t) = Taken By, (c) = Cosigned By    Initials Name Provider Type     Katie Medel PT Physical Therapist                            PT OP Goals     Row Name 07/11/19 1000          PT Short Term Goals    STG Date to Achieve  --  -     STG 1  Patient will be independent with education for symptom management, joint protection and strategies to minimize stress on affected tissues  -     STG 1 Progress  Partially Met  -     STG 2  Pt to improve pain complaints to no more than 6/10 with ADLs  -     STG 2 Progress  Ongoing  -     STG 3  Pt to improve R shoulder ROM in flex=110/135 deg, abd=100/130 deg, ER=back of head/55 deg and IR=L5/70 deg  -     STG 3 Progress  Met  -        Long Term Goals    LTG Date to Achieve  --  -     LTG 1  Pt to improve pain complaints to no more than 4/10 with ADLs  -     LTG 1 Progress  Ongoing;Progressing  -     LTG 2  Pt to improve R shoulder ROM in flex=150/160 deg, abd=150/160 deg, ER=T3/80 deg and IR=T10/80 deg  -     LTG 2 Progress  Ongoing  -     LTG 3  Pt to improve shoulder strength to 4/5  -     LTG 3 Progress  Ongoing;Progressing;Partially Met  -     LTG 4  Pt to improve SPADI score from 74% to 40% for overall functional improvement  -     LTG 4 Progress  New  -     LTG 5  Patient will be independent and compliant with advanced home exercise program to facilitate self-management of symptoms.  -     LTG 5 Progress  Ongoing  -       User Key  (r) = Recorded By, (t) = Taken By, (c) = Cosigned By    Initials Name Provider Type     Katie Medel PT Physical Therapist          PT Assessment/Plan     Row Name 07/11/19 1200          PT Assessment    Functional Limitations  Limitation in home management;Limitations in community activities;Performance in leisure activities;Limitations in functional capacity and  performance;Performance in self-care ADL;Performance in work activities  -     Impairments  Edema;Joint mobility;Muscle strength;Pain;Posture;Range of motion  -     Assessment Comments  Bienvenido Carter is a 38 y.o. year-old female referred to physical therapy for continued R shoulder pain following surgery earlier this year. She presents with a evolving clinical presentation.  She has had 28 previous therapy sessions. Her MRI is showing tenosynovitis of the biceps tendon and bursitis in the shoulder. She has decreased ROM with A/PROM with pain, decreased shoulder strength, mild edema of the upper arm, and tenderness over the LH biceps tendon and subacromial area. She scored a 74% on the SPADI, with 0% being no disability. Pt would benefit from therapy to help improve her ability to dress, sleep, perform [place] activities, and be able to find employment.  -     Please refer to paper survey for additional self-reported information  Yes  -     Rehab Potential  Good  -     Patient/caregiver participated in establishment of treatment plan and goals  Yes  -     Patient would benefit from skilled therapy intervention  Yes  -        PT Plan    PT Frequency  2x/week  -     Predicted Duration of Therapy Intervention (Therapy Eval)  6 weeks  -     PT Plan Comments  plan to see pt 2x week to help improve pain, ROM, and strength  -       User Key  (r) = Recorded By, (t) = Taken By, (c) = Cosigned By    Initials Name Provider Type     Katie Medel, PT Physical Therapist          Modalities     Row Name 07/11/19 0800             Ultrasound 49525    Location  R shoulder  -      Duty Cycle  100  -      Frequency  1.0 MHz  -      Intensity - Wts/cm  1.2  -      09945 - PT Ultrasound Minutes  10  -        User Key  (r) = Recorded By, (t) = Taken By, (c) = Cosigned By    Initials Name Provider Type     Katie Medel, PT Physical Therapist        Exercises     Row Name 07/11/19 1100 07/11/19  0800          Subjective Comments    Subjective Comments  --  Pt had MRI of shoulder. She has a follow up with surgeon (saw PA).   -        Subjective Pain    Able to rate subjective pain?  --  yes  -     Pre-Treatment Pain Level  --  7  -LH        Total Minutes    97149 - PT Therapeutic Exercise Minutes  --  10  -LH     90888 - PT Manual Therapy Minutes  25  -LH  --        Exercise 1    Exercise Name 1  --  AAROM Er with cane  -        Exercise 2    Exercise Name 2  --  table slides with A  -        Exercise 4    Exercise Name 4  --  scap retraction shoulder ext  -     Time 4  --  red  -LH        Exercise 7    Exercise Name 7  --  SL ER with towel  -     Cueing 7  --  Verbal;Tactile  -     Sets 7  --  2  -     Reps 7  --  10  -       User Key  (r) = Recorded By, (t) = Taken By, (c) = Cosigned By    Initials Name Provider Type     Katie Medel, PT Physical Therapist           Manual Rx (last 36 hours)      Manual Treatments     Row Name 07/11/19 1100             Total Minutes    49076 - PT Manual Therapy Minutes  25  -LH         Manual Rx 1    Manual Rx 1 Location  R shoulder  -      Manual Rx 1 Type  PROM in all planes with osciallations for pain, gentle distraction, subscap release, pect lift  -        User Key  (r) = Recorded By, (t) = Taken By, (c) = Cosigned By    Initials Name Provider Type     Katie Medel, PT Physical Therapist                      Outcome Measure Options: Other Outcome Measure(SPADI 97/130=74% disability)         Time Calculation:     Start Time: 0800  Stop Time: 0845  Time Calculation (min): 45 min  Total Timed Code Minutes- PT: 45 minute(s)     Therapy Charges for Today     Code Description Service Date Service Provider Modifiers Qty    27829809627 HC PT THER PROC EA 15 MIN 7/11/2019 Katie Medel, PT GP 1    40067691114 HC PT MANUAL THERAPY EA 15 MIN 7/11/2019 Katie Medel, PT GP 2          PT G-Codes  Outcome Measure Options: Other Outcome  Measure(SPADI 97/130=74% disability)         Katie Medel, PT  7/11/2019

## 2019-07-15 ENCOUNTER — APPOINTMENT (OUTPATIENT)
Dept: PHYSICAL THERAPY | Facility: HOSPITAL | Age: 39
End: 2019-07-15

## 2019-07-16 ENCOUNTER — HOSPITAL ENCOUNTER (OUTPATIENT)
Dept: PHYSICAL THERAPY | Facility: HOSPITAL | Age: 39
Setting detail: THERAPIES SERIES
Discharge: HOME | End: 2019-07-16

## 2019-07-16 DIAGNOSIS — Z98.890 S/P SHOULDER SURGERY: ICD-10-CM

## 2019-07-16 DIAGNOSIS — M25.511 CHRONIC RIGHT SHOULDER PAIN: Primary | ICD-10-CM

## 2019-07-16 DIAGNOSIS — G89.29 CHRONIC RIGHT SHOULDER PAIN: Primary | ICD-10-CM

## 2019-07-16 PROCEDURE — 97140 MANUAL THERAPY 1/> REGIONS: CPT | Performed by: PHYSICAL THERAPIST

## 2019-07-16 PROCEDURE — 97035 APP MDLTY 1+ULTRASOUND EA 15: CPT | Performed by: PHYSICAL THERAPIST

## 2019-07-16 PROCEDURE — 97110 THERAPEUTIC EXERCISES: CPT | Performed by: PHYSICAL THERAPIST

## 2019-07-16 NOTE — THERAPY TREATMENT NOTE
Outpatient Physical Therapy Ortho Treatment Note  [facility]     Patient Name: Bienvenido Carter  : 1980  MRN: [id number]  Today's Date: 2019      Visit Date: 2019    Visit Dx:    ICD-10-CM ICD-9-CM   1. Chronic right shoulder pain M25.511 719.41    G89.29 338.29   2. S/P shoulder surgery Z98.890 V45.89       Patient Active Problem List   Diagnosis   • Spinal stenosis of cervical region   • Cervical disc disorder at C5-C6 level with radiculopathy   • Chronic neck pain   • Chronic right shoulder pain        Past Medical History:   Diagnosis Date   • Anxiety    • Bipolar    • Borderline personality disorder in adult (CMS/HCC)    • Depression    • H/O emotional problems         No past surgical history on file.                    PT Assessment/Plan     Row Name 19 0934          PT Assessment    Assessment Comments  Pt went to MD yesterday and had an inj. She has not started to feel much effect from it yet. She still has pain over the subacromial and anterior shoulder area. Started back into gentle exercises this week without increased pain. Still some tightness of the shoulder  -        PT Plan    PT Plan Comments  cont  -       User Key  (r) = Recorded By, (t) = Taken By, (c) = Cosigned By    Initials Name Provider Type     Katie Medel, PT Physical Therapist          Modalities     Row Name 19 0800             Ultrasound 69976    Location  R shoulder  -      Duty Cycle  50 pulsed today due to injection yesterday  -      Frequency  1.0 MHz  -      Intensity - Wts/cm  1.2  -      58593 - PT Ultrasound Minutes  10  -LH        User Key  (r) = Recorded By, (t) = Taken By, (c) = Cosigned By    Initials Name Provider Type     Katie Medel, PT Physical Therapist        Exercises     Row Name 19 0919 08          Subjective Comments    Subjective Comments  --  got an injection yesterday in the shoulder, sore today. No set follow up with MD, just  after done with PT  -LH        Subjective Pain    Able to rate subjective pain?  --  yes  -LH     Pre-Treatment Pain Level  --  6  -LH     Post-Treatment Pain Level  --  5  -LH        Total Minutes    44227 - PT Therapeutic Exercise Minutes  --  20  -LH     08948 - PT Manual Therapy Minutes  13  -LH  --        Exercise 1    Exercise Name 1  --  AAROM Er with cane  -LH     Sets 1  --  1  -LH     Reps 1  --  10  -LH     Time 1  --  10 sec  -LH        Exercise 2    Exercise Name 2  --  table slides with A  -LH     Sets 2  --  1  -LH     Reps 2  --  10  -LH     Time 2  --  10 sec  -LH        Exercise 4    Exercise Name 4  --  scap retraction shoulder ext  -LH     Sets 4  --  2  -LH     Reps 4  --  10  -LH     Time 4  --  red  -LH        Exercise 5    Exercise Name 5  --  supine OH flexion with cane  -LH     Sets 5  --  1  -LH     Reps 5  --  10  -LH     Time 5  --  10 sec  -LH        Exercise 6    Exercise Name 6  --  supine press ups   -LH     Cueing 6  --  Verbal  -LH     Sets 6  --  2  -LH     Reps 6  --  10  -LH     Time 6  --  1 lb  -LH        Exercise 7    Exercise Name 7  --  SL ER with towel  -LH     Cueing 7  --  Verbal;Tactile  -LH     Sets 7  --  2  -LH     Reps 7  --  10  -LH     Time 7  --  1 lb  -LH       User Key  (r) = Recorded By, (t) = Taken By, (c) = Cosigned By    Initials Name Provider Type     Katie Medel, KANE Physical Therapist                      Manual Rx (last 36 hours)      Manual Treatments     Row Name 07/16/19 0900             Total Minutes    10753 - PT Manual Therapy Minutes  13  -LH         Manual Rx 1    Manual Rx 1 Location  R shoulder  -LH      Manual Rx 1 Type  PROM in all planes with osciallations for pain, gentle distraction, subscap release, pect lift  -        User Key  (r) = Recorded By, (t) = Taken By, (c) = Cosigned By    Initials Name Provider Type     Katie Medel PT Physical Therapist              Therapy Education  Given: HEP, Symptoms/condition  management, Pain management  Program: Reinforced  How Provided: Verbal  Provided to: Patient  Level of Understanding: Teach back education performed              Time Calculation:   Start Time: 0845  Stop Time: 0930  Time Calculation (min): 45 min  Total Timed Code Minutes- PT: 43 minute(s)  Therapy Charges for Today     Code Description Service Date Service Provider Modifiers Qty    77413957157  PT THER PROC EA 15 MIN 7/16/2019 Katie Medel, PT GP 1    82580963606  PT MANUAL THERAPY EA 15 MIN 7/16/2019 Katie Medel, PT GP 1    [id number]  PT ULTRASOUND EA 15 MIN 7/16/2019 Katie Medel, PT GP 1                    Katie Medel, PT  7/16/2019

## 2019-07-18 ENCOUNTER — TELEPHONE (OUTPATIENT)
Dept: NEUROSURGERY | Facility: CLINIC | Age: 39
End: 2019-07-18

## 2019-07-19 ENCOUNTER — HOSPITAL ENCOUNTER (OUTPATIENT)
Dept: PHYSICAL THERAPY | Facility: HOSPITAL | Age: 39
Setting detail: THERAPIES SERIES
Discharge: HOME | End: 2019-07-19

## 2019-07-19 DIAGNOSIS — M25.511 CHRONIC RIGHT SHOULDER PAIN: Primary | ICD-10-CM

## 2019-07-19 DIAGNOSIS — Z98.890 S/P SHOULDER SURGERY: ICD-10-CM

## 2019-07-19 DIAGNOSIS — G89.29 CHRONIC RIGHT SHOULDER PAIN: Primary | ICD-10-CM

## 2019-07-19 PROCEDURE — 97035 APP MDLTY 1+ULTRASOUND EA 15: CPT | Performed by: PHYSICAL THERAPIST

## 2019-07-19 PROCEDURE — 97140 MANUAL THERAPY 1/> REGIONS: CPT | Performed by: PHYSICAL THERAPIST

## 2019-07-19 PROCEDURE — 97110 THERAPEUTIC EXERCISES: CPT | Performed by: PHYSICAL THERAPIST

## 2019-07-23 ENCOUNTER — HOSPITAL ENCOUNTER (OUTPATIENT)
Dept: PHYSICAL THERAPY | Facility: HOSPITAL | Age: 39
Setting detail: THERAPIES SERIES
Discharge: HOME | End: 2019-07-23

## 2019-07-23 DIAGNOSIS — Z98.890 S/P SHOULDER SURGERY: ICD-10-CM

## 2019-07-23 DIAGNOSIS — M25.511 CHRONIC RIGHT SHOULDER PAIN: Primary | ICD-10-CM

## 2019-07-23 DIAGNOSIS — G89.29 CHRONIC RIGHT SHOULDER PAIN: Primary | ICD-10-CM

## 2019-07-23 PROCEDURE — 97140 MANUAL THERAPY 1/> REGIONS: CPT | Performed by: PHYSICAL THERAPIST

## 2019-07-23 PROCEDURE — 97035 APP MDLTY 1+ULTRASOUND EA 15: CPT | Performed by: PHYSICAL THERAPIST

## 2019-07-23 PROCEDURE — 97110 THERAPEUTIC EXERCISES: CPT | Performed by: PHYSICAL THERAPIST

## 2019-07-23 NOTE — THERAPY TREATMENT NOTE
Outpatient Physical Therapy Ortho Treatment Note  [facility]     Patient Name: Bienvenido Carter  : 1980  MRN: [id number]  Today's Date: 2019      Visit Date: 2019    Visit Dx:    ICD-10-CM ICD-9-CM   1. Chronic right shoulder pain M25.511 719.41    G89.29 338.29   2. S/P shoulder surgery Z98.890 V45.89       Patient Active Problem List   Diagnosis   • Spinal stenosis of cervical region   • Cervical disc disorder at C5-C6 level with radiculopathy   • Chronic neck pain   • Chronic right shoulder pain        Past Medical History:   Diagnosis Date   • Anxiety    • Bipolar    • Borderline personality disorder in adult (CMS/HCC)    • Depression    • H/O emotional problems         No past surgical history on file.                    PT Assessment/Plan     Row Name 19 1018          PT Assessment    Assessment Comments  Pt pain levels have come down since her injection. She still has some tightness of the shoulder joint and some pain at end of PROM in flexion. tenderness still over the subacromial area  -        PT Plan    PT Plan Comments  cont  -       User Key  (r) = Recorded By, (t) = Taken By, (c) = Cosigned By    Initials Name Provider Type     Katie Medel, PT Physical Therapist          Modalities     Row Name 19 0900             Ultrasound 99691    Location  R shoulder  -      Duty Cycle  100  -      Frequency  1.0 MHz  -      Intensity - Wts/cm  1.2  -      98384 - PT Ultrasound Minutes  10  -LH        User Key  (r) = Recorded By, (t) = Taken By, (c) = Cosigned By    Initials Name Provider Type     Katie Medel, PT Physical Therapist        Exercises     Row Name 19 0900             Subjective Comments    Subjective Comments  I tripped over the weekend and fell, but didn't land directly on the shoulder  -         Subjective Pain    Able to rate subjective pain?  yes  -      Pre-Treatment Pain Level  2  -         Total Minutes    14065 -  PT Therapeutic Exercise Minutes  20  -LH      70302 - PT Manual Therapy Minutes  15  -LH         Exercise 1    Exercise Name 1  AAROM Er with cane  -LH      Sets 1  1  -LH      Reps 1  10  -LH      Time 1  10 sec  -LH         Exercise 2    Exercise Name 2  table slides with A  -LH      Sets 2  1  -LH      Reps 2  10  -LH      Time 2  10 sec  -LH         Exercise 4    Exercise Name 4  scap retraction / shoulder ext  -LH      Sets 4  2  -LH      Reps 4  10ea  -LH      Time 4  red  -LH         Exercise 5    Exercise Name 5  supine OH flexion with cane  -LH      Sets 5  1  -LH      Reps 5  10  -LH      Time 5  10 sec  -LH         Exercise 6    Exercise Name 6  supine press ups   -LH      Cueing 6  Verbal  -LH      Sets 6  2  -LH      Reps 6  10  -LH      Time 6  1 lb  -LH         Exercise 7    Exercise Name 7  SL ER with towel  -LH      Cueing 7  Verbal;Tactile  -LH      Sets 7  2  -LH      Reps 7  10  -LH      Time 7  1 lb  -LH        User Key  (r) = Recorded By, (t) = Taken By, (c) = Cosigned By    Initials Name Provider Type     Katie Medel, PT Physical Therapist                      Manual Rx (last 36 hours)      Manual Treatments     Row Name 07/23/19 0900             Total Minutes    86179 - PT Manual Therapy Minutes  15  -LH         Manual Rx 1    Manual Rx 1 Location  R shoulder  -LH      Manual Rx 1 Type  PROM in all planes with oscillations for pain, gentle distraction, subscap release, pect lift  -LH        User Key  (r) = Recorded By, (t) = Taken By, (c) = Cosigned By    Initials Name Provider Type     Katie Medel PT Physical Therapist              Therapy Education  Given: HEP, Symptoms/condition management, Pain management  Program: Reinforced  How Provided: Verbal  Provided to: Patient  Level of Understanding: Teach back education performed              Time Calculation:   Start Time: 0930  Stop Time: 1015  Time Calculation (min): 45 min  Total Timed Code Minutes- PT: 45  minute(s)  Therapy Charges for Today     Code Description Service Date Service Provider Modifiers Qty    16766438711  PT THER PROC EA 15 MIN 7/23/2019 Katie Medel, PT GP 1    [id number]  PT MANUAL THERAPY EA 15 MIN 7/23/2019 Katie Medel, PT GP 1    [id number]  PT ULTRASOUND EA 15 MIN 7/23/2019 Katie Medel, PT GP 1                    Katie Medel, PT  7/23/2019

## 2019-07-25 ENCOUNTER — HOSPITAL ENCOUNTER (OUTPATIENT)
Dept: PHYSICAL THERAPY | Facility: HOSPITAL | Age: 39
Setting detail: THERAPIES SERIES
Discharge: HOME | End: 2019-07-25

## 2019-07-25 DIAGNOSIS — G89.29 CHRONIC RIGHT SHOULDER PAIN: Primary | ICD-10-CM

## 2019-07-25 DIAGNOSIS — M25.511 CHRONIC RIGHT SHOULDER PAIN: Primary | ICD-10-CM

## 2019-07-25 DIAGNOSIS — Z98.890 S/P SHOULDER SURGERY: ICD-10-CM

## 2019-07-25 PROCEDURE — 97110 THERAPEUTIC EXERCISES: CPT | Performed by: PHYSICAL THERAPIST

## 2019-07-25 PROCEDURE — 97140 MANUAL THERAPY 1/> REGIONS: CPT | Performed by: PHYSICAL THERAPIST

## 2019-07-25 NOTE — THERAPY TREATMENT NOTE
Outpatient Physical Therapy Ortho Treatment Note  [facility]     Patient Name: Bienvenido Carter  : 1980  MRN: [id number]  Today's Date: 2019      Visit Date: 2019    Visit Dx:    ICD-10-CM ICD-9-CM   1. Chronic right shoulder pain M25.511 719.41    G89.29 338.29   2. S/P shoulder surgery Z98.890 V45.89       Patient Active Problem List   Diagnosis   • Spinal stenosis of cervical region   • Cervical disc disorder at C5-C6 level with radiculopathy   • Chronic neck pain   • Chronic right shoulder pain        Past Medical History:   Diagnosis Date   • Anxiety    • Bipolar    • Borderline personality disorder in adult (CMS/HCC)    • Depression    • H/O emotional problems         No past surgical history on file.                    PT Assessment/Plan     Row Name 19 1156          PT Assessment    Assessment Comments  Pt pain levels have decreased to 2/10 with her injection. She is starting to be able to tolerate a little more activity in therapy without increased pain. Still with some tightness of the shoulder/GH joint  -        PT Plan    PT Plan Comments  cont  -       User Key  (r) = Recorded By, (t) = Taken By, (c) = Cosigned By    Initials Name Provider Type     Katie Medel, PT Physical Therapist            Exercises     Row Name 19 1100             Subjective Comments    Subjective Comments  I threw some darts this week and it is a little sore, but not bad  -         Subjective Pain    Able to rate subjective pain?  yes  -LH      Pre-Treatment Pain Level  2  -LH         Total Minutes    68015 - PT Therapeutic Exercise Minutes  25  -LH      69136 - PT Manual Therapy Minutes  15  -LH         Exercise 1    Exercise Name 1  AAROM Er with cane  -LH      Sets 1  1  -LH      Reps 1  10  -LH      Time 1  10 sec  -LH         Exercise 2    Exercise Name 2  table slides with A  -LH      Sets 2  1  -LH      Reps 2  10  -LH      Time 2  10 sec  -LH         Exercise 3     Exercise Name 3  wall push ups  -LH      Sets 3  2  -LH      Reps 3  10  -LH         Exercise 4    Exercise Name 4  scap retraction / shoulder ext  -LH      Sets 4  2  -LH      Reps 4  10ea  -LH      Time 4  red  -LH         Exercise 5    Exercise Name 5  supine OH flexion with cane  -LH      Sets 5  1  -LH      Reps 5  10  -LH      Time 5  10 sec  -LH         Exercise 6    Exercise Name 6  supine press ups   -LH      Cueing 6  Verbal  -LH      Sets 6  2  -LH      Reps 6  10  -LH      Time 6  2 lb  -LH         Exercise 7    Exercise Name 7  SL ER with towel  -LH      Cueing 7  Verbal;Tactile  -LH      Sets 7  2  -LH      Reps 7  10  -LH      Time 7  2 lb  -LH         Exercise 8    Exercise Name 8  supine prot/ret  -LH      Sets 8  2  -LH      Reps 8  10  -LH      Time 8  2 lb  -LH         Exercise 9    Exercise Name 9  shoulder at 90 deg, rolling red 'hedgehog' on the wall  -LH      Sets 9  2  -LH      Reps 9  10  -LH      Time 9  CW and CCW  -LH        User Key  (r) = Recorded By, (t) = Taken By, (c) = Cosigned By    Initials Name Provider Type     Katie Medel, PT Physical Therapist                      Manual Rx (last 36 hours)      Manual Treatments     Row Name 07/25/19 1100             Total Minutes    07834 - PT Manual Therapy Minutes  15  -LH         Manual Rx 1    Manual Rx 1 Location  R shoulder  -LH      Manual Rx 1 Type  PROM in all planes with oscillations for pain, gentle distraction, subscap release, pect lift  -LH        User Key  (r) = Recorded By, (t) = Taken By, (c) = Cosigned By    Initials Name Provider Type     Katie Medel, PT Physical Therapist              Therapy Education  Given: HEP, Symptoms/condition management, Pain management  Program: New, Reinforced  How Provided: Verbal, Demonstration  Provided to: Patient  Level of Understanding: Teach back education performed              Time Calculation:   Start Time: 1100  Stop Time: 1145  Time Calculation (min): 45 min  Total  Timed Code Minutes- PT: 40 minute(s)  Therapy Charges for Today     Code Description Service Date Service Provider Modifiers Qty    16498571160 HC PT THER PROC EA 15 MIN 7/25/2019 Katie Medel, PT GP 2    27556762798 HC PT MANUAL THERAPY EA 15 MIN 7/25/2019 Katie Medel, PT GP 1                    Katie Medel, PT  7/25/2019

## 2019-07-30 ENCOUNTER — HOSPITAL ENCOUNTER (OUTPATIENT)
Dept: PHYSICAL THERAPY | Facility: HOSPITAL | Age: 39
Setting detail: THERAPIES SERIES
Discharge: HOME | End: 2019-07-30

## 2019-07-30 DIAGNOSIS — M25.511 CHRONIC RIGHT SHOULDER PAIN: Primary | ICD-10-CM

## 2019-07-30 DIAGNOSIS — Z98.890 S/P SHOULDER SURGERY: ICD-10-CM

## 2019-07-30 DIAGNOSIS — G89.29 CHRONIC RIGHT SHOULDER PAIN: Primary | ICD-10-CM

## 2019-07-30 PROCEDURE — 97110 THERAPEUTIC EXERCISES: CPT | Performed by: PHYSICAL THERAPIST

## 2019-07-30 PROCEDURE — 97140 MANUAL THERAPY 1/> REGIONS: CPT | Performed by: PHYSICAL THERAPIST

## 2019-07-30 NOTE — THERAPY PROGRESS REPORT/RE-CERT
Outpatient Physical Therapy Ortho Progress Note  [facility]     Patient Name: Bienvenido Carter  : 1980  MRN: [id number]  Today's Date: 2019      Visit Date: 2019    Visit Dx:    ICD-10-CM ICD-9-CM   1. Chronic right shoulder pain M25.511 719.41    G89.29 338.29   2. S/P shoulder surgery Z98.890 V45.89       Patient Active Problem List   Diagnosis   • Spinal stenosis of cervical region   • Cervical disc disorder at C5-C6 level with radiculopathy   • Chronic neck pain   • Chronic right shoulder pain        Past Medical History:   Diagnosis Date   • Anxiety    • Bipolar    • Borderline personality disorder in adult (CMS/HCC)    • Depression    • H/O emotional problems         No past surgical history on file.    PT Ortho     Row Name 19 0900       Right Upper Ext    Rt Shoulder Abduction AROM  145 deg  -LH    Rt Shoulder Abduction PROM  170 deg  -LH    Rt Shoulder Flexion AROM  140 deg  -LH    Rt Shoulder Flexion PROM  160 deg  -LH    Rt Shoulder External Rotation AROM  C7  -LH    Rt Shoulder External Rotation PROM  80 deg  -LH    Rt Shoulder Internal Rotation AROM  L3  -LH    Rt Shoulder Internal Rotation PROM  78 deg  -LH       MMT Right Upper Ext    Rt Shoulder Flexion MMT, Gross Movement  (4/5) good  -LH    Rt Shoulder ABduction MMT, Gross Movement  (4/5) good  -LH    Rt Shoulder Internal Rotation MMT, Gross Movement  (4/5) good  -LH    Rt Shoulder External Rotation MMT, Gross Movement  (4-/5) good minus  -LH    Rt Upper Extremity Comments   pain with ER testing, some with flexion  -LH      User Key  (r) = Recorded By, (t) = Taken By, (c) = Cosigned By    Initials Name Provider Type     Katie Medel PT Physical Therapist                      PT Assessment/Plan     Row Name 19 1000          PT Assessment    Assessment Comments  Bienvenido Carter has been seen for 33 physical therapy sessions for R shoulder pain following shoulder arthroscopy.  Treatment has included  therapeutic exercise, manual therapy, ultrasound  and patient education with home exercise program . Progress to physical therapy goals is good. She has improved her ROM since her injection to flex=140/160 deg, abd=145/170 deg, ER=C7/80 deg, and IR=L3/78 deg and strength is 4- to 4/5. She still has pain on avg 4/10 and up to 7/10.  She will benefit from continued skilled physical therapy to address remaining impairments and functional limitations.   -        PT Plan    PT Plan Comments  cont with skilled therapy  -       User Key  (r) = Recorded By, (t) = Taken By, (c) = Cosigned By    Initials Name Provider Type     Katie Medel, PT Physical Therapist            Exercises     Row Name 07/30/19 0900             Subjective Comments    Subjective Comments  shoulder is starting to get sore again  -         Subjective Pain    Able to rate subjective pain?  yes  -      Pre-Treatment Pain Level  4  -LH         Total Minutes    32775 - PT Therapeutic Exercise Minutes  25  -LH      64327 - PT Manual Therapy Minutes  15  -LH         Exercise 1    Exercise Name 1  AAROM Er with cane  -LH      Sets 1  1  -LH      Reps 1  10  -LH      Time 1  10 sec  -LH         Exercise 2    Exercise Name 2  table slides with A  -LH      Sets 2  1  -LH      Reps 2  10  -LH      Time 2  10 sec  -LH         Exercise 3    Exercise Name 3  wall push ups  -LH      Sets 3  2  -LH      Reps 3  10  -LH         Exercise 4    Exercise Name 4  scap retraction / shoulder ext  -LH      Sets 4  2  -LH      Reps 4  10ea  -LH      Time 4  red  -LH         Exercise 5    Exercise Name 5  supine OH flexion with cane  -LH      Sets 5  1  -LH      Reps 5  10  -LH      Time 5  10 sec  -LH         Exercise 6    Exercise Name 6  supine press ups   -LH      Cueing 6  Verbal  -LH      Sets 6  2  -LH      Reps 6  10  -LH      Time 6  2 lb  -LH         Exercise 7    Exercise Name 7  SL ER with towel  -LH      Cueing 7  Verbal;Tactile  -LH      Sets 7  2   -LH      Reps 7  10  -LH      Time 7  2 lb  -LH         Exercise 8    Exercise Name 8  supine prot/ret  -LH      Sets 8  2  -LH      Reps 8  10  -LH      Time 8  2 lb  -LH         Exercise 9    Exercise Name 9  shoulder at 90 deg, rolling red 'hedgehog' on the wall  -LH      Sets 9  2  -LH      Reps 9  10  -LH      Time 9  CW and CCW  -        User Key  (r) = Recorded By, (t) = Taken By, (c) = Cosigned By    Initials Name Provider Type     Katie Medel PT Physical Therapist                      Manual Rx (last 36 hours)      Manual Treatments     Row Name 07/30/19 0900             Total Minutes    78058 - PT Manual Therapy Minutes  15  -LH         Manual Rx 1    Manual Rx 1 Location  R shoulder  -      Manual Rx 1 Type  PROM in all planes with oscillations for pain, gentle distraction, subscap release, pect lift  -        User Key  (r) = Recorded By, (t) = Taken By, (c) = Cosigned By    Initials Name Provider Type     Katie Medel PT Physical Therapist          PT OP Goals     Row Name 07/30/19 0900          PT Short Term Goals    STG 1  Patient will be independent with education for symptom management, joint protection and strategies to minimize stress on affected tissues  -     STG 1 Progress  Partially Met  -     STG 2  Pt to improve pain complaints to no more than 6/10 with ADLs  -     STG 2 Progress  Ongoing  -     STG 2 Progress Comments  7/10 at most  -     STG 3  Pt to improve R shoulder ROM in flex=110/135 deg, abd=100/130 deg, ER=back of head/55 deg and IR=L5/70 deg  -     STG 3 Progress  Met  -        Long Term Goals    LTG 1  Pt to improve pain complaints to no more than 4/10 with ADLs  -     LTG 1 Progress  Ongoing;Progressing  -     LTG 2  Pt to improve R shoulder ROM in flex=150/160 deg, abd=150/160 deg, ER=T3/80 deg and IR=T10/80 deg  -     LTG 2 Progress  Ongoing;Partially Met  -     LTG 3  Pt to improve shoulder strength to 4/5  -     LTG 3 Progress   Ongoing;Progressing;Partially Met  -     LTG 4  Pt to improve SPADI score from 74% to 40% for overall functional improvement  -     LTG 4 Progress  [facility]  [facility]  72%  -[facility] 5  Patient will be independent and compliant with advanced home exercise program to facilitate self-management of symptoms.  -[facility] 5 Progress  Ongoing  -       User Key  (r) = Recorded By, (t) = Taken By, (c) = Cosigned By    Initials Name Provider Type     Katie Medel, PT Physical Therapist          Therapy Education  Given: HEP, Symptoms/condition management, Pain management  Program: Reinforced  How Provided: Verbal  Provided to: Patient  Level of Understanding: Teach back education performed    Outcome Measure Options: Other Outcome Measure(SPADI 94/130=72% disability)         Time Calculation:   Start Time: 0930  Stop Time: 1015  Time Calculation (min): 45 min  Total Timed Code Minutes- PT: 42 minute(s)  Therapy Charges for Today     Code Description Service Date Service Provider Modifiers Qty    92145397947 HC PT THER PROC EA 15 MIN 7/30/2019 Katie Medel, PT GP 2    60259297239 HC PT MANUAL THERAPY EA 15 MIN 7/30/2019 Katie Medel, PT GP 1          PT G-Codes  Outcome Measure Options: Other Outcome Measure(SPADI 94/130=72% disability)         Katie Medel, PT  7/30/2019

## 2019-08-01 ENCOUNTER — HOSPITAL ENCOUNTER (OUTPATIENT)
Dept: PHYSICAL THERAPY | Facility: HOSPITAL | Age: 39
Setting detail: THERAPIES SERIES
Discharge: HOME | End: 2019-08-01

## 2019-08-01 DIAGNOSIS — M25.511 CHRONIC RIGHT SHOULDER PAIN: Primary | ICD-10-CM

## 2019-08-01 DIAGNOSIS — Z98.890 S/P SHOULDER SURGERY: ICD-10-CM

## 2019-08-01 DIAGNOSIS — G89.29 CHRONIC RIGHT SHOULDER PAIN: Primary | ICD-10-CM

## 2019-08-01 PROCEDURE — 97140 MANUAL THERAPY 1/> REGIONS: CPT | Performed by: PHYSICAL THERAPIST

## 2019-08-01 PROCEDURE — 97110 THERAPEUTIC EXERCISES: CPT | Performed by: PHYSICAL THERAPIST

## 2019-08-01 NOTE — THERAPY TREATMENT NOTE
Outpatient Physical Therapy Ortho Treatment Note  [facility]     Patient Name: Bienvenido Carter  : 1980  MRN: [id number]  Today's Date: 2019      Visit Date: 2019    Visit Dx:    ICD-10-CM ICD-9-CM   1. Chronic right shoulder pain M25.511 719.41    G89.29 338.29   2. S/P shoulder surgery Z98.890 V45.89       Patient Active Problem List   Diagnosis   • Spinal stenosis of cervical region   • Cervical disc disorder at C5-C6 level with radiculopathy   • Chronic neck pain   • Chronic right shoulder pain        Past Medical History:   Diagnosis Date   • Anxiety    • Bipolar    • Borderline personality disorder in adult (CMS/HCC)    • Depression    • H/O emotional problems         No past surgical history on file.    PT Ortho     Row Name 19 0900       Subjective Comments    Subjective Comments  shoulder is pretty sore this am. Pt was 15 min late. Thought appt was at 9:45  -LH       Subjective Pain    Able to rate subjective pain?  yes  -    Pre-Treatment Pain Level  5  -    Row Name 19 0900       Right Upper Ext    Rt Shoulder Abduction AROM  145 deg  -LH    Rt Shoulder Abduction PROM  170 deg  -LH    Rt Shoulder Flexion AROM  140 deg  -LH    Rt Shoulder Flexion PROM  160 deg  -LH    Rt Shoulder External Rotation AROM  C7  -LH    Rt Shoulder External Rotation PROM  80 deg  -LH    Rt Shoulder Internal Rotation AROM  L3  -LH    Rt Shoulder Internal Rotation PROM  78 deg  -LH       MMT Right Upper Ext    Rt Shoulder Flexion MMT, Gross Movement  (4/5) good  -LH    Rt Shoulder ABduction MMT, Gross Movement  (4/5) good  -LH    Rt Shoulder Internal Rotation MMT, Gross Movement  (4/5) good  -LH    Rt Shoulder External Rotation MMT, Gross Movement  (4-/5) good minus  -LH    Rt Upper Extremity Comments   pain with ER testing, some with flexion  -LH      User Key  (r) = Recorded By, (t) = Taken By, (c) = Cosigned By    Initials Name Provider Type    LH Katie eMdel, PT Physical  Therapist                      PT Assessment/Plan     Row Name 08/01/19 1117          PT Assessment    Assessment Comments  Pt pain levels continue to increase a little at a time in her last injection. She has improved her ROM and cont to improve her strength.   -LH        PT Plan    PT Plan Comments  cont  -LH       User Key  (r) = Recorded By, (t) = Taken By, (c) = Cosigned By    Initials Name Provider Type     Katie Medel, PT Physical Therapist            Exercises     Row Name 08/01/19 0900             Subjective Comments    Subjective Comments  shoulder is pretty sore this am. Pt was 15 min late. Thought appt was at 9:45  -         Subjective Pain    Able to rate subjective pain?  yes  -LH      Pre-Treatment Pain Level  5  -LH         Total Minutes    72985 - PT Therapeutic Exercise Minutes  15  -LH      15397 - PT Manual Therapy Minutes  10  -LH         Exercise 1    Exercise Name 1  AAROM Er with cane  -LH         Exercise 2    Exercise Name 2  table slides with A  -LH      Sets 2  1  -LH      Reps 2  10  -LH      Time 2  10 sec  -LH         Exercise 3    Exercise Name 3  wall push ups  -LH      Sets 3  2  -LH      Reps 3  10  -LH         Exercise 4    Exercise Name 4  scap retraction / shoulder ext  -LH      Sets 4  2  -LH      Reps 4  10ea  -LH      Time 4  red  -LH         Exercise 5    Exercise Name 5  supine OH flexion with cane  -LH         Exercise 6    Exercise Name 6  supine press ups   -LH      Cueing 6  Verbal  -LH      Sets 6  2  -LH      Reps 6  10  -LH      Time 6  2 lb  -LH         Exercise 7    Exercise Name 7  SL ER with towel  -LH      Cueing 7  Verbal;Tactile  -LH      Sets 7  2  -LH      Reps 7  10  -LH      Time 7  2 lb  -LH         Exercise 8    Exercise Name 8  supine prot/ret  -LH      Sets 8  --  -LH      Reps 8  --  -LH      Time 8  --  -LH         Exercise 9    Exercise Name 9  shoulder at 90 deg, rolling red 'hedgehog' on the wall  -LH        User Key  (r) = Recorded By,  (t) = Taken By, (c) = Cosigned By    Initials Name Provider Type     Katie Medel PT Physical Therapist                      Manual Rx (last 36 hours)      Manual Treatments     Row Name 08/01/19 0900             Total Minutes    00521 - PT Manual Therapy Minutes  10  -LH         Manual Rx 1    Manual Rx 1 Location  R shoulder  -      Manual Rx 1 Type  PROM in all planes with oscillations for pain, gentle distraction, subscap release, pect lift  -        User Key  (r) = Recorded By, (t) = Taken By, (c) = Cosigned By    Initials Name Provider Type     Katie Medel PT Physical Therapist              Therapy Education  Given: HEP, Symptoms/condition management, Pain management  Program: Reinforced  How Provided: Verbal  Provided to: Patient  Level of Understanding: Teach back education performed              Time Calculation:   Start Time: 0950  Stop Time: 1015  Time Calculation (min): 25 min  Total Timed Code Minutes- PT: 25 minute(s)  Therapy Charges for Today     Code Description Service Date Service Provider Modifiers Qty    24985044898  PT THER PROC EA 15 MIN 8/1/2019 Katie Medel, PT GP 1    [id number]  PT MANUAL THERAPY EA 15 MIN 8/1/2019 Katie Medel, PT GP 1                    Katie Medel PT  8/1/2019

## 2019-08-06 ENCOUNTER — APPOINTMENT (OUTPATIENT)
Dept: PHYSICAL THERAPY | Facility: HOSPITAL | Age: 39
End: 2019-08-06

## 2019-08-08 ENCOUNTER — HOSPITAL ENCOUNTER (OUTPATIENT)
Dept: PHYSICAL THERAPY | Facility: HOSPITAL | Age: 39
Setting detail: THERAPIES SERIES
Discharge: HOME | End: 2019-08-08

## 2019-08-08 DIAGNOSIS — Z98.890 S/P SHOULDER SURGERY: ICD-10-CM

## 2019-08-08 DIAGNOSIS — G89.29 CHRONIC RIGHT SHOULDER PAIN: Primary | ICD-10-CM

## 2019-08-08 DIAGNOSIS — M25.511 CHRONIC RIGHT SHOULDER PAIN: Primary | ICD-10-CM

## 2019-08-08 PROCEDURE — 97110 THERAPEUTIC EXERCISES: CPT | Performed by: PHYSICAL THERAPIST

## 2019-08-08 PROCEDURE — 97140 MANUAL THERAPY 1/> REGIONS: CPT | Performed by: PHYSICAL THERAPIST

## 2019-08-08 NOTE — THERAPY TREATMENT NOTE
Outpatient Physical Therapy Ortho Treatment Note  [facility]     Patient Name: Bienvenido Carter  : 1980  MRN: [id number]  Today's Date: 2019      Visit Date: 2019    Visit Dx:    ICD-10-CM ICD-9-CM   1. Chronic right shoulder pain M25.511 719.41    G89.29 338.29   2. S/P shoulder surgery Z98.890 V45.89       Patient Active Problem List   Diagnosis   • Spinal stenosis of cervical region   • Cervical disc disorder at C5-C6 level with radiculopathy   • Chronic neck pain   • Chronic right shoulder pain        Past Medical History:   Diagnosis Date   • Anxiety    • Bipolar    • Borderline personality disorder in adult (CMS/HCC)    • Depression    • H/O emotional problems         No past surgical history on file.                    PT Assessment/Plan     Row Name 19 1017          PT Assessment    Assessment Comments  pt pain levels 5-6/10 when she wakes in the am and down to 3/10 with her meds and icing. She is still getting some impingement type pain and has some tightness with PROM at end ROM  -LH        PT Plan    PT Plan Comments  cont  -       User Key  (r) = Recorded By, (t) = Taken By, (c) = Cosigned By    Initials Name Provider Type     Katie Medel, PT Physical Therapist            Exercises     Row Name 19 0900             Subjective Comments    Subjective Comments  Pain 5-6/10 upon waking. Doing icing and meds still. Did throw darts last night but didn't really bother it  -         Subjective Pain    Able to rate subjective pain?  yes  -      Pre-Treatment Pain Level  3  -LH         Total Minutes    91330 - PT Therapeutic Exercise Minutes  25  -LH      40522 - PT Manual Therapy Minutes  15  -LH         Exercise 1    Exercise Name 1  AAROM Er with cane  -      Sets 1  1  -      Reps 1  10  -LH      Time 1  10 sec  -LH         Exercise 2    Exercise Name 2  table slides with A  -LH      Sets 2  1  -LH      Reps 2  10  -LH      Time 2  10 sec  -LH          Exercise 3    Exercise Name 3  wall push ups  -LH      Sets 3  2  -LH      Reps 3  10  -LH         Exercise 4    Exercise Name 4  scap retraction / shoulder ext  -LH      Sets 4  2  -LH      Reps 4  10ea  -LH      Time 4  red  -LH         Exercise 5    Exercise Name 5  Er and IR standing  -LH      Sets 5  2  -LH      Reps 5  10  -LH      Time 5  red  -LH         Exercise 6    Exercise Name 6  supine press ups   -LH      Cueing 6  Verbal  -LH      Sets 6  2  -LH      Reps 6  10  -LH      Time 6  2 lb  -LH         Exercise 7    Exercise Name 7  SL ER with towel  -LH      Cueing 7  Verbal;Tactile  -LH      Sets 7  2  -LH      Reps 7  10  -LH      Time 7  2 lb  -LH         Exercise 8    Exercise Name 8  supine prot/ret  -LH      Sets 8  2  -LH      Reps 8  10  -LH      Time 8  2 lb  -LH         Exercise 9    Exercise Name 9  shoulder at 90 deg, rolling ball on wall  -LH      Sets 9  2  -LH      Reps 9  10  -LH        User Key  (r) = Recorded By, (t) = Taken By, (c) = Cosigned By    Initials Name Provider Type     Katie Medel, PT Physical Therapist                      Manual Rx (last 36 hours)      Manual Treatments     Row Name 08/08/19 0900             Total Minutes    66148 - PT Manual Therapy Minutes  15  -LH         Manual Rx 1    Manual Rx 1 Location  R shoulder  -LH      Manual Rx 1 Type  PROM in all planes with oscillations for pain, gentle distraction, subscap release, pect lift  -LH        User Key  (r) = Recorded By, (t) = Taken By, (c) = Cosigned By    Initials Name Provider Type     Katie Medel PT Physical Therapist              Therapy Education  Given: HEP, Symptoms/condition management, Pain management  Program: Reinforced  How Provided: Verbal  Provided to: Patient  Level of Understanding: Teach back education performed              Time Calculation:   Start Time: 0930  Stop Time: 1015  Time Calculation (min): 45 min  Total Timed Code Minutes- PT: 40 minute(s)  Therapy Charges for Today      Code Description Service Date Service Provider Modifiers Qty    [id number]  PT THER PROC EA 15 MIN 8/8/2019 Katie Medel, PT GP 2    [id number]  PT MANUAL THERAPY EA 15 MIN 8/8/2019 Katie Medel, PT GP 1                    Katie Medel, PT  8/8/2019

## 2019-08-14 ENCOUNTER — HOSPITAL ENCOUNTER (OUTPATIENT)
Dept: PHYSICAL THERAPY | Facility: HOSPITAL | Age: 39
Setting detail: THERAPIES SERIES
Discharge: HOME | End: 2019-08-14

## 2019-08-14 DIAGNOSIS — G89.29 CHRONIC RIGHT SHOULDER PAIN: Primary | ICD-10-CM

## 2019-08-14 DIAGNOSIS — Z98.890 S/P SHOULDER SURGERY: ICD-10-CM

## 2019-08-14 DIAGNOSIS — M25.511 CHRONIC RIGHT SHOULDER PAIN: Primary | ICD-10-CM

## 2019-08-14 PROCEDURE — 97140 MANUAL THERAPY 1/> REGIONS: CPT | Performed by: PHYSICAL THERAPIST

## 2019-08-14 PROCEDURE — 97110 THERAPEUTIC EXERCISES: CPT | Performed by: PHYSICAL THERAPIST

## 2019-08-14 NOTE — THERAPY TREATMENT NOTE
Outpatient Physical Therapy Ortho Treatment Note  [facility]     Patient Name: Bienvenido Carter  : 1980  MRN: [id number]  Today's Date: 2019      Visit Date: 2019    Visit Dx:    ICD-10-CM ICD-9-CM   1. Chronic right shoulder pain M25.511 719.41    G89.29 338.29   2. S/P shoulder surgery Z98.890 V45.89       Patient Active Problem List   Diagnosis   • Spinal stenosis of cervical region   • Cervical disc disorder at C5-C6 level with radiculopathy   • Chronic neck pain   • Chronic right shoulder pain        Past Medical History:   Diagnosis Date   • Anxiety    • Bipolar    • Borderline personality disorder in adult (CMS/HCC)    • Depression    • H/O emotional problems         No past surgical history on file.                    PT Assessment/Plan     Row Name 19 1414          PT Assessment    Assessment Comments  Pt still having a lot of tenderness over the LH biceps tendon. It is more sore in the am and reduces with her medication. Some complaints of popping and clicking in the shoulder as well  -        PT Plan    PT Plan Comments  cont to work on strength, ROM, pain  -       User Key  (r) = Recorded By, (t) = Taken By, (c) = Cosigned By    Initials Name Provider Type     Katie Medel, PT Physical Therapist            Exercises     Row Name 19 1300             Subjective Comments    Subjective Comments  it was really stiff when i woke up this am  -         Subjective Pain    Able to rate subjective pain?  yes  -      Pre-Treatment Pain Level  6  -         Total Minutes    21450 - PT Manual Therapy Minutes  20  -         Exercise 1    Exercise Name 1  AAROM Er with cane  -      Sets 1  1  -      Reps 1  10  -      Time 1  10 sec  -         Exercise 2    Exercise Name 2  table slides with A  -      Sets 2  --  -      Reps 2  --  -      Time 2  --  -         Exercise 3    Exercise Name 3  wall push ups  -      Sets 3  2  -      Reps 3  10   -LH         Exercise 4    Exercise Name 4  scap retraction / shoulder ext  -LH      Sets 4  2  -LH      Reps 4  10ea  -LH      Time 4  red  -LH         Exercise 5    Exercise Name 5  Er and IR standing  -LH      Sets 5  2  -LH      Reps 5  10  -LH      Time 5  red  -LH         Exercise 6    Exercise Name 6  supine press ups   -LH      Cueing 6  Verbal  -LH      Sets 6  2  -LH      Reps 6  10  -LH      Time 6  2 lb  -LH         Exercise 7    Exercise Name 7  SL ER with towel  -LH      Cueing 7  Verbal;Tactile  -LH      Sets 7  2  -LH      Reps 7  10  -LH      Time 7  2 lb  -LH         Exercise 8    Exercise Name 8  supine prot/ret  -LH      Sets 8  2  -LH      Reps 8  10  -LH      Time 8  2 lb  -LH         Exercise 9    Exercise Name 9  shoulder at 90 deg, rolling ball on wall  -LH      Sets 9  2  -LH      Reps 9  10  -LH         Exercise 10    Exercise Name 10  flexion to 90 deg  -LH      Sets 10  2  -LH      Reps 10  10  -LH      Time 10  red  -LH         Exercise 11    Exercise Name 11  supine horiz abd and D2 flexion  -LH      Sets 11  2  -LH      Reps 11  10  -LH      Time 11  red  -LH        User Key  (r) = Recorded By, (t) = Taken By, (c) = Cosigned By    Initials Name Provider Type     Katie Medel, PT Physical Therapist                      Manual Rx (last 36 hours)      Manual Treatments     Row Name 08/14/19 1300             Total Minutes    24621 - PT Manual Therapy Minutes  20  -LH         Manual Rx 1    Manual Rx 1 Location  R shoulder  -LH      Manual Rx 1 Type  PROM in all planes with oscillations for pain, gentle distraction, subscap release, pect lift  -LH        User Key  (r) = Recorded By, (t) = Taken By, (c) = Cosigned By    Initials Name Provider Type     Katie Medel, PT Physical Therapist              Therapy Education  Given: HEP, Symptoms/condition management, Pain management  Program: New  How Provided: Verbal, Demonstration  Provided to: Patient  Level of Understanding: Teach  back education performed              Time Calculation:   Start Time: 1330  Stop Time: 1415  Time Calculation (min): 45 min  Total Timed Code Minutes- PT: 43 minute(s)  Therapy Charges for Today     Code Description Service Date Service Provider Modifiers Qty    07828639512 HC PT MANUAL THERAPY EA 15 MIN 8/14/2019 Katie Medel, PT GP 1    19789380217 HC PT THER PROC EA 15 MIN 8/14/2019 Katie Medel, PT GP 2                    Katie Medel, PT  8/14/2019

## 2019-08-27 ENCOUNTER — HOSPITAL ENCOUNTER (OUTPATIENT)
Dept: PHYSICAL THERAPY | Facility: HOSPITAL | Age: 39
Setting detail: THERAPIES SERIES
Discharge: HOME | End: 2019-08-27

## 2019-08-27 DIAGNOSIS — G89.29 CHRONIC RIGHT SHOULDER PAIN: Primary | ICD-10-CM

## 2019-08-27 DIAGNOSIS — Z98.890 S/P SHOULDER SURGERY: ICD-10-CM

## 2019-08-27 DIAGNOSIS — M25.511 CHRONIC RIGHT SHOULDER PAIN: Primary | ICD-10-CM

## 2019-08-27 PROCEDURE — 97140 MANUAL THERAPY 1/> REGIONS: CPT | Performed by: PHYSICAL THERAPIST

## 2019-08-27 PROCEDURE — 97110 THERAPEUTIC EXERCISES: CPT | Performed by: PHYSICAL THERAPIST

## 2019-08-27 NOTE — THERAPY PROGRESS REPORT/RE-CERT
Outpatient Physical Therapy Ortho Progress Note  [facility]     Patient Name: Bienvenido Carter  : 1980  MRN: [id number]  Today's Date: 2019      Visit Date: 2019    Visit Dx:    ICD-10-CM ICD-9-CM   1. Chronic right shoulder pain M25.511 719.41    G89.29 338.29   2. S/P shoulder surgery Z98.890 V45.89       Patient Active Problem List   Diagnosis   • Spinal stenosis of cervical region   • Cervical disc disorder at C5-C6 level with radiculopathy   • Chronic neck pain   • Chronic right shoulder pain        Past Medical History:   Diagnosis Date   • Anxiety    • Bipolar    • Borderline personality disorder in adult (CMS/HCC)    • Depression    • H/O emotional problems         No past surgical history on file.    PT Ortho     Row Name 19 1500       Right Upper Ext    Rt Shoulder Abduction PROM  140 deg  -LH    Rt Shoulder Flexion PROM  145 deg  -LH    Rt Shoulder External Rotation PROM  70 deg  -LH    Rt Shoulder Internal Rotation PROM  70 deg  -LH      User Key  (r) = Recorded By, (t) = Taken By, (c) = Cosigned By    Initials Name Provider Type     Katie Medel, PT Physical Therapist                      PT Assessment/Plan     Row Name 19 1700          PT Assessment    Assessment Comments  Bienvenido Carter has been seen for physical therapy  for R shoulder pain following scope.   Treatment has included therapeutic exercise, manual therapy, ultrasound, patient education with home exercise program  and dry needling . Progress to physical therapy goals is slow. Her pain over the past 2 weeks has increased and she reports that it has been swollen. She has decreased PROM today due to pain and was limited with her ability to perform exercises. She did have injections with pain management earlier today and is sore.  She will benefit from continued skilled physical therapy to address remaining impairments and functional limitations.   -        PT Plan    PT Plan Comments   cont to progress program per pt tolerance  -       User Key  (r) = Recorded By, (t) = Taken By, (c) = Cosigned By    Initials Name Provider Type     Katie Medel, PT Physical Therapist            Exercises     Row Name 08/27/19 1700 08/27/19 1500          Subjective Comments    Subjective Comments  --  It has been really painful in the shoulder over the past couple of weeks. She went to pain management today and had nerve blocks and a steroid shot as well. She reports swelling in the R shoulder, upper arm, around the scapula.  -        Subjective Pain    Able to rate subjective pain?  --  yes  -     Pre-Treatment Pain Level  --  10  -LH     Post-Treatment Pain Level  --  8  -LH        Total Minutes    64100 - PT Therapeutic Exercise Minutes  --  15  -LH     36089 - PT Manual Therapy Minutes  25  -LH  --        Exercise 1    Exercise Name 1  --  AAROM Er with cane  -     Sets 1  --  1  -LH     Reps 1  --  10  -LH     Time 1  --  10 sec  -LH        Exercise 2    Exercise Name 2  --  table slides with A  -     Sets 2  --  1  -LH     Reps 2  --  10  -LH     Time 2  --  10 sec  -LH        Exercise 3    Exercise Name 3  --  shoulder rolls and scap ret  -LH     Sets 3  --  1  -LH     Reps 3  --  10  -LH        Exercise 4    Exercise Name 4  --  supine press ups with cane  -     Sets 4  --  1  -LH     Reps 4  --  5  -LH       User Key  (r) = Recorded By, (t) = Taken By, (c) = Cosigned By    Initials Name Provider Type     Katie Medel, PT Physical Therapist                      Manual Rx (last 36 hours)      Manual Treatments     Row Name 08/27/19 1700             Total Minutes    25171 - PT Manual Therapy Minutes  25  -LH         Manual Rx 1    Manual Rx 1 Location  R shoulder  -      Manual Rx 1 Type  PROM in all planes with oscillations for pain, gentle distraction, subscap release, pect lift  -        User Key  (r) = Recorded By, (t) = Taken By, (c) = Cosigned By    Initials Name Provider  Type    LH Katie Medel, PT Physical Therapist              Therapy Education  Given: HEP, Symptoms/condition management, Pain management  Program: Reinforced  How Provided: Verbal  Provided to: Patient  Level of Understanding: Teach back education performed              Time Calculation:   Start Time: 1500  Stop Time: 1545  Time Calculation (min): 45 min  Total Timed Code Minutes- PT: 40 minute(s)  Therapy Charges for Today     Code Description Service Date Service Provider Modifiers Qty    55543691530  PT THER PROC EA 15 MIN 8/27/2019 Katie Medel, PT GP 1    [id number]  PT MANUAL THERAPY EA 15 MIN 8/27/2019 Katie Medel, PT GP 2                    Katie Medel, PT  8/27/2019

## 2019-09-03 ENCOUNTER — TREATMENT (OUTPATIENT)
Dept: PHYSICAL THERAPY | Facility: CLINIC | Age: 39
End: 2019-09-03

## 2019-09-03 DIAGNOSIS — G89.29 CHRONIC RIGHT SHOULDER PAIN: Primary | ICD-10-CM

## 2019-09-03 DIAGNOSIS — Z98.890 S/P SHOULDER SURGERY: ICD-10-CM

## 2019-09-03 DIAGNOSIS — M25.511 CHRONIC RIGHT SHOULDER PAIN: Primary | ICD-10-CM

## 2019-09-03 PROCEDURE — 97140 MANUAL THERAPY 1/> REGIONS: CPT | Performed by: PHYSICAL THERAPIST

## 2019-09-03 PROCEDURE — 97110 THERAPEUTIC EXERCISES: CPT | Performed by: PHYSICAL THERAPIST

## 2019-09-03 NOTE — PROGRESS NOTES
Physical Therapy Daily Progress Note    Patient: Bienvenido Carter   : 1980  Diagnosis/ICD-10 Code:  Chronic right shoulder pain [M25.511, G89.29]  Referring practitioner: Sourav Lindo,*  Date of Initial Visit: Type: THERAPY  Noted: 2019  Today's Date: 9/3/2019  Patient seen for 38 sessions           Subjective Evaluation    History of Present Illness    Subjective comment: Pt pain levels 7/10 today, some better than last week. She has been icing regularly. Had a massage this weekend and reduced pain as well.        Objective   See Exercise, Manual, and Modality Logs for complete treatment.       Assessment & Plan     Assessment  Assessment details: Pt pain levels slightly lowered over the weekend. She is still having pain over the LH biceps tendon and the front of the shoulder. She did have a massage which helped some pain as well as continued icing. She has a cervical epidural on 9/10. Continue to discuss her posture. Her R shoulder tends to round forward and it is slightly sloped vs the L shoulder.     Plan  Plan details: Cont to progress her program per pt tolerance        Progress strengthening /stabilization /functional activity           Timed:    Manual Therapy:    15     mins  88241;  Therapeutic Exercise:    28     mins  51672;     Neuromuscular Namita:    0    mins  43077;    Therapeutic Activity:     0     mins  34916;     Gait Trainin     mins  22979;     Ultrasound:     0     mins  51560;    Electrical Stimulation:    0     mins  32555 ( );  Iontophoresis    0     mins 65751;  Aquatic Therapy    0     mins 70190;    Untimed:  Electrical Stimulation:    0     mins  12780 ( );  Mechanical Traction:    0     mins  16330;     Timed Treatment:   43   mins   Total Treatment:     43   mins  Katie Medel, PT  Physical Therapist

## 2019-09-12 ENCOUNTER — TREATMENT (OUTPATIENT)
Dept: PHYSICAL THERAPY | Facility: CLINIC | Age: 39
End: 2019-09-12

## 2019-09-12 DIAGNOSIS — Z98.890 S/P SHOULDER SURGERY: ICD-10-CM

## 2019-09-12 DIAGNOSIS — M25.511 CHRONIC RIGHT SHOULDER PAIN: Primary | ICD-10-CM

## 2019-09-12 DIAGNOSIS — G89.29 CHRONIC RIGHT SHOULDER PAIN: Primary | ICD-10-CM

## 2019-09-12 PROCEDURE — 97140 MANUAL THERAPY 1/> REGIONS: CPT | Performed by: PHYSICAL THERAPIST

## 2019-09-12 PROCEDURE — 97110 THERAPEUTIC EXERCISES: CPT | Performed by: PHYSICAL THERAPIST

## 2019-09-12 NOTE — PROGRESS NOTES
Physical Therapy Daily Progress Note    Patient: Bienvenido Carter   : 1980  Diagnosis/ICD-10 Code:  Chronic right shoulder pain [M25.511, G89.29]  Referring practitioner: Sourav Lindo,*  Date of Initial Visit: Type: THERAPY  Noted: 2019  Today's Date: 2019  Patient seen for 39 sessions           Subjective Evaluation    History of Present Illness    Subjective comment: I had an epidural injection yesterday and I really feel where they gave the injection. The shoulder is still really painful, swollen. I ice all the time. I was Rx a new medicine, but places here don't carry it - Embeda.        Objective   See Exercise, Manual, and Modality Logs for complete treatment.       Assessment & Plan     Assessment  Assessment details: Pt is still having both neck and shoulder pain, that can be severe. She had a cervical epidural yesterday and not sure that it really helped. She can just feel pain where they injected her. They have Rx a new pain med, Embeda, but the pharmacy here doesn't have it so waiting on that. She is been trying to do her HEP, but it has been painful and limited. Pt with acute tenderness over the L pect, given tennis ball to do some self release at home               Timed:    Manual Therapy:    13     mins  40064;  Therapeutic Exercise:    25     mins  96799;     Neuromuscular Namita:    0    mins  78764;    Therapeutic Activity:     0     mins  73131;     Gait Trainin     mins  61613;     Ultrasound:     0     mins  73356;    Electrical Stimulation:    0     mins  26031 ( );  Iontophoresis    0     mins 20876;  Aquatic Therapy    0     mins 93658;    Untimed:  Electrical Stimulation:    0     mins  02025 ( );  Mechanical Traction:    0     mins  17579;     Timed Treatment:   38   mins   Total Treatment:     38   mins  Katie Medel, PT  Physical Therapist

## 2019-09-18 ENCOUNTER — TREATMENT (OUTPATIENT)
Dept: PHYSICAL THERAPY | Facility: CLINIC | Age: 39
End: 2019-09-18

## 2019-09-18 DIAGNOSIS — M25.511 CHRONIC RIGHT SHOULDER PAIN: Primary | ICD-10-CM

## 2019-09-18 DIAGNOSIS — Z98.890 S/P SHOULDER SURGERY: ICD-10-CM

## 2019-09-18 DIAGNOSIS — G89.29 CHRONIC RIGHT SHOULDER PAIN: Primary | ICD-10-CM

## 2019-09-18 PROCEDURE — 97140 MANUAL THERAPY 1/> REGIONS: CPT | Performed by: PHYSICAL THERAPIST

## 2019-09-18 PROCEDURE — 97035 APP MDLTY 1+ULTRASOUND EA 15: CPT | Performed by: PHYSICAL THERAPIST

## 2019-09-18 NOTE — PROGRESS NOTES
Physical Therapy Daily Progress Note    Patient: Bienvenido Carter   : 1980  Diagnosis/ICD-10 Code:  Chronic right shoulder pain [M25.511, G89.29]  Referring practitioner: Sourav Lindo,*  Date of Initial Visit: Type: THERAPY  Noted: 2019  Today's Date: 2019  Patient seen for 40 sessions           Subjective Evaluation    History of Present Illness    Subjective comment: Got new meds yesterday after having to wait for pre-auth. She was out of meds for 3 days and has gotten very painful. Pain  Current pain ratin           Objective   See Exercise, Manual, and Modality Logs for complete treatment.       Assessment & Plan     Assessment  Assessment details: Pt has been without pain meds for about 3 days, got new ones last evening, and is really flared up today. It hurts to even move the R shoulder, pain into the neck. She has trigger points in several spots, so did dry needling today to reduce muscle tightness and pain. Pt is having a second opinion of her shoulder tomorrow with Dr. Randall.                Timed:    Manual Therapy:    30     mins  08979;  Therapeutic Exercise:         mins  32120;     Neuromuscular Namita:        mins  01469;    Therapeutic Activity:          mins  25566;     Gait Training:           mins  03405;     Ultrasound:     10     mins  40811;    Electrical Stimulation:         mins  80262 ( );  Iontophoresis         mins 65246;  Aquatic Therapy         mins 33835;    Untimed:  Electrical Stimulation:         mins  70233 ( );  Mechanical Traction:         mins  50111;     Timed Treatment:   40   mins   Total Treatment:     40   mins  Katie Medel, PT  Physical Therapist

## 2019-09-25 ENCOUNTER — TREATMENT (OUTPATIENT)
Dept: PHYSICAL THERAPY | Facility: CLINIC | Age: 39
End: 2019-09-25

## 2019-09-25 DIAGNOSIS — Z98.890 S/P SHOULDER SURGERY: ICD-10-CM

## 2019-09-25 DIAGNOSIS — M25.511 CHRONIC RIGHT SHOULDER PAIN: Primary | ICD-10-CM

## 2019-09-25 DIAGNOSIS — G89.29 CHRONIC RIGHT SHOULDER PAIN: Primary | ICD-10-CM

## 2019-09-25 PROCEDURE — 97110 THERAPEUTIC EXERCISES: CPT | Performed by: PHYSICAL THERAPIST

## 2019-09-25 PROCEDURE — 97140 MANUAL THERAPY 1/> REGIONS: CPT | Performed by: PHYSICAL THERAPIST

## 2019-09-25 NOTE — PROGRESS NOTES
Re-Assessment / Re-Certification        Patient: Bienvenido Carter   : 1980  Diagnosis/ICD-10 Code:  Chronic right shoulder pain [M25.511, G89.29]  Referring practitioner: Sourav Lindo,*  Date of Initial Visit: Type: THERAPY  Noted: 2019  Today's Date: 2019  Patient seen for 41 sessions      Subjective:     Clinical Progress: improved  Home Program Compliance: Yes  Treatment has included:  therapeutic exercise, manual therapy, therapeutic activity, ultrasound, patient education with home exercise program  and dry needling     Subjective Evaluation    History of Present Illness    Subjective comment: Pt had a second opinion about her shoulder. He thinks it is still inflamed and started her on Mobic, has taken 3 days so far. It feels like it is starting to work some. Pain  Current pain ratin  At best pain ratin         Objective       Active Range of Motion     Right Shoulder   Flexion: 156 degrees   Abduction: 115 degrees   External rotation BTH: C7   Internal rotation BTB: L1     Passive Range of Motion     Right Shoulder   Flexion: 165 degrees   Abduction: 165 degrees   External rotation 45°: 80 degrees   Internal rotation 45°: 75 degrees     Strength/Myotome Testing     Right Shoulder     Planes of Motion   Flexion: 4-   Abduction: 4-   External rotation at 0°: 4-   Internal rotation at 45°: 4      Assessment & Plan     Assessment  Assessment details: Bienvenido Carter has been seen for 41 physical therapy sessions for s/p R shoulder scope.  Treatment has included therapeutic exercise, manual therapy, therapeutic activity, ultrasound, patient education with home exercise program  and dry needling . Progress to physical therapy goals is fair. She recently had a second opinion about her shoulder and has been started on a new anti-inflammatory, Mobic. She has used it for the last 3 days and feels that her pain is getting better. Her current ROM has improved since her last visit  and she is able to tolerate strengthening exercises a little better. She will benefit from continued skilled physical therapy to address remaining impairments and functional limitations.       Goals  Plan Goals: ST. Patient will be independent with education for symptom management, joint protection and strategies to minimize stress on affected tissues  - part met  2. Pt to improve pain complaints to no more than 6/10 with ADLs - ongoing   3. Pt to improve R shoulder ROM in flex=110/135 deg, abd=100/130 deg, ER=back of head/55 deg and IR=L5/70 deg - MET    LT. Pt to improve pain complaints to no more than 4/10 with ADLs- ongoing    2. Pt to improve R shoulder ROM in flex=150/160 deg, abd=150/160 deg, ER=T3/80 deg and IR=T10/80 deg -  Part met  3. Pt to improve shoulder strength to 4/5 - Part met  4. Pt to improve SPADI score from 74% to 50% for overall functional improvement  - improved to 66%, goal revised  5. Patient will be independent and compliant with advanced home exercise program to facilitate self-management of symptoms - ongoing      Plan  Therapy options: will be seen for skilled physical therapy services           Recommendations: Continue as planned  Timeframe: 1 month  Prognosis to achieve goals: good    PT Signature: Katie Medel, PT      Based upon review of the patient's progress and continued therapy plan, it is my medical opinion that Bienvenido Carter should continue physical therapy treatment at [facility]  [address] [place] [address]  [phone].    Signature: __________________________________  Sourav Lindo PA    Timed:  Manual Therapy:    15     mins  39992;  Therapeutic Exercise:    25     mins  51900;     Neuromuscular Namita:        mins  41965;    Therapeutic Activity:        mins  40632;     Gait Training:           mins  54643;     Ultrasound:          mins  88816;    Electrical  Stimulation:         mins  40799 ( );  Iontophoresis         mins 84584;    Untimed:  Electrical Stimulation:         mins  37193 ( );  Mechanical Traction:         mins  30885;     Timed Treatment:   40   mins   Total Treatment:     40   mins

## 2019-10-02 ENCOUNTER — OFFICE VISIT (OUTPATIENT)
Dept: NEUROSURGERY | Facility: CLINIC | Age: 39
End: 2019-10-02

## 2019-10-02 DIAGNOSIS — M48.02 SPINAL STENOSIS OF CERVICAL REGION: ICD-10-CM

## 2019-10-02 DIAGNOSIS — M54.2 CHRONIC NECK PAIN: ICD-10-CM

## 2019-10-02 DIAGNOSIS — G89.29 CHRONIC RIGHT SHOULDER PAIN: Primary | ICD-10-CM

## 2019-10-02 DIAGNOSIS — G89.29 CHRONIC NECK PAIN: ICD-10-CM

## 2019-10-02 DIAGNOSIS — M25.511 CHRONIC RIGHT SHOULDER PAIN: Primary | ICD-10-CM

## 2019-10-02 PROCEDURE — 99213 OFFICE O/P EST LOW 20 MIN: CPT | Performed by: NEUROLOGICAL SURGERY

## 2019-10-02 RX ORDER — HYDROCODONE BITARTRATE AND ACETAMINOPHEN 7.5; 325 MG/1; MG/1
1 TABLET ORAL EVERY 6 HOURS PRN
Status: ON HOLD | COMMUNITY
End: 2020-08-11

## 2019-10-02 RX ORDER — METHYLPREDNISOLONE 4 MG/1
TABLET ORAL
Qty: 21 TABLET | Refills: 0 | Status: SHIPPED | OUTPATIENT
Start: 2019-10-02 | End: 2020-07-15

## 2019-10-03 ENCOUNTER — TREATMENT (OUTPATIENT)
Dept: PHYSICAL THERAPY | Facility: CLINIC | Age: 39
End: 2019-10-03

## 2019-10-03 DIAGNOSIS — G89.29 CHRONIC RIGHT SHOULDER PAIN: Primary | ICD-10-CM

## 2019-10-03 DIAGNOSIS — M25.511 CHRONIC RIGHT SHOULDER PAIN: Primary | ICD-10-CM

## 2019-10-03 DIAGNOSIS — Z98.890 S/P SHOULDER SURGERY: ICD-10-CM

## 2019-10-03 PROCEDURE — 97140 MANUAL THERAPY 1/> REGIONS: CPT | Performed by: PHYSICAL THERAPIST

## 2019-10-03 PROCEDURE — 97110 THERAPEUTIC EXERCISES: CPT | Performed by: PHYSICAL THERAPIST

## 2019-10-03 NOTE — PROGRESS NOTES
Physical Therapy Daily Progress Note    Patient: Bienvenido Carter   : 1980  Diagnosis/ICD-10 Code:  Chronic right shoulder pain [M25.511, G89.29]  Referring practitioner: Sourav Lindo,*  Date of Initial Visit: Type: THERAPY  Noted: 2019  Today's Date: 10/3/2019  Patient seen for 42 sessions           Subjective Evaluation    History of Present Illness    Subjective comment: Pt feels that the meloxicam has reduced her pain levels about 50%. Pain  Current pain ratin           Objective   See Exercise, Manual, and Modality Logs for complete treatment.       Assessment & Plan     Assessment  Assessment details: Pt is starting to be able to tolerate more strengthening exercises now. Her pain levels are about a 4/10 on average. Her PROM is now much improved as well, some tightness in the subscap and pect               Timed:    Manual Therapy:    20     mins  52730;  Therapeutic Exercise:    23     mins  88004;     Neuromuscular Namita:        mins  50404;    Therapeutic Activity:          mins  52220;     Gait Training:           mins  52956;     Ultrasound:          mins  92685;    Electrical Stimulation:         mins  67264 ( );  Iontophoresis         mins 75940;  Aquatic Therapy         mins 14657;    Untimed:  Electrical Stimulation:         mins  85388 ( );  Mechanical Traction:         mins  17764;     Timed Treatment:   43   mins   Total Treatment:     43   mins  Katie Medel, PT  Physical Therapist

## 2019-10-04 ENCOUNTER — PATIENT MESSAGE (OUTPATIENT)
Dept: NEUROSURGERY | Facility: CLINIC | Age: 39
End: 2019-10-04

## 2019-10-04 DIAGNOSIS — M50.122 CERVICAL DISC DISORDER AT C5-C6 LEVEL WITH RADICULOPATHY: Primary | ICD-10-CM

## 2019-10-09 ENCOUNTER — TREATMENT (OUTPATIENT)
Dept: PHYSICAL THERAPY | Facility: CLINIC | Age: 39
End: 2019-10-09

## 2019-10-09 DIAGNOSIS — M25.511 CHRONIC RIGHT SHOULDER PAIN: Primary | ICD-10-CM

## 2019-10-09 DIAGNOSIS — G89.29 CHRONIC RIGHT SHOULDER PAIN: Primary | ICD-10-CM

## 2019-10-09 DIAGNOSIS — Z98.890 S/P SHOULDER SURGERY: ICD-10-CM

## 2019-10-09 PROCEDURE — 97140 MANUAL THERAPY 1/> REGIONS: CPT | Performed by: PHYSICAL THERAPIST

## 2019-10-09 PROCEDURE — 97110 THERAPEUTIC EXERCISES: CPT | Performed by: PHYSICAL THERAPIST

## 2019-10-09 NOTE — PROGRESS NOTES
Physical Therapy Daily Progress Note    Patient: Bienvenido Carter   : 1980  Diagnosis/ICD-10 Code:  Chronic right shoulder pain [M25.511, G89.29]  Referring practitioner: Sourav Lindo,*  Date of Initial Visit: Type: THERAPY  Noted: 2019  Today's Date: 10/9/2019  Patient seen for 43 sessions           Subjective Evaluation    History of Present Illness    Subjective comment: the meloxicam seems to be helping her pain, -3/10 today       Objective   See Exercise, Manual, and Modality Logs for complete treatment.       Assessment & Plan     Assessment  Assessment details: Pt with some pain over the UT and levator with tightness. Worked on that area with ischemic pressure to get the trigger points to release. Decreased pain after therapy. Pt is starting to be able to tolerate more strength activities again. Will cont to progress slowly as tolerated                 Timed:    Manual Therapy:    20     mins  81564;  Therapeutic Exercise:    23     mins  04814;     Neuromuscular Namita:        mins  22694;    Therapeutic Activity:          mins  39468;     Gait Training:           mins  21504;     Ultrasound:          mins  66258;    Electrical Stimulation:         mins  67329 ( );  Iontophoresis         mins 17324;  Aquatic Therapy         mins 53925;    Untimed:  Electrical Stimulation:         mins  83256 ( );  Mechanical Traction:         mins  04314;     Timed Treatment:   43   mins   Total Treatment:     43   mins  Katie Medel, PT  Physical Therapist

## 2019-10-16 ENCOUNTER — TREATMENT (OUTPATIENT)
Dept: PHYSICAL THERAPY | Facility: CLINIC | Age: 39
End: 2019-10-16

## 2019-10-16 DIAGNOSIS — G89.29 CHRONIC RIGHT SHOULDER PAIN: Primary | ICD-10-CM

## 2019-10-16 DIAGNOSIS — Z98.890 S/P SHOULDER SURGERY: ICD-10-CM

## 2019-10-16 DIAGNOSIS — M25.511 CHRONIC RIGHT SHOULDER PAIN: Primary | ICD-10-CM

## 2019-10-16 PROCEDURE — 97140 MANUAL THERAPY 1/> REGIONS: CPT | Performed by: PHYSICAL THERAPIST

## 2019-10-16 PROCEDURE — 97110 THERAPEUTIC EXERCISES: CPT | Performed by: PHYSICAL THERAPIST

## 2019-10-16 NOTE — PROGRESS NOTES
Physical Therapy Daily Progress Note    Patient: Bienvenido Carter   : 1980  Diagnosis/ICD-10 Code:  Chronic right shoulder pain [M25.511, G89.29]  Referring practitioner: Sourav Lindo,*  Date of Initial Visit: Type: THERAPY  Noted: 2019  Today's Date: 10/16/2019  Patient seen for 44 sessions           Subjective Evaluation    History of Present Illness    Subjective comment: changed pain meds. the other meds were making her sick to her stomachPain  Current pain ratin           Objective       Active Range of Motion     Right Shoulder   Flexion: 145 degrees   Abduction: 115 degrees   External rotation BTH: C4   Internal rotation BTB: T10      See Exercise, Manual, and Modality Logs for complete treatment.       Assessment & Plan     Assessment  Assessment details: Pt pain levels have been staying lower since she has been on the new meds. She is still having some clicking and catching around the 90 deg elevation arc with some pain, but it is improving overall.                Timed:    Manual Therapy:    10     mins  79227;  Therapeutic Exercise:    30     mins  74850;     Neuromuscular Namita:        mins  50760;    Therapeutic Activity:          mins  31343;     Gait Training:           mins  41592;     Ultrasound:          mins  30282;    Electrical Stimulation:         mins  83839 ( );  Iontophoresis         mins 26404;  Aquatic Therapy         mins 52780;    Untimed:  Electrical Stimulation:         mins  82363 ( );  Mechanical Traction:         mins  83790;     Timed Treatment:   40   mins   Total Treatment:     40   mins  Katie Medel, PT  Physical Therapist

## 2019-10-24 ENCOUNTER — HOSPITAL ENCOUNTER (OUTPATIENT)
Dept: MRI IMAGING | Facility: HOSPITAL | Age: 39
Discharge: HOME | End: 2019-10-24
Admitting: NEUROLOGICAL SURGERY

## 2019-10-24 DIAGNOSIS — M50.122 CERVICAL DISC DISORDER AT C5-C6 LEVEL WITH RADICULOPATHY: ICD-10-CM

## 2019-10-24 PROCEDURE — 72141 MRI NECK SPINE W/O DYE: CPT

## 2019-10-30 ENCOUNTER — TREATMENT (OUTPATIENT)
Dept: PHYSICAL THERAPY | Facility: CLINIC | Age: 39
End: 2019-10-30

## 2019-10-30 DIAGNOSIS — G89.29 CHRONIC RIGHT SHOULDER PAIN: Primary | ICD-10-CM

## 2019-10-30 DIAGNOSIS — Z98.890 S/P SHOULDER SURGERY: ICD-10-CM

## 2019-10-30 DIAGNOSIS — M25.511 CHRONIC RIGHT SHOULDER PAIN: Primary | ICD-10-CM

## 2019-10-30 PROCEDURE — 97110 THERAPEUTIC EXERCISES: CPT | Performed by: PHYSICAL THERAPIST

## 2019-10-30 PROCEDURE — 97140 MANUAL THERAPY 1/> REGIONS: CPT | Performed by: PHYSICAL THERAPIST

## 2019-10-30 NOTE — PROGRESS NOTES
Re-Assessment / Re-Certification        Patient: Bienvenido Carter   : 1980  Diagnosis/ICD-10 Code:  Chronic right shoulder pain [M25.511, G89.29]  Referring practitioner: Tino Randall  Date of Initial Visit: Type: THERAPY  Noted: 2019  Today's Date: 10/30/2019  Patient seen for 45 sessions      Subjective:     Clinical Progress: improved  Home Program Compliance: Yes  Treatment has included:  therapeutic exercise, manual therapy, therapeutic activity, ultrasound and patient education with home exercise program     Subjective Evaluation    History of Present Illness    Subjective comment: sorry I missed last week, I was really sick     Objective       Active Range of Motion     Right Shoulder   Flexion: 145 degrees   Adduction: 125 degrees   External rotation BTH: T1   Internal rotation BTB: L1     Passive Range of Motion     Right Shoulder   Flexion: 168 degrees   Abduction: 168 degrees   External rotation 45°: 85 degrees   Internal rotation 45°: 76 degrees     Strength/Myotome Testing     Left Shoulder     Planes of Motion   Flexion: 4+   Abduction: 4+   External rotation at 0°: 4+   Internal rotation at 0°: 4+     Right Shoulder     Planes of Motion   Flexion: 4-   Abduction: 4-   External rotation at 0°: 4   Internal rotation at 0°: 4      See Exercise, Manual, and Modality Logs for complete treatment.       Assessment & Plan     Assessment  Assessment details: Bienvenido Carter has been seen for 45 physical therapy sessions for s/p R shoulder arthroscopy.  Treatment has included therapeutic exercise, manual therapy, therapeutic activity, ultrasound and patient education with home exercise program . Progress to physical therapy goals is good. She does continue with pain, best is 4/10 and 8/10 at most. Her pain is with lifting, showering, reaching behind her back, and sleeping. She has started taking meloxicam and got an injection this week and it is helping. She improved her score on the SPADI  from 66% to 55% over the past month, with 0% being no disability. She will benefit from continued skilled physical therapy to address remaining impairments and functional limitations.     Prognosis: good    Goals  Plan Goals: ST. Patient will be independent with education for symptom management, joint protection and strategies to minimize stress on affected tissues  - part met  2. Pt to improve pain complaints to no more than 6/10 with ADLs - ongoing   3. Pt to improve R shoulder ROM in flex=110/135 deg, abd=100/130 deg, ER=back of head/55 deg and IR=L5/70 deg - MET    LT. Pt to improve pain complaints to no more than 4/10 with ADLs- ongoing    2. Pt to improve R shoulder ROM in flex=150/160 deg, abd=150/160 deg, ER=T3/80 deg and IR=T10/80 deg -  Part met  3. Pt to improve shoulder strength to 4/5 - Part met  4. Pt to improve SPADI score from 74% to 50% for overall functional improvement  - improved to 66% to 55% over the past month, goal revised  5. Patient will be independent and compliant with advanced home exercise program to facilitate self-management of symptoms - ongoing    Plan  Therapy options: will be seen for skilled physical therapy services           Recommendations: Continue as planned  Timeframe: 1 month  Prognosis to achieve goals: good    PT Signature: Katie Medel, PT      Based upon review of the patient's progress and continued therapy plan, it is my medical opinion that Bienvenido Carter should continue physical therapy treatment at [facility]  [address] [place] [address] [address] [place] [address]  [phone].    Signature: __________________________________  Tino Randall MD    Timed:  Manual Therapy:    15     mins  30745;  Therapeutic Exercise:    25     mins  33769;     Neuromuscular Namita:        mins  13117;    Therapeutic Activity:          mins  61004;     Gait Training:           mins  61945;     Ultrasound:          mins   41527;    Electrical Stimulation:         mins  89710 ( );  Iontophoresis         mins 74731;    Untimed:  Electrical Stimulation:         mins  73983 ( );  Mechanical Traction:         mins  32304;     Timed Treatment:   40   mins   Total Treatment:     40   mins

## 2019-11-19 ENCOUNTER — TREATMENT (OUTPATIENT)
Dept: PHYSICAL THERAPY | Facility: CLINIC | Age: 39
End: 2019-11-19

## 2019-11-19 DIAGNOSIS — Z98.890 S/P SHOULDER SURGERY: ICD-10-CM

## 2019-11-19 DIAGNOSIS — M25.511 CHRONIC RIGHT SHOULDER PAIN: Primary | ICD-10-CM

## 2019-11-19 DIAGNOSIS — G89.29 CHRONIC RIGHT SHOULDER PAIN: Primary | ICD-10-CM

## 2019-11-19 PROCEDURE — 97140 MANUAL THERAPY 1/> REGIONS: CPT | Performed by: PHYSICAL THERAPIST

## 2019-11-19 PROCEDURE — 97110 THERAPEUTIC EXERCISES: CPT | Performed by: PHYSICAL THERAPIST

## 2019-11-19 NOTE — PROGRESS NOTES
Physical Therapy Daily Progress Note    Patient: Bienvenido Carter   : 1980  Diagnosis/ICD-10 Code:  Chronic right shoulder pain [M25.511, G89.29]  Referring practitioner: Sourav Lindo,*  Date of Initial Visit: Type: THERAPY  Noted: 2019  Today's Date: 2019  Patient seen for 46 sessions           Subjective Evaluation    History of Present Illness    Subjective comment: Pt had an appt with a new pain management place, is back on percocet PRN (not having to take daily). Using cervical traction unit at home for neck. 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 Pain  Current pain ratin           Objective   See Exercise, Manual, and Modality Logs for complete treatment.       Assessment & Plan     Assessment  Assessment details: Pt has started driving again for [employer] and feels like her neck and shoulder pain are a little more aggravated. She rates it 5/10 on avg. She is now with a new pain management group. She is taking percocet PRN for neck and shoulder pain.                Timed:    Manual Therapy:    15     mins  36228;  Therapeutic Exercise:    25     mins  81322;     Neuromuscular Namita:        mins  53955;    Therapeutic Activity:          mins  22498;     Gait Training:           mins  20683;     Ultrasound:          mins  19933;    Electrical Stimulation:         mins  01289 ( );  Iontophoresis         mins 53996;  Aquatic Therapy         mins 48021;    Untimed:  Electrical Stimulation:         mins  15337 ( );  Mechanical Traction:         mins  18082;     Timed Treatment:   40   mins   Total Treatment:     40   mins  Katie Medel, PT  Physical Therapist

## 2019-11-21 ENCOUNTER — TREATMENT (OUTPATIENT)
Dept: PHYSICAL THERAPY | Facility: CLINIC | Age: 39
End: 2019-11-21

## 2019-11-21 DIAGNOSIS — Z98.890 S/P SHOULDER SURGERY: ICD-10-CM

## 2019-11-21 DIAGNOSIS — M25.511 CHRONIC RIGHT SHOULDER PAIN: Primary | ICD-10-CM

## 2019-11-21 DIAGNOSIS — G89.29 CHRONIC RIGHT SHOULDER PAIN: Primary | ICD-10-CM

## 2019-11-21 PROCEDURE — 97140 MANUAL THERAPY 1/> REGIONS: CPT | Performed by: PHYSICAL THERAPIST

## 2019-11-21 PROCEDURE — 97110 THERAPEUTIC EXERCISES: CPT | Performed by: PHYSICAL THERAPIST

## 2019-11-21 NOTE — PROGRESS NOTES
Physical Therapy Daily Progress Note    Patient: Bienvenido Carter   : 1980  Diagnosis/ICD-10 Code:  Chronic right shoulder pain [M25.511, G89.29]  Referring practitioner: Sourav Lindo,*  Date of Initial Visit: Type: THERAPY  Noted: 2019  Today's Date: 2019  Patient seen for 47 sessions           Subjective Evaluation    History of Present Illness    Subjective comment: My neck has been a little more sore than my shoulder       Objective   See Exercise, Manual, and Modality Logs for complete treatment.       Assessment & Plan     Assessment  Assessment details: Pt has more c/o of neck versus shoulder pain today. She has been trying to be very conscious of her posture with driving, since she is back with [employer] again. She is still a little tight in subscap, limiting end range of flexion and abd               Timed:    Manual Therapy:    20     mins  86372;  Therapeutic Exercise:    23     mins  42643;     Neuromuscular Namita:        mins  24694;    Therapeutic Activity:          mins  68736;     Gait Training:           mins  96097;     Ultrasound:          mins  84482;    Electrical Stimulation:         mins  98896 ( );  Iontophoresis         mins 56812;  Aquatic Therapy         mins 91319;    Untimed:  Electrical Stimulation:         mins  61132 ( );  Mechanical Traction:         mins  69490;     Timed Treatment:   43   mins   Total Treatment:     43   mins  Katie Medel, PT  Physical Therapist

## 2019-11-26 ENCOUNTER — TREATMENT (OUTPATIENT)
Dept: PHYSICAL THERAPY | Facility: CLINIC | Age: 39
End: 2019-11-26

## 2019-11-26 DIAGNOSIS — M25.511 CHRONIC RIGHT SHOULDER PAIN: Primary | ICD-10-CM

## 2019-11-26 DIAGNOSIS — G89.29 CHRONIC RIGHT SHOULDER PAIN: Primary | ICD-10-CM

## 2019-11-26 DIAGNOSIS — Z98.890 S/P SHOULDER SURGERY: ICD-10-CM

## 2019-11-26 PROCEDURE — 97140 MANUAL THERAPY 1/> REGIONS: CPT | Performed by: PHYSICAL THERAPIST

## 2019-11-26 PROCEDURE — 97110 THERAPEUTIC EXERCISES: CPT | Performed by: PHYSICAL THERAPIST

## 2019-11-26 NOTE — PROGRESS NOTES
Physical Therapy Daily Progress Note    Patient: Bienvenido Carter   : 1980  Diagnosis/ICD-10 Code:  Chronic right shoulder pain [M25.511, G89.29]  Referring practitioner: Sourav Lindo,*  Date of Initial Visit: Type: THERAPY  Noted: 2019  Today's Date: 2019  Patient seen for 48 sessions           Subjective Evaluation    History of Present Illness    Subjective comment: doing ok today, pain not too bad       Objective   See Exercise, Manual, and Modality Logs for complete treatment.       Assessment & Plan     Assessment  Assessment details: Discussed discharge next week and continuing on with her strength program. She still needs some cues for scapular setting with some exercises               Timed:    Manual Therapy:    12     mins  02475;  Therapeutic Exercise:    30     mins  83261;     Neuromuscular Namita:        mins  39467;    Therapeutic Activity:          mins  17543;     Gait Training:           mins  58181;     Ultrasound:          mins  88675;    Electrical Stimulation:         mins  10778 ( );  Iontophoresis         mins 01566;  Aquatic Therapy         mins 75869;    Untimed:  Electrical Stimulation:         mins  51917 ( );  Mechanical Traction:         mins  64539;     Timed Treatment:   42   mins   Total Treatment:     42   mins  Katie Medel, PT  Physical Therapist

## 2019-11-27 ENCOUNTER — TREATMENT (OUTPATIENT)
Dept: PHYSICAL THERAPY | Facility: CLINIC | Age: 39
End: 2019-11-27

## 2019-11-27 DIAGNOSIS — M25.511 CHRONIC RIGHT SHOULDER PAIN: Primary | ICD-10-CM

## 2019-11-27 DIAGNOSIS — G89.29 CHRONIC RIGHT SHOULDER PAIN: Primary | ICD-10-CM

## 2019-11-27 DIAGNOSIS — Z98.890 S/P SHOULDER SURGERY: ICD-10-CM

## 2019-11-27 PROCEDURE — 97110 THERAPEUTIC EXERCISES: CPT | Performed by: PHYSICAL THERAPIST

## 2019-11-27 PROCEDURE — 97140 MANUAL THERAPY 1/> REGIONS: CPT | Performed by: PHYSICAL THERAPIST

## 2019-11-27 NOTE — PROGRESS NOTES
Physical Therapy Daily Progress Note    Patient: Bienvenido Carter   : 1980  Diagnosis/ICD-10 Code:  Chronic right shoulder pain [M25.511, G89.29]  Referring practitioner: Sourav Lindo,*  Date of Initial Visit: Type: THERAPY  Noted: 2019  Today's Date: 2019  Patient seen for 49 sessions           Subjective Evaluation    History of Present Illness    Subjective comment: tired from throwing darts last night       Objective   See Exercise, Manual, and Modality Logs for complete treatment.       Assessment & Plan     Assessment  Assessment details: Pt is tolerating more activity in therapy. She still has pain on avg of 3-4/10, with meds, usually around the biceps tendon               Timed:    Manual Therapy:    15     mins  91960;  Therapeutic Exercise:    28     mins  63853;     Neuromuscular Namita:        mins  38383;    Therapeutic Activity:          mins  44931;     Gait Training:           mins  75273;     Ultrasound:          mins  20258;    Electrical Stimulation:         mins  35317 ( );  Iontophoresis         mins 63833;  Aquatic Therapy         mins 59620;    Untimed:  Electrical Stimulation:         mins  83431 ( );  Mechanical Traction:         mins  50360;     Timed Treatment:   43   mins   Total Treatment:     43   mins  Katie Medel, PT  Physical Therapist

## 2019-12-02 ENCOUNTER — TREATMENT (OUTPATIENT)
Dept: PHYSICAL THERAPY | Facility: CLINIC | Age: 39
End: 2019-12-02

## 2019-12-02 DIAGNOSIS — G89.29 CHRONIC RIGHT SHOULDER PAIN: Primary | ICD-10-CM

## 2019-12-02 DIAGNOSIS — Z98.890 S/P SHOULDER SURGERY: ICD-10-CM

## 2019-12-02 DIAGNOSIS — M25.511 CHRONIC RIGHT SHOULDER PAIN: Primary | ICD-10-CM

## 2019-12-02 PROCEDURE — 97110 THERAPEUTIC EXERCISES: CPT | Performed by: PHYSICAL THERAPIST

## 2019-12-02 PROCEDURE — 97140 MANUAL THERAPY 1/> REGIONS: CPT | Performed by: PHYSICAL THERAPIST

## 2019-12-02 NOTE — PROGRESS NOTES
Physical Therapy Daily Progress Note    Patient: Bienvenido Carter   : 1980  Diagnosis/ICD-10 Code:  Chronic right shoulder pain [M25.511, G89.29]  Referring practitioner: Sourav Lindo,*  Date of Initial Visit: Type: THERAPY  Noted: 2019  Today's Date: 2019  Patient seen for 50 sessions           Subjective Evaluation    History of Present Illness    Subjective comment: I woke up with a lot of pain in the shoulder today and around the blade as well. Pain  Current pain ratin           Objective   See Exercise, Manual, and Modality Logs for complete treatment.       Assessment & Plan     Assessment  Assessment details: Pt woke up with more pain in the shoulder this morning and into the scapula. She recalls no incident that would've irritated it. 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 She has some tightness of the UT and periscapular muscles and pain at the anterior shoulder, LH biceps tendon. Pt will use her tennis ball at home to help with muscular tension in her back               Timed:    Manual Therapy:    15     mins  02851;  Therapeutic Exercise:    30     mins  10612;     Neuromuscular Namita:        mins  96653;    Therapeutic Activity:          mins  73706;     Gait Training:           mins  55062;     Ultrasound:          mins  06787;    Electrical Stimulation:         mins  04492 ( );  Iontophoresis         mins 94835;  Aquatic Therapy         mins 23400;    Untimed:  Electrical Stimulation:         mins  01301 ( );  Mechanical Traction:         mins  20854;     Timed Treatment:   45   mins   Total Treatment:     45   mins  Katie Medel, PT  Physical Therapist

## 2019-12-04 ENCOUNTER — TREATMENT (OUTPATIENT)
Dept: PHYSICAL THERAPY | Facility: CLINIC | Age: 39
End: 2019-12-04

## 2019-12-04 DIAGNOSIS — G89.29 CHRONIC RIGHT SHOULDER PAIN: Primary | ICD-10-CM

## 2019-12-04 DIAGNOSIS — M25.511 CHRONIC RIGHT SHOULDER PAIN: Primary | ICD-10-CM

## 2019-12-04 DIAGNOSIS — Z98.890 S/P SHOULDER SURGERY: ICD-10-CM

## 2019-12-04 PROCEDURE — 97140 MANUAL THERAPY 1/> REGIONS: CPT | Performed by: PHYSICAL THERAPIST

## 2019-12-04 PROCEDURE — 97110 THERAPEUTIC EXERCISES: CPT | Performed by: PHYSICAL THERAPIST

## 2019-12-04 NOTE — PROGRESS NOTES
Re-Assessment / Re-Certification        Patient: Bienvenido Carter   : 1980  Diagnosis/ICD-10 Code:  Chronic right shoulder pain [M25.511, G89.29]  Referring practitioner: Sourav Lindo,*  Date of Initial Visit: Type: THERAPY  Noted: 2019  Today's Date: 2019  Patient seen for 51 sessions      Subjective:     Clinical Progress: unchanged  Home Program Compliance: Yes  Treatment has included:  therapeutic exercise, manual therapy, ultrasound, patient education with home exercise program  and dry needling     Subjective   Objective       Active Range of Motion     Right Shoulder   Flexion: 143 degrees   Abduction: 130 degrees   External rotation BTH: T3   Internal rotation BTB: T12     Additional Active Range of Motion Details  Pain at end of all AROM     Passive Range of Motion     Right Shoulder   Flexion: 168 degrees   Abduction: 165 degrees   External rotation 45°: 85 degrees   Internal rotation 45°: 83 degrees     Strength/Myotome Testing     Right Shoulder     Planes of Motion   Flexion: 4   Abduction: 4   External rotation at 0°: 4   Internal rotation at 0°: 4     Additional Strength Details  Pain with resisted ER and abd     See Exercise, Manual, and Modality Logs for complete treatment.       Assessment & Plan     Assessment  Assessment details: Bienvenido Carter has been seen for 51 physical therapy sessions for R shoulder scope.  Treatment has included therapeutic exercise, manual therapy, ultrasound, patient education with home exercise program  and dry needling . Progress to physical therapy goals is good overall, having a set back this week in pain. She still does not have full AROM and is painful at the end of available ROM. Her strength is improving.  She will benefit from continued skilled physical therapy to address remaining impairments and functional limitations.       Goals  Plan Goals:  ST. Patient will be independent with education for symptom management, joint  protection and strategies to minimize stress on affected tissues  - part met  2. Pt to improve pain complaints to no more than 6/10 with ADLs - Part Met (6-7/10)  3. Pt to improve R shoulder ROM in flex=110/135 deg, abd=100/130 deg, ER=back of head/55 deg and IR=L5/70 deg - MET    LT. Pt to improve pain complaints to no more than 4/10 with ADLs- ongoing    2. Pt to improve R shoulder ROM in flex=150/160 deg, abd=150/160 deg, ER=T3/80 deg and IR=T10/80 deg -  Part met (Met all PROM)  3. Pt to improve shoulder strength to 4/5 - Part met  4. Pt to improve SPADI score from 74% to 50% for overall functional improvement  - increased from 55% to 61% over the past month, goal revised  5. Patient will be independent and compliant with advanced home exercise program to facilitate self-management of symptoms - ongoing    Plan  Therapy options: will be seen for skilled physical therapy services           Recommendations: Continue as planned  Timeframe: 1 month  Prognosis to achieve goals: good    PT Signature: Katie Medel, KANE      Based upon review of the patient's progress and continued therapy plan, it is my medical opinion that Bienvenido Carter should continue physical therapy treatment at [facility]  [address] [place] [address] [address] [place] [address] [phone].    Signature: __________________________________  Sourav Lindo PA    Timed:  Manual Therapy:    15     mins  08771;  Therapeutic Exercise:    28     mins  81033;     Neuromuscular Namita:        mins  79409;    Therapeutic Activity:          mins  51211;     Gait Training:           mins  37551;     Ultrasound:          mins  72546;    Electrical Stimulation:         mins  89089 ( );  Iontophoresis         mins 62645;    Untimed:  Electrical Stimulation:         mins  45638 ( );  Mechanical Traction:         mins  86471;     Timed Treatment:  43    mins   Total Treatment:     43    mins

## 2020-01-22 ENCOUNTER — TRANSCRIBE ORDERS (OUTPATIENT)
Dept: PHYSICAL THERAPY | Facility: CLINIC | Age: 40
End: 2020-01-22

## 2020-01-22 DIAGNOSIS — S49.91XS RIGHT SHOULDER INJURY, SEQUELA: Primary | ICD-10-CM

## 2020-02-03 ENCOUNTER — TREATMENT (OUTPATIENT)
Dept: PHYSICAL THERAPY | Facility: CLINIC | Age: 40
End: 2020-02-03

## 2020-02-03 ENCOUNTER — TRANSCRIBE ORDERS (OUTPATIENT)
Dept: PHYSICAL THERAPY | Facility: CLINIC | Age: 40
End: 2020-02-03

## 2020-02-03 DIAGNOSIS — M54.2 NECK PAIN: ICD-10-CM

## 2020-02-03 DIAGNOSIS — Z98.890 HISTORY OF SHOULDER SURGERY: ICD-10-CM

## 2020-02-03 DIAGNOSIS — M25.511 CHRONIC RIGHT SHOULDER PAIN: Primary | ICD-10-CM

## 2020-02-03 DIAGNOSIS — M54.2 CERVICALGIA: Primary | ICD-10-CM

## 2020-02-03 DIAGNOSIS — G89.29 CHRONIC RIGHT SHOULDER PAIN: Primary | ICD-10-CM

## 2020-02-03 DIAGNOSIS — M54.12 CERVICAL RADICULAR PAIN: ICD-10-CM

## 2020-02-03 PROCEDURE — 97162 PT EVAL MOD COMPLEX 30 MIN: CPT | Performed by: PHYSICAL THERAPIST

## 2020-02-03 PROCEDURE — 97110 THERAPEUTIC EXERCISES: CPT | Performed by: PHYSICAL THERAPIST

## 2020-02-03 NOTE — PROGRESS NOTES
Physical Therapy Initial Evaluation and Plan of Care      Patient: Bienvenido Carter   : 1980  Diagnosis/ICD-10 Code:  Chronic right shoulder pain [M25.511, G89.29]  Referring practitioner: Tino Randall MD  Date of Initial Visit: 2/3/2020  Today's Date: 2/3/2020  Patient seen for 1 sessions           Subjective Evaluation    History of Present Illness  Date of onset: 12/15/2019  Date of surgery: 2019  Mechanism of injury: Pt with a history of SAD of the R shoulder on 19. She was treated for about 9 months after her surgery for PT, her last visit in 19. She reports that her pain increased mid Dec. She was going through a deep depression and is going to counseling weekly for sessions. She has lost weight, about 10+ lbs (weighing @75 lb now, was down to 70 lb). She is having trouble with meds helping her mental health. Ortho MD wants her to do more therapy, doesn't want to do surgery. She is taking meloxicam again (started back yesterday). RTMD in March. Pain is in the R shoulder, front and into the scapula. She is having increased neck pain, with N&T in both hands. Her pain management MD sent and RX for her neck as well.       Patient Occupation: not employed Quality of life: fair    Pain  Current pain ratin  At best pain ratin  At worst pain rating: 10  Location: R shoulder and neck  Quality: sharp and radiating (N&T. spasms)  Relieving factors: ice (theracane for self massage)  Aggravating factors: sleeping, overhead activity, lifting, movement, outstretched reach, repetitive movement and prolonged positioning  Progression: worsening    Social Support  Lives in: condominium  Lives with: alone    Hand dominance: right    Treatments  Previous treatment: physical therapy, medication and injection treatment  Current treatment: medication  Patient Goals  Patient goals for therapy: decreased pain, increased motion, increased strength, independence with ADLs/IADLs, return to  sport/leisure activities and return to work             Objective       Palpation     Right   Hypertonic in the cervical paraspinals, latissimus, levator scapulae, pectoralis major, pectoralis minor, scalenes, sternocleidomastoid and upper trapezius. Tenderness of the anterior deltoid, cervical paraspinals, deltoid, infraspinatus, latissimus, levator scapulae, pectoralis major, pectoralis minor, scalenes, sternocleidomastoid and upper trapezius.   Trigger point to levator scapulae and upper trapezius.     Neurological Testing     Sensation   Cervical/Thoracic   Left   Intact: light touch  Diminished: light touch    Right   Intact: light touch  Diminished: light touch    Comments   Left light touch: less in C4/5.   Right light touch: less in C6/7.     Active Range of Motion   Cervical/Thoracic Spine   Cervical    Flexion: 70 degrees   Extension: 25 degrees   Left lateral flexion: 23 degrees   Right lateral flexion: 20 degrees   Left rotation: 65 degrees   Right rotation: 40 (painful) degrees   Left Shoulder   Flexion: 145 degrees   Abduction: 120 degrees   External rotation BTH: T1   Internal rotation BTB: T10     Right Shoulder   Flexion: 90 degrees   Abduction: 90 degrees     Additional Active Range of Motion Details  R ER=behind head  R IR=ipsilateral buttocks  (all motions painful for B shoulders)    Passive Range of Motion   Left Shoulder   Flexion: 150 degrees   Abduction: 120 degrees   External rotation 45°: 80 degrees   Internal rotation 45°: 80 degrees     Right Shoulder   Flexion: 133 degrees   Abduction: 120 degrees   External rotation 45°: 65 degrees   Internal rotation 45°: 65 degrees     Additional Passive Range of Motion Details  All motions painful in B shoulders, pt very guarded    Strength/Myotome Testing   Cervical Spine   Neck extension: 3+  Neck flexion: 3+    Left   Neck lateral flexion (C3): 3+    Right   Neck lateral flexion (C3): 3+    Left Shoulder     Planes of Motion   Flexion: 4    Abduction: 4   External rotation at 0°: 4   Internal rotation at 0°: 4     Isolated Muscles   Biceps: 4   Triceps: 4     Right Shoulder     Planes of Motion   Flexion: 3 (in neutral)   Abduction: 3 (in neutral)   External rotation at 0°: 3+   Internal rotation at 0°: 4-     Isolated Muscles   Biceps: 4-   Triceps: 4-     Left Elbow   Flexion: 4  Extension: 4    Right Elbow   Flexion: 4-  Extension: 4-    Additional Strength Details  R shoulder painful with all MMT       See Exercise, Manual, and Modality Logs for complete treatment.   Functional outcome score:   SPADI=98/130 or 75%; NDI=60%    Exercises:  -shoulder rolls and scap ret  -AAROM ER with cane, 10x10 sec  -table slides  -UT, levator, and scalene stretching, 3x20 sec          Assessment & Plan     Assessment  Impairments: abnormal or restricted ROM, activity intolerance, impaired physical strength, lacks appropriate home exercise program and pain with function  Assessment details: Bienvenido Carter is a 39 y.o. year-old female referred to physical therapy for R shoulder and neck pain. She presents with a evolving clinical presentation.  She has comorbidities of previous R shoulder surgery and personal factors of reported severe depression and weight loss that may affect her progress in the plan of care.  Pt has decreased shoulder A/PROM and pain with all movements, decreased shoulder strength, and global tenderness around the shoulder. She has decreased cervical AROM and pain with all motions, radicular symptoms into B UEs, and tenderness over the UT, levator, SCM, and scalenes. Pt would benefit from therapy to help improve her ability to sleep, dress, and perform OH activities.    Prognosis: good  Functional Limitations: carrying objects, lifting, sleeping, pulling, pushing, uncomfortable because of pain, reaching behind back and reaching overhead  Goals  Plan Goals: ST weeks  1. Patient will be independent with education for symptom management,  joint protection and strategies to minimize stress on affected tissues    2. Pt to improve pain complaints to no more than 7/10 with ADLs   3. Pt to improve R shoulder ROM in flex=110/140 deg, abd=100/140 deg, ER=C3/70 deg and IR=L5/70 deg     LT weeks  1. Pt to improve pain complaints to no more than 4/10 with ADLs    2. Pt to improve R shoulder ROM in flex=150/160 deg, abd=150/160 deg, ER=T3/80 deg and IR=T10/80 deg   3. Pt to improve shoulder strength to 4/5   4. Pt to improve SPADI score from 75% to 50% for overall functional improvement   5. Pt to improve score on NDI from 60% to 40%, with 0% being no disability  6. Patient will be independent and compliant with advanced home exercise program to facilitate self-management of symptoms - ongoing    Plan  Therapy options: will be seen for skilled physical therapy services  Planned modality interventions: cryotherapy, TENS, ultrasound and traction  Other planned modality interventions: dry needling  Planned therapy interventions: body mechanics training, flexibility, functional ROM exercises, home exercise program, IADL retraining, joint mobilization, manual therapy, postural training, soft tissue mobilization, spinal/joint mobilization, strengthening, stretching and therapeutic activities  Frequency: 2x week  Treatment plan discussed with: patient  Plan details: Plan to see for 36 visits        Timed:  Manual Therapy:         mins  97331;  Therapeutic Exercise:    10     mins  96671;     Neuromuscular Namita:        mins  85335;    Therapeutic Activity:          mins  30889;     Gait Training:           mins  51503;     Ultrasound:          mins  19694;    Electrical Stimulation:         mins  34041 ( );  Iontophoresis         mins 76018  Dry Needling        mins      Untimed:  Electrical Stimulation:         mins  14956 ( );  Mechanical Traction:         mins  75815;     Timed Treatment:   10   mins   Total Treatment:     45   mins    *note  addendum for time error    PT SIGNATURE: Katie Medel, PT   DATE TREATMENT INITIATED: 2/3/2020    Initial Certification  Certification Period: 5/3/2020  I certify that the therapy services are furnished while this patient is under my care.  The services outlined above are required by this patient, and will be reviewed every 90 days.     PHYSICIAN: Tino Randall MD      DATE:     Physician: Salvatore Roblero MD  DATE:    Please sign and return via fax to  .. Thank you, [facility].

## 2020-02-06 ENCOUNTER — TREATMENT (OUTPATIENT)
Dept: PHYSICAL THERAPY | Facility: CLINIC | Age: 40
End: 2020-02-06

## 2020-02-06 DIAGNOSIS — G89.29 CHRONIC RIGHT SHOULDER PAIN: Primary | ICD-10-CM

## 2020-02-06 DIAGNOSIS — M54.12 CERVICAL RADICULAR PAIN: ICD-10-CM

## 2020-02-06 DIAGNOSIS — M25.511 CHRONIC RIGHT SHOULDER PAIN: Primary | ICD-10-CM

## 2020-02-06 DIAGNOSIS — M54.2 NECK PAIN: ICD-10-CM

## 2020-02-06 DIAGNOSIS — Z98.890 HISTORY OF SHOULDER SURGERY: ICD-10-CM

## 2020-02-06 PROCEDURE — 97140 MANUAL THERAPY 1/> REGIONS: CPT | Performed by: PHYSICAL THERAPIST

## 2020-02-06 PROCEDURE — 97110 THERAPEUTIC EXERCISES: CPT | Performed by: PHYSICAL THERAPIST

## 2020-02-06 PROCEDURE — 97035 APP MDLTY 1+ULTRASOUND EA 15: CPT | Performed by: PHYSICAL THERAPIST

## 2020-02-06 NOTE — PROGRESS NOTES
Physical Therapy Daily Progress Note    Patient: Bienvenido Carter   : 1980  Diagnosis/ICD-10 Code:  Chronic right shoulder pain [M25.511, G89.29]  Referring practitioner: Tino Randall MD  Date of Initial Visit: Type: THERAPY  Noted: 2/3/2020  Today's Date: 2020  Patient seen for 2 sessions           Subjective Pain about a 8/10 today    Objective   See Exercise, Manual, and Modality Logs for complete treatment.     Exercises:  -shoulder rolls and scap ret  -AAROM ER with cane, 10x10 sec  -table slides  -shoulder isometrics; flex, ER, abd 10x5 sec  -UT, levator, and scalene stretching, 3x20 sec    Manual:  -PROM in all planes to R shoulder with oscillations for pain control, pect lift, STM to UT/levator muscles    Assessment & Plan     Assessment  Assessment details: Pt is still very painful in the R shoulder, but she was able to tolerate PROM better today and with more motion. Started isometrics to get back into light strengthening. Trial of US for pain control               Timed:    Manual Therapy:    15     mins  00985;  Therapeutic Exercise:    15     mins  15721;     Neuromuscular Namita:        mins  60862;    Therapeutic Activity:          mins  75134;     Gait Training:           mins  08896;     Ultrasound:     10     mins  58316;    Electrical Stimulation:         mins  02665 ( );  Iontophoresis         mins 10416;  Aquatic Therapy         mins 58975;  Dry Needling                   mins    Untimed:  Electrical Stimulation:         mins  51643 ( );  Mechanical Traction:         mins  70881;     Timed Treatment:   40   mins   Total Treatment:     40   mins  Katie Medel PT  Physical Therapist

## 2020-02-11 ENCOUNTER — TREATMENT (OUTPATIENT)
Dept: PHYSICAL THERAPY | Facility: CLINIC | Age: 40
End: 2020-02-11

## 2020-02-11 DIAGNOSIS — M54.2 NECK PAIN: ICD-10-CM

## 2020-02-11 DIAGNOSIS — G89.29 CHRONIC RIGHT SHOULDER PAIN: Primary | ICD-10-CM

## 2020-02-11 DIAGNOSIS — Z98.890 HISTORY OF SHOULDER SURGERY: ICD-10-CM

## 2020-02-11 DIAGNOSIS — M54.12 CERVICAL RADICULAR PAIN: ICD-10-CM

## 2020-02-11 DIAGNOSIS — M25.511 CHRONIC RIGHT SHOULDER PAIN: Primary | ICD-10-CM

## 2020-02-11 PROCEDURE — 97110 THERAPEUTIC EXERCISES: CPT | Performed by: PHYSICAL THERAPIST

## 2020-02-11 PROCEDURE — 97140 MANUAL THERAPY 1/> REGIONS: CPT | Performed by: PHYSICAL THERAPIST

## 2020-02-11 NOTE — PROGRESS NOTES
Physical Therapy Daily Progress Note    Patient: Bienvenido Carter   : 1980  Diagnosis/ICD-10 Code:  Chronic right shoulder pain [M25.511, G89.29]  Referring practitioner: Tino Randall MD  Date of Initial Visit: Type: THERAPY  Noted: 2/3/2020  Today's Date: 2020  Patient seen for 3 sessions           Subjective  I had an EMG yesterday, ordered by pain management MD. Shoulder pain today -6/10    Objective       Passive Range of Motion     Right Shoulder   Flexion: 163 degrees   Abduction: 165 degrees   External rotation 45°: 80 degrees   Internal rotation 45°: 78 degrees      See Exercise, Manual, and Modality Logs for complete treatment.     Exercises:  -shoulder rolls and scap ret  -AAROM ER with cane, 10x10 sec  -table slides 10x 10 sec  -shoulder isometrics; flex, ER, abd 10x5 sec  -supine cervical nodding, 10x 5 sec (towel roll under neck)  -supine cervical nodding with rotation, 5x 5sec to each side (towel roll under neck)       Manual:  -PROM in all planes to R shoulder with oscillations for pain control, pect lift, STM to UT/levator muscles, subscap release    Assessment & Plan     Assessment  Assessment details: Pt PROM has improved over the past week as has her overall pain. She has tightness of the pects, UT, levator, and subscap (limits some of her ROM). Started strengthening of the deep neck flexors today               Timed:    Manual Therapy:    20     mins  19533;  Therapeutic Exercise:    23     mins  70583;     Neuromuscular Namita:        mins  69348;    Therapeutic Activity:          mins  58234;     Gait Training:           mins  91189;     Ultrasound:          mins  71377;    Electrical Stimulation:         mins  42473 ( );  Iontophoresis         mins 32299;  Aquatic Therapy         mins 45128;  Dry Needling                   mins    Untimed:  Electrical Stimulation:         mins  06737 ( );  Mechanical Traction:         mins  91721;     Timed Treatment:   43    mins   Total Treatment:     43   mins  Katie Medel, PT  Physical Therapist

## 2020-02-13 ENCOUNTER — TREATMENT (OUTPATIENT)
Dept: PHYSICAL THERAPY | Facility: CLINIC | Age: 40
End: 2020-02-13

## 2020-02-13 DIAGNOSIS — Z98.890 HISTORY OF SHOULDER SURGERY: ICD-10-CM

## 2020-02-13 DIAGNOSIS — G89.29 CHRONIC RIGHT SHOULDER PAIN: Primary | ICD-10-CM

## 2020-02-13 DIAGNOSIS — M54.2 NECK PAIN: ICD-10-CM

## 2020-02-13 DIAGNOSIS — M25.511 CHRONIC RIGHT SHOULDER PAIN: Primary | ICD-10-CM

## 2020-02-13 DIAGNOSIS — M54.12 CERVICAL RADICULAR PAIN: ICD-10-CM

## 2020-02-13 PROCEDURE — 97110 THERAPEUTIC EXERCISES: CPT | Performed by: PHYSICAL THERAPIST

## 2020-02-13 PROCEDURE — 97140 MANUAL THERAPY 1/> REGIONS: CPT | Performed by: PHYSICAL THERAPIST

## 2020-02-13 NOTE — PROGRESS NOTES
Physical Therapy Daily Progress Note    Patient: Bienvenido Carter   : 1980  Diagnosis/ICD-10 Code:  Chronic right shoulder pain [M25.511, G89.29]  Referring practitioner: Tino Randall MD  Date of Initial Visit: Type: THERAPY  Noted: 2/3/2020  Today's Date: 2020  Patient seen for 4 sessions           Subjective  Pain 6/10 today, slept on it a little funny    Objective   See Exercise, Manual, and Modality Logs for complete treatment.     Exercises:  -shoulder rolls and scap ret  -AAROM ER with cane, 10x10 sec  -table slides 10x 10 sec  -shoulder isometrics; flex, ER, abd 10x5 sec  -supine press ups w/cane, 2x10  -supine cervical nodding, 10x 5 sec (towel roll under neck)  -supine cervical nodding with rotation, 5x 5sec to each side (towel roll under neck)  -SL ER with towel, small ROM, 2x10        Manual:  -PROM in all planes to R shoulder with oscillations for pain control, pect lift, STM to UT/levator muscles, subscap release    Assessment & Plan     Assessment  Assessment details: Pt with fewer pain complaints this week. She has been more tolerant to activity and PROM. Continue to progress UE/neck strength per pt tolerance.                Timed:    Manual Therapy:    18     mins  93182;  Therapeutic Exercise:    23     mins  92278;     Neuromuscular Namita:        mins  20096;    Therapeutic Activity:          mins  76967;     Gait Training:           mins  78564;     Ultrasound:          mins  20254;    Electrical Stimulation:         mins  41343 ( );  Iontophoresis         mins 68286;  Aquatic Therapy         mins 50362;  Dry Needling                   mins    Untimed:  Electrical Stimulation:         mins  97544 ( );  Mechanical Traction:         mins  60069;     Timed Treatment:   41   mins   Total Treatment:     41   mins  Katie Medel, PT  Physical Therapist

## 2020-02-18 ENCOUNTER — TREATMENT (OUTPATIENT)
Dept: PHYSICAL THERAPY | Facility: CLINIC | Age: 40
End: 2020-02-18

## 2020-02-18 DIAGNOSIS — M54.2 NECK PAIN: ICD-10-CM

## 2020-02-18 DIAGNOSIS — M54.12 CERVICAL RADICULAR PAIN: ICD-10-CM

## 2020-02-18 DIAGNOSIS — Z98.890 HISTORY OF SHOULDER SURGERY: ICD-10-CM

## 2020-02-18 DIAGNOSIS — G89.29 CHRONIC RIGHT SHOULDER PAIN: Primary | ICD-10-CM

## 2020-02-18 DIAGNOSIS — M25.511 CHRONIC RIGHT SHOULDER PAIN: Primary | ICD-10-CM

## 2020-02-18 PROCEDURE — 97140 MANUAL THERAPY 1/> REGIONS: CPT | Performed by: PHYSICAL THERAPIST

## 2020-02-18 PROCEDURE — 97110 THERAPEUTIC EXERCISES: CPT | Performed by: PHYSICAL THERAPIST

## 2020-02-18 NOTE — PROGRESS NOTES
Physical Therapy Daily Progress Note    Patient: Bienvenido Carter   : 1980  Diagnosis/ICD-10 Code:  Chronic right shoulder pain [M25.511, G89.29]  Referring practitioner: Tino Randall MD  Date of Initial Visit: Type: THERAPY  Noted: 2/3/2020  Today's Date: 2020  Patient seen for 5 sessions           Subjective Shoulder is really hurting this am, 7-8/10    Objective   See Exercise, Manual, and Modality Logs for complete treatment.     Exercises:  -shoulder rolls and scap ret x10  -AAROM ER and OH flexion with cane, 10x10 sec  -table slides 10x 10 sec  -shoulder isometrics; flex, ER, abd 10x5 sec  -supine press ups w/cane, 2x10  -supine cervical nodding, 10x 5 sec (towel roll under neck)  -supine cervical nodding with rotation, 5x 5sec to each side (towel roll under neck)  -SL ER with towel, small ROM, 2x10        Manual:  -PROM in all planes to R shoulder with oscillations for pain control, pect lift, STM to UT/levator muscles, subscap release    Assessment & Plan     Assessment  Assessment details: Pt reports a little more pain today with the rainy, cold weather, but she is tolerating PROM and her exercises well. Will continue to progress her strength program per pt tolerance.                Timed:    Manual Therapy:    12     mins  16549;  Therapeutic Exercise:    26     mins  53243;     Neuromuscular Namita:        mins  40244;    Therapeutic Activity:          mins  25164;     Gait Training:           mins  12605;     Ultrasound:          mins  91572;    Electrical Stimulation:         mins  83177 ( );  Iontophoresis         mins 49511;  Aquatic Therapy         mins 84469;  Dry Needling                   mins    Untimed:  Electrical Stimulation:         mins  53303 ( );  Mechanical Traction:         mins  63927;     Timed Treatment:   38   mins   Total Treatment:     38   mins  Katie Medel, PT  Physical Therapist

## 2020-02-25 ENCOUNTER — TREATMENT (OUTPATIENT)
Dept: PHYSICAL THERAPY | Facility: CLINIC | Age: 40
End: 2020-02-25

## 2020-02-25 DIAGNOSIS — Z98.890 HISTORY OF SHOULDER SURGERY: ICD-10-CM

## 2020-02-25 DIAGNOSIS — M54.2 NECK PAIN: ICD-10-CM

## 2020-02-25 DIAGNOSIS — M54.12 CERVICAL RADICULAR PAIN: ICD-10-CM

## 2020-02-25 DIAGNOSIS — M25.511 CHRONIC RIGHT SHOULDER PAIN: Primary | ICD-10-CM

## 2020-02-25 DIAGNOSIS — G89.29 CHRONIC RIGHT SHOULDER PAIN: Primary | ICD-10-CM

## 2020-02-25 PROCEDURE — 97140 MANUAL THERAPY 1/> REGIONS: CPT | Performed by: PHYSICAL THERAPIST

## 2020-02-25 PROCEDURE — 97110 THERAPEUTIC EXERCISES: CPT | Performed by: PHYSICAL THERAPIST

## 2020-02-25 NOTE — PROGRESS NOTES
Physical Therapy Daily Progress Note    Patient: Bienvenido Carter   : 1980  Diagnosis/ICD-10 Code:  Chronic right shoulder pain [M25.511, G89.29]  Referring practitioner: Tino Randall MD  Date of Initial Visit: Type: THERAPY  Noted: 2/3/2020  Today's Date: 2020  Patient seen for 6 sessions           Subjective Sorry about last session. My phone was broken and I couldn't call to cancel. Have a new phone now. I am sad today, a friend took his life the other night.     Objective       Passive Range of Motion     Right Shoulder   Flexion: 160 degrees   Abduction: 165 degrees   External rotation 45°: 80 degrees   Internal rotation 45°: 75 degrees      See Exercise, Manual, and Modality Logs for complete treatment.     Exercises:  -shoulder rolls and scap ret x10  -AAROM ER and OH flexion with cane, 10x10 sec  -table slides 10x 10 sec  -shoulder isometrics; flex, ER, abd 10x5 sec  -supine press ups w/cane, 2x10  -supine cervical nodding, 10x 5 sec (towel roll under neck)  -supine cervical nodding with rotation, 5x 5sec to each side (towel roll under neck)  -SL ER with towel, small ROM, 2x10        Manual:  -PROM in all planes to R shoulder with oscillations for pain control, pect lift, STM to UT/levator muscles, subscap release      Assessment & Plan     Assessment  Assessment details: Pt's PROM has improved over the last couple of weeks. She still has pain at the end ROM, especially more with flexion and abd. Starting some isometrics and AROM with some discomfort.                Timed:    Manual Therapy:    15     mins  07262;  Therapeutic Exercise:    25     mins  49212;     Neuromuscular Namita:        mins  38349;    Therapeutic Activity:          mins  56342;     Gait Training:           mins  72685;     Ultrasound:          mins  41826;    Electrical Stimulation:         mins  88504 ( );  Iontophoresis         mins 22640;  Aquatic Therapy         mins 68224;  Dry Needling                    mins    Untimed:  Electrical Stimulation:         mins  69100 ( );  Mechanical Traction:         mins  75127;     Timed Treatment:   40   mins   Total Treatment:     40   mins  Katie Medel, PT  Physical Therapist

## 2020-02-27 ENCOUNTER — TREATMENT (OUTPATIENT)
Dept: PHYSICAL THERAPY | Facility: CLINIC | Age: 40
End: 2020-02-27

## 2020-02-27 DIAGNOSIS — M54.2 NECK PAIN: ICD-10-CM

## 2020-02-27 DIAGNOSIS — M54.12 CERVICAL RADICULAR PAIN: ICD-10-CM

## 2020-02-27 DIAGNOSIS — Z98.890 HISTORY OF SHOULDER SURGERY: ICD-10-CM

## 2020-02-27 DIAGNOSIS — M25.511 CHRONIC RIGHT SHOULDER PAIN: Primary | ICD-10-CM

## 2020-02-27 DIAGNOSIS — G89.29 CHRONIC RIGHT SHOULDER PAIN: Primary | ICD-10-CM

## 2020-02-27 PROCEDURE — 97140 MANUAL THERAPY 1/> REGIONS: CPT | Performed by: PHYSICAL THERAPIST

## 2020-02-27 PROCEDURE — 97110 THERAPEUTIC EXERCISES: CPT | Performed by: PHYSICAL THERAPIST

## 2020-02-27 NOTE — PROGRESS NOTES
Physical Therapy Daily Progress Note    Patient: Bienvenido Carter   : 1980  Diagnosis/ICD-10 Code:  Chronic right shoulder pain [M25.511, G89.29]  Referring practitioner: Tino Randall MD  Date of Initial Visit: Type: THERAPY  Noted: 2/3/2020  Today's Date: 2020  Patient seen for 7 sessions           Subjective It is pretty sore this am, 7/10. Not sure if I am rolling over on it during the night    Objective   See Exercise, Manual, and Modality Logs for complete treatment.     Exercises:  -AAROM ER and OH flexion with cane, 10x10 sec  -table slides 10x 10 sec  -shoulder isometrics; flex, ER, abd 10x5 sec  -supine press ups w/cane, 2x10  -supine cervical nodding, 10x 5 sec (towel roll under neck)  -supine cervical nodding with rotation, 5x 5sec to each side (towel roll under neck)  -SL ER with towel, small ROM, 2x10  -wall slides, 2x5         Manual:  -PROM in all planes to R shoulder with oscillations for pain control, pect lift, STM to UT/levator muscles, subscap release    Assessment/Plan  Pt pain c/o in the front of the shoulder and down into the R upper arm. She feels that she has been sleeping on the R side a bit, causing her more pain in the am. Cont to progress ROM and strength per pt tolerance         Timed:    Manual Therapy:    12     mins  11052;  Therapeutic Exercise:    26     mins  89456;     Neuromuscular Namita:        mins  95202;    Therapeutic Activity:          mins  70955;     Gait Training:           mins  74934;     Ultrasound:          mins  32872;    Electrical Stimulation:         mins  43699 ( );  Iontophoresis         mins 43231;  Aquatic Therapy         mins 33351;  Dry Needling                   mins    Untimed:  Electrical Stimulation:         mins  76013 ( );  Mechanical Traction:         mins  38892;     Timed Treatment:   38   mins   Total Treatment:     38   mins  Katie Medel, PT  Physical Therapist

## 2020-03-10 ENCOUNTER — TREATMENT (OUTPATIENT)
Dept: PHYSICAL THERAPY | Facility: CLINIC | Age: 40
End: 2020-03-10

## 2020-03-10 DIAGNOSIS — M25.511 CHRONIC RIGHT SHOULDER PAIN: Primary | ICD-10-CM

## 2020-03-10 DIAGNOSIS — M54.2 NECK PAIN: ICD-10-CM

## 2020-03-10 DIAGNOSIS — Z98.890 HISTORY OF SHOULDER SURGERY: ICD-10-CM

## 2020-03-10 DIAGNOSIS — M54.12 CERVICAL RADICULAR PAIN: ICD-10-CM

## 2020-03-10 DIAGNOSIS — G89.29 CHRONIC RIGHT SHOULDER PAIN: Primary | ICD-10-CM

## 2020-03-10 PROCEDURE — 97140 MANUAL THERAPY 1/> REGIONS: CPT | Performed by: PHYSICAL THERAPIST

## 2020-03-10 PROCEDURE — 97110 THERAPEUTIC EXERCISES: CPT | Performed by: PHYSICAL THERAPIST

## 2020-03-10 NOTE — PROGRESS NOTES
30-Day / 10-Visit Progress Note         Patient: Bienvenido Carter   : 1980  Diagnosis/ICD-10 Code:  Chronic right shoulder pain [M25.511, G89.29]  Referring practitioner: Tino Randall MD  Date of Initial Visit: Type: THERAPY  Noted: 2/3/2020  Today's Date: 3/10/2020  Patient seen for 8 sessions      Subjective:     Clinical Progress: unchanged  Home Program Compliance: Yes  Treatment has included:  therapeutic exercise, manual therapy and patient education with home exercise program     Subjective Pt went back to MD for shoulder. He looked at her L shoulder since she is having pain in it as well. He says she has the same issue in the shoulder with a calcific biceps tendon. She is still taking meloxicam, but doesn't feel that it is helping. She is not sleeping well and having to take meds for that, but still waking up a lot.     Objective       Palpation   Left   Tenderness of the biceps.     Right   Hypertonic in the upper trapezius. Tenderness of the anterior deltoid, biceps and upper trapezius.     Left Shoulder Comments  Left biceps: LH tendon.     Right Shoulder Comments  Right biceps: LH tendon.     Active Range of Motion   Left Shoulder   Flexion: 135 (pain at end ROM) degrees   Abduction: 90 (pain) degrees   External rotation BTH: T3   Internal rotation BTB: T11     Right Shoulder   Flexion: 125 (painful arc @90 deg and at end ROM) degrees   Abduction: 90 (pain) degrees   External rotation BTH: C7   Internal rotation BTB: Active internal rotation behind the back: ipsilateral buttocks - painful.     Passive Range of Motion   Left Shoulder   Flexion: 170 degrees   Abduction: 170 degrees   External rotation 45°: 88 degrees   Internal rotation 45°: 85 degrees     Right Shoulder   Flexion: 158 (painful, decreased GH/scapular motion) degrees   Abduction: 155 (in scaption) degrees   External rotation 45°: 75 (painful) degrees   Internal rotation 45°: 70 degrees     Scapular Mobility     Additional  Scapular Mobility Details  Decreased scapulohumeral rhythm on the R shoulder    Strength/Myotome Testing     Left Shoulder     Planes of Motion   Flexion: 4-   Abduction: 4-   External rotation at 0°: 4-   Internal rotation at 0°: 4     Right Shoulder     Planes of Motion   Flexion: 3+   Abduction: 3+   External rotation at 0°: 3+   Internal rotation at 0°: 4-      See Exercise, Manual, and Modality Logs for complete treatment.     Exercises:  -AAROM ER and OH flexion with cane, 10x10 sec  -table slides 10x 10 sec        Manual:  -PROM in all planes to R and L shoulder with oscillations for pain control, pect lift, STM to UT/levator muscles, subscap release     Functional outcome score: YDOZI=116/130=79%  Neck Disability Index=52%, with 0% being no disability      Assessment & Plan     Assessment  Assessment details: Bienvenido Carter has been seen for 8 physical therapy sessions for B shoulder and neck pain.  Treatment has included therapeutic exercise, manual therapy and patient education with home exercise program . Progress to physical therapy goals is slow. She has made some improvements in her ROM, but she does have a capsular pattern in the R shoulder making her end ROM firm and painful. She is now having pain in the L shoulder, with pt reporting xrays show biceps calcifications (PROM is WNL on the L). She has decreased strength and tolerance to activities OH, dressing, and sleeping. She continues with neck pain and radicular symptoms.  She will benefit from continued skilled physical therapy to address remaining impairments and functional limitations.       Goals  Plan Goals: ST weeks  1. Patient will be independent with education for symptom management, joint protection and strategies to minimize stress on affected tissues  (ongoing)  2. Pt to improve pain complaints to no more than 7/10 with ADLs (ongoing)  3. Pt to improve R shoulder ROM in flex=110/140 deg, abd=100/140 deg, ER=C3/70 deg and  IR=L5/70 deg (part met)    LT weeks  1. Pt to improve pain complaints to no more than 4/10 with ADLs In B shoulders and neck  2. Pt to improve R shoulder ROM in flex=150/160 deg, abd=150/160 deg, ER=T3/80 deg and IR=T10/80 deg (ongoing)  3. Pt to improve shoulder strength to 4/5  (ongoing)  4. Pt to improve SPADI score from 75% to 50% for overall functional improvement (ongoing - 79%)  5. Pt to improve score on NDI from 60% to 40%, with 0% being no disability (ongoing -improved to 52%)  6. Patient will be independent and compliant with advanced home exercise program to facilitate self-management of symptoms - (ongoing)    Plan  Therapy options: will be seen for skilled physical therapy services  Plan details: Left a message for MD regarding her symptoms and to discuss her care           Recommendations: Continue as planned  Timeframe: 2 months  Prognosis to achieve goals: good    PT Signature: Katie Medel, PT      Based upon review of the patient's progress and continued therapy plan, it is my medical opinion that Bienvenido Carter should continue physical therapy treatment at [facility]  [address] [place] [address] [phone].    Signature: __________________________________  Tino Randall MD    Timed:  Manual Therapy:    30     mins  56457;  Therapeutic Exercise:    10     mins  55095;     Neuromuscular Namita:        mins  04790;    Therapeutic Activity:          mins  97284;     Gait Training:           mins  54316;     Ultrasound:          mins  01633;    Electrical Stimulation:         mins  46262 ( );  Iontophoresis         mins 98602;  Dry Needling                   mins    Untimed:  Electrical Stimulation:         mins  11431 ( );  Mechanical Traction:         mins  50112;     Timed Treatment:   40   mins   Total Treatment:     40   mins

## 2020-03-13 ENCOUNTER — TREATMENT (OUTPATIENT)
Dept: PHYSICAL THERAPY | Facility: CLINIC | Age: 40
End: 2020-03-13

## 2020-03-13 DIAGNOSIS — M54.2 NECK PAIN: ICD-10-CM

## 2020-03-13 DIAGNOSIS — M25.511 CHRONIC RIGHT SHOULDER PAIN: Primary | ICD-10-CM

## 2020-03-13 DIAGNOSIS — G89.29 CHRONIC RIGHT SHOULDER PAIN: Primary | ICD-10-CM

## 2020-03-13 DIAGNOSIS — Z98.890 HISTORY OF SHOULDER SURGERY: ICD-10-CM

## 2020-03-13 DIAGNOSIS — M54.12 CERVICAL RADICULAR PAIN: ICD-10-CM

## 2020-03-13 PROCEDURE — 97140 MANUAL THERAPY 1/> REGIONS: CPT | Performed by: PHYSICAL THERAPIST

## 2020-03-13 PROCEDURE — 97110 THERAPEUTIC EXERCISES: CPT | Performed by: PHYSICAL THERAPIST

## 2020-03-13 NOTE — PROGRESS NOTES
Physical Therapy Daily Progress Note    Patient: Bienvenido Carter   : 1980  Diagnosis/ICD-10 Code:  Chronic right shoulder pain [M25.511, G89.29]  Referring practitioner: Tino Randall MD  Date of Initial Visit: Type: THERAPY  Noted: 2/3/2020  Today's Date: 3/13/2020  Patient seen for 9 sessions           Subjective  Still not sleeping well.     Objective   See Exercise, Manual, and Modality Logs for complete treatment.     Exercises:  -AAROM ER and OH flexion with cane, 10x10 sec  -scap ret and shoulder ext, yellow 2x10 ea  -shoulder isometrics: ER, abd 10x5 sec  -supine press ups, 2x10  -supine cervical nodding, 10x 5 sec (towel roll under neck)  -supine cervical nodding with rotation, 5x 5sec to each side (towel roll under neck)  -SL ER with towel, small ROM, 2x10           Manual:  -PROM in all planes to R shoulder with oscillations for pain control, pect lift, STM to UT/levator muscles, subscap release    Assessment/Plan  Pt still painful in R>L shoulders making it difficult for her to sleep. Her meds she takes for sleeping aren't seeming to help either.          Timed:    Manual Therapy:    15     mins  31153;  Therapeutic Exercise:    25     mins  82369;     Neuromuscular Namita:        mins  92756;    Therapeutic Activity:          mins  15423;     Gait Training:           mins  11757;     Ultrasound:          mins  59912;    Electrical Stimulation:         mins  97007 ( );  Iontophoresis         mins 47857;  Aquatic Therapy         mins 41517;  Dry Needling                   mins    Untimed:  Electrical Stimulation:         mins  29318 ( );  Mechanical Traction:         mins  58732;     Timed Treatment:   40   mins   Total Treatment:     40   mins  Katie Medel, PT  Physical Therapist

## 2020-06-15 ENCOUNTER — TELEPHONE (OUTPATIENT)
Dept: NEUROSURGERY | Facility: CLINIC | Age: 40
End: 2020-06-15

## 2020-06-15 NOTE — TELEPHONE ENCOUNTER
Provider:   Caller: PATIENT  Reason for Call: PATIENT CURRENTLY SCHEDULED BUT PATIENT REQUESTING TO BE SEEN SOONER. PATIENT COMPLAINING OF NUMBNESS & TINGLING OF UPPER EXTREMITIES AND FEELS LIKE IT MIGHT BE RELATED TO CARPEL TUNNEL BUT VERY UNSURE. PATIENT SEEN ORTHO SURGEON TODAY (6/15) WHO HAS INSTRUCTED HER TO FOLLOW UP WITH OUR OFFICE AS SOON AS POSSIBLE. PATIENT HAD AN MRI CERV ON 10/24/19 & AWAITING ANY INSTRUCTIONS REGARDING NEXT STEPS.  When was the patient last seen: 10/02/19  When did it start: ABOUT A WEEK AND HALF AGO  Where is it located: NECK, SHOULDERS, BOTH ARMS & FINGERS  Characteristics of symptom/severity: SEVERE PAIN  Timing- Is it constant or intermittent: CONSTANT  What therapies/medications have you tried: NECK DARRYN    PLEASE CONTACT PATIENT -[phone] FOR FURTHER INSTRUCTION

## 2020-06-17 ENCOUNTER — TREATMENT (OUTPATIENT)
Dept: PHYSICAL THERAPY | Facility: CLINIC | Age: 40
End: 2020-06-17

## 2020-06-17 DIAGNOSIS — M54.12 CERVICAL RADICULAR PAIN: ICD-10-CM

## 2020-06-17 DIAGNOSIS — M25.511 CHRONIC RIGHT SHOULDER PAIN: Primary | ICD-10-CM

## 2020-06-17 DIAGNOSIS — G89.29 CHRONIC RIGHT SHOULDER PAIN: Primary | ICD-10-CM

## 2020-06-17 DIAGNOSIS — M54.2 NECK PAIN: ICD-10-CM

## 2020-06-17 DIAGNOSIS — Z98.890 HISTORY OF SHOULDER SURGERY: ICD-10-CM

## 2020-06-17 PROCEDURE — 97140 MANUAL THERAPY 1/> REGIONS: CPT | Performed by: PHYSICAL THERAPIST

## 2020-06-17 NOTE — PROGRESS NOTES
Physical Therapy Re-certification         Patient: Bienvenido Carter   : 1980  Diagnosis/ICD-10 Code:  Chronic right shoulder pain [M25.511, G89.29]  Referring practitioner: Salvatore Roblero MD  Date of Initial Visit: Type: THERAPY  Noted: 2/3/2020  Today's Date: 2020  Patient seen for 10 sessions      Subjective:     Clinical Progress: worse  Home Program Compliance: Yes  Treatment has included:  therapeutic exercise, manual therapy, therapeutic activity, ultrasound and patient education with home exercise program     Subjective Evaluation    History of Present Illness  Mechanism of injury: Pt with history of neck and shoulder pain with radicular symptoms. Her neck pain has been progressively getting worse with numbness into B UEs to her hands that is constant. She has a hard time with gripping things, sleeping is difficult. She got a neck brace, but feels that it makes it worse. She has a pneumatic cervical traction device, but is getting no relief with it.          Objective          Palpation   Left   Hypertonic in the levator scapulae, scalenes, suboccipitals and upper trapezius.   Tenderness of the levator scapulae, suboccipitals and upper trapezius.   Trigger point to suboccipitals and upper trapezius.     Right   Hypertonic in the levator scapulae, scalenes, sternocleidomastoid, suboccipitals and upper trapezius. Tenderness of the levator scapulae, sternocleidomastoid, suboccipitals and upper trapezius.   Trigger point to levator scapulae, suboccipitals and upper trapezius.     Neurological Testing     Sensation   Cervical/Thoracic   Left   Intact: light touch  Diminished: light touch    Right   Intact: light touch  Diminished: light touch    Comments   Left light touch: decreased in C5/6.   Right light touch: decreased C6.     Active Range of Motion     Additional Active Range of Motion Details  Flexion=40% painful  Ext=10%, painful into base of neck  Lateral flexion=25%, painful on  ipsilateral side  R rotation=60%  L rotation=25%, painful B    Strength/Myotome Testing     Additional Strength Details  MMT B=3 to 4-/5 (painful with UE MMT)    Tests   Cervical     Left   Positive active compression ([place]), cervical distraction and Spurling's sign.     Right   Positive active compression ([place]), cervical distraction and Spurling's sign.     Additional Tests Details  Spurling more painful on the L>R      See Exercise, Manual, and Modality Logs for complete treatment.     Functional outcome score: Neck Disability Index=82%  AFEJZ=999/130 or 80% disabled    Manual therapy:  Stm to B UT, levator, and suboccipitals with suboccipital release and manual distraction    Assessment & Plan     Assessment  Assessment details: Bienvenido Carter has been seen for 10 physical therapy sessions for B shoulder and neck pain.  Treatment has included therapeutic exercise, manual therapy, therapeutic activity, ultrasound and patient education with home exercise program . Progress to physical therapy goals is slow. Over the past few weeks, her L shoulder pain and neck pain have increased. She is reporting radicular symptoms into B UE, L>R, with some decreased sensation to light touch. She has tightness of her UT, levator, scalenes, and suboccipitals.  She will benefit from continued skilled physical therapy to address remaining impairments and functional limitations.       Goals  Plan Goals: ST weeks  1. Patient will be independent with education for symptom management, joint protection and strategies to minimize stress on affected tissues  (ongoing)  2. Pt to improve pain complaints to no more than 7/10 with ADLs (ongoing)  3. Pt to improve R shoulder ROM in flex=110/140 deg, abd=100/140 deg, ER=C3/70 deg and IR=L5/70 deg (part met)    LT weeks  1. Pt to improve pain complaints to no more than 4/10 with ADLs In B shoulders and neck  2. Pt to improve R shoulder ROM in flex=150/160 deg, abd=150/160 deg,  ER=T3/80 deg and IR=T10/80 deg (ongoing)  3. Pt to improve shoulder strength to 4/5  (ongoing)  4. Pt to improve SPADI score from 80% to 60% for overall functional improvement (ongoing - 80%)  5. Pt to improve score on NDI from 82% to 60%, with 0% being no disability (ongoing -improved to 82%)  6. Patient will be independent and compliant with advanced home exercise program to facilitate self-management of symptoms - (ongoing)    Plan  Frequency: 2x week  Duration in weeks: 20           Recommendations: Continue as planned  Timeframe: 2 months  Prognosis to achieve goals: good    PT Signature: Katie Medel, PT      Based upon review of the patient's progress and continued therapy plan, it is my medical opinion that Bienvenido Carter should continue physical therapy treatment at [facility]  [address]   [place] [id number] [phone]. office phone  [phone] office fax      Re-certification  Certification Period: 9/15/2020  I certify that the therapy services are furnished while this patient is under my care.  The services outlined above are required by this patient, and will be reviewed every 90 days.     PHYSICIAN: Salvatore Roblero MD      DATE:     Signature: __________________________________  Salvatore Roblero MD    Please sign and return via fax to [phone]   Thank you, [facility].    Timed:  Manual Therapy:    45     mins  74225;  Therapeutic Exercise:         mins  27484;     Neuromuscular Namita:        mins  13145;    Therapeutic Activity:          mins  94046;     Gait Training:           mins  42097;     Ultrasound:          mins  92347;    Electrical Stimulation:         mins  33041 ( );  Iontophoresis         mins 09015;  Orthotic Mgmt/training       mins 90915;  Orthotic check out       mins 85900;  Canalith Repositioning       mins 93890;  Aquatic Therapy         mins 44884;  Dry Needling                    mins    Untimed:  Electrical Stimulation:         mins  10119 ( );  Mechanical Traction:         mins  06829;     Timed Treatment:   45   mins   Total Treatment:     45   mins

## 2020-06-17 NOTE — TELEPHONE ENCOUNTER
Pt wants in sooner than 7/27/ appt.  She saw her ortho and they did an MRI shoulder and told her it must be her neck.  Last MRI 10/24/2019.

## 2020-06-17 NOTE — TELEPHONE ENCOUNTER
And we talk about this.  I think I said to set up a tele-visit with her so I can talk with her and see if she needed new imaging of the cervical spine.  I did not see a new one in her chart it

## 2020-06-19 ENCOUNTER — TREATMENT (OUTPATIENT)
Dept: PHYSICAL THERAPY | Facility: CLINIC | Age: 40
End: 2020-06-19

## 2020-06-19 DIAGNOSIS — M25.511 CHRONIC RIGHT SHOULDER PAIN: Primary | ICD-10-CM

## 2020-06-19 DIAGNOSIS — M54.2 NECK PAIN: ICD-10-CM

## 2020-06-19 DIAGNOSIS — M54.12 CERVICAL RADICULAR PAIN: ICD-10-CM

## 2020-06-19 DIAGNOSIS — Z98.890 HISTORY OF SHOULDER SURGERY: ICD-10-CM

## 2020-06-19 DIAGNOSIS — G89.29 CHRONIC RIGHT SHOULDER PAIN: Primary | ICD-10-CM

## 2020-06-19 PROCEDURE — 97140 MANUAL THERAPY 1/> REGIONS: CPT | Performed by: PHYSICAL THERAPIST

## 2020-06-19 NOTE — PROGRESS NOTES
Physical Therapy Daily Progress Note    Patient: Bienvenido Carter   : 1980  Diagnosis/ICD-10 Code:  Chronic right shoulder pain [M25.511, G89.29]  Referring practitioner: Tino Randall MD  Date of Initial Visit: Type: THERAPY  Noted: 2/3/2020  Today's Date: 2020  Patient seen for 11 sessions           Subjective I had a really hard time sleeping last night due to pain    Objective   See Exercise, Manual, and Modality Logs for complete treatment.     Manual therapy:  STM to B UT, levator and cervical PVMs with suboccipital release, manual distraction, and ischemic pressure to R UT and levator muscles to reduce trigger points    Assessment/Plan  Pt with c/o constant neck pain and radicular symptoms. She has been using a traction unit at home without any relief. Some reduction in pain post therapy, but still with UE pain. Pt has televisit with MD on Monday         Timed:    Manual Therapy:    38     mins  27968;  Therapeutic Exercise:         mins  19763;     Neuromuscular Namita:        mins  81226;    Therapeutic Activity:          mins  55094;     Gait Training:           mins  94414;     Ultrasound:          mins  29865;    Electrical Stimulation:         mins  21681 ( );  Iontophoresis         mins 12703;  Aquatic Therapy         mins 30383;  Dry Needling                   mins    Untimed:  Electrical Stimulation:         mins  90660 ( );  Mechanical Traction:         mins  42882;     Timed Treatment:   38   mins   Total Treatment:     38   mins  Katie Medel, PT  Physical Therapist

## 2020-06-22 ENCOUNTER — OFFICE VISIT (OUTPATIENT)
Dept: NEUROSURGERY | Facility: CLINIC | Age: 40
End: 2020-06-22

## 2020-06-22 DIAGNOSIS — R20.2 NUMBNESS AND TINGLING IN RIGHT HAND: ICD-10-CM

## 2020-06-22 DIAGNOSIS — M50.122 CERVICAL DISC DISORDER AT C5-C6 LEVEL WITH RADICULOPATHY: Primary | ICD-10-CM

## 2020-06-22 DIAGNOSIS — M48.02 SPINAL STENOSIS OF CERVICAL REGION: ICD-10-CM

## 2020-06-22 DIAGNOSIS — R20.0 NUMBNESS AND TINGLING IN RIGHT HAND: ICD-10-CM

## 2020-06-22 PROCEDURE — 99442 PR PHYS/QHP TELEPHONE EVALUATION 11-20 MIN: CPT | Performed by: NEUROLOGICAL SURGERY

## 2020-06-22 NOTE — PROGRESS NOTES
Subjective   History of Present Illness: Bienvenido Carter is a 39 y.o. female for televisit follow up for increased N/T bilateral arms and right shoulder pain. She saw an orthopedic surgeon, Dr Randall, who told her to discuss with our office.  She is going to PT for chronic right shoulder pain.  [facility]    Patient was last seen 10.2.19 for neck pain and right shoulder pain    It has been about 8 months since I have seen her. This was a televisit which lasted 15 minutes. I was in the office and she was at home. Things have worsened since I saw her last particularly in the last month or so. She is now working with Dr. Randall, the orthopedic surgeon and he is holding off on doing anything about her bad shoulders until the neck is addressed. She is having more in the pain the neck and down the arms and into the hands with numbness and tingling. She says that her hands are weak. She has not had an EMG and nerve conduction study done by a physician. Apparently it was done at the physical therapist office. She describes some pain and tingling in her hands, which could be carpal tunnel syndrome. I think we need to get a new MRI, x-rays and an EMG and nerve conduction study and have her come in to decide at this point if she needs surgery. I will be able to examine her next time as well.      You have chosen to receive care through a telephone visit. Do you consent to use a telephone visit for your medical care today? Yes    History of Present Illness    The following portions of the patient's history were reviewed and updated as appropriate: allergies, current medications, past family history, past medical history, past social history, past surgical history and problem list.    Review of Systems   Constitutional: Negative.    HENT: Negative.    Eyes: Negative.    Respiratory: Negative.    Cardiovascular: Negative.    Gastrointestinal: Negative.    Endocrine: Negative.    Genitourinary: Negative  for urgency.   Musculoskeletal: Negative for arthralgias, gait problem, joint swelling, myalgias, neck pain and neck stiffness.   Allergic/Immunologic: Negative.    Neurological: Positive for numbness. Negative for weakness.   Hematological: Negative.    Psychiatric/Behavioral: Negative.        Objective             Physical Exam   Deferred  Neurologic Exam   Deferred        Assessment/Plan   Independent Review of Radiographic Studies:      I personally reviewed the images from the following studies.    The cervical MRI done 9/7/2018 shows interval progression of her stenosis particularly at C5-C6.  There is also some stenosis at C3-C4 and C4-C5.  Agree with the report    Medical Decision Making:      We will go ahead and get a new MRI as well as x-rays and flexion-extension films and an EMG and nerve conduction study of both upper extremities.  We will decide at that point if this is the time to pursue surgery for the neck.      Bienvenido was seen today for numbness and shoulder pain.    Diagnoses and all orders for this visit:    Cervical disc disorder at C5-C6 level with radiculopathy  -     MRI Cervical Spine Without Contrast; Future  -     XR spine cervical ap and lat w flex and ext; Future  -     EMG both arms; Future  -     Nerve Conduction Test both arms; Future    Spinal stenosis of cervical region  -     MRI Cervical Spine Without Contrast; Future  -     XR spine cervical ap and lat w flex and ext; Future  -     EMG both arms; Future  -     Nerve Conduction Test both arms; Future    Numbness and tingling in right hand  -     MRI Cervical Spine Without Contrast; Future  -     XR spine cervical ap and lat w flex and ext; Future  -     EMG both arms; Future  -     Nerve Conduction Test both arms; Future      Return in about 9 days (around 7/1/2020) for After tests.

## 2020-06-25 ENCOUNTER — TREATMENT (OUTPATIENT)
Dept: PHYSICAL THERAPY | Facility: CLINIC | Age: 40
End: 2020-06-25

## 2020-06-25 DIAGNOSIS — M25.511 CHRONIC RIGHT SHOULDER PAIN: Primary | ICD-10-CM

## 2020-06-25 DIAGNOSIS — Z98.890 HISTORY OF SHOULDER SURGERY: ICD-10-CM

## 2020-06-25 DIAGNOSIS — M54.2 NECK PAIN: ICD-10-CM

## 2020-06-25 DIAGNOSIS — G89.29 CHRONIC RIGHT SHOULDER PAIN: Primary | ICD-10-CM

## 2020-06-25 DIAGNOSIS — M54.12 CERVICAL RADICULAR PAIN: ICD-10-CM

## 2020-06-25 PROCEDURE — 97035 APP MDLTY 1+ULTRASOUND EA 15: CPT | Performed by: PHYSICAL THERAPIST

## 2020-06-25 PROCEDURE — 97110 THERAPEUTIC EXERCISES: CPT | Performed by: PHYSICAL THERAPIST

## 2020-06-25 NOTE — PROGRESS NOTES
Physical Therapy Daily Progress Note    Patient: Bienvenido Carter   : 1980  Diagnosis/ICD-10 Code:  Chronic right shoulder pain [M25.511, G89.29]  Referring practitioner: Salvatore Roblero MD  Date of Initial Visit: Type: THERAPY  Noted: 2/3/2020  Today's Date: 2020  Patient seen for 12 sessions           Subjective Pt went to primary care today. Had some bloodwork drawn and UA done. She talked to neurosurgeon and is going to have EMG/NCS, xrays, and MRIs repeated. She has been in a lot of pain in spite of taking pain meds and gabapentin.     Objective   See Exercise, Manual, and Modality Logs for complete treatment.     Manual therapy:  STM to B UT, levator and cervical PVMs with suboccipital release, manual distraction, and ischemic pressure to R UT and levator muscles to reduce trigger points    Assessment/Plan  Pt had virtual appt with neurosurgeon who is ordering testing for her. She is having a lot of pain in the neck and into the UEs. She has complaints of decreased ability to grasp objects and use her phone. Discussed a pneumatic traction device, TENs unit - given names to patient. She did have reduced UE symptoms post treatment         Timed:    Manual Therapy:    30     mins  95796;  Therapeutic Exercise:         mins  53886;     Neuromuscular Namita:        mins  79478;    Therapeutic Activity:          mins  15017;     Gait Training:           mins  62457;     Ultrasound:     10     mins  52981;    Electrical Stimulation:         mins  75631 ( );  Iontophoresis         mins 88467;  Aquatic Therapy         mins 95807;  Dry Needling                   mins    Untimed:  Electrical Stimulation:         mins  43277 ( );  Mechanical Traction:         mins  59461;     Timed Treatment:   40   mins   Total Treatment:     40   mins  Katie Medel, PT  Physical Therapist

## 2020-07-01 ENCOUNTER — TREATMENT (OUTPATIENT)
Dept: PHYSICAL THERAPY | Facility: CLINIC | Age: 40
End: 2020-07-01

## 2020-07-01 DIAGNOSIS — M54.2 NECK PAIN: ICD-10-CM

## 2020-07-01 DIAGNOSIS — G89.29 CHRONIC RIGHT SHOULDER PAIN: Primary | ICD-10-CM

## 2020-07-01 DIAGNOSIS — Z98.890 HISTORY OF SHOULDER SURGERY: ICD-10-CM

## 2020-07-01 DIAGNOSIS — M25.511 CHRONIC RIGHT SHOULDER PAIN: Primary | ICD-10-CM

## 2020-07-01 DIAGNOSIS — M54.12 CERVICAL RADICULAR PAIN: ICD-10-CM

## 2020-07-01 PROCEDURE — 97035 APP MDLTY 1+ULTRASOUND EA 15: CPT | Performed by: PHYSICAL THERAPIST

## 2020-07-01 PROCEDURE — 97140 MANUAL THERAPY 1/> REGIONS: CPT | Performed by: PHYSICAL THERAPIST

## 2020-07-01 NOTE — PROGRESS NOTES
Physical Therapy Daily Progress Note    Patient: Bienvenido Carter   : 1980  Diagnosis/ICD-10 Code:  Chronic right shoulder pain [M25.511, G89.29]  Referring practitioner: Salvatore Roblero MD  Date of Initial Visit: Type: THERAPY  Noted: 2/3/2020  Today's Date: 2020  Patient seen for 13 sessions           Subjective  Pt reports that blood work was neg for any RA, UA was clear. She is still having complaints of multi joint pain.    Objective   See Exercise, Manual, and Modality Logs for complete treatment.     Manual therapy:  STM to B UT, levator and cervical PVMs with suboccipital release, manual distraction, and ischemic pressure to R UT and levator muscles to reduce trigger points    Assessment/Plan  Pt is having a repeat MRI tomorrow as well as xrays. She continues with complaints of B UE pain and numbness in her hands, making it hard for her to  objects. She did have some relief from last session, but it only lasted for the rest of the day.          Timed:    Manual Therapy:    30     mins  93093;  Therapeutic Exercise:         mins  80654;     Neuromuscular Namita:        mins  66748;    Therapeutic Activity:          mins  61895;     Gait Training:           mins  13289;     Ultrasound:     10     mins  31778;    Electrical Stimulation:         mins  60519 ( );  Iontophoresis         mins 55252;  Aquatic Therapy         mins 36431;  Dry Needling                   mins    Untimed:  Electrical Stimulation:         mins  59204 ( );  Mechanical Traction:         mins  57343;     Timed Treatment:   40   mins   Total Treatment:     40   mins  Katie Medel, PT  Physical Therapist

## 2020-07-02 ENCOUNTER — HOSPITAL ENCOUNTER (OUTPATIENT)
Dept: GENERAL RADIOLOGY | Facility: HOSPITAL | Age: 40
Discharge: HOME | End: 2020-07-02

## 2020-07-02 ENCOUNTER — HOSPITAL ENCOUNTER (OUTPATIENT)
Dept: MRI IMAGING | Facility: HOSPITAL | Age: 40
Discharge: HOME | End: 2020-07-02
Admitting: NEUROLOGICAL SURGERY

## 2020-07-02 DIAGNOSIS — R20.2 NUMBNESS AND TINGLING IN RIGHT HAND: ICD-10-CM

## 2020-07-02 DIAGNOSIS — R20.0 NUMBNESS AND TINGLING IN RIGHT HAND: ICD-10-CM

## 2020-07-02 DIAGNOSIS — M50.122 CERVICAL DISC DISORDER AT C5-C6 LEVEL WITH RADICULOPATHY: ICD-10-CM

## 2020-07-02 DIAGNOSIS — M48.02 SPINAL STENOSIS OF CERVICAL REGION: ICD-10-CM

## 2020-07-02 PROCEDURE — 72141 MRI NECK SPINE W/O DYE: CPT

## 2020-07-02 PROCEDURE — 72050 X-RAY EXAM NECK SPINE 4/5VWS: CPT

## 2020-07-06 ENCOUNTER — TREATMENT (OUTPATIENT)
Dept: PHYSICAL THERAPY | Facility: CLINIC | Age: 40
End: 2020-07-06

## 2020-07-06 DIAGNOSIS — M54.2 NECK PAIN: ICD-10-CM

## 2020-07-06 DIAGNOSIS — M25.511 CHRONIC RIGHT SHOULDER PAIN: Primary | ICD-10-CM

## 2020-07-06 DIAGNOSIS — G89.29 CHRONIC RIGHT SHOULDER PAIN: Primary | ICD-10-CM

## 2020-07-06 DIAGNOSIS — Z98.890 HISTORY OF SHOULDER SURGERY: ICD-10-CM

## 2020-07-06 DIAGNOSIS — M54.12 CERVICAL RADICULAR PAIN: ICD-10-CM

## 2020-07-06 PROCEDURE — 97140 MANUAL THERAPY 1/> REGIONS: CPT | Performed by: PHYSICAL THERAPIST

## 2020-07-06 PROCEDURE — 97035 APP MDLTY 1+ULTRASOUND EA 15: CPT | Performed by: PHYSICAL THERAPIST

## 2020-07-06 NOTE — PROGRESS NOTES
Physical Therapy Daily Progress Note    Patient: Bienvenido Carter   : 1980  Diagnosis/ICD-10 Code:  Chronic right shoulder pain [M25.511, G89.29]  Referring practitioner: Salvatore Roblero MD  Date of Initial Visit: Type: THERAPY  Noted: 2/3/2020  Today's Date: 2020  Patient seen for 14 sessions           Subjective  I had my MRI and xrays. Haven't talked to the MD yet    Objective   See Exercise, Manual, and Modality Logs for complete treatment.     Manual therapy:  STM to B UT, levator and cervical PVMs with suboccipital release, manual distraction, and ischemic pressure to R UT and levator muscles to reduce trigger points    Assessment/Plan  Pt continues to complain of severe neck pain that is only mildly helped with medication. She has N&T into her hands, that is more present than not. Waiting to have EMG/NCS in August.          Timed:    Manual Therapy:    30     mins  70718;  Therapeutic Exercise:         mins  23137;     Neuromuscular Namita:        mins  11028;    Therapeutic Activity:          mins  22357;     Gait Training:           mins  04970;     Ultrasound:     10     mins  54231;    Electrical Stimulation:         mins  21537 ( );  Iontophoresis         mins 20034;  Aquatic Therapy         mins 42976;  Dry Needling                   mins    Untimed:  Electrical Stimulation:         mins  77027 ( );  Mechanical Traction:         mins  57529;     Timed Treatment:   40   mins   Total Treatment:     40   mins  Katie Medel, PT  Physical Therapist

## 2020-07-07 ENCOUNTER — TELEPHONE (OUTPATIENT)
Dept: NEUROSURGERY | Facility: CLINIC | Age: 40
End: 2020-07-07

## 2020-07-07 NOTE — TELEPHONE ENCOUNTER
Her father, Dr Carter called and said that she is in a lot of discomfort, she has had her cervical MRI and plain films at [facility], however, her EMG is not scheduled until August with Dr Poncho Miller.    He said that he has called Dr Miller personally to get this moved up and has not had a response.  He said that if you still need to EMG he wants you to call Dr Miller personally and have this moved up.    He states that she needs something done urgently    I explained to him that you were in surgery today and might not be able to address this until tomorrow, he voiced understanding

## 2020-07-07 NOTE — TELEPHONE ENCOUNTER
Spoke with patient and informed her that Dr SANCHEZ can see her 7.9.20 at 11:00 am     Missy will open the spot when she returns to office tomorrow

## 2020-07-07 NOTE — TELEPHONE ENCOUNTER
Just have her come in for a face-to-face visit on Thursday.  Frankly, I do not think the EMG and nerve conduction study is critical to make any decision about her neck so we do not really have to get it moved up.  She is probably going to need surgery at this point.

## 2020-07-08 ENCOUNTER — TREATMENT (OUTPATIENT)
Dept: PHYSICAL THERAPY | Facility: CLINIC | Age: 40
End: 2020-07-08

## 2020-07-08 DIAGNOSIS — M54.12 CERVICAL RADICULAR PAIN: ICD-10-CM

## 2020-07-08 DIAGNOSIS — M25.511 CHRONIC RIGHT SHOULDER PAIN: Primary | ICD-10-CM

## 2020-07-08 DIAGNOSIS — M54.2 NECK PAIN: ICD-10-CM

## 2020-07-08 DIAGNOSIS — Z98.890 HISTORY OF SHOULDER SURGERY: ICD-10-CM

## 2020-07-08 DIAGNOSIS — G89.29 CHRONIC RIGHT SHOULDER PAIN: Primary | ICD-10-CM

## 2020-07-08 PROCEDURE — 97140 MANUAL THERAPY 1/> REGIONS: CPT | Performed by: PHYSICAL THERAPIST

## 2020-07-08 PROCEDURE — 97035 APP MDLTY 1+ULTRASOUND EA 15: CPT | Performed by: PHYSICAL THERAPIST

## 2020-07-08 NOTE — PROGRESS NOTES
Physical Therapy Daily Progress Note    Patient: Bienvenido Carter   : 1980  Diagnosis/ICD-10 Code:  Chronic right shoulder pain [M25.511, G89.29]  Referring practitioner: Salvatore Roblero MD  Date of Initial Visit: Type: THERAPY  Noted: 2/3/2020  Today's Date: 2020  Patient seen for 15 sessions           Subjective I get a few hours of relief from therapy. I am feeling worse this am, always worse in the am. Haven't taken any meds yet today. Numbness in B thumb and index fingers is bad - can't feel anything.     Objective   See Exercise, Manual, and Modality Logs for complete treatment.     Manual therapy:  STM to B UT, levator and cervical PVMs with suboccipital release, manual distraction, and ischemic pressure to R UT and levator muscles to reduce trigger points    Assessment/Plan  Pt continues with complaints of increased B UE pain and numbness in her hands, especially her thumb and index fingers. She has c/o dropping objects and difficulty with gripping. She has an appt with neurosurgery tomorrow to discuss her MRI results and possibly surgery. She will call back to cancel her remaining appts if she is going to have surgery.          Timed:    Manual Therapy:    30     mins  35998;  Therapeutic Exercise:         mins  45550;     Neuromuscular Namita:        mins  18199;    Therapeutic Activity:          mins  03497;     Gait Training:           mins  65952;     Ultrasound:     10     mins  46440;    Electrical Stimulation:         mins  72889 ( );  Iontophoresis         mins 23652;  Aquatic Therapy         mins 94281;  Dry Needling                   mins    Untimed:  Electrical Stimulation:         mins  08065 ( );  Mechanical Traction:         mins  90107;     Timed Treatment:   40   mins   Total Treatment:     40   mins  Katie Medel, PT  Physical Therapist

## 2020-07-08 NOTE — PROGRESS NOTES
Subjective   History of Present Illness: Bienvenido Carter is a 39 y.o. female is here today for follow-up to discuss cervical MRI and plain film results done at [facility] 7.2.20, her EMG is scheduled for 8.20.20    Patient had televisit 6.22.20 for N/T bilateral arms and right shoulder pain, neck and bilateral arm pain and bilateral hand weakness. Patient states that her symptoms are unchanged.  No urinary incontinence or problems with her balance and gait    The patient was alone but we spoke to her father by [employer]. When I first met her about 4 years ago, she had mainly neck pain. She has gone on to have a number of other issues including shoulder problems and has had a surgery by Dr. Perez in the right shoulder and is probably going to need another surgery in the left shoulder by Dr. Randall, but she over the last 8 weeks has had radiating arm pain bilaterally in a radicular distribution with some weakness documented below. She has a positive Spurling sign with reproduction of pain on the right and the left. She has a positive Tinel sign at the wrists and the elbows bilaterally and a positive Phalen sign at the wrists bilaterally suggestive of some superimposed carpal tunnel and cubital tunnel syndrome. Her EMG and nerve conduction study is not until August, but I think there is enough evidence to suggest that the neck has probably the majority role in the radiating arm symptoms. Given the weakness and the fact that she has already gone through conservative treatment including physical therapy and blocks, I think surgery is reasonable. She had a very bad experience with blocks and we are not going to repeat that anyway. She will need an ACDF at C4-C5 and C5-C6 which I have described below and we will move forward with. She knows that there will be residual symptoms because of the superimposed shoulder problems and superimposed peripheral neuropathies. We will address the peripheral neuropathies once she gets the EMG  and nerve conduction study in August. I do not think we need to delay to move forward with the cervical surgery. 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         Neck Pain    The problem occurs constantly. The problem has been unchanged. The pain is present in the midline. The pain is at a severity of 7/10. The pain is moderate. Associated symptoms include numbness and weakness. Pertinent negatives include no fever.   Arm Pain    The pain is present in the left forearm, upper right arm, upper left arm, right forearm and right shoulder. The pain is at a severity of 7/10. The pain is moderate. The pain has been constant since the incident. Associated symptoms include numbness.   Extremity Weakness    The pain is present in the left hand and right hand. The problem occurs constantly. The problem has been unchanged. The pain is at a severity of 7/10. The pain is moderate. Associated symptoms include numbness. Pertinent negatives include no fever.       The following portions of the patient's history were reviewed and updated as appropriate: allergies, current medications, past family history, past medical history, past social history, past surgical history and problem list.    Review of Systems   Constitutional: Negative for fever.   HENT: Negative.    Eyes: Negative.    Respiratory: Negative.    Cardiovascular: Negative.    Gastrointestinal: Negative.    Endocrine: Negative.    Genitourinary: Negative for urgency.   Musculoskeletal: Positive for extremity weakness, neck pain and neck stiffness. Negative for arthralgias, gait problem, joint swelling and myalgias.   Allergic/Immunologic: Negative.    Neurological: Positive for weakness and numbness.   Hematological: Negative.        Objective     There were no vitals filed for this visit.  There is no height or weight on file to calculate BMI.      Physical Exam   Constitutional: She is oriented to person, place, and time. She appears  well-developed and well-nourished.   HENT:   Head: Normocephalic and atraumatic.   Eyes: Pupils are equal, round, and reactive to light. Conjunctivae and EOM are normal.   Fundoscopic exam:       The right eye shows no papilledema. The right eye shows venous pulsations.        The left eye shows no papilledema. The left eye shows venous pulsations.   Neck: Carotid bruit is not present.   Neurological: She is oriented to person, place, and time. She has a normal Finger-Nose-Finger Test and a normal Heel to Shin Test. Gait normal.   Reflex Scores:       Tricep reflexes are 2+ on the right side and 2+ on the left side.       Bicep reflexes are 2+ on the right side and 2+ on the left side.       Brachioradialis reflexes are 2+ on the right side and 2+ on the left side.       Patellar reflexes are 2+ on the right side and 2+ on the left side.       Achilles reflexes are 2+ on the right side and 2+ on the left side.  Psychiatric: Her speech is normal.     Neurologic Exam     Mental Status   Oriented to person, place, and time.   Registration of memory: Good recent and remote memory.   Attention: normal. Concentration: normal.   Speech: speech is normal   Level of consciousness: alert  Knowledge: consistent with education.     Cranial Nerves     CN II   Visual fields full to confrontation.   Visual acuity: normal    CN III, IV, VI   Pupils are equal, round, and reactive to light.  Extraocular motions are normal.     CN V   Facial sensation intact.   Right corneal reflex: normal  Left corneal reflex: normal    CN VII   Facial expression full, symmetric.   Right facial weakness: none  Left facial weakness: none    CN VIII   Hearing: intact    CN IX, X   Palate: symmetric    CN XI   Right sternocleidomastoid strength: normal  Left sternocleidomastoid strength: normal    CN XII   Tongue: not atrophic  Tongue deviation: none    Motor Exam   Muscle bulk: normal  Right arm tone: normal  Left arm tone: normal  Right leg tone:  normal  Left leg tone: normal    Strength   Strength 5/5 except as noted.   Right deltoid: 4/5  Left deltoid: 4/5  Right biceps: 4/5  Left biceps: 4/5  Right triceps: 4/5  Left triceps: 4/5  Right wrist flexion: 4/5  Left wrist flexion: 4/5  Right wrist extension: 4/5  Left wrist extension: 4/5  Right interossei: 3/5  Left interossei: 3/5    Sensory Exam   Light touch normal.     Gait, Coordination, and Reflexes     Gait  Gait: normal    Coordination   Finger to nose coordination: normal  Heel to shin coordination: normal    Reflexes   Right brachioradialis: 2+  Left brachioradialis: 2+  Right biceps: 2+  Left biceps: 2+  Right triceps: 2+  Left triceps: 2+  Right patellar: 2+  Left patellar: 2+  Right achilles: 2+  Left achilles: 2+  Right : 2+  Left : 2+          Assessment/Plan   Independent Review of Radiographic Studies:      I personally reviewed the images from the following studies.    I reviewed the recently completed cervical MRI which showed loss of the cervical lordosis and disc degeneration at C3-C4 and C6-C7.  However at C4-C5 and C5-C6 in particular there was fairly significant osteophytic disc herniations with both cord and root compression at C5-C6 and root compression bilaterally at C4-C5.  Agree with the report.        Medical Decision Making:      I described and recommended an anterior cervical discectomy and fusion with a cage and plate at C4-C5 and C5-C6. The goal of surgery is relief of radiating arm pain and improvement of numbness, tingling, and weakness. This will help reduce but may not eliminate midline neck pain. The risks include, but are not limited to, infection, hemorrhage requiring transfusion or reoperation, CSF leak requiring reoperation, incomplete relief of symptoms, difficulty swallowing, hoarseness of voice, hardware problems requiring revision surgery, potential need for additional surgery in the future, stroke, paralysis, coma, and death. The patient agrees to  proceed.  Because she probably has some bilateral carpal tunnel syndrome bilateral cubital tunnel syndrome I expect there to be some residual arm symptoms.  Once we get the EMG and nerve conduction study postoperative we can decide if there requires a referral to Dr. Doss for further care.      Bienvenido was seen today for numbness, shoulder pain, neck pain, arm pain and extremity weakness.    Diagnoses and all orders for this visit:    Cervical disc disorder at C5-C6 level with radiculopathy    Cervical disc disorder at C4-C5 level with radiculopathy    Bilateral carpal tunnel syndrome      Return for two weeks after surgery with me.

## 2020-07-09 ENCOUNTER — OFFICE VISIT (OUTPATIENT)
Dept: NEUROSURGERY | Facility: CLINIC | Age: 40
End: 2020-07-09

## 2020-07-09 ENCOUNTER — PREP FOR SURGERY (OUTPATIENT)
Dept: OTHER | Facility: HOSPITAL | Age: 40
End: 2020-07-09

## 2020-07-09 DIAGNOSIS — M50.121 CERVICAL DISC DISORDER AT C4-C5 LEVEL WITH RADICULOPATHY: ICD-10-CM

## 2020-07-09 DIAGNOSIS — G56.03 BILATERAL CARPAL TUNNEL SYNDROME: ICD-10-CM

## 2020-07-09 DIAGNOSIS — M50.121 CERVICAL DISC DISORDER AT C4-C5 LEVEL WITH RADICULOPATHY: Primary | ICD-10-CM

## 2020-07-09 DIAGNOSIS — Z01.818 PRE-OP TESTING: ICD-10-CM

## 2020-07-09 DIAGNOSIS — M48.02 SPINAL STENOSIS OF CERVICAL REGION: ICD-10-CM

## 2020-07-09 DIAGNOSIS — M50.122 CERVICAL DISC DISORDER AT C5-C6 LEVEL WITH RADICULOPATHY: Primary | ICD-10-CM

## 2020-07-09 DIAGNOSIS — M50.122 CERVICAL DISC DISORDER AT C5-C6 LEVEL WITH RADICULOPATHY: ICD-10-CM

## 2020-07-09 PROCEDURE — 99214 OFFICE O/P EST MOD 30 MIN: CPT | Performed by: NEUROLOGICAL SURGERY

## 2020-07-15 ENCOUNTER — APPOINTMENT (OUTPATIENT)
Dept: PREADMISSION TESTING | Facility: HOSPITAL | Age: 40
End: 2020-07-15

## 2020-07-15 VITALS
BODY MASS INDEX: 14.17 KG/M2 | HEIGHT: 62 IN | WEIGHT: 77 LBS | RESPIRATION RATE: 18 BRPM | OXYGEN SATURATION: 100 % | SYSTOLIC BLOOD PRESSURE: 115 MMHG | DIASTOLIC BLOOD PRESSURE: 83 MMHG | TEMPERATURE: 98.5 F | HEART RATE: 98 BPM

## 2020-07-15 DIAGNOSIS — Z01.818 PRE-OP TESTING: ICD-10-CM

## 2020-07-15 LAB
ABO GROUP BLD: NORMAL
ANION GAP SERPL CALCULATED.3IONS-SCNC: 12.5 MMOL/L (ref 5–15)
APTT PPP: 36.4 SECONDS (ref 22.7–35.4)
BASOPHILS # BLD AUTO: 0.06 10*3/MM3 (ref 0–0.2)
BASOPHILS NFR BLD AUTO: 0.7 % (ref 0–1.5)
BILIRUB UR QL STRIP: NEGATIVE
BLD GP AB SCN SERPL QL: NEGATIVE
BUN SERPL-MCNC: 9 MG/DL (ref 6–20)
BUN/CREAT SERPL: 16.1 (ref 7–25)
CALCIUM SPEC-SCNC: 9.4 MG/DL (ref 8.6–10.5)
CHLORIDE SERPL-SCNC: 99 MMOL/L (ref 98–107)
CLARITY UR: CLEAR
CO2 SERPL-SCNC: 24.5 MMOL/L (ref 22–29)
COLOR UR: YELLOW
CREAT SERPL-MCNC: 0.56 MG/DL (ref 0.57–1)
DEPRECATED RDW RBC AUTO: 42.8 FL (ref 37–54)
EOSINOPHIL # BLD AUTO: 0.11 10*3/MM3 (ref 0–0.4)
EOSINOPHIL NFR BLD AUTO: 1.3 % (ref 0.3–6.2)
ERYTHROCYTE [DISTWIDTH] IN BLOOD BY AUTOMATED COUNT: 12.6 % (ref 12.3–15.4)
GFR SERPL CREATININE-BSD FRML MDRD: 120 ML/MIN/1.73
GFR SERPL CREATININE-BSD FRML MDRD: 145 ML/MIN/1.73
GLUCOSE SERPL-MCNC: 92 MG/DL (ref 65–99)
GLUCOSE UR STRIP-MCNC: NEGATIVE MG/DL
HCG SERPL QL: NEGATIVE
HCT VFR BLD AUTO: 35.1 % (ref 34–46.6)
HGB BLD-MCNC: 11.6 G/DL (ref 12–15.9)
HGB UR QL STRIP.AUTO: NEGATIVE
IMM GRANULOCYTES # BLD AUTO: 0.01 10*3/MM3 (ref 0–0.05)
IMM GRANULOCYTES NFR BLD AUTO: 0.1 % (ref 0–0.5)
INR PPP: 0.93 (ref 0.9–1.1)
KETONES UR QL STRIP: ABNORMAL
LEUKOCYTE ESTERASE UR QL STRIP.AUTO: NEGATIVE
LYMPHOCYTES # BLD AUTO: 2.64 10*3/MM3 (ref 0.7–3.1)
LYMPHOCYTES NFR BLD AUTO: 30.8 % (ref 19.6–45.3)
MCH RBC QN AUTO: 30.7 PG (ref 26.6–33)
MCHC RBC AUTO-ENTMCNC: 33 G/DL (ref 31.5–35.7)
MCV RBC AUTO: 92.9 FL (ref 79–97)
MONOCYTES # BLD AUTO: 0.6 10*3/MM3 (ref 0.1–0.9)
MONOCYTES NFR BLD AUTO: 7 % (ref 5–12)
NEUTROPHILS NFR BLD AUTO: 5.15 10*3/MM3 (ref 1.7–7)
NEUTROPHILS NFR BLD AUTO: 60.1 % (ref 42.7–76)
NITRITE UR QL STRIP: NEGATIVE
NRBC BLD AUTO-RTO: 0 /100 WBC (ref 0–0.2)
PH UR STRIP.AUTO: 6.5 [PH] (ref 5–8)
PLATELET # BLD AUTO: 271 10*3/MM3 (ref 140–450)
PMV BLD AUTO: 11.4 FL (ref 6–12)
POTASSIUM SERPL-SCNC: 4.1 MMOL/L (ref 3.5–5.2)
PROT UR QL STRIP: NEGATIVE
PROTHROMBIN TIME: 12.4 SECONDS (ref 11.7–14.2)
RBC # BLD AUTO: 3.78 10*6/MM3 (ref 3.77–5.28)
RH BLD: POSITIVE
SODIUM SERPL-SCNC: 136 MMOL/L (ref 136–145)
SP GR UR STRIP: 1.02 (ref 1–1.03)
T&S EXPIRATION DATE: NORMAL
UROBILINOGEN UR QL STRIP: ABNORMAL
WBC # BLD AUTO: 8.57 10*3/MM3 (ref 3.4–10.8)

## 2020-07-15 PROCEDURE — 86850 RBC ANTIBODY SCREEN: CPT | Performed by: NEUROLOGICAL SURGERY

## 2020-07-15 PROCEDURE — 36415 COLL VENOUS BLD VENIPUNCTURE: CPT

## 2020-07-15 PROCEDURE — 85025 COMPLETE CBC W/AUTO DIFF WBC: CPT | Performed by: NEUROLOGICAL SURGERY

## 2020-07-15 PROCEDURE — 86900 BLOOD TYPING SEROLOGIC ABO: CPT | Performed by: NEUROLOGICAL SURGERY

## 2020-07-15 PROCEDURE — 80048 BASIC METABOLIC PNL TOTAL CA: CPT | Performed by: NEUROLOGICAL SURGERY

## 2020-07-15 PROCEDURE — 81003 URINALYSIS AUTO W/O SCOPE: CPT | Performed by: NEUROLOGICAL SURGERY

## 2020-07-15 PROCEDURE — C9803 HOPD COVID-19 SPEC COLLECT: HCPCS

## 2020-07-15 PROCEDURE — 85610 PROTHROMBIN TIME: CPT | Performed by: NEUROLOGICAL SURGERY

## 2020-07-15 PROCEDURE — 84703 CHORIONIC GONADOTROPIN ASSAY: CPT | Performed by: NEUROLOGICAL SURGERY

## 2020-07-15 PROCEDURE — 85730 THROMBOPLASTIN TIME PARTIAL: CPT | Performed by: NEUROLOGICAL SURGERY

## 2020-07-15 PROCEDURE — U0004 COV-19 TEST NON-CDC HGH THRU: HCPCS | Performed by: NURSE PRACTITIONER

## 2020-07-15 PROCEDURE — 86901 BLOOD TYPING SEROLOGIC RH(D): CPT | Performed by: NEUROLOGICAL SURGERY

## 2020-07-15 RX ORDER — QUETIAPINE FUMARATE 50 MG/1
50-100 TABLET, FILM COATED ORAL NIGHTLY
COMMUNITY
End: 2020-08-12 | Stop reason: HOSPADM

## 2020-07-15 RX ORDER — QUETIAPINE FUMARATE 50 MG/1
75 TABLET, FILM COATED ORAL DAILY
COMMUNITY
End: 2021-07-15

## 2020-07-15 RX ORDER — HYDROXYZINE HYDROCHLORIDE 25 MG/1
25 TABLET, FILM COATED ORAL 3 TIMES DAILY PRN
COMMUNITY
End: 2020-10-29

## 2020-07-15 RX ORDER — PERMETHRIN 50 MG/G
CREAM TOPICAL ONCE
COMMUNITY
End: 2020-10-29

## 2020-07-15 RX ORDER — CETIRIZINE HYDROCHLORIDE 10 MG/1
10 TABLET ORAL DAILY
Status: ON HOLD | COMMUNITY
End: 2020-08-11

## 2020-07-15 RX ORDER — AMOXICILLIN 875 MG/1
875 TABLET, COATED ORAL 2 TIMES DAILY
Status: ON HOLD | COMMUNITY
End: 2020-08-11

## 2020-07-15 NOTE — DISCHARGE INSTRUCTIONS
Take the following medications the morning of surgery: ALBUTEROL, ATARAX, GABAPENTIN, AND SEROQUEL    ARRIVE AT 1400    General Instructions:  • Do not eat solid food after midnight the night before surgery.  • You may drink clear liquids day of surgery but must stop at least one hour before your hospital arrival time.  • It is beneficial for you to have a clear drink that contains carbohydrates the day of surgery.  We suggest a 12 to 20 ounce bottle of Gatorade or Powerade for non-diabetic patients or a 12 to 20 ounce bottle of G2 or Powerade Zero for diabetic patients. (Pediatric patients, are not advised to drink a 12 to 20 ounce carbohydrate drink)    Clear liquids are liquids you can see through.  Nothing red in color.     Plain water                               Sports drinks  Sodas                                   Gelatin (Jell-O)  Fruit juices without pulp such as white grape juice and apple juice  Popsicles that contain no fruit or yogurt  Tea or coffee (no cream or milk added)  Gatorade / Powerade  G2 / Powerade Zero    • Infants may have breast milk up to four hours before surgery.  • Infants drinking formula may drink formula up to six hours before surgery.   • Patients who avoid smoking, chewing tobacco and alcohol for 4 weeks prior to surgery have a reduced risk of post-operative complications.  Quit smoking as many days before surgery as you can.  • Do not smoke, use chewing tobacco or drink alcohol the day of surgery.   • If applicable bring your C-PAP/ BI-PAP machine.  • Bring any papers given to you in the doctor’s office.  • Wear clean comfortable clothes.  • Do not wear contact lenses, false eyelashes or make-up.  Bring a case for your glasses.   • Bring crutches or walker if applicable.  • Remove all piercings.  Leave jewelry and any other valuables at home.  • Hair extensions with metal clips must be removed prior to surgery.  • The Pre-Admission Testing nurse will instruct you to bring  medications if unable to obtain an accurate list in Pre-Admission Testing.        If you were given a blood bank ID arm band remember to bring it with you the day of surgery.    Preventing a Surgical Site Infection:  • For 2 to 3 days before surgery, avoid shaving with a razor because the razor can irritate skin and make it easier to develop an infection.    • Any areas of open skin can increase the risk of a post-operative wound infection by allowing bacteria to enter and travel throughout the body.  Notify your surgeon if you have any skin wounds / rashes even if it is not near the expected surgical site.  The area will need assessed to determine if surgery should be delayed until it is healed.  • The night prior to surgery shower using a fresh bar of anti-bacterial soap (such as Dial) and clean washcloth.  Sleep in a clean bed with clean clothing.  Do not allow pets to sleep with you.  • Shower on the morning of surgery using a fresh bar of anti-bacterial soap (such as Dial) and clean washcloth.  Dry with a clean towel and dress in clean clothing.  • Ask your surgeon if you will be receiving antibiotics prior to surgery.  • Make sure you, your family, and all healthcare providers clean their hands with soap and water or an alcohol based hand  before caring for you or your wound.    Day of surgery:  Your arrival time is approximately two hours before your scheduled surgery time.  Upon arrival, a Pre-op nurse and Anesthesiologist will review your health history, obtain vital signs, and answer questions you may have.  The only belongings needed at this time will be a list of your home medications and if applicable your C-PAP/BI-PAP machine.  If you are staying overnight your family can leave the rest of your belongings in the car and bring them to your room later.  A Pre-op nurse will start an IV and you may receive medication in preparation for surgery, including something to help you relax.  Your family  will be able to see you in the Pre-op area.  Two visitors at a time will be allowed in the Pre-op room.  While you are in surgery your family should notify the waiting room  if they leave the waiting room area and provide a contact phone number.    Please be aware that surgery does come with discomfort.  We want to make every effort to control your discomfort so please discuss any uncontrolled symptoms with your nurse.   Your doctor will most likely have prescribed pain medications.      If you are going home after surgery you will receive individualized written care instructions before being discharged.  A responsible adult must drive you to and from the hospital on the day of your surgery and stay with you for 24 hours.    If you are staying overnight following surgery, you will be transported to your hospital room following the recovery period.  [facility] has all private rooms.    If you have any questions please call Pre-Admission Testing at [phone].  Deductibles and co-payments are collected on the day of service. Please be prepared to pay the required co-pay, deductible or deposit on the day of service as defined by your plan.    Patient Education for Self-Quarantine Process    Following your COVID testing, we strongly recommend that you do not leave your home after you have been tested for COVID except to get medical care. This includes not going to work, school or to public areas.  If this is not possible for you to do please limit your activities to only required outings.  Be sure to wear a mask when you are with other people, practice social distancing and wash your hands frequently.      The following items provide additional details to keep you safe.  • Wash your hands with soap and water frequently for at least 20 seconds.   • Avoid touching your eyes, nose and mouth with unwashed hands.  • Do not share anything - utensils, towels, food from the same bowl.   • Have your  own utensils, drinking glass, dishes, towels and bedding.   • Do not have visitors.   • Do use FaceTime to stay in touch with family and friends.  • You should stay in a specific room away from others if possible.   • Stay at least 6 feet away from others in the home if you cannot have a dedicated room to yourself.   • Do not snuggle with your pet. While the CDC says there is no evidence that pets can spread COVID-19 or be infected from humans, it is probably best to avoid “petting, snuggling, being kissed or licked and sharing food (during self-quarantine)”, according to the CDC.   • Sanitize household surfaces daily. Include all high touch areas (door handles, light switches, phones, countertops, etc.)  • Do not share a bathroom with others, if possible.   • Wear a mask around others in your home if you are unable to stay in a separate room or 6 feet apart. If  you are unable to wear a mask, have your family member wear a mask if they must be within 6 feet of you.   Call your surgeon immediately if you experience any of the following symptoms:  • Sore Throat  • Shortness of Breath or difficulty breathing  • Cough  • Chills  • Body soreness or muscle pain  • Headache  • Fever  • New loss of taste or smell  • Do not arrive for your surgery ill.  Your procedure will need to be rescheduled to another time.  You will need to call your physician before the day of surgery to avoid any unnecessary exposure to hospital staff as well as other patients.

## 2020-07-16 LAB
REF LAB TEST METHOD: NORMAL
SARS-COV-2 RNA RESP QL NAA+PROBE: NOT DETECTED

## 2020-08-05 ENCOUNTER — TRANSCRIBE ORDERS (OUTPATIENT)
Dept: SLEEP MEDICINE | Facility: HOSPITAL | Age: 40
End: 2020-08-05

## 2020-08-05 ENCOUNTER — PATIENT MESSAGE (OUTPATIENT)
Dept: NEUROSURGERY | Facility: CLINIC | Age: 40
End: 2020-08-05

## 2020-08-05 DIAGNOSIS — Z01.818 OTHER SPECIFIED PRE-OPERATIVE EXAMINATION: Primary | ICD-10-CM

## 2020-08-06 ENCOUNTER — TELEPHONE (OUTPATIENT)
Dept: NEUROSURGERY | Facility: CLINIC | Age: 40
End: 2020-08-06

## 2020-08-06 NOTE — TELEPHONE ENCOUNTER
She is wanting to know how long she has to have someone stay with her after surgery    Please advise

## 2020-08-08 ENCOUNTER — LAB (OUTPATIENT)
Dept: LAB | Facility: HOSPITAL | Age: 40
End: 2020-08-08

## 2020-08-08 DIAGNOSIS — Z01.818 OTHER SPECIFIED PRE-OPERATIVE EXAMINATION: ICD-10-CM

## 2020-08-08 PROCEDURE — U0004 COV-19 TEST NON-CDC HGH THRU: HCPCS

## 2020-08-08 PROCEDURE — C9803 HOPD COVID-19 SPEC COLLECT: HCPCS

## 2020-08-10 LAB
REF LAB TEST METHOD: NORMAL
SARS-COV-2 RNA RESP QL NAA+PROBE: NOT DETECTED

## 2020-08-11 ENCOUNTER — ANESTHESIA (OUTPATIENT)
Dept: PERIOP | Facility: HOSPITAL | Age: 40
End: 2020-08-11

## 2020-08-11 ENCOUNTER — HOSPITAL ENCOUNTER (OUTPATIENT)
Facility: HOSPITAL | Age: 40
Discharge: HOME | End: 2020-08-12
Attending: NEUROLOGICAL SURGERY | Admitting: NEUROLOGICAL SURGERY

## 2020-08-11 ENCOUNTER — APPOINTMENT (OUTPATIENT)
Dept: GENERAL RADIOLOGY | Facility: HOSPITAL | Age: 40
End: 2020-08-11

## 2020-08-11 ENCOUNTER — ANESTHESIA EVENT (OUTPATIENT)
Dept: PERIOP | Facility: HOSPITAL | Age: 40
End: 2020-08-11

## 2020-08-11 DIAGNOSIS — M50.121 CERVICAL DISC DISORDER AT C4-C5 LEVEL WITH RADICULOPATHY: ICD-10-CM

## 2020-08-11 DIAGNOSIS — M48.02 SPINAL STENOSIS OF CERVICAL REGION: Primary | ICD-10-CM

## 2020-08-11 DIAGNOSIS — M50.122 CERVICAL DISC DISORDER AT C5-C6 LEVEL WITH RADICULOPATHY: ICD-10-CM

## 2020-08-11 PROBLEM — M50.20 CERVICAL DISC HERNIATION: Status: ACTIVE | Noted: 2020-08-11

## 2020-08-11 LAB
ABO GROUP BLD: NORMAL
B-HCG UR QL: NEGATIVE
BLD GP AB SCN SERPL QL: NEGATIVE
INTERNAL NEGATIVE CONTROL: NEGATIVE
INTERNAL POSITIVE CONTROL: POSITIVE
Lab: NORMAL
RH BLD: POSITIVE
T&S EXPIRATION DATE: NORMAL

## 2020-08-11 PROCEDURE — 22551 ARTHRD ANT NTRBDY CERVICAL: CPT | Performed by: NEUROLOGICAL SURGERY

## 2020-08-11 PROCEDURE — C1713 ANCHOR/SCREW BN/BN,TIS/BN: HCPCS | Performed by: NEUROLOGICAL SURGERY

## 2020-08-11 PROCEDURE — 25010000002 HYDROMORPHONE PER 4 MG: Performed by: NURSE ANESTHETIST, CERTIFIED REGISTERED

## 2020-08-11 PROCEDURE — 86850 RBC ANTIBODY SCREEN: CPT | Performed by: NEUROLOGICAL SURGERY

## 2020-08-11 PROCEDURE — 94799 UNLISTED PULMONARY SVC/PX: CPT

## 2020-08-11 PROCEDURE — 25010000002 FENTANYL CITRATE (PF) 100 MCG/2ML SOLUTION: Performed by: NURSE ANESTHETIST, CERTIFIED REGISTERED

## 2020-08-11 PROCEDURE — 25010000002 ONDANSETRON PER 1 MG: Performed by: NURSE ANESTHETIST, CERTIFIED REGISTERED

## 2020-08-11 PROCEDURE — 86901 BLOOD TYPING SEROLOGIC RH(D): CPT | Performed by: NEUROLOGICAL SURGERY

## 2020-08-11 PROCEDURE — 76000 FLUOROSCOPY <1 HR PHYS/QHP: CPT

## 2020-08-11 PROCEDURE — 86900 BLOOD TYPING SEROLOGIC ABO: CPT | Performed by: NEUROLOGICAL SURGERY

## 2020-08-11 PROCEDURE — 22552 ARTHRD ANT NTRBD CERVICAL EA: CPT | Performed by: NEUROLOGICAL SURGERY

## 2020-08-11 PROCEDURE — 72040 X-RAY EXAM NECK SPINE 2-3 VW: CPT

## 2020-08-11 PROCEDURE — 25010000002 PROPOFOL 10 MG/ML EMULSION: Performed by: NURSE ANESTHETIST, CERTIFIED REGISTERED

## 2020-08-11 PROCEDURE — L0120 CERV FLEX N/ADJ FOAM PRE OTS: HCPCS | Performed by: NEUROLOGICAL SURGERY

## 2020-08-11 PROCEDURE — 25010000003 CEFAZOLIN 1-4 GM/50ML-% SOLUTION: Performed by: NEUROLOGICAL SURGERY

## 2020-08-11 PROCEDURE — 25010000003 CEFAZOLIN PER 500 MG: Performed by: NEUROLOGICAL SURGERY

## 2020-08-11 PROCEDURE — 25010000002 DEXAMETHASONE PER 1 MG: Performed by: NURSE ANESTHETIST, CERTIFIED REGISTERED

## 2020-08-11 PROCEDURE — 25010000002 METHOCARBAMOL 1000 MG/10ML SOLUTION: Performed by: NEUROLOGICAL SURGERY

## 2020-08-11 PROCEDURE — 81025 URINE PREGNANCY TEST: CPT | Performed by: ANESTHESIOLOGY

## 2020-08-11 PROCEDURE — 22845 INSERT SPINE FIXATION DEVICE: CPT | Performed by: NEUROLOGICAL SURGERY

## 2020-08-11 PROCEDURE — 22853 INSJ BIOMECHANICAL DEVICE: CPT | Performed by: NEUROLOGICAL SURGERY

## 2020-08-11 PROCEDURE — 25010000002 HYDROMORPHONE PER 4 MG: Performed by: NEUROLOGICAL SURGERY

## 2020-08-11 PROCEDURE — 25010000003 CEFAZOLIN IN DEXTROSE 2-4 GM/100ML-% SOLUTION: Performed by: NEUROLOGICAL SURGERY

## 2020-08-11 PROCEDURE — 25010000002 NEOSTIGMINE PER 0.5 MG: Performed by: NURSE ANESTHETIST, CERTIFIED REGISTERED

## 2020-08-11 PROCEDURE — G0378 HOSPITAL OBSERVATION PER HR: HCPCS

## 2020-08-11 DEVICE — FLOSEAL HEMOSTATIC MATRIX, 10ML
Type: IMPLANTABLE DEVICE | Site: SPINE CERVICAL | Status: FUNCTIONAL
Brand: FLOSEAL HEMOSTATIC MATRIX

## 2020-08-11 DEVICE — SPACER 6286511 PSR LAT PORTS 5X11X11
Type: IMPLANTABLE DEVICE | Site: SPINE CERVICAL | Status: FUNCTIONAL
Brand: VERTE-STACK® SPINAL SYSTEM

## 2020-08-11 DEVICE — PUTTY DBF GRAFTON 3CC: Type: IMPLANTABLE DEVICE | Site: SPINE CERVICAL | Status: FUNCTIONAL

## 2020-08-11 DEVICE — WAX BONE HEMO NAT 2.5G: Type: IMPLANTABLE DEVICE | Site: SPINE CERVICAL | Status: FUNCTIONAL

## 2020-08-11 DEVICE — DEV CONTRL TISS STRATAFIXSPIRALMNCRYL PLSPS2 REV4/0 30CM: Type: IMPLANTABLE DEVICE | Site: SPINE CERVICAL | Status: FUNCTIONAL

## 2020-08-11 DEVICE — SCREW 7723513 ZEVO VAR ST 3.5MM X 13MM
Type: IMPLANTABLE DEVICE | Site: SPINE CERVICAL | Status: FUNCTIONAL
Brand: ZEVO™ ANTERIOR CERVICAL PLATE SYSTEM

## 2020-08-11 RX ORDER — ONDANSETRON 2 MG/ML
INJECTION INTRAMUSCULAR; INTRAVENOUS AS NEEDED
Status: DISCONTINUED | OUTPATIENT
Start: 2020-08-11 | End: 2020-08-11 | Stop reason: SURG

## 2020-08-11 RX ORDER — SODIUM CHLORIDE 0.9 % (FLUSH) 0.9 %
3 SYRINGE (ML) INJECTION EVERY 12 HOURS SCHEDULED
Status: DISCONTINUED | OUTPATIENT
Start: 2020-08-11 | End: 2020-08-11 | Stop reason: HOSPADM

## 2020-08-11 RX ORDER — LIDOCAINE HYDROCHLORIDE 10 MG/ML
0.5 INJECTION, SOLUTION EPIDURAL; INFILTRATION; INTRACAUDAL; PERINEURAL ONCE AS NEEDED
Status: DISCONTINUED | OUTPATIENT
Start: 2020-08-11 | End: 2020-08-11 | Stop reason: HOSPADM

## 2020-08-11 RX ORDER — ALBUTEROL SULFATE 2.5 MG/3ML
2.5 SOLUTION RESPIRATORY (INHALATION) EVERY 6 HOURS PRN
Status: DISCONTINUED | OUTPATIENT
Start: 2020-08-11 | End: 2020-08-12 | Stop reason: HOSPADM

## 2020-08-11 RX ORDER — LIDOCAINE HYDROCHLORIDE 20 MG/ML
INJECTION, SOLUTION INFILTRATION; PERINEURAL AS NEEDED
Status: DISCONTINUED | OUTPATIENT
Start: 2020-08-11 | End: 2020-08-11 | Stop reason: SURG

## 2020-08-11 RX ORDER — FENTANYL CITRATE 50 UG/ML
INJECTION, SOLUTION INTRAMUSCULAR; INTRAVENOUS AS NEEDED
Status: DISCONTINUED | OUTPATIENT
Start: 2020-08-11 | End: 2020-08-11 | Stop reason: SURG

## 2020-08-11 RX ORDER — DIVALPROEX SODIUM 500 MG/1
1000 TABLET, EXTENDED RELEASE ORAL NIGHTLY
Status: DISCONTINUED | OUTPATIENT
Start: 2020-08-11 | End: 2020-08-12 | Stop reason: HOSPADM

## 2020-08-11 RX ORDER — FENTANYL CITRATE 50 UG/ML
50 INJECTION, SOLUTION INTRAMUSCULAR; INTRAVENOUS
Status: DISCONTINUED | OUTPATIENT
Start: 2020-08-11 | End: 2020-08-11 | Stop reason: HOSPADM

## 2020-08-11 RX ORDER — EPHEDRINE SULFATE 50 MG/ML
5 INJECTION, SOLUTION INTRAVENOUS ONCE AS NEEDED
Status: DISCONTINUED | OUTPATIENT
Start: 2020-08-11 | End: 2020-08-11 | Stop reason: HOSPADM

## 2020-08-11 RX ORDER — DEXTROAMPHETAMINE SACCHARATE, AMPHETAMINE ASPARTATE, DEXTROAMPHETAMINE SULFATE AND AMPHETAMINE SULFATE 1.25; 1.25; 1.25; 1.25 MG/1; MG/1; MG/1; MG/1
15 TABLET ORAL DAILY
Status: DISCONTINUED | OUTPATIENT
Start: 2020-08-11 | End: 2020-08-12 | Stop reason: HOSPADM

## 2020-08-11 RX ORDER — QUETIAPINE FUMARATE 50 MG/1
50 TABLET, FILM COATED ORAL NIGHTLY
Status: DISCONTINUED | OUTPATIENT
Start: 2020-08-11 | End: 2020-08-12 | Stop reason: HOSPADM

## 2020-08-11 RX ORDER — HYDROMORPHONE HCL 110MG/55ML
PATIENT CONTROLLED ANALGESIA SYRINGE INTRAVENOUS AS NEEDED
Status: DISCONTINUED | OUTPATIENT
Start: 2020-08-11 | End: 2020-08-11 | Stop reason: SURG

## 2020-08-11 RX ORDER — HYDROCODONE BITARTRATE AND ACETAMINOPHEN 7.5; 325 MG/1; MG/1
1 TABLET ORAL ONCE AS NEEDED
Status: DISCONTINUED | OUTPATIENT
Start: 2020-08-11 | End: 2020-08-11 | Stop reason: HOSPADM

## 2020-08-11 RX ORDER — PROMETHAZINE HYDROCHLORIDE 25 MG/ML
12.5 INJECTION, SOLUTION INTRAMUSCULAR; INTRAVENOUS ONCE AS NEEDED
Status: DISCONTINUED | OUTPATIENT
Start: 2020-08-11 | End: 2020-08-11 | Stop reason: HOSPADM

## 2020-08-11 RX ORDER — DEXMEDETOMIDINE HYDROCHLORIDE 4 UG/ML
INJECTION INTRAVENOUS AS NEEDED
Status: DISCONTINUED | OUTPATIENT
Start: 2020-08-11 | End: 2020-08-11 | Stop reason: SURG

## 2020-08-11 RX ORDER — MIDAZOLAM HYDROCHLORIDE 1 MG/ML
1 INJECTION INTRAMUSCULAR; INTRAVENOUS
Status: DISCONTINUED | OUTPATIENT
Start: 2020-08-11 | End: 2020-08-11 | Stop reason: HOSPADM

## 2020-08-11 RX ORDER — CEFAZOLIN SODIUM 2 G/100ML
2 INJECTION, SOLUTION INTRAVENOUS EVERY 8 HOURS
Status: COMPLETED | OUTPATIENT
Start: 2020-08-11 | End: 2020-08-12

## 2020-08-11 RX ORDER — LABETALOL HYDROCHLORIDE 5 MG/ML
5 INJECTION, SOLUTION INTRAVENOUS
Status: DISCONTINUED | OUTPATIENT
Start: 2020-08-11 | End: 2020-08-11 | Stop reason: HOSPADM

## 2020-08-11 RX ORDER — NALOXONE HCL 0.4 MG/ML
0.4 VIAL (ML) INJECTION
Status: DISCONTINUED | OUTPATIENT
Start: 2020-08-11 | End: 2020-08-12 | Stop reason: HOSPADM

## 2020-08-11 RX ORDER — CEFAZOLIN SODIUM 1 G/50ML
1 INJECTION, SOLUTION INTRAVENOUS ONCE
Status: COMPLETED | OUTPATIENT
Start: 2020-08-11 | End: 2020-08-11

## 2020-08-11 RX ORDER — METHOCARBAMOL 100 MG/ML
500 INJECTION, SOLUTION INTRAMUSCULAR; INTRAVENOUS ONCE
Status: COMPLETED | OUTPATIENT
Start: 2020-08-11 | End: 2020-08-11

## 2020-08-11 RX ORDER — NALOXONE HCL 0.4 MG/ML
0.2 VIAL (ML) INJECTION AS NEEDED
Status: DISCONTINUED | OUTPATIENT
Start: 2020-08-11 | End: 2020-08-11 | Stop reason: HOSPADM

## 2020-08-11 RX ORDER — HYDROXYZINE HYDROCHLORIDE 25 MG/1
25 TABLET, FILM COATED ORAL 3 TIMES DAILY PRN
Status: DISCONTINUED | OUTPATIENT
Start: 2020-08-11 | End: 2020-08-12 | Stop reason: HOSPADM

## 2020-08-11 RX ORDER — OXYCODONE AND ACETAMINOPHEN 7.5; 325 MG/1; MG/1
1 TABLET ORAL ONCE AS NEEDED
Status: DISCONTINUED | OUTPATIENT
Start: 2020-08-11 | End: 2020-08-11 | Stop reason: HOSPADM

## 2020-08-11 RX ORDER — ONDANSETRON 4 MG/1
4 TABLET, FILM COATED ORAL EVERY 6 HOURS PRN
Status: DISCONTINUED | OUTPATIENT
Start: 2020-08-11 | End: 2020-08-12 | Stop reason: HOSPADM

## 2020-08-11 RX ORDER — PROMETHAZINE HYDROCHLORIDE 25 MG/ML
6.25 INJECTION, SOLUTION INTRAMUSCULAR; INTRAVENOUS
Status: DISCONTINUED | OUTPATIENT
Start: 2020-08-11 | End: 2020-08-11 | Stop reason: HOSPADM

## 2020-08-11 RX ORDER — ACETAMINOPHEN 325 MG/1
650 TABLET ORAL EVERY 4 HOURS PRN
Status: DISCONTINUED | OUTPATIENT
Start: 2020-08-11 | End: 2020-08-12 | Stop reason: HOSPADM

## 2020-08-11 RX ORDER — ONDANSETRON 2 MG/ML
4 INJECTION INTRAMUSCULAR; INTRAVENOUS ONCE AS NEEDED
Status: DISCONTINUED | OUTPATIENT
Start: 2020-08-11 | End: 2020-08-11 | Stop reason: HOSPADM

## 2020-08-11 RX ORDER — ACETAMINOPHEN 500 MG
1000 TABLET ORAL ONCE
Status: COMPLETED | OUTPATIENT
Start: 2020-08-11 | End: 2020-08-11

## 2020-08-11 RX ORDER — ALBUTEROL SULFATE 90 UG/1
2 AEROSOL, METERED RESPIRATORY (INHALATION) EVERY 6 HOURS PRN
Status: DISCONTINUED | OUTPATIENT
Start: 2020-08-11 | End: 2020-08-11 | Stop reason: CLARIF

## 2020-08-11 RX ORDER — ROCURONIUM BROMIDE 10 MG/ML
INJECTION, SOLUTION INTRAVENOUS AS NEEDED
Status: DISCONTINUED | OUTPATIENT
Start: 2020-08-11 | End: 2020-08-11 | Stop reason: SURG

## 2020-08-11 RX ORDER — PROPOFOL 10 MG/ML
VIAL (ML) INTRAVENOUS AS NEEDED
Status: DISCONTINUED | OUTPATIENT
Start: 2020-08-11 | End: 2020-08-11 | Stop reason: SURG

## 2020-08-11 RX ORDER — PROMETHAZINE HYDROCHLORIDE 25 MG/1
25 SUPPOSITORY RECTAL ONCE AS NEEDED
Status: DISCONTINUED | OUTPATIENT
Start: 2020-08-11 | End: 2020-08-11 | Stop reason: HOSPADM

## 2020-08-11 RX ORDER — DIPHENHYDRAMINE HYDROCHLORIDE 50 MG/ML
12.5 INJECTION INTRAMUSCULAR; INTRAVENOUS
Status: DISCONTINUED | OUTPATIENT
Start: 2020-08-11 | End: 2020-08-11 | Stop reason: HOSPADM

## 2020-08-11 RX ORDER — ACETAMINOPHEN 325 MG/1
650 TABLET ORAL ONCE AS NEEDED
Status: DISCONTINUED | OUTPATIENT
Start: 2020-08-11 | End: 2020-08-11 | Stop reason: HOSPADM

## 2020-08-11 RX ORDER — TRAMADOL HYDROCHLORIDE 50 MG/1
50 TABLET ORAL EVERY 6 HOURS PRN
Status: DISCONTINUED | OUTPATIENT
Start: 2020-08-11 | End: 2020-08-12 | Stop reason: HOSPADM

## 2020-08-11 RX ORDER — CYCLOBENZAPRINE HCL 10 MG
10 TABLET ORAL 3 TIMES DAILY PRN
Status: DISCONTINUED | OUTPATIENT
Start: 2020-08-11 | End: 2020-08-12 | Stop reason: HOSPADM

## 2020-08-11 RX ORDER — PROMETHAZINE HYDROCHLORIDE 25 MG/1
25 TABLET ORAL ONCE AS NEEDED
Status: DISCONTINUED | OUTPATIENT
Start: 2020-08-11 | End: 2020-08-11 | Stop reason: HOSPADM

## 2020-08-11 RX ORDER — GLYCOPYRROLATE 0.2 MG/ML
INJECTION INTRAMUSCULAR; INTRAVENOUS AS NEEDED
Status: DISCONTINUED | OUTPATIENT
Start: 2020-08-11 | End: 2020-08-11 | Stop reason: SURG

## 2020-08-11 RX ORDER — DIPHENHYDRAMINE HCL 25 MG
25 CAPSULE ORAL
Status: DISCONTINUED | OUTPATIENT
Start: 2020-08-11 | End: 2020-08-11 | Stop reason: HOSPADM

## 2020-08-11 RX ORDER — HYDRALAZINE HYDROCHLORIDE 20 MG/ML
5 INJECTION INTRAMUSCULAR; INTRAVENOUS
Status: DISCONTINUED | OUTPATIENT
Start: 2020-08-11 | End: 2020-08-11 | Stop reason: HOSPADM

## 2020-08-11 RX ORDER — GABAPENTIN 300 MG/1
600 CAPSULE ORAL ONCE
Status: COMPLETED | OUTPATIENT
Start: 2020-08-11 | End: 2020-08-11

## 2020-08-11 RX ORDER — OXYCODONE AND ACETAMINOPHEN 7.5; 325 MG/1; MG/1
1 TABLET ORAL 2 TIMES DAILY
Status: ON HOLD | COMMUNITY
End: 2020-08-12

## 2020-08-11 RX ORDER — SODIUM CHLORIDE 0.9 % (FLUSH) 0.9 %
3 SYRINGE (ML) INJECTION EVERY 12 HOURS SCHEDULED
Status: DISCONTINUED | OUTPATIENT
Start: 2020-08-11 | End: 2020-08-12 | Stop reason: HOSPADM

## 2020-08-11 RX ORDER — GABAPENTIN 300 MG/1
600 CAPSULE ORAL EVERY 8 HOURS SCHEDULED
Status: DISCONTINUED | OUTPATIENT
Start: 2020-08-11 | End: 2020-08-12 | Stop reason: HOSPADM

## 2020-08-11 RX ORDER — SODIUM CHLORIDE 0.9 % (FLUSH) 0.9 %
3-10 SYRINGE (ML) INJECTION AS NEEDED
Status: DISCONTINUED | OUTPATIENT
Start: 2020-08-11 | End: 2020-08-11 | Stop reason: HOSPADM

## 2020-08-11 RX ORDER — DEXAMETHASONE SODIUM PHOSPHATE 10 MG/ML
INJECTION INTRAMUSCULAR; INTRAVENOUS AS NEEDED
Status: DISCONTINUED | OUTPATIENT
Start: 2020-08-11 | End: 2020-08-11 | Stop reason: SURG

## 2020-08-11 RX ORDER — DEXTROAMPHETAMINE SACCHARATE, AMPHETAMINE ASPARTATE, DEXTROAMPHETAMINE SULFATE AND AMPHETAMINE SULFATE 1.25; 1.25; 1.25; 1.25 MG/1; MG/1; MG/1; MG/1
15 TABLET ORAL DAILY
COMMUNITY
End: 2021-04-16

## 2020-08-11 RX ORDER — SODIUM CHLORIDE, SODIUM LACTATE, POTASSIUM CHLORIDE, CALCIUM CHLORIDE 600; 310; 30; 20 MG/100ML; MG/100ML; MG/100ML; MG/100ML
50 INJECTION, SOLUTION INTRAVENOUS CONTINUOUS
Status: DISCONTINUED | OUTPATIENT
Start: 2020-08-11 | End: 2020-08-12 | Stop reason: HOSPADM

## 2020-08-11 RX ORDER — FLUMAZENIL 0.1 MG/ML
0.2 INJECTION INTRAVENOUS AS NEEDED
Status: DISCONTINUED | OUTPATIENT
Start: 2020-08-11 | End: 2020-08-11 | Stop reason: HOSPADM

## 2020-08-11 RX ORDER — HYDROMORPHONE HYDROCHLORIDE 1 MG/ML
0.5 INJECTION, SOLUTION INTRAMUSCULAR; INTRAVENOUS; SUBCUTANEOUS
Status: DISCONTINUED | OUTPATIENT
Start: 2020-08-11 | End: 2020-08-12 | Stop reason: HOSPADM

## 2020-08-11 RX ORDER — FAMOTIDINE 10 MG/ML
20 INJECTION, SOLUTION INTRAVENOUS ONCE
Status: COMPLETED | OUTPATIENT
Start: 2020-08-11 | End: 2020-08-11

## 2020-08-11 RX ORDER — ONDANSETRON 2 MG/ML
4 INJECTION INTRAMUSCULAR; INTRAVENOUS EVERY 6 HOURS PRN
Status: DISCONTINUED | OUTPATIENT
Start: 2020-08-11 | End: 2020-08-12 | Stop reason: HOSPADM

## 2020-08-11 RX ORDER — SODIUM CHLORIDE 0.9 % (FLUSH) 0.9 %
10 SYRINGE (ML) INJECTION AS NEEDED
Status: DISCONTINUED | OUTPATIENT
Start: 2020-08-11 | End: 2020-08-12 | Stop reason: HOSPADM

## 2020-08-11 RX ORDER — OXYCODONE AND ACETAMINOPHEN 7.5; 325 MG/1; MG/1
1 TABLET ORAL EVERY 8 HOURS PRN
Status: DISCONTINUED | OUTPATIENT
Start: 2020-08-11 | End: 2020-08-12 | Stop reason: HOSPADM

## 2020-08-11 RX ORDER — HYDROMORPHONE HYDROCHLORIDE 1 MG/ML
0.5 INJECTION, SOLUTION INTRAMUSCULAR; INTRAVENOUS; SUBCUTANEOUS
Status: DISCONTINUED | OUTPATIENT
Start: 2020-08-11 | End: 2020-08-11 | Stop reason: HOSPADM

## 2020-08-11 RX ORDER — SODIUM CHLORIDE, SODIUM LACTATE, POTASSIUM CHLORIDE, CALCIUM CHLORIDE 600; 310; 30; 20 MG/100ML; MG/100ML; MG/100ML; MG/100ML
9 INJECTION, SOLUTION INTRAVENOUS CONTINUOUS
Status: DISCONTINUED | OUTPATIENT
Start: 2020-08-11 | End: 2020-08-11

## 2020-08-11 RX ADMIN — HYDROMORPHONE HYDROCHLORIDE 0.5 MG: 2 INJECTION, SOLUTION INTRAMUSCULAR; INTRAVENOUS; SUBCUTANEOUS at 11:52

## 2020-08-11 RX ADMIN — FENTANYL CITRATE 50 MCG: 50 INJECTION, SOLUTION INTRAMUSCULAR; INTRAVENOUS at 13:56

## 2020-08-11 RX ADMIN — HYDROMORPHONE HYDROCHLORIDE 0.5 MG: 2 INJECTION, SOLUTION INTRAMUSCULAR; INTRAVENOUS; SUBCUTANEOUS at 12:47

## 2020-08-11 RX ADMIN — DEXAMETHASONE SODIUM PHOSPHATE 10 MG: 10 INJECTION INTRAMUSCULAR; INTRAVENOUS at 11:18

## 2020-08-11 RX ADMIN — LIDOCAINE HYDROCHLORIDE 30 MG: 20 INJECTION, SOLUTION INFILTRATION; PERINEURAL at 11:10

## 2020-08-11 RX ADMIN — FENTANYL CITRATE 50 MCG: 50 INJECTION INTRAMUSCULAR; INTRAVENOUS at 11:42

## 2020-08-11 RX ADMIN — DEXMEDETOMIDINE HYDROCHLORIDE 2 MCG: 4 INJECTION INTRAVENOUS at 12:54

## 2020-08-11 RX ADMIN — PROPOFOL 50 MG: 10 INJECTION, EMULSION INTRAVENOUS at 11:39

## 2020-08-11 RX ADMIN — HYDROMORPHONE HYDROCHLORIDE 0.5 MG: 1 INJECTION, SOLUTION INTRAMUSCULAR; INTRAVENOUS; SUBCUTANEOUS at 23:52

## 2020-08-11 RX ADMIN — NEOSTIGMINE METHYLSULFATE 2 MG: 1 INJECTION INTRAMUSCULAR; INTRAVENOUS; SUBCUTANEOUS at 12:57

## 2020-08-11 RX ADMIN — GABAPENTIN 600 MG: 300 CAPSULE ORAL at 23:39

## 2020-08-11 RX ADMIN — FAMOTIDINE 20 MG: 10 INJECTION INTRAVENOUS at 09:42

## 2020-08-11 RX ADMIN — CEFAZOLIN SODIUM 2 G: 2 INJECTION, SOLUTION INTRAVENOUS at 18:30

## 2020-08-11 RX ADMIN — ROCURONIUM BROMIDE 30 MG: 10 INJECTION INTRAVENOUS at 11:10

## 2020-08-11 RX ADMIN — GABAPENTIN 600 MG: 300 CAPSULE ORAL at 15:44

## 2020-08-11 RX ADMIN — CEFAZOLIN SODIUM 1 G: 1 INJECTION, SOLUTION INTRAVENOUS at 11:03

## 2020-08-11 RX ADMIN — SODIUM CHLORIDE, POTASSIUM CHLORIDE, SODIUM LACTATE AND CALCIUM CHLORIDE 9 ML/HR: 600; 310; 30; 20 INJECTION, SOLUTION INTRAVENOUS at 08:46

## 2020-08-11 RX ADMIN — QUETIAPINE FUMARATE 50 MG: 50 TABLET, FILM COATED ORAL at 23:39

## 2020-08-11 RX ADMIN — CYCLOBENZAPRINE HYDROCHLORIDE 10 MG: 10 TABLET, FILM COATED ORAL at 23:52

## 2020-08-11 RX ADMIN — FENTANYL CITRATE 50 MCG: 50 INJECTION, SOLUTION INTRAMUSCULAR; INTRAVENOUS at 13:39

## 2020-08-11 RX ADMIN — DEXMEDETOMIDINE HYDROCHLORIDE 2 MCG: 4 INJECTION INTRAVENOUS at 12:43

## 2020-08-11 RX ADMIN — SODIUM CHLORIDE, POTASSIUM CHLORIDE, SODIUM LACTATE AND CALCIUM CHLORIDE 9 ML/HR: 600; 310; 30; 20 INJECTION, SOLUTION INTRAVENOUS at 14:21

## 2020-08-11 RX ADMIN — GABAPENTIN 600 MG: 300 CAPSULE ORAL at 09:42

## 2020-08-11 RX ADMIN — GLYCOPYRROLATE 0.4 MG: 0.2 INJECTION INTRAMUSCULAR; INTRAVENOUS at 12:57

## 2020-08-11 RX ADMIN — ONDANSETRON HYDROCHLORIDE 4 MG: 2 SOLUTION INTRAMUSCULAR; INTRAVENOUS at 12:57

## 2020-08-11 RX ADMIN — ACETAMINOPHEN 1000 MG: 500 TABLET ORAL at 09:42

## 2020-08-11 RX ADMIN — SODIUM CHLORIDE, POTASSIUM CHLORIDE, SODIUM LACTATE AND CALCIUM CHLORIDE 50 ML/HR: 600; 310; 30; 20 INJECTION, SOLUTION INTRAVENOUS at 15:45

## 2020-08-11 RX ADMIN — OXYCODONE HYDROCHLORIDE AND ACETAMINOPHEN 1 TABLET: 7.5; 325 TABLET ORAL at 16:38

## 2020-08-11 RX ADMIN — DEXMEDETOMIDINE HYDROCHLORIDE 2 MCG: 4 INJECTION INTRAVENOUS at 13:07

## 2020-08-11 RX ADMIN — HYDROMORPHONE HYDROCHLORIDE 0.5 MG: 1 INJECTION, SOLUTION INTRAMUSCULAR; INTRAVENOUS; SUBCUTANEOUS at 18:30

## 2020-08-11 RX ADMIN — DEXMEDETOMIDINE HYDROCHLORIDE 2 MCG: 4 INJECTION INTRAVENOUS at 12:07

## 2020-08-11 RX ADMIN — METHOCARBAMOL 500 MG: 100 INJECTION, SOLUTION INTRAMUSCULAR; INTRAVENOUS at 13:48

## 2020-08-11 RX ADMIN — PROPOFOL 100 MG: 10 INJECTION, EMULSION INTRAVENOUS at 11:10

## 2020-08-11 RX ADMIN — FENTANYL CITRATE 50 MCG: 50 INJECTION INTRAMUSCULAR; INTRAVENOUS at 11:39

## 2020-08-11 NOTE — H&P
Primary Visit Coverage     Payer Plan Sponsor Code Group Number Group Name   [place]  [id number]    Primary Visit Coverage Subscriber     Subscriber ID Subscriber Name Subscriber SSN Subscriber Address   [id number] BIENVENIDO WEBSTER  [id number]      [address]      [place] [address]   Office Visit     8/11/2020  [facility] NEUROSURGERY      Osiel Hills MD   Neurosurgery   Cervical disc disorder at C5-C6 level with radiculopathy +2 more   Dx   Numbness   , Shoulder Pain   , Neck Pain   , Arm Pain   , Extremity Weakness     Reason for Visit    Progress Notes     Expand All Collapse All       Subjective      History of Present Illness: Bienvenido Webster is a 39 y.o. female is here today for follow-up to discuss cervical MRI and plain film results done at [facility] 7.2.20, her EMG is scheduled for 8.20.20     Patient had televisit 6.22.20 for N/T bilateral arms and right shoulder pain, neck and bilateral arm pain and bilateral hand weakness. Patient states that her symptoms are unchanged.  No urinary incontinence or problems with her balance and gait     The patient was alone but we spoke to her father by [employer]. When I first met her about 4 years ago, she had mainly neck pain. She has gone on to have a number of other issues including shoulder problems and has had a surgery by Dr. Perez in the right shoulder and is probably going to need another surgery in the left shoulder by Dr. Randall, but she over the last 8 weeks has had radiating arm pain bilaterally in a radicular distribution with some weakness documented below. She has a positive Spurling sign with reproduction of pain on the right and the left. She has a positive Tinel sign at the wrists and the elbows bilaterally and a positive Phalen sign at the wrists bilaterally suggestive of some superimposed carpal tunnel and cubital tunnel syndrome. Her EMG and nerve conduction study is not until  August, but I think there is enough evidence to suggest that the neck has probably the majority role in the radiating arm symptoms. Given the weakness and the fact that she has already gone through conservative treatment including physical therapy and blocks, I think surgery is reasonable. She had a very bad experience with blocks and we are not going to repeat that anyway. She will need an ACDF at C4-C5 and C5-C6 which I have described below and we will move forward with. She knows that there will be residual symptoms because of the superimposed shoulder problems and superimposed peripheral neuropathies. We will address the peripheral neuropathies once she gets the EMG and nerve conduction study in August. I do not think we need to delay to move forward with the cervical surgery. 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            Neck Pain    The problem occurs constantly. The problem has been unchanged. The pain is present in the midline. The pain is at a severity of 7/10. The pain is moderate. Associated symptoms include numbness and weakness. Pertinent negatives include no fever.   Arm Pain    The pain is present in the left forearm, upper right arm, upper left arm, right forearm and right shoulder. The pain is at a severity of 7/10. The pain is moderate. The pain has been constant since the incident. Associated symptoms include numbness.   Extremity Weakness    The pain is present in the left hand and right hand. The problem occurs constantly. The problem has been unchanged. The pain is at a severity of 7/10. The pain is moderate. Associated symptoms include numbness. Pertinent negatives include no fever.         The following portions of the patient's history were reviewed and updated as appropriate: allergies, current medications, past family history, past medical history, past social history, past surgical history and problem list.     Review of Systems   Constitutional:  Negative for fever.   HENT: Negative.    Eyes: Negative.    Respiratory: Negative.    Cardiovascular: Negative.    Gastrointestinal: Negative.    Endocrine: Negative.    Genitourinary: Negative for urgency.   Musculoskeletal: Positive for extremity weakness, neck pain and neck stiffness. Negative for arthralgias, gait problem, joint swelling and myalgias.   Allergic/Immunologic: Negative.    Neurological: Positive for weakness and numbness.   Hematological: Negative.             Objective         Vitals   There were no vitals filed for this visit.     There is no height or weight on file to calculate BMI.        Physical Exam   Constitutional: She is oriented to person, place, and time. She appears well-developed and well-nourished.   HENT:   Head: Normocephalic and atraumatic.   Eyes: Pupils are equal, round, and reactive to light. Conjunctivae and EOM are normal.   Fundoscopic exam:       The right eye shows no papilledema. The right eye shows venous pulsations.        The left eye shows no papilledema. The left eye shows venous pulsations.   Neck: Carotid bruit is not present.   Neurological: She is oriented to person, place, and time. She has a normal Finger-Nose-Finger Test and a normal Heel to Shin Test. Gait normal.   Reflex Scores:       Tricep reflexes are 2+ on the right side and 2+ on the left side.       Bicep reflexes are 2+ on the right side and 2+ on the left side.       Brachioradialis reflexes are 2+ on the right side and 2+ on the left side.       Patellar reflexes are 2+ on the right side and 2+ on the left side.       Achilles reflexes are 2+ on the right side and 2+ on the left side.  Psychiatric: Her speech is normal.      Neurologic Exam      Mental Status   Oriented to person, place, and time.   Registration of memory: Good recent and remote memory.   Attention: normal. Concentration: normal.   Speech: speech is normal   Level of consciousness: alert  Knowledge: consistent with education.       Cranial Nerves      CN II   Visual fields full to confrontation.   Visual acuity: normal     CN III, IV, VI   Pupils are equal, round, and reactive to light.  Extraocular motions are normal.      CN V   Facial sensation intact.   Right corneal reflex: normal  Left corneal reflex: normal     CN VII   Facial expression full, symmetric.   Right facial weakness: none  Left facial weakness: none     CN VIII   Hearing: intact     CN IX, X   Palate: symmetric     CN XI   Right sternocleidomastoid strength: normal  Left sternocleidomastoid strength: normal     CN XII   Tongue: not atrophic  Tongue deviation: none     Motor Exam   Muscle bulk: normal  Right arm tone: normal  Left arm tone: normal  Right leg tone: normal  Left leg tone: normal     Strength   Strength 5/5 except as noted.   Right deltoid: 4/5  Left deltoid: 4/5  Right biceps: 4/5  Left biceps: 4/5  Right triceps: 4/5  Left triceps: 4/5  Right wrist flexion: 4/5  Left wrist flexion: 4/5  Right wrist extension: 4/5  Left wrist extension: 4/5  Right interossei: 3/5  Left interossei: 3/5     Sensory Exam   Light touch normal.      Gait, Coordination, and Reflexes      Gait  Gait: normal     Coordination   Finger to nose coordination: normal  Heel to shin coordination: normal     Reflexes   Right brachioradialis: 2+  Left brachioradialis: 2+  Right biceps: 2+  Left biceps: 2+  Right triceps: 2+  Left triceps: 2+  Right patellar: 2+  Left patellar: 2+  Right achilles: 2+  Left achilles: 2+  Right : 2+  Left : 2+                 Assessment/Plan      Independent Review of Radiographic Studies:      I personally reviewed the images from the following studies.     I reviewed the recently completed cervical MRI which showed loss of the cervical lordosis and disc degeneration at C3-C4 and C6-C7.  However at C4-C5 and C5-C6 in particular there was fairly significant osteophytic disc herniations with both cord and root compression at C5-C6 and root compression  bilaterally at C4-C5.  Agree with the report.           Medical Decision Making:       I described and recommended an anterior cervical discectomy and fusion with a cage and plate at C4-C5 and C5-C6. The goal of surgery is relief of radiating arm pain and improvement of numbness, tingling, and weakness. This will help reduce but may not eliminate midline neck pain. The risks include, but are not limited to, infection, hemorrhage requiring transfusion or reoperation, CSF leak requiring reoperation, incomplete relief of symptoms, difficulty swallowing, hoarseness of voice, hardware problems requiring revision surgery, potential need for additional surgery in the future, stroke, paralysis, coma, and death. The patient agrees to proceed.  Because she probably has some bilateral carpal tunnel syndrome bilateral cubital tunnel syndrome I expect there to be some residual arm symptoms.  Once we get the EMG and nerve conduction study postoperative we can decide if there requires a referral to Dr. Doss for further care.        Bienvenido was seen today for numbness, shoulder pain, neck pain, arm pain and extremity weakness.     Diagnoses and all orders for this visit:     Cervical disc disorder at C5-C6 level with radiculopathy     Cervical disc disorder at C4-C5 level with radiculopathy     Bilateral carpal tunnel syndrome        Return for two weeks after surgery with me.               Instructions         Return for two weeks after surgery with me.   Additional Documentation     Vitals:    LMP 07/02/2020    Flowsheets:    Outbreak Screening       Encounter Info:    Billing Info,    History,    Allergies,    Detailed Report,    Prep for Surgery Order Report,    PAF,    Chart Review: Routing History,    Coding Summary,    Encounter Facesheet,    Link Facesheet    ...(6 more)   Media     Scan on 7/9/2020 1218 by Missy Vences: NSScan on 7/9/2020 1218 by Missy Vences: [facility]    Scan on 7/9/2020 1518 by Missy Vences:  Surgery Letter Dr. Pack on 7/9/2020 1518 by Missy Vences M: Surgery Letter Dr. Bundy    Scan on 7/7/2020 by [employer], [place]: NAKUL BUNDY [id number] on 7/7/2020 by [employer], [place]: NAKUL BUNDY 7-7-2020    Orders Placed      None   Medication Changes        None      Medication List    Visit Diagnoses         Cervical disc disorder at C5-C6 level with radiculopathy      Cervical disc disorder at C4-C5 level with radiculopathy      Bilateral carpal tunnel syndrome      Problem List     Current Medications     albuterol (PROVENTIL HFA;VENTOLIN HFA) 108 (90 Base) MCG/ACT inhaler   Start: 11/10/2017   Sig - Route: Inhale 2 puffs Every 6 (Six) Hours As Needed. - Inhalation   Class: Historical Med   amoxicillin (AMOXIL) 875 MG tablet   Sig - Route: Take 875 mg by mouth 2 (Two) Times a Day. - Oral   Class: Historical Med   amphetamine-dextroamphetamine (ADDERALL) 20 MG tablet   Start: 10/30/2017   Sig - Route: Take 15 mg by mouth Daily. - Oral   Class: Historical Med   Earliest Fill Date: 10/30/2017   Calcium 500-100 MG-UNIT chewable tablet   Sig - Route: Chew 1 tablet Every 4 (Four) Hours As Needed. - Oral   Class: Historical Med   cetirizine (zyrTEC) 10 MG tablet   Sig - Route: Take 10 mg by mouth Daily. - Oral   Class: Historical Med   cholecalciferol (VITAMIN D3) 1000 units tablet   Sig - Route: Take 1,000 Units by mouth Daily. - Oral   Class: Historical Med   Diclofenac Sodium (VOLTAREN PO)   Sig - Route: Take  by mouth. - Oral   Class: Historical Med   divalproex (DEPAKOTE) 500 MG 24 hr tablet   Start: 8/1/2016   Sig - Route: Take 1,000 mg by mouth Every Night. - Oral   Class: Historical Med   gabapentin (NEURONTIN) 600 MG tablet   Sig - Route: Take 600 mg by mouth 3 (Three) Times a Day. - Oral   Class: Historical Med   HYDROcodone-acetaminophen (NORCO) 7.5-325 MG per tablet   Sig - Route: Take 1 tablet by mouth Every 6 (Six) Hours As Needed for Moderate Pain . - Oral   Class:  Historical Med   hydrOXYzine (ATARAX) 25 MG tablet   Sig - Route: Take 25 mg by mouth 3 (Three) Times a Day As Needed for Itching. - Oral   Class: Historical Med   meloxicam (MOBIC) 7.5 MG tablet   Sig - Route: Take 7.5 mg by mouth Daily. held - Oral   Class: Historical Med   neomycin-polymyxin-hydrocortisone (CORTISPORIN) 3.5-46104-2 otic solution   Sig - Route: Administer 3 drops to the right ear 4 (Four) Times a Day. - Right Ear   Class: Historical Med   permethrin (ELIMITE) 5 % cream   Sig - Route: Apply  topically to the appropriate area as directed 1 (One) Time. - Topical   Class: Historical Med   QUEtiapine (SEROquel) 50 MG tablet   Sig - Route: Take  mg by mouth Every Night. - Oral   Class: Historical Med   QUEtiapine (SEROquel) 50 MG tablet   Sig - Route: Take 50 mg by mouth Daily. - Oral   Class: Historical Med   Hospital Medications     acetaminophen (TYLENOL) tablet 1,000 mg (Completed)   Dose: 1,000 mg   Frequency: Once   Start: 8/11/2020   End: 8/11/2020   Admin Instructions: Do not exceed 4 grams of acetaminophen in a 24 hr period.    If given for pain, use the following pain scale:   Mild Pain = Pain Score of 1-3, CPOT 1-2  Moderate Pain = Pain Score of 4-6, CPOT 3-4  Severe Pain = Pain Score of 7-10, CPOT 5-8   Route: Oral   famotidine (PEPCID) injection 20 mg (Completed)   Dose: 20 mg   Frequency: Once   Start: 8/11/2020   End: 8/11/2020   Admin Instructions: Dilute to 10 mL total volume and give IV push over 2 minutes.   Route: Intravenous   fentaNYL citrate (PF) (SUBLIMAZE) injection 50 mcg   Dose: 50 mcg   Frequency: Every 10 Minutes PRN   Start: 8/11/2020   Admin Instructions: Maximum total dose of fentanyl is 100 mcg.  If given for pain, use the following pain scale:  Mild Pain = Pain Score of 1-3, CPOT 1-2  Moderate Pain = Pain Score of 4-6, CPOT 3-4  Severe Pain = Pain Score of 7-10, CPOT 5-8   Route: Intravenous   gabapentin (NEURONTIN) capsule 600 mg (Completed)   Dose: 600 mg    Frequency: Once   Start: 8/11/2020   End: 8/11/2020   Admin Instructions:    Route: Oral   lactated ringers infusion   Dose: 9 mL/hr   Frequency: Continuous   Start: 8/11/2020   Route: Intravenous   lidocaine PF 1% (XYLOCAINE) injection 0.5 mL   Dose: 0.5 mL   Frequency: Once As Needed   Start: 8/11/2020   Route: Injection   midazolam (VERSED) injection 1 mg   Dose: 1 mg   Frequency: Every 10 Minutes PRN   Start: 8/11/2020   Admin Instructions: May repeat dose in 10 minutes x 1 then contact provider for additional orders.     Route: Intravenous   sodium chloride 0.9 % flush 3 mL   Dose: 3 mL   Frequency: Every 12 Hours Scheduled   Start: 8/11/2020   Route: Intravenous   sodium chloride 0.9 % flush 3-10 mL   Dose: 3-10 mL   Frequency: As Needed   Start: 8/11/2020   Route: Intravenous   No change in PE. Proceed as abov.e

## 2020-08-11 NOTE — ANESTHESIA PROCEDURE NOTES
Airway  Urgency: elective    Date/Time: 8/11/2020 11:12 AM  Airway not difficult    General Information and Staff    Patient location during procedure: OR  Anesthesiologist: Kota Rivera DO  CRNA: Oleksandr Corral CRNA    Indications and Patient Condition  Indications for airway management: airway protection    Preoxygenated: yes  MILS maintained throughout  Mask difficulty assessment: 1 - vent by mask    Final Airway Details  Final airway type: endotracheal airway      Successful airway: ETT  Cuffed: yes   Successful intubation technique: direct laryngoscopy  Facilitating devices/methods: intubating stylet  Endotracheal tube insertion site: oral  Blade: Tita  Blade size: 3  ETT size (mm): 7.0  Cormack-Lehane Classification: grade I - full view of glottis  Placement verified by: chest auscultation and capnometry   Cuff volume (mL): 5  Measured from: lips  ETT/EBT  to lips (cm): 19  Number of attempts at approach: 1  Assessment: lips, teeth, and gum same as pre-op and atraumatic intubation

## 2020-08-11 NOTE — ANESTHESIA POSTPROCEDURE EVALUATION
Patient: Bienvenido Carter    Procedure Summary     Date:  08/11/20 Room / Location:  [facility] OR [address] [place] [address] MAIN OR    Anesthesia Start:  1103 Anesthesia Stop:  1321    Procedure:  CERVICAL 4 TO CERVICAL 5, CERVICAL 5 TO CERVICAL 6 ANTERIOR DISCECTOMY FUSION WITH CAGE AND PLATE (Bilateral Spine Cervical) Diagnosis:       Cervical disc disorder at C4-C5 level with radiculopathy      Cervical disc disorder at C5-C6 level with radiculopathy      Spinal stenosis of cervical region      (Cervical disc disorder at C4-C5 level with radiculopathy [M50.121])      (Cervical disc disorder at C5-C6 level with radiculopathy [M50.122])      (Spinal stenosis of cervical region [M48.02])    Surgeon:  Osiel Hills MD Provider:  Kota Rivera DO    Anesthesia Type:  general ASA Status:  2          Anesthesia Type: general    Vitals  Vitals Value Taken Time   /74 8/11/2020  2:00 PM   Temp 36.7 °C (98.1 °F) 8/11/2020  1:25 PM   Pulse 94 8/11/2020  2:03 PM   Resp     SpO2 92 % 8/11/2020  2:03 PM   Vitals shown include unvalidated device data.        Post Anesthesia Care and Evaluation    Patient location during evaluation: PACU  Patient participation: complete - patient participated  Level of consciousness: awake and alert  Pain management: adequate  Airway patency: patent  Anesthetic complications: No anesthetic complications  PONV Status: none  Cardiovascular status: acceptable  Respiratory status: acceptable  Hydration status: acceptable

## 2020-08-11 NOTE — ANESTHESIA PREPROCEDURE EVALUATION
Anesthesia Evaluation     NPO Solid Status: > 8 hours             Airway   Mallampati: I  Dental      Pulmonary    (+) a smoker (quit in last few weeks) Former Abstained day of surgery, asthma,  (-) sleep apnea    ROS comment: Negative patient screen for OLGA    Cardiovascular     (-) angina, KING      Neuro/Psych  (+) headaches, numbness, psychiatric history Anxiety, ADHD and Bipolar,     GI/Hepatic/Renal/Endo      Musculoskeletal     (+) neck pain,   Abdominal    Substance History      OB/GYN          Other                        Anesthesia Plan    ASA 2     general       Anesthetic plan, all risks, benefits, and alternatives have been provided, discussed and informed consent has been obtained with: patient.

## 2020-08-11 NOTE — OP NOTE
Preoperative diagnosis: Osteophytic cervical disc herniations at C4-C5 and C5-C6 with bilateral radiculopathies    Postoperative diagnosis: Same as above    Procedures performed: ACDF at C4-C5 and C5-C6 with cages and anterior cervical plate from C4-C6    Surgeon: Reinier    First Assistant:Misa Cabrera  (She greatly assisted in the exposure, visualization of neural structures, control of bleeding, retraction, and closure of the incision. Her skilled assistance was necessary for the success of this case.)    Anesthesia: GET    EBL: 20 cc    Complications: none    Specimen sent: none    Drains: none    Findings: cord and bilateral root compression    Postoperative condition: good    Indications for the operation: The patient is a 39-year-old female with a history of neck pain and more recently bilateral arm pain, attributable to osteophytic cervical disc herniations at C4-C5 and C5-C6.  We have known about these herniations for a while, but in the past, she has just had neck pain and no myelopathy, so I never recommended surgery.  However, she has also had bilateral shoulder problems and peripheral entrapments which complicates matters.  I felt though given the physical examination and weakness and mechanical signs that she was symptomatic from the root compression at both levels and it probably made a greater contribution than the other two, so I brought her to the operating room for a two level diskectomy and fusion.        Informed consent: She understood that the goal of surgery is relief of radiating arm pain and improvement of numbness, tingling, and weakness. This will help reduce but may not eliminate midline neck pain. The risks include, but are not limited to, infection, hemorrhage requiring transfusion or reoperation, CSF leak requiring reoperation, incomplete relief of symptoms, difficulty swallowing, hoarseness of voice, hardware problems requiring revision surgery, potential need for additional  surgery in the future, stroke, paralysis, coma, and death. The patient agrees to proceed.     Details of the operation:The patient was taken to the operating room and placed on the operating table in the supine position.  After induction and endotracheal intubation, she was given a gram of Kefzol as per the SCIP protocol.  SCDs were placed.  No giang was needed.  She was put in extension with a shoulder roll.  We brought the C-arm to umesh out the incision before prepping, which was higher than its usual spot.  The anterior surface of the neck was prepped and draped in the usual sterile fashion.  We did a surgical timeout.  I made a transverse incision at the level of the C5 vertebral body with a #10 skin knife.  Hemostasis was obtained with monopolar cautery.  Dissection was taken all the way down to the platysma which was divided in a transverse fashion and undermined with Metzenbaum scissors.  The omohyoid was identified and retracted medially.  The sternocleidomastoid was identified and retracted laterally.  The pretracheal and prevertebral fascia were opened up sharply.  The carotid sheath was identified and retracted laterally.  The anterior surface of the spine was palpated.  I put a needle in which turned out to be verified as the C4-C5 disc space.  The longus colli were mobilized bilaterally.  The disc spaces were marked at C4-C5 and C5-C6.  Self retaining retractors were placed medially and laterally.  The microscope was draped and brought into the field.  Its use was essential to the performance of a microneurosurgical technique.  I incised the disc spaces at C4-C5 and C5-C6 and using an intervertebral  and a 3 mm Matchstick on an electric Bourbon drill and under magnification, did a generous diskectomy from uncovertebral joint to uncovertebral joint that started first at the C4-C5 and then later at the C5-C6 level.  On both levels, I incised the ligament with a sharp nerve hook and the remainder  of the bony and ligamentous removal was done with 1 and 2 mm angled Cloward punches decompressing the central thecal sac, specifically the spinal cord and bilaterally the C5 and the C6 roots.  I measured out for a cornerstone PSR cage measuring 5 x 11 x 11.  I packed it with demineralized bone matrix and impacted them into place with a good amount of distraction.  I put this size cage at both C4-C5 and C5-C6.  I used a 37 mm Circular anterior cervical plate suitable for smaller necks and then used 3.5 x 13 mm screws bilaterally at C4, bilaterally at C5, bilaterally at C6 with excellent purchase.  I tightened down the central locking screws at those 3 levels.  Intraoperative C-arm x-rays showed good placement of the cages and the screws and the plate in the PA and lateral view.  Hemostasis was obtained.  Floseal was used.  No drain was necessary.  The platysma was reapproximated with 3-0 interrupted Vicryl suture.  The subcutaneous layer was closed with 3-0 interrupted Vicryl suture.  The skin was closed with a running 4-0 Vicryl StrataFix.  Sterile clean and dry dressing was placed.  A soft collar was placed.  She was extubated and taken to the recovery room in satisfactory condition.

## 2020-08-11 NOTE — BRIEF OP NOTE
CERVICAL DISCECTOMY ANTERIOR FUSION WITH INSTRUMENTATION  Progress Note    Bienvenido Carter  8/11/2020    Pre-op Diagnosis:   Cervical disc disorder at C4-C5 level with radiculopathy [M50.121]  Cervical disc disorder at C5-C6 level with radiculopathy [M50.122]  Spinal stenosis of cervical region [M48.02]       Post-Op Diagnosis Codes:     * Cervical disc disorder at C4-C5 level with radiculopathy [M50.121]     * Cervical disc disorder at C5-C6 level with radiculopathy [M50.122]     * Spinal stenosis of cervical region [M48.02]    Procedure/CPT® Codes:        Procedure(s):  CERVICAL 4 TO CERVICAL 5, CERVICAL 5 TO CERVICAL 6 ANTERIOR DISCECTOMY FUSION WITH CAGE AND PLATE    Surgeon(s):  Osiel Hills MD    Anesthesia: General    Staff:   Circulator: Laurita Alvarado RN; Sarah Epps RN  Scrub Person: Ty Zhou; Kaylyn Piedra; Charlee Basilio  Vendor Representative: Nadir Souza  Assistant: Misa Cabrera CSA  Orientee: Gay Chang RN  Assistant: Misa Cabrera CSA      Estimated Blood Loss: 20 cc    Urine Voided: none    Specimens:                none          Drains: none    Findings: cord and bilateral root compression    Complications: none    Assistant: Misa Cabrera CSA  was responsible for performing the following activities: Retraction, Suction, Irrigation, Suturing, Closing and Placing Dressing and their skilled assistance was necessary for the success of this case.    Osiel Hills MD     Date: 8/11/2020  Time: 13:14

## 2020-08-12 VITALS
BODY MASS INDEX: 15.24 KG/M2 | TEMPERATURE: 97.8 F | RESPIRATION RATE: 16 BRPM | WEIGHT: 77.6 LBS | OXYGEN SATURATION: 99 % | SYSTOLIC BLOOD PRESSURE: 113 MMHG | HEIGHT: 60 IN | HEART RATE: 73 BPM | DIASTOLIC BLOOD PRESSURE: 74 MMHG

## 2020-08-12 PROCEDURE — 94799 UNLISTED PULMONARY SVC/PX: CPT

## 2020-08-12 PROCEDURE — 25010000002 HYDROMORPHONE PER 4 MG: Performed by: NEUROLOGICAL SURGERY

## 2020-08-12 PROCEDURE — 25010000003 CEFAZOLIN IN DEXTROSE 2-4 GM/100ML-% SOLUTION: Performed by: NEUROLOGICAL SURGERY

## 2020-08-12 RX ORDER — OXYCODONE AND ACETAMINOPHEN 7.5; 325 MG/1; MG/1
1 TABLET ORAL EVERY 6 HOURS PRN
Qty: 40 TABLET | Refills: 0 | OUTPATIENT
Start: 2020-08-12 | End: 2020-08-12 | Stop reason: HOSPADM

## 2020-08-12 RX ORDER — TIZANIDINE 4 MG/1
4 TABLET ORAL EVERY 8 HOURS PRN
Qty: 40 TABLET | Refills: 0 | Status: SHIPPED | OUTPATIENT
Start: 2020-08-12 | End: 2020-08-24

## 2020-08-12 RX ORDER — OXYCODONE AND ACETAMINOPHEN 7.5; 325 MG/1; MG/1
1 TABLET ORAL EVERY 6 HOURS PRN
Qty: 40 TABLET | Refills: 0
Start: 2020-08-12 | End: 2020-08-17

## 2020-08-12 RX ADMIN — OXYCODONE HYDROCHLORIDE AND ACETAMINOPHEN 1 TABLET: 7.5; 325 TABLET ORAL at 03:04

## 2020-08-12 RX ADMIN — GABAPENTIN 600 MG: 300 CAPSULE ORAL at 13:42

## 2020-08-12 RX ADMIN — HYDROMORPHONE HYDROCHLORIDE 0.5 MG: 1 INJECTION, SOLUTION INTRAMUSCULAR; INTRAVENOUS; SUBCUTANEOUS at 02:26

## 2020-08-12 RX ADMIN — OXYCODONE HYDROCHLORIDE AND ACETAMINOPHEN 1 TABLET: 7.5; 325 TABLET ORAL at 11:52

## 2020-08-12 RX ADMIN — GABAPENTIN 600 MG: 300 CAPSULE ORAL at 05:52

## 2020-08-12 RX ADMIN — CEFAZOLIN SODIUM 2 G: 2 INJECTION, SOLUTION INTRAVENOUS at 03:05

## 2020-08-12 RX ADMIN — HYDROMORPHONE HYDROCHLORIDE 0.5 MG: 1 INJECTION, SOLUTION INTRAMUSCULAR; INTRAVENOUS; SUBCUTANEOUS at 05:52

## 2020-08-12 NOTE — DISCHARGE SUMMARY
Bienvenido Carter  1980    Patient Care Team:  Robert Allen MD as PCP - General (Family Medicine)  Geovani Mcfadden MD as PCP - Family Medicine  Zach Pa MD (Psychiatry)    Date of Admit: 8/11/2020    Date of Discharge:  8/12/2020    Discharge Diagnosis:  Spinal stenosis of cervical region    Cervical disc disorder at C4-C5 level with radiculopathy    Cervical disc disorder at C5-C6 level with radiculopathy    Cervical disc herniation    Cervical stenosis of spine      Procedures Performed  Procedure(s):  CERVICAL 4 TO CERVICAL 5, CERVICAL 5 TO CERVICAL 6 ANTERIOR DISCECTOMY FUSION WITH CAGE AND PLATE       Complications: None    Consultants: None      Condition on Discharge: stable    Discharge disposition: home      Brief HPI: This is a 39-year-old female well-known to Dr. Hills.  She has a long history of neck pain but more recently developed bilateral arm pain.  Work-up revealed osteophytic cervical disc herniations at C4-5 and C5-6.  While these issues had been followed for a long while, the patient had failed all conservative measures and therefore elected to proceed with the above stated surgical procedure.  Please see admission H&P for further details.    Hospital Course: Today the patient reports improvement in the preoperative bilateral arm pain.  The left arm is feeling better than the right arm right now.  The patient also has some issues with suspected nerve entrapment in her upper extremities which also may be playing a role here.  The anterior cervical incision is flat and soft.  There is no redness, swelling, or drainage.  The patient is wearing the soft cervical collar which is providing some relief.  Her voice quality is slightly hoarse as expected.  She is voiding and tolerating a diet without difficulty.  She denies any difficulty with swallowing.  She denies any shortness of breath or chest pain.  He is moving both arms without difficulty.   Sensation is intact in the upper extremities.  The patient feels ready for discharge home today.  The patient will be given some pain medications at discharge today.  After that, she is aware that she will need to resume care with her pain management physician, Dr. Roblero.  She is also been given a prescription for a different muscle relaxer for spasm relief.  Both she and Dr. Hills are in agreement with the plan.    Temp:  [97.5 °F (36.4 °C)-98.6 °F (37 °C)] 97.8 °F (36.6 °C)  Heart Rate:  [73-88] 73  Resp:  [16-18] 16  BP: (107-139)/(69-87) 113/74    Current labs:  Lab Results (last 24 hours)     ** No results found for the last 24 hours. **          Discharge Medications  Magan has been reviewed and narcotic consent is on file in the patient's chart.     Your medication list      START taking these medications      Instructions Last Dose Given Next Dose Due   tiZANidine 4 MG tablet  Commonly known as:  ZANAFLEX      Take 1 tablet by mouth Every 8 (Eight) Hours As Needed for Muscle Spasms.          CONTINUE taking these medications      Instructions Last Dose Given Next Dose Due   albuterol sulfate  (90 Base) MCG/ACT inhaler  Commonly known as:  PROVENTIL HFA;VENTOLIN HFA;PROAIR HFA      Inhale 2 puffs Every 6 (Six) Hours As Needed.       amphetamine-dextroamphetamine 5 MG tablet  Commonly known as:  ADDERALL      Take 15 mg by mouth Daily.       Calcium 500-100 MG-UNIT chewable tablet      Chew 1 tablet Every 4 (Four) Hours As Needed.       cholecalciferol 25 MCG (1000 UT) tablet  Commonly known as:  VITAMIN D3      Take 1,000 Units by mouth Daily.       divalproex 500 MG 24 hr tablet  Commonly known as:  DEPAKOTE      Take 1,000 mg by mouth Every Night.       hydrOXYzine 25 MG tablet  Commonly known as:  ATARAX      Take 25 mg by mouth 3 (Three) Times a Day As Needed for Itching.       permethrin 5 % cream  Commonly known as:  ELIMITE      Apply  topically to the appropriate area as directed 1 (One)  Time.          ASK your doctor about these medications      Instructions Last Dose Given Next Dose Due   gabapentin 600 MG tablet  Commonly known as:  NEURONTIN      Take 600 mg by mouth 3 (Three) Times a Day.       oxyCODONE-acetaminophen 7.5-325 MG per tablet  Commonly known as:  Percocet  Ask about: Which instructions should I use?      Take 1 tablet by mouth Every 6 (Six) Hours As Needed for Moderate Pain .       oxyCODONE-acetaminophen 7.5-325 MG per tablet  Commonly known as:  Percocet  Ask about: Which instructions should I use?      Take 1 tablet by mouth Every 6 (Six) Hours As Needed for Severe Pain .       QUEtiapine 50 MG tablet  Commonly known as:  SEROquel      Take  mg by mouth Every Night.       QUEtiapine 50 MG tablet  Commonly known as:  SEROquel      Take 50 mg by mouth Daily.             Where to Get Your Medications      These medications were sent to [id number] - [place], [place] - [address] AT [address] - [phone]  - [phone] [address] [address]    Phone:  [phone]   · oxyCODONE-acetaminophen 7.5-325 MG per tablet  · tiZANidine 4 MG tablet     Information about where to get these medications is not yet available    Ask your nurse or doctor about these medications  · oxyCODONE-acetaminophen 7.5-325 MG per tablet         Discharge Diet:     Diet Order   Procedures   • Diet Regular       Activity at Discharge:     As listed in the attached post op instruction sheet in After Visit Summary    Call for: questions or concerns    Follow-up Appointments  Future Appointments   Date Time Provider Department Center   8/20/2020  1:00 PM Poncho Miller MD [facility]   8/20/2020  1:45 PM Poncho Miller MD  [facility]   8/26/2020 10:45 AM Osiel Hills MD [facility]     Follow-up Information     Robert Allen MD .    Specialty:  Family Medicine  Contact information:  [address]  [facility] [address]  [phone]             Geovani Mcfadden MD .    Specialty:  Family Medicine  Contact information:  [address]  [place] [id number] [phone]                 Additional Instructions for the Follow-ups that You Need to Schedule     Call MD With Problems / Concerns   As directed      Instructions: Call Dr. Hills's office for incisional redness, swelling, drainage, temperature greater than 100.5 degrees, worsening pain or swallowing difficulty.  Please call Dr. Hills's office if you have any further questions or concerns.    Order Comments:  Instructions: Call Dr. Hills's office for incisional redness, swelling, drainage, temperature greater than 100.5 degrees, worsening pain or swallowing difficulty.  Please call Dr. Hills's office if you have any further questions or concerns.          Discharge Follow-up with Specialty: Neurosurgery; 2 Weeks   As directed      Specialty:  Neurosurgery    Follow Up:  2 Weeks    Follow Up Details:  Dr. Hills - keep appointment in two weeks as previously arranged               I discussed the discharge instructions with patient and family    Katie Holly, APRN  08/12/20  16:25    \"Dictated utilizing Dragon dictation\".    "

## 2020-08-12 NOTE — PROGRESS NOTES
Discharge Planning Assessment  [facility]     Patient Name: Bienvenido Carter  MRN: [id number]  Today's Date: 8/12/2020    Admit Date: 8/11/2020    Discharge Needs Assessment     Row Name 08/12/20 0957       Living Environment    Lives With  alone;parent(s)    Current Living Arrangements  home/apartment/condo    Primary Care Provided by  self    Provides Primary Care For  no one    Family Caregiver if Needed  parent(s)    Family Caregiver Names  Alexis Carter father [phone]    Quality of Family Relationships  supportive    Able to Return to Prior Arrangements  no    Living Arrangement Comments  She will be staying with her parents       Resource/Environmental Concerns    Transportation Concerns  car, none       Transition Planning    Patient/Family Anticipates Transition to  home with family    Patient/Family Anticipated Services at Transition  none    Transportation Anticipated  family or friend will provide       Discharge Needs Assessment    Readmission Within the Last 30 Days  no previous admission in last 30 days    Concerns to be Addressed  discharge planning    Equipment Currently Used at Home  cane, straight    Provided Post Acute Provider List?  N/A    N/A Provider List Comment  No needs identified    Patient's Choice of Community Agency(s)  No needs identified    Current Discharge Risk  chronically ill        Discharge Plan     Row Name 08/12/20 1008       Plan    Plan  Home with parents    Patient/Family in Agreement with Plan  yes    Plan Comments  Met with patient at bedside. Face sheet verified. Prior to admission patient was living in her own condominium; she was able to perform all of her own ADL's.  Patient states she has a couple of canes that she will use at times.  Patient uses the [facility] on [address], denies any issues affording or taking her medications.  She will be using [facility] upon discharge.  Patient states Alexis Carter father [phone] is her  power of .  Discharge plans discussed, the plan is for her to stay with her parents after discharge until she regains her strength.  Family will transport.  Will continue to monitor for new or changing discharge needs.          Destination      Coordination has not been started for this encounter.      Durable Medical Equipment      Coordination has not been started for this encounter.      Dialysis/Infusion      Coordination has not been started for this encounter.      Home Medical Care      Coordination has not been started for this encounter.      Therapy      Coordination has not been started for this encounter.      Community Resources      Coordination has not been started for this encounter.          Demographic Summary     Row Name 08/12/20 0956       General Information    Admission Type  same day    Arrived From  home    Referral Source  admission list    Reason for Consult  discharge planning    Preferred Language  English     Used During This Interaction  no       Contact Information    Permission Granted to Share Info With  family/designee Alexis Carter father [phone]        Functional Status     Row Name 08/12/20 0956       Functional Status    Usual Activity Tolerance  good    Current Activity Tolerance  moderate       Functional Status, IADL    Medications  independent    Meal Preparation  independent    Housekeeping  independent    Laundry  independent    Shopping  independent       Mental Status    General Appearance WDL  WDL       Mental Status Summary    Recent Changes in Mental Status/Cognitive Functioning  no changes       Employment/    Employment Status  disabled        Psychosocial    No documentation.       Abuse/Neglect    No documentation.       Legal    No documentation.       Substance Abuse    No documentation.       Patient Forms    No documentation.           Melina Salinas RN

## 2020-08-12 NOTE — TELEPHONE ENCOUNTER
Per verbal from Katie Holly Rx for Percocet 7.5-325mg Q6H prn #40 w/0 refills placed. Please sign off.

## 2020-08-13 NOTE — PROGRESS NOTES
Case Management Discharge Note      Final Note: Discharged home with family.    Provided Post Acute Provider List?: N/A  N/A Provider List Comment: No needs identified    Destination      No service has been selected for the patient.      Durable Medical Equipment      No service has been selected for the patient.      Dialysis/Infusion      No service has been selected for the patient.      Home Medical Care      No service has been selected for the patient.      Therapy      No service has been selected for the patient.      Community Resources      No service has been selected for the patient.        Transportation Services  Private: Car    Final Discharge Disposition Code: 01 - home or self-care

## 2020-08-14 ENCOUNTER — TELEPHONE (OUTPATIENT)
Dept: NEUROSURGERY | Facility: CLINIC | Age: 40
End: 2020-08-14

## 2020-08-14 NOTE — TELEPHONE ENCOUNTER
Patient called stating that she has sent multiple [employer] messages to our office with no response. Please refer to [employer] messages from 8/13/20 and 8/14/20 and advise recommendations.

## 2020-08-17 ENCOUNTER — PATIENT MESSAGE (OUTPATIENT)
Dept: NEUROSURGERY | Facility: CLINIC | Age: 40
End: 2020-08-17

## 2020-08-17 ENCOUNTER — OFFICE VISIT (OUTPATIENT)
Dept: NEUROSURGERY | Facility: CLINIC | Age: 40
End: 2020-08-17

## 2020-08-17 DIAGNOSIS — Z48.89 POSTOPERATIVE VISIT: Primary | ICD-10-CM

## 2020-08-17 PROCEDURE — 99024 POSTOP FOLLOW-UP VISIT: CPT | Performed by: NEUROLOGICAL SURGERY

## 2020-08-17 RX ORDER — METHYLPREDNISOLONE 4 MG/1
TABLET ORAL
Qty: 21 TABLET | Refills: 0 | Status: SHIPPED | OUTPATIENT
Start: 2020-08-17 | End: 2020-10-29

## 2020-08-17 RX ORDER — OXYCODONE AND ACETAMINOPHEN 10; 325 MG/1; MG/1
1 TABLET ORAL EVERY 4 HOURS PRN
Qty: 40 TABLET | Refills: 0 | Status: SHIPPED | OUTPATIENT
Start: 2020-08-17 | End: 2020-08-26 | Stop reason: SDUPTHER

## 2020-08-17 NOTE — PROGRESS NOTES
Subjective   History of Present Illness: Bienvenido Carter is a 39 y.o. female for televisit post op appt.  Patient had surgery 8.11.20,  C4/5 C5/6 ACDF.    Patient has contacted office several times stating that she feels her incision is infected,no drainage,fever or chills.  She has had difficulty swallowing, she felt a pop in her neck which is causing extreme pain and difficulty breathing and spasms.  She has severe right arm pain and bilateral hands and fingers . She has excessive phlegm and she has a bone in her neck sticking out.  She cannot sleep and does not know what position to sleep in and does not understand her post op restrictions    She is taking Percoce 7.5/325 1 q 6 hours and Zanaflex (2) 4 mg TID and nothing is helping.  Patient states that she needs stronger pain meds.    You have chosen to receive care through a telephone visit. Do you consent to use a telephone visit for your medical care today? Yes                Neck Pain    The problem occurs constantly. The problem has been unchanged. The pain is present in the midline. The pain is at a severity of 10/10. The pain is severe. Associated symptoms include numbness and trouble swallowing. Pertinent negatives include no weakness.   Arm Pain    The pain is present in the left forearm, upper right arm, upper left arm, right forearm, right shoulder and left shoulder. The pain is at a severity of 10/10. The pain is severe. The pain has been constant since the incident. Associated symptoms include numbness.   Difficulty Swallowing   The problem occurs constantly. The problem has been unchanged. Associated symptoms include neck pain and numbness. Pertinent negatives include no arthralgias, joint swelling, myalgias or weakness.       The following portions of the patient's history were reviewed and updated as appropriate: allergies, current medications, past family history, past medical history, past social history, past surgical history and problem  list.    Review of Systems   Constitutional: Negative.    HENT: Positive for trouble swallowing.    Eyes: Negative.    Respiratory: Negative.    Cardiovascular: Negative.    Gastrointestinal: Negative.    Endocrine: Negative.    Genitourinary: Negative for urgency.   Musculoskeletal: Positive for neck pain and neck stiffness. Negative for arthralgias, back pain, gait problem, joint swelling and myalgias.   Allergic/Immunologic: Negative.    Neurological: Positive for numbness. Negative for weakness.   Hematological: Negative.    Psychiatric/Behavioral: Negative.        This was a tele-visit from my office.  The patient was at home.  It lasted 10 minutes.  It is been a week tomorrow since surgery.  She is complaining bitterly of neck pain and the increase in the Tizanidine really did not do much.  She has pain in her arms and her hands.  She said that there was a clicking sensation in her neck when she looked upwards which was very painful.  She is taking 7.5  mg strength Percocet every 6 hours.  She was on that strength before surgery.  She does not think it is enough to control her pain.  She is on gabapentin at 600 mg 3 times daily.  I told her we would try a steroid pack and increase her pain medications and get x-rays to make sure that there is nothing wrong with the hardware which I doubt.  But if these are okay then that this will simply have to run its course.  She has had other problems such as the shoulder problems and probable peripheral entrapments as well that could be the issue.  We will touch base tomorrow after the x-rays with a tele-visit.    Objective             Physical Exam   Deferred  Neurologic Exam   Deferred        Assessment/Plan   Independent Review of Radiographic Studies:      I personally reviewed the images from the following studies.        Medical Decision Making:      We will go ahead and give her a steroid pack and increase her pain medications outlined below.  To 10 mg p.o. every 4  hours as needed.  We will get some x-rays to make sure that not there is nothing wrong with the hardware and do a tele-visit tomorrow.      Bienvenido was seen today for post-op, difficulty swallowing, neck pain and arm pain.    Diagnoses and all orders for this visit:    Postoperative visit  -     oxyCODONE-acetaminophen (Percocet)  MG per tablet; Take 1 tablet by mouth Every 4 (Four) Hours As Needed for Moderate Pain .  -     XR Spine Cervical Complete 4 or 5 View; Future    Other orders  -     methylPREDNISolone (MEDROL, SURENDRA,) 4 MG tablet; Take as directed on package instructions.      Return in about 1 day (around 8/18/2020) for As it afternoon tele-visit.

## 2020-08-18 ENCOUNTER — TELEPHONE (OUTPATIENT)
Dept: NEUROSURGERY | Facility: CLINIC | Age: 40
End: 2020-08-18

## 2020-08-18 ENCOUNTER — HOSPITAL ENCOUNTER (OUTPATIENT)
Dept: GENERAL RADIOLOGY | Facility: HOSPITAL | Age: 40
Discharge: HOME | End: 2020-08-18
Admitting: NEUROLOGICAL SURGERY

## 2020-08-18 DIAGNOSIS — Z48.89 POSTOPERATIVE VISIT: ICD-10-CM

## 2020-08-18 PROCEDURE — 72050 X-RAY EXAM NECK SPINE 4/5VWS: CPT

## 2020-08-18 NOTE — TELEPHONE ENCOUNTER
Patient sent message via [employer] letting her know that she needs to go to hospital today and get xrays and Dr SANCHEZ will call her tomorrow afternoon to discuss      Missy, can you put her on for a televisit with Dr SANCHEZ tomorrow

## 2020-08-18 NOTE — TELEPHONE ENCOUNTER
----- Message from Osiel Hills MD sent at 8/17/2020  6:50 PM EDT -----  See my note.  I gave her a steroid pack and increased her pain medication strength.  I ordered a x-ray.  If we can have that done tomorrow morning so I can do a tele-visit with her again tomorrow afternoon.

## 2020-08-18 NOTE — PROGRESS NOTES
Subjective   History of Present Illness: Bienvenido Carter is a 39 y.o. female for televisit post op appt to discuss cervical spine plain film results done at [facility].  Patient had surgery 8/11/20, C4/5 C5/6 ACDF    Patient had televisit on 8.17.20 for severe neck and bilateral arm and hand pain. No change in her symptoms.  She was prescribed MDP at her last visit    You have chosen to receive care through a telephone visit. Do you consent to use a telephone visit for your medical care today? Yes    Neck Pain    The problem occurs constantly. The problem has been unchanged. The pain is present in the midline. The pain is at a severity of 10/10. The pain is severe. Pertinent negatives include no numbness or weakness.   Arm Pain    The pain is present in the left forearm, upper right arm, upper left arm and right forearm. The pain is at a severity of 10/10. Pertinent negatives include no numbness.       The following portions of the patient's history were reviewed and updated as appropriate: allergies, current medications, past family history, past medical history, past social history, past surgical history and problem list.    Review of Systems   Constitutional: Negative.    HENT: Negative.    Eyes: Negative.    Respiratory: Negative.    Cardiovascular: Negative.    Gastrointestinal: Negative.    Endocrine: Negative.    Genitourinary: Negative for urgency.   Musculoskeletal: Positive for neck pain and neck stiffness. Negative for arthralgias, back pain, gait problem, joint swelling and myalgias.   Allergic/Immunologic: Negative.    Neurological: Negative for weakness and numbness.   Hematological: Negative.    Psychiatric/Behavioral: Negative.        This was a tele-visit from my office.  She was at home.  It lasted 5 minutes.  I reassured her that the x-rays look fine.  The increased pain medications and the steroids helped.  I told her to keep her collar on.  She is getting her EMG and nerve conduction study  tomorrow.  We will see her next week and we can probably take the collar off and let her drive.  I will hold off on therapy given that there are other issues going on.    Objective             Physical Exam   Deferred  Neurologic Exam   Deferred        Assessment/Plan   Independent Review of Radiographic Studies:      I personally reviewed the images from the following studies.    X-rays done yesterday showed good positioning of the cages and the screws and the plate at C4-C5 and C5-C6.      Medical Decision Making:      Clinically feeling better.  She will finish out her steroid pack and take her pain medications.  I will see her next week.      Bienvenido was seen today for post-op, neck pain and arm pain.    Diagnoses and all orders for this visit:    Postoperative visit      Return in about 1 week (around 8/26/2020) for She should already have a visit on that day..

## 2020-08-19 ENCOUNTER — OFFICE VISIT (OUTPATIENT)
Dept: NEUROSURGERY | Facility: CLINIC | Age: 40
End: 2020-08-19

## 2020-08-19 DIAGNOSIS — Z48.89 POSTOPERATIVE VISIT: Primary | ICD-10-CM

## 2020-08-19 PROCEDURE — 99024 POSTOP FOLLOW-UP VISIT: CPT | Performed by: NEUROLOGICAL SURGERY

## 2020-08-20 ENCOUNTER — HOSPITAL ENCOUNTER (OUTPATIENT)
Dept: INFUSION THERAPY | Facility: HOSPITAL | Age: 40
Discharge: HOME | End: 2020-08-20

## 2020-08-20 ENCOUNTER — HOSPITAL ENCOUNTER (OUTPATIENT)
Dept: INFUSION THERAPY | Facility: HOSPITAL | Age: 40
Discharge: HOME | End: 2020-08-20
Admitting: PSYCHIATRY & NEUROLOGY

## 2020-08-20 DIAGNOSIS — R20.2 NUMBNESS AND TINGLING IN RIGHT HAND: ICD-10-CM

## 2020-08-20 DIAGNOSIS — M48.02 SPINAL STENOSIS OF CERVICAL REGION: ICD-10-CM

## 2020-08-20 DIAGNOSIS — R20.0 NUMBNESS AND TINGLING IN RIGHT HAND: ICD-10-CM

## 2020-08-20 DIAGNOSIS — M50.122 CERVICAL DISC DISORDER AT C5-C6 LEVEL WITH RADICULOPATHY: ICD-10-CM

## 2020-08-20 PROCEDURE — 95911 NRV CNDJ TEST 9-10 STUDIES: CPT

## 2020-08-20 PROCEDURE — 95911 NRV CNDJ TEST 9-10 STUDIES: CPT | Performed by: PSYCHIATRY & NEUROLOGY

## 2020-08-20 PROCEDURE — 95886 MUSC TEST DONE W/N TEST COMP: CPT

## 2020-08-20 PROCEDURE — 95886 MUSC TEST DONE W/N TEST COMP: CPT | Performed by: PSYCHIATRY & NEUROLOGY

## 2020-08-20 NOTE — PROGRESS NOTES
Subjective   History of Present Illness: Bienvenido Carter is a 39 y.o. female is here today for post op appt to discuss bilateral arm EMG results done at [facility] 8.20.20.  She is in pain management with Dr Roblero, she does not have a current follow up appt scheduled     Patient had surgery 8.11.20, C4/5 C5/6 ACDF and has had significant post operative neck and arm pain    Patient states that she is still having neck and bilateral arm pain and weakness.  Caryl problems with her incision, no redness, swelling and drainage.  Steri strips removed with ease, she is wearing her cervical collar    Neck Pain    The problem occurs constantly. The problem has been unchanged. The pain is present in the midline. The pain is at a severity of 5/10. The pain is moderate. Pertinent negatives include no fever or numbness.   Extremity Weakness    The pain is present in the left arm and right arm. The problem occurs constantly. The problem has been unchanged. Pertinent negatives include no fever or numbness.   Arm Pain    The pain is present in the left forearm, upper right arm, upper left arm and right forearm. The pain is at a severity of 5/10. The pain is moderate. Pertinent negatives include no numbness.       The following portions of the patient's history were reviewed and updated as appropriate: allergies, current medications, past family history, past medical history, past social history, past surgical history and problem list.    Review of Systems   Constitutional: Negative for fever.   Eyes: Negative.    Endocrine: Negative.    Genitourinary: Negative.    Musculoskeletal: Positive for extremity weakness and neck pain.   Allergic/Immunologic: Negative.    Neurological: Negative for numbness.   Hematological: Negative.        It has been 2 weeks since her ACDF at C4-C5 and C5-C6.  She has had a lot of neck spasm and the steroids helped, but she is still having them although it is better than it was.  We have gone through muscle  relaxants including baclofen, Flexeril, Robaxin, Valium, tizanidine.  They do not seem to be helping as much.  They only thing really left to try is Skelaxin and will try it. I increased her pain medications to Percocet 10 mg strength p.o. q4 hours p.r.n.  Will continue that.  She is supposed to have an appointment with Dr. Roblero, her pain physician, and they will continue that after she gets that visit. Her EMG and nerve conduction study did show bilateral carpal tunnel syndrome and left-sided ulnar neuropathy.  There was some cervical radiculopathy also as expected which we addressed at the time of the surgery.  Will put her in some wrist splints and send her to Dr. Doss.  As to whether or not she requires surgery, I will leave to his discretion. I really do not want her to do physical therapy formally.  They might over do it, so I showed her simple exercises to do.  The incision is fine.  The collar can come off.  She can drive.  I will see her in 6 weeks.  I stressed again that the slowness of the improvement is because there is a multitude of factors that are causing her symptoms in the limbs including shoulder problems, peripheral entrapment and the cervical root compression, but overall I am pleased with her initial progress.        Objective     Vitals:    08/26/20 1159   Temp: 97.9 °F (36.6 °C)   TempSrc: Temporal     There is no height or weight on file to calculate BMI.      Physical Exam   Constitutional: She is oriented to person, place, and time. She appears well-developed and well-nourished.   HENT:   Head: Normocephalic and atraumatic.   Eyes: Pupils are equal, round, and reactive to light. Conjunctivae and EOM are normal.   Fundoscopic exam:       The right eye shows no papilledema. The right eye shows venous pulsations.        The left eye shows no papilledema. The left eye shows venous pulsations.   Neck: Carotid bruit is not present.   Neurological: She is oriented to person, place, and time.  She has a normal Finger-Nose-Finger Test and a normal Heel to Shin Test. Gait normal.   Reflex Scores:       Tricep reflexes are 2+ on the right side and 2+ on the left side.       Bicep reflexes are 2+ on the right side and 2+ on the left side.       Brachioradialis reflexes are 2+ on the right side and 2+ on the left side.       Patellar reflexes are 2+ on the right side and 2+ on the left side.       Achilles reflexes are 2+ on the right side and 2+ on the left side.  Psychiatric: Her speech is normal.     Neurologic Exam     Mental Status   Oriented to person, place, and time.   Registration of memory: Good recent and remote memory.   Attention: normal. Concentration: normal.   Speech: speech is normal   Level of consciousness: alert  Knowledge: consistent with education.     Cranial Nerves     CN II   Visual fields full to confrontation.   Visual acuity: normal    CN III, IV, VI   Pupils are equal, round, and reactive to light.  Extraocular motions are normal.     CN V   Facial sensation intact.   Right corneal reflex: normal  Left corneal reflex: normal    CN VII   Facial expression full, symmetric.   Right facial weakness: none  Left facial weakness: none    CN VIII   Hearing: intact    CN IX, X   Palate: symmetric    CN XI   Right sternocleidomastoid strength: normal  Left sternocleidomastoid strength: normal    CN XII   Tongue: not atrophic  Tongue deviation: none    Motor Exam   Muscle bulk: normal  Right arm tone: normal  Left arm tone: normal  Right leg tone: normal  Left leg tone: normal    Strength   Strength 5/5 except as noted.     Sensory Exam   Light touch normal.     Gait, Coordination, and Reflexes     Gait  Gait: normal    Coordination   Finger to nose coordination: normal  Heel to shin coordination: normal    Reflexes   Right brachioradialis: 2+  Left brachioradialis: 2+  Right biceps: 2+  Left biceps: 2+  Right triceps: 2+  Left triceps: 2+  Right patellar: 2+  Left patellar: 2+  Right  achilles: 2+  Left achilles: 2+  Right : 2+  Left : 2+          Assessment/Plan   Independent Review of Radiographic Studies:      I personally reviewed the images from the following studies.        Medical Decision Making:      We will go ahead and try some bilateral wrist splints and send her to the hand surgeons.  We will try another muscle relaxant, Skelaxin.  I do not know if her insurance will pay for this however.  I refilled her pain medications.  I refilled her gabapentin for now.  Her pain physician will take care of that later.  I asked her to follow-up with Dr. Roblero her pain physician.  She will do her own exercises.  No formal physical therapy.  I will see her in 6weeks.      Bienvenido was seen today for post-op, neck pain, arm pain and extremity weakness.    Diagnoses and all orders for this visit:    Postoperative visit  -     Ambulatory Referral to Hand Surgery  -     oxyCODONE-acetaminophen (Percocet)  MG per tablet; Take 1 tablet by mouth Every 4 (Four) Hours As Needed for Moderate Pain .  -     gabapentin (NEURONTIN) 600 MG tablet; Take 1-1/2 tablets p.o. 3 times daily    Other orders  -     metaxalone (Skelaxin) 800 MG tablet; Take 1 tablet by mouth 3 (Three) Times a Day As Needed for Muscle Spasms.      Return in about 6 weeks (around 10/7/2020) for face to face.         Answers for HPI/ROS submitted by the patient on 8/22/2020   Neurologic complaint  What is the primary reason for your visit?: Neurological Problem

## 2020-08-20 NOTE — PROCEDURES
EMG and Nerve Conduction Studies    I.      Instrument used: Neuromax 1002  II.     Please see data sheets for tabular summary of NCS and details on methods, temperatures and lab standards.   III.    EMG muscles tested for upper extremity studies include the deltoid, biceps, triceps, pronator teres, extensor digitorum communis, first dorsal interosseous and abductor pollicis brevis.    IV.   EMG muscles tested for lower extremity studies include the vastus lateralis, tibialis anterior, peroneus longus, medial gastrocnemius and extensor digitorum brevis.    V.    Additional muscles tested as needed.  Paraspinal muscles tested as needed.   VI.   Please see data sheets for tabular summary of EMG findings.   VII. The complete report includes the data sheets.      Indication: Numbness and tingling in both hands  History: 39-year-old Chinese female with recent multilevel anterior cervical discectomy  With worsening numbness and tingling in the hands.  It seems to involve all digits.  She indicates that she had a previous study done late last year results of which are not available to me but the patient indicates no specific pathology was mentioned to her.    Ht: 152.4 cm  Wt: 35.2 kg; BMI 15.16  HbA1C:   Lab Results   Component Value Date    HGBA1C 5.5 01/03/2020     TSH:   Lab Results   Component Value Date    TSH 2.340 06/05/2020       Technical summary:  Nerve conduction studies were obtained in both arms.  Skin temperatures were at least 32 °C measured on the palms.  Needle examination was obtained on selected muscles in both arms.    Results:  1.  Mildly prolonged right median sensory latency at 3.8 ms with normal amplitude.  Upper limit of normal left median sensory latency at 3.6 ms with normal amplitude.  The left median sensory latency minus the ulnar sensory latency was prolonged at 0.8 ms.  2.  Prolonged left median orthodromic palmar sensory latency at 2.3 ms with normal amplitude.  3.  Normal ulnar sensory  studies bilaterally.  4.  Normal median motor studies bilaterally.  5.  Normal right ulnar motor conduction velocities below the elbow and in the short segment across the elbow at 53.6 m/s with a difference of 8.7 m/s.  Normal distal latency and amplitudes.  Normal left ulnar motor conduction velocity in the short segment across the elbow at 55.6 m/s with much faster velocity below the elbow at 70 m/s.  The difference of 14.4 m/s is a mild abnormality.  Normal distal latency and amplitudes.  6.  Needle examination of selected muscles in both arm showed normal insertional activities throughout.  There were normal motor units and recruitment patterns.    Impression:  Abnormal study showing 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  No evidence of a right ulnar neuropathy.  In addition there is evidence of an old or chronic right C7 radiculopathy (or perhaps C6).  No acute changes were seen.  Study results were discussed with the patient.    Poncho Miller M.D.        Addendum:  Brief power testing revealed mild weakness in the left first dorsal interosseous and abductor digiti quinti muscles with normal power on the right and normal power in both abductor pollicis brevis and flexor pollicis longus muscles.  GNS      Dictated utilizing Dragon dictation.

## 2020-08-24 RX ORDER — TIZANIDINE 4 MG/1
TABLET ORAL
Qty: 40 TABLET | Refills: 0 | Status: SHIPPED | OUTPATIENT
Start: 2020-08-24 | End: 2020-08-26

## 2020-08-26 ENCOUNTER — OFFICE VISIT (OUTPATIENT)
Dept: NEUROSURGERY | Facility: CLINIC | Age: 40
End: 2020-08-26

## 2020-08-26 VITALS — TEMPERATURE: 97.9 F

## 2020-08-26 DIAGNOSIS — Z48.89 POSTOPERATIVE VISIT: Primary | ICD-10-CM

## 2020-08-26 PROCEDURE — 99024 POSTOP FOLLOW-UP VISIT: CPT | Performed by: NEUROLOGICAL SURGERY

## 2020-08-26 RX ORDER — METAXALONE 800 MG/1
800 TABLET ORAL 3 TIMES DAILY PRN
Qty: 60 TABLET | Refills: 2 | Status: SHIPPED | OUTPATIENT
Start: 2020-08-26 | End: 2020-10-29 | Stop reason: CLARIF

## 2020-08-26 RX ORDER — OXYCODONE AND ACETAMINOPHEN 10; 325 MG/1; MG/1
1 TABLET ORAL EVERY 4 HOURS PRN
Qty: 40 TABLET | Refills: 0 | Status: SHIPPED | OUTPATIENT
Start: 2020-08-26 | End: 2020-09-08 | Stop reason: SDUPTHER

## 2020-08-26 RX ORDER — GABAPENTIN 600 MG/1
TABLET ORAL
Qty: 135 TABLET | Refills: 3 | Status: SHIPPED | OUTPATIENT
Start: 2020-08-26 | End: 2020-10-14 | Stop reason: CLARIF

## 2020-08-28 ENCOUNTER — TELEPHONE (OUTPATIENT)
Dept: NEUROSURGERY | Facility: CLINIC | Age: 40
End: 2020-08-28

## 2020-08-28 NOTE — TELEPHONE ENCOUNTER
Rec'd fax from pt's pharmacy at 2:53 pm today stating that pt needed a PA for Skelaxin.  Spoke with Brittni at [facility] and did an expedited PA over the phone.  Reference # for call is [id number]    Patient informed thru Mychart of the status    Copy of PA request from pharmacy scanned into chart

## 2020-08-28 NOTE — TELEPHONE ENCOUNTER
[facility] denied coverage for Skelaxin, per Dr SANCHEZ she can try moist heat, hot water bottle or heating pain as long as she does not apply to incision area    Patient has tried Baclofen, Robaxin, Zanaflex, Flexeril and Valium and states that none help her spasms.  Per Dr SANCHEZ there is nothing else to try.    Patient informed via [employer] messages in response to [employer] message she sent

## 2020-09-04 ENCOUNTER — TELEPHONE (OUTPATIENT)
Dept: NEUROSURGERY | Facility: CLINIC | Age: 40
End: 2020-09-04

## 2020-09-04 NOTE — TELEPHONE ENCOUNTER
I called Dr. Roblero's office he is out of the office for the day.       She has tried multiple muscle relaxer's with no relief and Skelaxin is $ 800 because it is not covered under her insurance.     She is wanting to try the Soma. I told her she will need to discuss with Dr. Roblero about pain medications.      She said she has 4 Percocet and will be out over this weekend.  She is not scheduled to See Dr. Roblero yet.

## 2020-09-04 NOTE — TELEPHONE ENCOUNTER
Spoke to pt she denies any redness or swelling in arms. She has over all muscle pain bad in her hands .  She did not have any relief for muscle relaxer's.  She is taking Gabapentin 900 mg TID and Oxycodone 10/325 QID.     She will be calling pain management today to discuss options. Dr. Salvatore Roblero.     She is having N/T and has been scheduled with Nadir Layne hand surgeon 9/11/20.

## 2020-09-04 NOTE — TELEPHONE ENCOUNTER
Tell her I think the hand and arm symptoms are coming  mainly the carpal tunnel syndrome and cubital tunnel syndrome.  Discuss it with the hand surgeon.  Continue taking the muscle relaxants for the neck pain and spasm.

## 2020-09-04 NOTE — TELEPHONE ENCOUNTER
She is not on any muscle relaxants.  She has tried them all including Valium and nothing works. We tried to get Skelaxin approved thru her insurance which is a medicaid plan and they would not allow it.  She wanted Soma earlier this week and you said she needs to contact her pain management MD about that.    She has tried Flexeril, Baclofen, Robaxin, Zanaflex and Valium and nothing as helped    She states that moist heat and a heating pad are not helping

## 2020-09-04 NOTE — TELEPHONE ENCOUNTER
----- Message from Bienvenido Carter sent at 9/4/2020 12:19 PM EDT -----  Regarding: Non-Urgent Medical Question  Contact: [phone]  I have made an appt. with Dr. Nadir Layne for next week. BUT, for the past 3 days now, I am not sure if it has to do with that issue, both hands have swelled up so badly that they stay swollen all day long AND my neck has been hurting & I have been unable to feel my fingers and the tingling in them is extremely painful!!! My muscles as well have been killing me!

## 2020-09-08 ENCOUNTER — PATIENT MESSAGE (OUTPATIENT)
Dept: NEUROSURGERY | Facility: CLINIC | Age: 40
End: 2020-09-08

## 2020-09-08 DIAGNOSIS — Z48.89 POSTOPERATIVE VISIT: ICD-10-CM

## 2020-09-08 RX ORDER — OXYCODONE AND ACETAMINOPHEN 10; 325 MG/1; MG/1
1 TABLET ORAL EVERY 4 HOURS PRN
Qty: 40 TABLET | Refills: 0 | Status: SHIPPED | OUTPATIENT
Start: 2020-09-08 | End: 2020-11-11

## 2020-09-09 ENCOUNTER — PATIENT MESSAGE (OUTPATIENT)
Dept: NEUROSURGERY | Facility: CLINIC | Age: 40
End: 2020-09-09

## 2020-09-10 ENCOUNTER — PATIENT MESSAGE (OUTPATIENT)
Dept: NEUROSURGERY | Facility: CLINIC | Age: 40
End: 2020-09-10

## 2020-09-10 ENCOUNTER — OFFICE VISIT (OUTPATIENT)
Dept: NEUROSURGERY | Facility: CLINIC | Age: 40
End: 2020-09-10

## 2020-09-10 DIAGNOSIS — Z48.89 POSTOPERATIVE VISIT: Primary | ICD-10-CM

## 2020-09-10 PROCEDURE — 99024 POSTOP FOLLOW-UP VISIT: CPT | Performed by: NEUROLOGICAL SURGERY

## 2020-09-10 NOTE — PROGRESS NOTES
Subjective   History of Present Illness: Bienvenido Carter is a 39 y.o. female for televisit post op for neck pain and spasm, right shoulder pain and bilateral hand N/T.  Patient had surgery 8.11.20  C4/5 C5/6 ACDF    Patient states that her appointment with the hand surgeon is next week    You have chosen to receive care through a telephone visit. Do you consent to use a telephone visit for your medical care today? Yes    It has been about a month since her surgery. This is a televisit and it was from the hospital. It lasted 15 minutes. She was at her parents' home. She is going to see Dr. Salvatore Roblero, the pain physician, tomorrow who will assume prescribing the medicines. She had wanted to be on Soma and I did not want to prescribe that. She can speak with Dr. Roblero about using that medicine. We went through a whole host of muscle relaxants, none of which seemed to help her. I have refilled her Percocet and, in fact, raised it, but Dr. Roblero will be taking over that. The gabapentin is still being taken at 900 mg 3 times a day. She will be seeing the [facility] next week. She does have bilateral carpal tunnel syndrome and left ulnar neuropathy. Whether or not she needs to have any surgery done, I will leave to their discretion. She continues to complain of hand pain and numbness and weakness, which I think in part is due to the carpal tunnel syndrome and some residual radicular pain. Her nerves need simply time to recover. I think her questions were answered and she was reassured that some of the nerve pain is expected to improve. At some point I will need to send her to physical therapy, I just do not think she is ready yet. Will do another televisit in 2 weeks.       Neck Pain    The problem occurs constantly. The problem has been unchanged. The pain is present in the midline. The pain is at a severity of 10/10. The pain is severe. Associated symptoms include numbness and weakness.       The  following portions of the patient's history were reviewed and updated as appropriate: allergies, current medications, past family history, past medical history, past social history, past surgical history and problem list.    Review of Systems   HENT: Negative.    Eyes: Negative.    Respiratory: Negative.    Cardiovascular: Negative.    Gastrointestinal: Negative.    Endocrine: Negative.    Genitourinary: Negative for urgency.   Musculoskeletal: Positive for neck pain. Negative for arthralgias, back pain, gait problem, joint swelling and myalgias.   Allergic/Immunologic: Negative.    Neurological: Positive for weakness and numbness.   Hematological: Negative.    Psychiatric/Behavioral: Negative.            Objective             Physical Exam   Deferred  Neurologic Exam   Deferred        Assessment/Plan   Independent Review of Radiographic Studies:      I personally reviewed the images from the following studies.         Medical Decision Making:      She will be seeing Dr. Annika Geller in the hand surgeons next week.  I think her questions were all answered and will just simply give it time for now.  We will do a tele-visit in 2 weeks after I return from being out of town to talk about with the hand surgeons told her.  At some point I would like to start physical therapy perhaps at the next visit or the one after that on 10/9/2020.      Bienvenido was seen today for post-op, neck pain, shoulder pain and numbness.    Diagnoses and all orders for this visit:    Postoperative visit      Return in about 2 weeks (around 9/24/2020) for As a tele-visit.  Keep the 10/9/2020 face-to-face visit..

## 2020-09-11 NOTE — PROGRESS NOTES
Subjective   History of Present Illness: Bienvenido Carter is a 39 y.o. female for televisit post op appt after evaluation with hand surgeon and pain management with Dr Roblero. .  Patient had surgery 8.11.20  C4/5 C5/6 ACDF    Patient had televisit 9.10.20 for neck pain and spasm, right shoulder pain and bilateral hand N/T    You have chosen to receive care through a telephone visit. Do you consent to use a telephone visit for your medical care today? Yes    This was a tele-visit from my office.  She was at home.  It lasted 15 minutes.  She is about 6 weeks out from her surgery.  The hand surgeons felt that her carpal tunnel syndrome and her ulnar neuropathy were too mild to do any surgery.  She has yet to see Dr. Roblero.  She insisted on a tele-visit and they wanted to see her in person and she did not want to do that.  I told her that she needs to work out an arrangement with them so they can provide her her pain medications.  She is willing to go to physical therapy now and will start that at [facility] which she has been before.  We will see her in 5 weeks.  She will eventually see her shoulder surgeon but right now her shoulders really are not bothering her too much is mainly the spasm in the neck and the residual pain in the hands  History of Present Illness    The following portions of the patient's history were reviewed and updated as appropriate: allergies, current medications, past family history, past medical history, past social history, past surgical history and problem list.    Review of Systems        Objective             Physical Exam   Deferred  Neurologic Exam   Deferred        Assessment/Plan   Independent Review of Radiographic Studies:      I personally reviewed the images from the following studies.        Medical Decision Making:      I think it is time to start physical therapy at [facility].  We will start that and have her come back in 5 weeks for a face-to-face visit.      Diagnoses and  all orders for this visit:    Postoperative visit  -     Ambulatory Referral to Physical Therapy Evaluate and treat      Return in about 5 weeks (around 10/30/2020) for face to face.

## 2020-09-15 ENCOUNTER — DOCUMENTATION (OUTPATIENT)
Dept: PHYSICAL THERAPY | Facility: CLINIC | Age: 40
End: 2020-09-15

## 2020-09-15 DIAGNOSIS — M25.511 CHRONIC RIGHT SHOULDER PAIN: Primary | ICD-10-CM

## 2020-09-15 DIAGNOSIS — M54.12 CERVICAL RADICULAR PAIN: ICD-10-CM

## 2020-09-15 DIAGNOSIS — G89.29 CHRONIC RIGHT SHOULDER PAIN: Primary | ICD-10-CM

## 2020-09-15 DIAGNOSIS — M54.2 NECK PAIN: ICD-10-CM

## 2020-09-15 DIAGNOSIS — Z98.890 HISTORY OF SHOULDER SURGERY: ICD-10-CM

## 2020-09-25 ENCOUNTER — OFFICE VISIT (OUTPATIENT)
Dept: NEUROSURGERY | Facility: CLINIC | Age: 40
End: 2020-09-25

## 2020-09-25 DIAGNOSIS — Z48.89 POSTOPERATIVE VISIT: Primary | ICD-10-CM

## 2020-09-25 PROCEDURE — 99024 POSTOP FOLLOW-UP VISIT: CPT | Performed by: NEUROLOGICAL SURGERY

## 2020-10-06 ENCOUNTER — TELEPHONE (OUTPATIENT)
Dept: NEUROSURGERY | Facility: CLINIC | Age: 40
End: 2020-10-06

## 2020-10-06 DIAGNOSIS — R51.9 ACUTE INTRACTABLE HEADACHE, UNSPECIFIED HEADACHE TYPE: ICD-10-CM

## 2020-10-06 DIAGNOSIS — M54.2 CHRONIC NECK PAIN: Primary | ICD-10-CM

## 2020-10-06 DIAGNOSIS — G89.29 CHRONIC NECK PAIN: Primary | ICD-10-CM

## 2020-10-06 NOTE — TELEPHONE ENCOUNTER
Response to patient's email thru [employer] informs her that as per Dr SANCHEZ, he has sent orders for brain and cervical spine MRI's to [facility] and they will contact her to schedule, once they are scheduled she is to call our office back and let us know when they are being done so we can schedule her for a televisit follow up to discuss the results    In regards to her pain meds, per Dr SANCHEZ he is deferring to Dr Roblero with pain management    See patient email message dated 10.5.20 for further clarity

## 2020-10-13 ENCOUNTER — PATIENT MESSAGE (OUTPATIENT)
Dept: NEUROSURGERY | Facility: CLINIC | Age: 40
End: 2020-10-13

## 2020-10-13 DIAGNOSIS — R20.2 NUMBNESS AND TINGLING IN RIGHT HAND: Primary | ICD-10-CM

## 2020-10-13 DIAGNOSIS — M50.121 CERVICAL DISC DISORDER AT C4-C5 LEVEL WITH RADICULOPATHY: ICD-10-CM

## 2020-10-13 DIAGNOSIS — M50.122 CERVICAL DISC DISORDER AT C5-C6 LEVEL WITH RADICULOPATHY: ICD-10-CM

## 2020-10-13 DIAGNOSIS — R20.0 NUMBNESS AND TINGLING IN RIGHT HAND: Primary | ICD-10-CM

## 2020-10-14 RX ORDER — GABAPENTIN 600 MG/1
TABLET ORAL
Qty: 540 TABLET | Refills: 3 | Status: SHIPPED | OUTPATIENT
Start: 2020-10-14 | End: 2021-05-07

## 2020-10-14 NOTE — TELEPHONE ENCOUNTER
Please sign off on her new script for Gabapentin 600 mg (2) TID    See patient email thru [employer] dated 10.13.20.  Patient informed thru [employer] that a new script for Gabapentin with new instructions sent to her pharmacy

## 2020-10-22 ENCOUNTER — TELEPHONE (OUTPATIENT)
Dept: NEUROSURGERY | Facility: CLINIC | Age: 40
End: 2020-10-22
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
## 2020-10-27 ENCOUNTER — APPOINTMENT (OUTPATIENT)
Dept: MRI IMAGING | Facility: HOSPITAL | Age: 40
End: 2020-10-27

## 2020-10-29 ENCOUNTER — OFFICE VISIT (OUTPATIENT)
Dept: NEUROLOGY | Facility: CLINIC | Age: 40
End: 2020-10-29

## 2020-10-29 VITALS
BODY MASS INDEX: 17.04 KG/M2 | SYSTOLIC BLOOD PRESSURE: 122 MMHG | WEIGHT: 86.8 LBS | DIASTOLIC BLOOD PRESSURE: 72 MMHG | HEIGHT: 60 IN | OXYGEN SATURATION: 96 % | HEART RATE: 95 BPM

## 2020-10-29 DIAGNOSIS — M19.90 ARTHRITIS: ICD-10-CM

## 2020-10-29 DIAGNOSIS — G56.03 BILATERAL CARPAL TUNNEL SYNDROME: ICD-10-CM

## 2020-10-29 DIAGNOSIS — M54.12 CERVICAL RADICULOPATHY: ICD-10-CM

## 2020-10-29 DIAGNOSIS — M79.7 FIBROMYALGIA: ICD-10-CM

## 2020-10-29 DIAGNOSIS — H53.2 TRANSIENT DIPLOPIA: Primary | ICD-10-CM

## 2020-10-29 PROBLEM — M75.20 BICEPS TENDONITIS: Status: ACTIVE | Noted: 2018-09-12

## 2020-10-29 PROBLEM — R10.84 GENERALIZED ABDOMINAL PAIN: Status: ACTIVE | Noted: 2018-08-02

## 2020-10-29 PROBLEM — M75.41 IMPINGEMENT SYNDROME OF RIGHT SHOULDER: Status: ACTIVE | Noted: 2018-09-12

## 2020-10-29 PROBLEM — R73.9 HYPERGLYCEMIA: Status: ACTIVE | Noted: 2017-02-22

## 2020-10-29 PROBLEM — Z98.890 S/P ARTHROSCOPY OF SHOULDER: Status: ACTIVE | Noted: 2019-02-22

## 2020-10-29 PROBLEM — N92.1 METRORRHAGIA: Status: ACTIVE | Noted: 2018-04-04

## 2020-10-29 PROBLEM — M75.32 CALCIFIC TENDONITIS OF LEFT SHOULDER REGION: Status: ACTIVE | Noted: 2020-02-08

## 2020-10-29 PROBLEM — F41.9 ANXIETY: Status: ACTIVE | Noted: 2020-10-29

## 2020-10-29 PROBLEM — F32.A DEPRESSIVE DISORDER: Status: ACTIVE | Noted: 2020-10-29

## 2020-10-29 PROBLEM — M47.812 CERVICAL SPONDYLOSIS WITHOUT MYELOPATHY: Status: ACTIVE | Noted: 2018-06-13

## 2020-10-29 PROBLEM — F41.0 PANIC ATTACKS: Status: ACTIVE | Noted: 2017-02-22

## 2020-10-29 PROBLEM — G25.81 RLS (RESTLESS LEGS SYNDROME): Status: ACTIVE | Noted: 2017-02-22

## 2020-10-29 PROBLEM — Z79.899 ON LONG TERM DRUG THERAPY: Status: ACTIVE | Noted: 2020-10-29

## 2020-10-29 PROBLEM — M25.511 SHOULDER PAIN, RIGHT: Status: ACTIVE | Noted: 2018-07-24

## 2020-10-29 PROCEDURE — 99214 OFFICE O/P EST MOD 30 MIN: CPT | Performed by: PSYCHIATRY & NEUROLOGY

## 2020-10-29 RX ORDER — PREGABALIN 75 MG/1
75 CAPSULE ORAL 2 TIMES DAILY
Qty: 60 CAPSULE | Refills: 3 | Status: SHIPPED | OUTPATIENT
Start: 2020-10-29 | End: 2021-04-16
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
## 2020-11-05 ENCOUNTER — TELEPHONE (OUTPATIENT)
Dept: NEUROLOGY | Facility: CLINIC | Age: 40
End: 2020-11-05

## 2020-11-05 NOTE — TELEPHONE ENCOUNTER
PATIENT CALLED IN THE MAKE  AWARE       PATIENT STATES ON Monday 11-2-20 AND WEDS 11-3-20 SHE WAS BASICALLY IMMOBILE, SHE STATES HER WHOLE RIGHT SIDE IS GETTING WORSE COMPARED TO HER LEFT SIDE. SHE STATES THE MUSCLES IN HER NECK ARE GETTING WORSE AS WELL. SHE STATES ITS GOING DOWN INTO ARMS AND LEGS. SHE WAS DRIVING TO THE STORE AND ON THE WAY HOME HER RIGHT LEG WENT NUMB AND WAS IN PAIN AND SHE HAD TO PULL OVER. SHE STATES THAT NO MEDICATIONS ARE HELPING THE PAIN, SHE STATES HER PAIN IS SO BAD THAT SHE CAN NOT SLEEP THROUGH IT. THE BONES ON THE OUTSIDE OF HER ANKLES ARE SUPER SWOLLEN. SHE STATES HER FINGERS ARE SWOLLEN AS WELL TO THE POINT WHERE SHE CAN NOT BEND THEM.       PLEASE ADVISE.     Bienvenido Carter ([facility]) [phone] (H)
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
## 2020-11-09 NOTE — PROGRESS NOTES
Subjective   History of Present Illness: Bienvenido Carter is a 40 y.o. female is here today for follow-up.    Patient had C4/5 C5/6 ACDF 8.11.2020.  Patient was last seen 9.25.2020 for post op appointment .Patient was referred to [facility] for PT.    Patient reports today that she is having neck pain. Patient states that she has pain with turning her neck from side to side. Patient says that she is having muscle spasms.    Patient is currently taking Gabapentin 600mg TID for pain. Patient is also taking Percocet 7.5 mg TID for pain.     It has been a little over 3 months since she underwent her cervical discectomy and fusion of C4-C5 and C5-C6. Overall, the pain in the arm seems to be better. She continues to have chronic neck pain and she describes progressive numbness and tingling pretty much everywhere in her body. Since I saw her, she went to see the rheumatologist, Dr. Dodd, who told her that she has fibromyalgia. She continues to work with Dr. Roblero, her pain physician. She went to see Dr. Miller a few weeks ago and he was concerned about MS, but he told her yesterday by phone, but there was no evidence of that. Her pain is diffuse throughout her body including her neck. Given all the recent reimaging I cannot attribute her pain any specific relationship to her degenerative disease although that probably is a part of it. Unfortunately, there is a not a whole lot more for me to do at this point. She never went to physical therapy because any kind of movement of the neck stirs things up. I just reminded her to do her exercises and to try and work with her rheumatologist and pain doctor in regards to her fibromyalgia. She tried Lyrica, but that was stopped because it did not help. I will see her in 6 months with x-rays. If she is stable at that point from my standpoint we can probably keep it open ended.      Neck Pain   The problem occurs constantly. The problem has been gradually worsening. The quality  "of the pain is described as shooting and aching. The pain is at a severity of 9/10. The pain is moderate. The symptoms are aggravated by twisting, swallowing, sneezing, coughing and position. The pain is same all the time. Stiffness is present all day. Associated symptoms include numbness and weakness. Pertinent negatives include no fever, leg pain or tingling. She has tried acetaminophen for the symptoms. The treatment provided no relief.   Back Pain  This is a recurrent problem. The problem is unchanged. The pain is present in the lumbar spine. The pain is at a severity of 6/10. The pain is moderate. The pain is the same all the time. Associated symptoms include numbness and weakness. Pertinent negatives include no dysuria, fever, leg pain or tingling. The treatment provided no relief.       The following portions of the patient's history were reviewed and updated as appropriate: current medications, past family history, past medical history, past social history, past surgical history and problem list.    Review of Systems   Constitutional: Negative for chills and fever.   HENT: Negative for congestion.    Genitourinary: Negative for dysuria.   Musculoskeletal: Positive for back pain, neck pain and neck stiffness.   Neurological: Positive for weakness and numbness. Negative for tingling.   All other systems reviewed and are negative.          Objective     Vitals:    11/11/20 0950   BP: 112/79   Cuff Size: Small Adult   Pulse: 93   Temp: 98.3 °F (36.8 °C)   Weight: 39.4 kg (86 lb 12.8 oz)   Height: 152.4 cm (60\")     Body mass index is 16.95 kg/m².      Physical Exam  Constitutional:       Appearance: She is well-developed.   HENT:      Head: Normocephalic and atraumatic.   Eyes:      Extraocular Movements: EOM normal.      Conjunctiva/sclera: Conjunctivae normal.      Pupils: Pupils are equal, round, and reactive to light.      Funduscopic exam:     Right eye: No papilledema.         Left eye: No papilledema. "   Neck:      Vascular: No carotid bruit.   Neurological:      Mental Status: She is oriented to person, place, and time.      Coordination: Finger-Nose-Finger Test and Heel to Shin Test normal.      Gait: Gait is intact.      Deep Tendon Reflexes:      Reflex Scores:       Tricep reflexes are 2+ on the right side and 2+ on the left side.       Bicep reflexes are 2+ on the right side and 2+ on the left side.       Brachioradialis reflexes are 2+ on the right side and 2+ on the left side.       Patellar reflexes are 2+ on the right side and 2+ on the left side.       Achilles reflexes are 2+ on the right side and 2+ on the left side.  Psychiatric:         Speech: Speech normal.       Neurologic Exam     Mental Status   Oriented to person, place, and time.   Registration of memory: Good recent and remote memory.   Attention: normal. Concentration: normal.   Speech: speech is normal   Level of consciousness: alert  Knowledge: consistent with education.     Cranial Nerves     CN II   Visual fields full to confrontation.   Visual acuity: normal    CN III, IV, VI   Pupils are equal, round, and reactive to light.  Extraocular motions are normal.     CN V   Facial sensation intact.   Right corneal reflex: normal  Left corneal reflex: normal    CN VII   Facial expression full, symmetric.   Right facial weakness: none  Left facial weakness: none    CN VIII   Hearing: intact    CN IX, X   Palate: symmetric    CN XI   Right sternocleidomastoid strength: normal  Left sternocleidomastoid strength: normal    CN XII   Tongue: not atrophic  Tongue deviation: none    Motor Exam   Muscle bulk: normal  Right arm tone: normal  Left arm tone: normal  Right leg tone: normal  Left leg tone: normal    Strength   Strength 5/5 except as noted.     Sensory Exam   Light touch normal.     Gait, Coordination, and Reflexes     Gait  Gait: normal    Coordination   Finger to nose coordination: normal  Heel to shin coordination: normal    Reflexes    Right brachioradialis: 2+  Left brachioradialis: 2+  Right biceps: 2+  Left biceps: 2+  Right triceps: 2+  Left triceps: 2+  Right patellar: 2+  Left patellar: 2+  Right achilles: 2+  Left achilles: 2+  Right : 2+  Left : 2+          Assessment/Plan   Independent Review of Radiographic Studies:      I personally reviewed the images from the following studies.    I reviewed the brain MRI and the cervical MRI done on 11/10/2020.  The brain MRI was normal.  The cervical MRI showed the usual postoperative changes at C4-C5 and C5-C6 but no significant root or cord compression.  Agree with the report.        Medical Decision Making:      As per the above discussion, I think the vast majority of her symptoms are related to her fibromyalgia.  My standpoint, she is recovered from the surgery related fairly well and has gotten some benefit in the arms, not so much the neck.  We will see her in 6 months with x-rays.  If she stable at that point we can keep it open-ended.  I reminded her to try and do what exercises of her neck she can tolerate.      Diagnoses and all orders for this visit:    1. Chronic neck pain (Primary)  -     XR spine cervical ap and lat w flex and ext; Future    2. History of spinal fusion  -     XR spine cervical ap and lat w flex and ext; Future    3. Fibromyalgia      Return in about 6 months (around 5/11/2021) for Face-to-face.

## 2020-11-10 ENCOUNTER — HOSPITAL ENCOUNTER (OUTPATIENT)
Dept: MRI IMAGING | Facility: HOSPITAL | Age: 40
Discharge: HOME | End: 2020-11-10

## 2020-11-10 ENCOUNTER — APPOINTMENT (OUTPATIENT)
Dept: OTHER | Facility: HOSPITAL | Age: 40
End: 2020-11-10

## 2020-11-10 DIAGNOSIS — Z09 FOLLOW UP: ICD-10-CM

## 2020-11-10 DIAGNOSIS — M54.2 CHRONIC NECK PAIN: ICD-10-CM

## 2020-11-10 DIAGNOSIS — G89.29 CHRONIC NECK PAIN: ICD-10-CM

## 2020-11-10 DIAGNOSIS — R51.9 ACUTE INTRACTABLE HEADACHE, UNSPECIFIED HEADACHE TYPE: ICD-10-CM

## 2020-11-10 PROCEDURE — A9577 INJ MULTIHANCE: HCPCS | Performed by: NEUROLOGICAL SURGERY

## 2020-11-10 PROCEDURE — 0 GADOBENATE DIMEGLUMINE 529 MG/ML SOLUTION: Performed by: NEUROLOGICAL SURGERY

## 2020-11-10 PROCEDURE — 72156 MRI NECK SPINE W/O & W/DYE: CPT

## 2020-11-10 PROCEDURE — 70553 MRI BRAIN STEM W/O & W/DYE: CPT

## 2020-11-10 RX ADMIN — GADOBENATE DIMEGLUMINE 7 ML: 529 INJECTION, SOLUTION INTRAVENOUS at 18:40

## 2020-11-11 ENCOUNTER — OFFICE VISIT (OUTPATIENT)
Dept: NEUROSURGERY | Facility: CLINIC | Age: 40
End: 2020-11-11

## 2020-11-11 VITALS
WEIGHT: 86.8 LBS | HEART RATE: 93 BPM | DIASTOLIC BLOOD PRESSURE: 79 MMHG | SYSTOLIC BLOOD PRESSURE: 112 MMHG | TEMPERATURE: 98.3 F | HEIGHT: 60 IN | BODY MASS INDEX: 17.04 KG/M2

## 2020-11-11 DIAGNOSIS — G89.29 CHRONIC NECK PAIN: Primary | ICD-10-CM

## 2020-11-11 DIAGNOSIS — Z98.1 HISTORY OF SPINAL FUSION: ICD-10-CM

## 2020-11-11 DIAGNOSIS — M79.7 FIBROMYALGIA: ICD-10-CM

## 2020-11-11 DIAGNOSIS — M54.2 CHRONIC NECK PAIN: Primary | ICD-10-CM

## 2020-11-11 PROCEDURE — 99214 OFFICE O/P EST MOD 30 MIN: CPT | Performed by: NEUROLOGICAL SURGERY

## 2020-11-11 RX ORDER — OXYCODONE AND ACETAMINOPHEN 7.5; 325 MG/1; MG/1
TABLET ORAL
COMMUNITY
Start: 2020-10-05 | End: 2021-04-16

## 2021-01-20 ENCOUNTER — TELEPHONE (OUTPATIENT)
Dept: NEUROLOGY | Facility: CLINIC | Age: 41
End: 2021-01-20

## 2021-01-21 ENCOUNTER — LAB (OUTPATIENT)
Dept: LAB | Facility: HOSPITAL | Age: 41
End: 2021-01-21

## 2021-01-21 DIAGNOSIS — M19.90 ARTHRITIS: ICD-10-CM

## 2021-01-21 DIAGNOSIS — H53.2 TRANSIENT DIPLOPIA: ICD-10-CM

## 2021-01-21 LAB
CRP SERPL-MCNC: 0.08 MG/DL (ref 0–0.5)
ERYTHROCYTE [SEDIMENTATION RATE] IN BLOOD: 3 MM/HR (ref 0–20)

## 2021-01-21 PROCEDURE — 86140 C-REACTIVE PROTEIN: CPT

## 2021-01-21 PROCEDURE — 83519 RIA NONANTIBODY: CPT

## 2021-01-21 PROCEDURE — 36415 COLL VENOUS BLD VENIPUNCTURE: CPT | Performed by: PSYCHIATRY & NEUROLOGY

## 2021-01-21 PROCEDURE — 85652 RBC SED RATE AUTOMATED: CPT | Performed by: PSYCHIATRY & NEUROLOGY

## 2021-02-02 LAB
ACHR BIND AB SER-SCNC: <0.03 NMOL/L (ref 0–0.24)
ACHR BLOCK AB SER-ACNC: 13 % (ref 0–25)
ACHR MOD AB/ACHR TOTAL SFR SER: <12 % (ref 0–20)

## 2021-02-23 ENCOUNTER — TRANSCRIBE ORDERS (OUTPATIENT)
Dept: PHYSICAL THERAPY | Facility: CLINIC | Age: 41
End: 2021-02-23

## 2021-02-23 DIAGNOSIS — M79.10 MUSCLE PAIN: Primary | ICD-10-CM

## 2021-02-23 DIAGNOSIS — M79.7 FIBROMYALGIA: ICD-10-CM

## 2021-02-25 ENCOUNTER — TELEPHONE (OUTPATIENT)
Dept: NEUROSURGERY | Facility: CLINIC | Age: 41
End: 2021-02-25
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
## 2021-03-02 DIAGNOSIS — M79.7 FIBROMYALGIA: ICD-10-CM

## 2021-03-02 DIAGNOSIS — M54.2 CHRONIC NECK PAIN: Primary | ICD-10-CM

## 2021-03-02 DIAGNOSIS — G89.29 CHRONIC NECK PAIN: Primary | ICD-10-CM

## 2021-03-03 ENCOUNTER — TREATMENT (OUTPATIENT)
Dept: PHYSICAL THERAPY | Facility: CLINIC | Age: 41
End: 2021-03-03

## 2021-03-03 DIAGNOSIS — Z98.1 S/P CERVICAL SPINAL FUSION: ICD-10-CM

## 2021-03-03 DIAGNOSIS — M54.2 NECK PAIN: Primary | ICD-10-CM

## 2021-03-03 DIAGNOSIS — M79.7 FIBROMYALGIA: ICD-10-CM

## 2021-03-03 PROCEDURE — 97162 PT EVAL MOD COMPLEX 30 MIN: CPT | Performed by: PHYSICAL THERAPIST
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
## 2021-03-15 ENCOUNTER — TREATMENT (OUTPATIENT)
Dept: PHYSICAL THERAPY | Facility: CLINIC | Age: 41
End: 2021-03-15

## 2021-03-15 DIAGNOSIS — Z98.1 S/P CERVICAL SPINAL FUSION: ICD-10-CM

## 2021-03-15 DIAGNOSIS — M79.7 FIBROMYALGIA: ICD-10-CM

## 2021-03-15 DIAGNOSIS — M54.2 NECK PAIN: Primary | ICD-10-CM

## 2021-03-15 PROCEDURE — 97113 AQUATIC THERAPY/EXERCISES: CPT | Performed by: PHYSICAL THERAPIST
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
## 2021-03-22 ENCOUNTER — TELEPHONE (OUTPATIENT)
Dept: PHYSICAL THERAPY | Facility: CLINIC | Age: 41
End: 2021-03-22

## 2021-03-24 ENCOUNTER — TREATMENT (OUTPATIENT)
Dept: PHYSICAL THERAPY | Facility: CLINIC | Age: 41
End: 2021-03-24

## 2021-03-24 DIAGNOSIS — M54.2 NECK PAIN: Primary | ICD-10-CM

## 2021-03-24 DIAGNOSIS — M79.7 FIBROMYALGIA: ICD-10-CM

## 2021-03-24 DIAGNOSIS — Z98.1 S/P CERVICAL SPINAL FUSION: ICD-10-CM

## 2021-03-24 PROCEDURE — 97140 MANUAL THERAPY 1/> REGIONS: CPT | Performed by: PHYSICAL THERAPIST

## 2021-03-24 PROCEDURE — 97110 THERAPEUTIC EXERCISES: CPT | Performed by: PHYSICAL THERAPIST

## 2021-03-24 PROCEDURE — 97035 APP MDLTY 1+ULTRASOUND EA 15: CPT | Performed by: PHYSICAL THERAPIST
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
## 2021-03-26 ENCOUNTER — TREATMENT (OUTPATIENT)
Dept: PHYSICAL THERAPY | Facility: CLINIC | Age: 41
End: 2021-03-26

## 2021-03-26 DIAGNOSIS — M54.2 NECK PAIN: Primary | ICD-10-CM

## 2021-03-26 DIAGNOSIS — M79.7 FIBROMYALGIA: ICD-10-CM

## 2021-03-26 DIAGNOSIS — Z98.1 S/P CERVICAL SPINAL FUSION: ICD-10-CM

## 2021-03-26 PROCEDURE — 97110 THERAPEUTIC EXERCISES: CPT | Performed by: PHYSICAL THERAPIST

## 2021-03-26 PROCEDURE — 97035 APP MDLTY 1+ULTRASOUND EA 15: CPT | Performed by: PHYSICAL THERAPIST

## 2021-03-26 PROCEDURE — 97530 THERAPEUTIC ACTIVITIES: CPT | Performed by: PHYSICAL THERAPIST
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
## 2021-03-29 ENCOUNTER — TREATMENT (OUTPATIENT)
Dept: PHYSICAL THERAPY | Facility: CLINIC | Age: 41
End: 2021-03-29

## 2021-03-29 DIAGNOSIS — M79.7 FIBROMYALGIA: ICD-10-CM

## 2021-03-29 DIAGNOSIS — Z98.1 S/P CERVICAL SPINAL FUSION: ICD-10-CM

## 2021-03-29 DIAGNOSIS — M54.2 NECK PAIN: Primary | ICD-10-CM

## 2021-03-29 PROCEDURE — 97113 AQUATIC THERAPY/EXERCISES: CPT | Performed by: PHYSICAL THERAPIST

## 2021-03-29 NOTE — PROGRESS NOTES
"Physical Therapy Daily Progress Note    Patient: Bienvenido Carter   : 1980  Diagnosis/ICD-10 Code:  Neck pain [M54.2]  Referring practitioner: Osiel Hills MD  Date of Initial Visit: Type: THERAPY  Noted: 3/3/2021  Today's Date: 3/29/2021  Patient seen for 5 sessions             Subjective Pt reports recent R shoulder steroid shot with increased pain in B hands; L hand feels \"like it's going to break off at my wrist.\"     Objective   AQUATIC EXERCISE:  Walker Walk    Fwds, Sideways and Backwards X 2 laps each with cues for exaggerated reciprocal UE motion               Large Noodle Pushdowns X 15  Scapular squeeze BUE  x 15  L1 paddles              Bicep Curls  x 15                            Lateral raises (ABD) x 15                                        External Rotation at 0 degrees  x15              Alternate Rows x15              Pot stirs x10 CW/CCW  Wall push ups next session.  Shoulder press with 1/2 noodle x15   Lateral/frontal plane UE raises with jumping sánchez x10  Alt shoulder flexion/ext in sagittal place with cross country ski x10  Alt march with alt elbow to knee x 10 each  Alternating shoulder rolls x 10 each                                                            Seated Seated Cycles X 2 min  Seated cervical AROM              Rotation x 10              Lateral flexion x 10              Flexion/ext x 10      Assessment/Plan  Pt 15 min late to appt; requires cues for attention to task throughout. Pt recounts past few weeks of medical history. Pt reports continued shoulder and hand pain, does not know which one of doctors to talk to about it. Pt with noted frustration about her health conditions. PT suggesting yoga/meditation even just 10-15 min per day to address stress, anxiety and emotional aspects of pain. Noted cervical AROM with conversation throughout session WNL; however limited when asked to perform AROM exercises. Addition of paddle stirs this date; plan to continue to " progress exercises per pt's tolerance with combination of land and aquatic therapy to manage various co-morbidities.          Timed:  Aquatic Therapy    31     mins 26539;    Laurie Elder, PT  Physical Therapist

## 2021-04-02 ENCOUNTER — BULK ORDERING (OUTPATIENT)
Dept: CASE MANAGEMENT | Facility: OTHER | Age: 41
End: 2021-04-02

## 2021-04-02 DIAGNOSIS — Z23 IMMUNIZATION DUE: ICD-10-CM

## 2021-04-07 ENCOUNTER — TREATMENT (OUTPATIENT)
Dept: PHYSICAL THERAPY | Facility: CLINIC | Age: 41
End: 2021-04-07

## 2021-04-07 DIAGNOSIS — M79.7 FIBROMYALGIA: ICD-10-CM

## 2021-04-07 DIAGNOSIS — Z98.1 S/P CERVICAL SPINAL FUSION: ICD-10-CM

## 2021-04-07 DIAGNOSIS — M54.2 NECK PAIN: Primary | ICD-10-CM

## 2021-04-07 PROCEDURE — 97113 AQUATIC THERAPY/EXERCISES: CPT | Performed by: PHYSICAL THERAPIST

## 2021-04-07 NOTE — PROGRESS NOTES
Physical Therapy Daily Progress Note    Patient: Bienvenido Carter   : 1980  Diagnosis/ICD-10 Code:  Neck pain [M54.2]  Referring practitioner: Osiel Hills MD  Date of Initial Visit: Type: THERAPY  Noted: 3/3/2021  Today's Date: 2021  Patient seen for 6 sessions             Subjective Pt reports increased pain and soreness in shoulders and hands following last pool session; increased hand pain and weakness over the past few days.     Objective   AQUATIC EXERCISE:  Walker Walk    Fwds, Sideways and Backwards X 2 laps each with cues for exaggerated reciprocal UE motion               Large Noodle Pushdowns X 15  Scapular squeeze BUE  x 15  L1 paddles              Bicep Curls  x 15                            Lateral raises (ABD) x 15                                        External Rotation at 0 degrees  x15              Alternate Rows x15              Pot stirs x10 CW/CCW  Wall push ups next session.  Shoulder press with 1/2 noodle x15   Lateral/frontal plane UE raises with jumping sánchez x15  Alt shoulder flexion/ext in sagittal place with cross country ski x15  Alt march with alt elbow to knee x 15 each  Alternating shoulder rolls x 10 each    Median nerve glides x 10 each arm; shoulder ABD, wrist supination with wrist flexion/ext  Wrist pronation/supination x 10                                                          Seated Seated Cycles X 2 min  Seated cervical AROM              Rotation x 10              Lateral flexion x 10              Flexion/ext x 10      Assessment/Plan   Pt 12 min late to appt.  Pt continues to reports neck, shoulder and hand pain. 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 Pt had to give up tennis due to her pain and enjoys darts now. Pt with decreased cervical ROM on the R side with neck stretches this date and increased R hand weakness. Addition of BUE nerve median nerve glides and wrist exercises this date  to improve hand pain and radiating symptoms. Pt continues transition between pool and land therapy with hopeful decrease pain and educated on pain management.       Timed:  Aquatic Therapy    39     mins 61211;    Laurie Elder, PT  Physical Therapist

## 2021-04-12 ENCOUNTER — TREATMENT (OUTPATIENT)
Dept: PHYSICAL THERAPY | Facility: CLINIC | Age: 41
End: 2021-04-12

## 2021-04-12 DIAGNOSIS — M54.2 NECK PAIN: Primary | ICD-10-CM

## 2021-04-12 DIAGNOSIS — Z98.1 S/P CERVICAL SPINAL FUSION: ICD-10-CM

## 2021-04-12 DIAGNOSIS — M79.7 FIBROMYALGIA: ICD-10-CM

## 2021-04-12 PROCEDURE — 97140 MANUAL THERAPY 1/> REGIONS: CPT | Performed by: PHYSICAL THERAPIST

## 2021-04-12 PROCEDURE — DRYNDL PR CUSTOM DRY NEEDLING SELF PAY: Performed by: PHYSICAL THERAPIST

## 2021-04-12 NOTE — PROGRESS NOTES
30-Day / 10-Visit Progress Note         Patient: Bienvenido Carter   : 1980  Diagnosis/ICD-10 Code:  Neck pain [M54.2]  Referring practitioner: Osiel Hills MD  Date of Initial Visit: Type: THERAPY  Noted: 3/3/2021  Today's Date: 2021  Patient seen for 7 sessions      Subjective:     Clinical Progress: unchanged  Home Program Compliance: Yes  Treatment has included:  therapeutic exercise, manual therapy, aquatic therapy, ultrasound, patient education with home exercise program  and dry needling     Subjective Evaluation    Pain  Current pain ratin  At best pain ratin  Location: neck  Aggravating factors: lifting, sleeping, repetitive movement and overhead activity (driving, reading, hard to concentrate)         Objective          Palpation   Left   Hypertonic in the levator scapulae, suboccipitals and upper trapezius.   Tenderness of the levator scapulae, suboccipitals and upper trapezius.   Trigger point to levator scapulae and upper trapezius.     Right   Hypertonic in the levator scapulae, suboccipitals and upper trapezius. Tenderness of the levator scapulae, suboccipitals and upper trapezius.   Trigger point to levator scapulae and upper trapezius.     Active Range of Motion     Additional Active Range of Motion Details  Cervical AROM:  Flexion=50%, pain along B UT  Extension=20% painful at base of neck  R rotation=60% pain B  L rotation=50%, pain on L  Lateral flexion=40%, pain on contralateral>same side        See Exercise, Manual, and Modality Logs for complete treatment.     Functional Outcome Score:   Neck Disability Index=80%    Dry needling, using threading and direct techniques, obtaining written and verbal consent to treat after discussing benefits and risks. Patient position during treatment: Supine. Muscles treated: B UT and levator. Response: Multiple LTR with .30x.50mm needles.Clean needle technique observed at all times, precautions for lung fields, neurovascular  structures observed. Manual palpation and assessment performed before, during, and after session.    STM to B UT, levator muscles, and cervical PVMs with light suboccipital release      Assessment & Plan     Assessment  Assessment details: Bienvenido Carter has been seen for 7 physical therapy sessions for neck pain and fibromylgia.  Treatment has included therapeutic exercise, manual therapy, therapeutic activity, ultrasound, patient education with home exercise program  and dry needling . Progress to physical therapy goals is slow. Pt has been treated for 3 land based and 4 aquatic treatments. She continues with neck pain, B shoulder pain, hand pain, and radicular symptoms. She has areas of trigger points and did a trial of dry needling to help improve her pain and ROM, as she feels that the tightness has really been limiting her activities. She continues with difficulty sleeping, driving, lifting anything over 2lbs, and concentrating on reading. She will benefit from continued skilled physical therapy to address remaining impairments and functional limitations.     Prognosis: good    Goals  Plan Goals: ST. Patient will be independent with education for symptom management, joint protection and strategies to minimize stress on affected tissues (ONGOING)   2. Pt to improve pain to no more than 8/10 with ADLs (ONGOING) still can be up to 10/10    LT. Pt to improve pain to no more than 5/10 with ADLs (ONGOING)   2. Pt to improve NDI score from 80% to 50% for overall functional improvement (ONGOING) goal revised  3. Pt to improve her cervical rotation to 60% without increased pain for greater ease with driving her car (ONGOING)   4. Pt to be independent with HEP for symptom management and strengthening (ONGOING)     Plan  Therapy options: will be seen for skilled physical therapy services  Frequency: 2x week  Duration in weeks: 8  Treatment plan discussed with: patient           Recommendations: Continue  as planned  Timeframe: 2 months  Prognosis to achieve goals: good    PT Signature: Katie Medel, PT      Based upon review of the patient's progress and continued therapy plan, it is my medical opinion that Bienvenido Carter should continue physical therapy treatment at [facility]  [address] [place] [address] [address] [place] [address]  [phone].    Signature: __________________________________  Osiel Hills MD    Timed:  Manual Therapy:    23     mins  84109;  Therapeutic Exercise:         mins  08118;     Neuromuscular Namita:        mins  70855;    Therapeutic Activity:          mins  93258;     Gait Training:           mins  38193;     Ultrasound:          mins  19264;    Electrical Stimulation:         mins  75137 ( );  Iontophoresis         mins 38590;  Dry Needling              15     mins    Untimed:  Electrical Stimulation:         mins  14956 ( );  Mechanical Traction:         mins  43935;     Timed Treatment:   38   mins   Total Treatment:     38   mins

## 2021-04-14 ENCOUNTER — TREATMENT (OUTPATIENT)
Dept: PHYSICAL THERAPY | Facility: CLINIC | Age: 41
End: 2021-04-14

## 2021-04-14 DIAGNOSIS — M54.2 NECK PAIN: Primary | ICD-10-CM

## 2021-04-14 DIAGNOSIS — Z98.1 S/P CERVICAL SPINAL FUSION: ICD-10-CM

## 2021-04-14 DIAGNOSIS — M79.7 FIBROMYALGIA: ICD-10-CM

## 2021-04-14 PROCEDURE — 97113 AQUATIC THERAPY/EXERCISES: CPT | Performed by: PHYSICAL THERAPIST

## 2021-04-14 NOTE — PROGRESS NOTES
Physical Therapy Daily Progress Note    Patient: Bienvenido Carter   : 1980  Diagnosis/ICD-10 Code:  Neck pain [M54.2]  Referring practitioner: Osiel Hills MD  Date of Initial Visit: Type: THERAPY  Noted: 3/3/2021  Today's Date: 2021  Patient seen for 8 sessions             Subjective Pt reports neck and arm and hand pain 10/10 this AM but 8/10 at start of session and 7/10 at end of session    Objective   AQUATIC EXERCISE:  Walker Walk    Fwds, Sideways and Backwards X 2 laps each with cues for exaggerated reciprocal UE motion               Large Noodle Pushdowns X 15  UTR x 10  Scapular squeeze BUE  x 15  L1 paddles              Bicep Curls  x 15                            Lateral raises (ABD) x 15                                        External Rotation at 0 degrees  x15              Alternate Rows x15              Pot stirs x10 CW/CCW  Wall push ups next session.  Shoulder press with 1/2 noodle x15   Lateral/frontal plane UE raises with jumping sánchez x15  Alt shoulder flexion/ext in sagittal place with cross country ski x15  Alt march with alt elbow to knee x 15 each  Alternating shoulder rolls x 10 each    Median nerve glides x 10 each arm; shoulder ABD, wrist supination with wrist flexion/ext  Wrist pronation/supination x 10                                                          Open book x 10 each side  Seated cervical AROM              Rotation x 10              Lateral flexion x 10              Flexion/ext x 10         Assessment/Plan   Pt 13 min late to session.   Pt continues to report chronic neck, shoulder and hand pain; L wrist and R side neck mostly. Pt reports intermittent R shoulder popping that is painful, denies any shoulder popping during session. Addition of UTR and open books this date to improve scapular strength and mobility along with cervical ROM. Pt reports moderate soreness for 1-2 days following last pool session, no increase in reps this date. Pt reports she was  able to play in her dart league last night which could contribute to increased soreness this morning.          Timed:  Aquatic Therapy    33     mins [id number];    Laurie Elder, PT  Physical Therapist

## 2021-04-16 ENCOUNTER — OFFICE VISIT (OUTPATIENT)
Dept: NEUROLOGY | Facility: CLINIC | Age: 41
End: 2021-04-16

## 2021-04-16 VITALS
SYSTOLIC BLOOD PRESSURE: 122 MMHG | HEIGHT: 60 IN | WEIGHT: 82 LBS | OXYGEN SATURATION: 98 % | HEART RATE: 90 BPM | DIASTOLIC BLOOD PRESSURE: 80 MMHG | BODY MASS INDEX: 16.1 KG/M2

## 2021-04-16 DIAGNOSIS — G89.4 CHRONIC PAIN SYNDROME: ICD-10-CM

## 2021-04-16 DIAGNOSIS — G56.03 BILATERAL CARPAL TUNNEL SYNDROME: Primary | ICD-10-CM

## 2021-04-16 DIAGNOSIS — M79.7 FIBROMYALGIA: ICD-10-CM

## 2021-04-16 DIAGNOSIS — G56.22 ULNAR NEUROPATHY AT ELBOW OF LEFT UPPER EXTREMITY: ICD-10-CM

## 2021-04-16 PROCEDURE — 99214 OFFICE O/P EST MOD 30 MIN: CPT | Performed by: PSYCHIATRY & NEUROLOGY

## 2021-04-16 RX ORDER — DIVALPROEX SODIUM 500 MG/1
1000 TABLET, DELAYED RELEASE ORAL NIGHTLY
COMMUNITY
End: 2021-10-07

## 2021-04-16 RX ORDER — NEOMYCIN SULFATE, POLYMYXIN B SULFATE, HYDROCORTISONE 3.5; 10000; 1 MG/ML; [USP'U]/ML; MG/ML
3 SOLUTION/ DROPS AURICULAR (OTIC) 3 TIMES DAILY
COMMUNITY
Start: 2021-03-05

## 2021-04-16 RX ORDER — DEXTROAMPHETAMINE SACCHARATE, AMPHETAMINE ASPARTATE, DEXTROAMPHETAMINE SULFATE AND AMPHETAMINE SULFATE 3.75; 3.75; 3.75; 3.75 MG/1; MG/1; MG/1; MG/1
15 TABLET ORAL 2 TIMES DAILY
COMMUNITY
Start: 2021-03-24 | End: 2021-11-03

## 2021-04-16 RX ORDER — OXYCODONE AND ACETAMINOPHEN 7.5; 325 MG/1; MG/1
TABLET ORAL
COMMUNITY
Start: 2021-03-05 | End: 2021-11-03

## 2021-04-16 NOTE — PROGRESS NOTES
CC: Chronic neck pain with polyarticular pain; bilateral carpal tunnel syndrome and left cubital tunnel syndrome, fibromyalgia    HPI:  Bienvenido Carter is a  40 y.o. right-handed [place] female I am seeing in follow-up with several different symptoms as noted above.  I saw her clinically 10/29/2020.  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  She has been evaluated by hand surgery who has opted not to operate because they are not severe enough.  She has chronic neck pain and is status post two-level anterior cervical discectomy by Dr. Hills.  She continues physical therapy, aqua therapy and is on gabapentin at max dose 1200 mg 3 times daily for fibromyalgia.  I tried her on Lyrica which caused notable side effects unfortunately and she is not a candidate for antidepressant medications because of her bipolar disorder causing manic symptoms.    She has seen rheumatology and no additional rheumatological diagnoses is identified she indicates.  She has osteoporosis/osteopenia.  She states that she has not been on her calcium and vitamin D recently and will just have to get it over-the-counter because insurance does not cover it.    Today she describes various additional pains that appear to be centered around joints in particular the wrists especially in the left and at the left ankle.  There is not really any specific swelling identified just pain.    When I saw her last she describes some intermittent diplopia and had some sensory findings outside of the range of peripheral nerve origin and so I felt that further central nervous system investigation was needed as well as screening for myasthenia gravis.  Her acetylcholine receptor antibody panel was negative.  She had already had a thyroid profile which was normal.  I obtained an MRI of the cervical spine which demonstrated no evidence of a  demyelinative disease.  There was some central disc bulging with some cord effacement but no foraminal stenosis at any level.  MRI of the brain demonstrated no evidence of a demyelinating disease or any structural abnormality.        Past Medical History:   Diagnosis Date   • ADHD    • Anxiety    • Asthma    • Bipolar    • Borderline personality disorder in adult (CMS/HCC)    • Depression    • Ear infection    • Growth hormone deficiency (CMS/HCC)    • H/O emotional problems    • Pokagon (hard of hearing)    • Low bone density for age    • Migraines    • Neck pain    • Rash of entire body     NIX CREAM   • Seasonal allergies    • Spinal stenosis of cervical region          Past Surgical History:   Procedure Laterality Date   • ANTERIOR CERVICAL DISCECTOMY W/ FUSION Bilateral 8/11/2020    Procedure: CERVICAL 4 TO CERVICAL 5, CERVICAL 5 TO CERVICAL 6 ANTERIOR DISCECTOMY FUSION WITH CAGE AND PLATE;  Surgeon: Osiel Hills MD;  Location: [facility];  Service: Neurosurgery;  Laterality: Bilateral;   • COLONOSCOPY     • COLPOSCOPY     • D & C HYSTEROSCOPY     • ENDOSCOPY     • SHOULDER ARTHROSCOPY Right            Current Outpatient Medications:   •  albuterol (PROVENTIL HFA;VENTOLIN HFA) 108 (90 Base) MCG/ACT inhaler, Inhale 2 puffs Every 6 (Six) Hours As Needed., Disp: , Rfl:   •  amphetamine-dextroamphetamine (ADDERALL) 15 MG tablet, Take 15 mg by mouth 2 (Two) Times a Day., Disp: , Rfl:   •  divalproex (Depakote) 500 MG DR tablet, Take 1,000 mg by mouth Every Night., Disp: , Rfl:   •  gabapentin (NEURONTIN) 600 MG tablet, TAKE 2 TABLETS THREE TIMES A DAY, Disp: 540 tablet, Rfl: 3  •  neomycin-polymyxin-hydrocortisone (CORTISPORIN) 1 % solution otic solution, Administer 3 drops into ear(s) as directed by provider 3 (Three) Times a Day., Disp: , Rfl:   •  oxyCODONE-acetaminophen (PERCOCET) 7.5-325 MG per tablet, TAKE 1 TABLET BY MOUTH FOUR TIMES DAILY, Disp: , Rfl:   •  QUEtiapine (SEROquel) 50 MG tablet, Take 75  "mg by mouth Daily., Disp: , Rfl:       Family History   Adopted: Yes   Problem Relation Age of Onset   • Malig Hyperthermia Neg Hx          Social History     Socioeconomic History   • Marital status: Single     Spouse name: Not on file   • Number of children: Not on file   • Years of education: some college   • Highest education level: Not on file   Tobacco Use   • Smoking status: Current Every Day Smoker     Packs/day: 0.50     Years: 23.00     Pack years: 11.50     Types: Cigarettes     Last attempt to quit: 2020     Years since quittin.7   • Smokeless tobacco: Never Used   Vaping Use   • Vaping Use: Never used   Substance and Sexual Activity   • Alcohol use: Yes     Comment: rarely   • Drug use: No   • Sexual activity: Defer         Allergies   Allergen Reactions   • Hydrocodone Nausea And Vomiting         Pain Scale: 8/10        ROS:  Review of Systems   Gastrointestinal: Negative for diarrhea, nausea and vomiting.   Endocrine: Negative for cold intolerance, heat intolerance and polydipsia.   Genitourinary: Negative for decreased urine volume, difficulty urinating and urgency.   Musculoskeletal: Positive for back pain, neck pain and neck stiffness.   Skin: Negative for color change, rash and wound.   Allergic/Immunologic: Negative for environmental allergies, food allergies and immunocompromised state.   Neurological: Positive for dizziness, weakness, numbness and headaches. Negative for facial asymmetry.   Hematological: Negative for adenopathy. Does not bruise/bleed easily.   Psychiatric/Behavioral: Positive for behavioral problems and sleep disturbance. Negative for confusion and decreased concentration. The patient is nervous/anxious.          I have reviewed and agree with the above ROS completed by the medical assistant.      Physical Exam:  Vitals:    21 1129   BP: 122/80   Pulse: 90   SpO2: 98%   Weight: 37.2 kg (82 lb)   Height: 152.4 cm (60\")     Orthostatic BP:    Body mass index is " 16.01 kg/m².    Physical Exam  General: Underweight  female no acute distress  HEENT: Normocephalic no evidence of trauma  Neck: Supple but some reduced range of motion  Heart: Regular rate and rhythm  Extremities: No pedal edema.  No joint swelling.  Radial pulses strong and simultaneous      Neurological Exam:   Mental Status: Awake, alert, oriented to person, place and time.  Conversant without evidence of a thought disorder, delusions or hallucinations.  Her detailing of her history is somewhat tangential.  Attention span and concentration are normal.  HCF: No aphasia, apraxia or dysarthria.  Recent and remote memory intact.  Knowledgeof recent events intact.  CN: I:   II: Visual fields full without left inattention   III, IV, VI: Eye movements intact without nystagmus or ptosis.  Pupils equal round and reactive to light.   V,VII: Light touch and pinprick intact all 3 divisions of V.  Facial muscles symmetrical.   VIII: Hearing intact to finger rub   IX,X: Soft palate elevates symmetrically   XI: Sternomastoid and trapezius are strong.   XII: Tongue midline without atrophy or fasciculations  Motor: Normal tone and bulk in the upper and lower extremities   Power testing: Pain inhibition testing the deltoid muscles.  There is mild weakness of the interossei of the left hand with good power in all other muscles tested in both arms.  In the lower extremities there was tendency to give way with complaints of pain on the left.  With some coaching I felt that the power testing was full in all muscles tested in the  legs.  Reflexes: Upper extremities: +2 and equal        Lower extremities: +2 and equal        Toe signs: Downgoing bilaterally  Sensory: Light touch: Reduction on all fingers of the left hand.  Reduction on the first 3 digits of the right hand.  Some reduction on the dorsal aspects of the hands also.  Some reduction in the tops of the feet but intact at the bases of the toes.        Pinprick: Similar  in the upper extremities to light touch.  Pinprick was sharp all over the lower extremities except for the right great toe.        Vibration: Intact at the ankle        Position: Intact at the great toes    Cerebellar: Finger-to-nose: Initially functional striking her glasses each time subsequently becoming more repetitively touching the tip of her nose.           Rapid movement: Intact           Heel-to-shin: Intact  Gait and Station: Casual walk is intact.  Toe and heel walking looks symmetrical.  Tandem walking is intact.  No Romberg no drift    Results:      Lab Results   Component Value Date    GLUCOSE 92 07/15/2020    BUN 8 09/16/2020    CREATININE 0.4 (L) 09/16/2020    EGFRIFNONA 120 07/15/2020    EGFRIFAFRI 145 07/15/2020    BCR 22.0 (H) 09/16/2020    CO2 26 09/16/2020    CALCIUM 9.1 09/16/2020    ALBUMIN 4.1 09/16/2020    LABIL2 1.8 09/16/2020    AST 24 09/16/2020    ALT 8 (L) 09/16/2020       Lab Results   Component Value Date    WBC 7.97 09/16/2020    HGB 11.9 (L) 09/16/2020    HCT 37.4 09/16/2020    MCV 91.0 09/16/2020     09/16/2020         .No results found for: RPR      Lab Results   Component Value Date    TSH 2.340 06/05/2020         Lab Results   Component Value Date    QLWJEJKY95 1,121 (H) 09/16/2020         No results found for: FOLATE      Lab Results   Component Value Date    HGBA1C 5.5 01/03/2020         Lab Results   Component Value Date    GLUCOSE 92 07/15/2020    BUN 8 09/16/2020    CREATININE 0.4 (L) 09/16/2020    EGFRIFNONA 120 07/15/2020    EGFRIFAFRI 145 07/15/2020    BCR 22.0 (H) 09/16/2020    K 4.6 09/16/2020    CO2 26 09/16/2020    CALCIUM 9.1 09/16/2020    ALBUMIN 4.1 09/16/2020    LABIL2 1.8 09/16/2020    AST 24 09/16/2020    ALT 8 (L) 09/16/2020         Lab Results   Component Value Date    WBC 7.97 09/16/2020    HGB 11.9 (L) 09/16/2020    HCT 37.4 09/16/2020    MCV 91.0 09/16/2020     09/16/2020             Assessment:   1.  Chronic pain syndrome status post  anterior cervical discectomy at 2 levels and fibromyalgia.  She experiences quite a bit of additional joint pain which does not seem to have a neurologic origin.  2.  Mild bilateral median neuropathies at the wrists and moderate left ulnar neuropathy at the elbow.  Patient has seen hand surgery.  3.  No brain or cervical spinal cord suspicion for demyelinative disease and no evidence of myasthenia on laboratory testing          Plan:  1.  I feel no further neurologic investigation is needed.  2.  Recommend continued gabapentin.  She failed Lyrica and cannot take antidepressants due to side effect risk of increased oswaldo  3.  Would defer to rheumatology other questions about a more generalized rheumatological disorder that may cause or contribute to her multifocal joint pains.  4.  Little else to add from the neurology point of view.  Follow-up as needed          Time: 30 minutes              Dictated utilizing Dragon dictation.

## 2021-04-19 ENCOUNTER — TREATMENT (OUTPATIENT)
Dept: PHYSICAL THERAPY | Facility: CLINIC | Age: 41
End: 2021-04-19

## 2021-04-19 DIAGNOSIS — M54.2 NECK PAIN: Primary | ICD-10-CM

## 2021-04-19 DIAGNOSIS — M79.7 FIBROMYALGIA: ICD-10-CM

## 2021-04-19 DIAGNOSIS — Z98.1 S/P CERVICAL SPINAL FUSION: ICD-10-CM

## 2021-04-19 DIAGNOSIS — G89.29 CHRONIC NECK PAIN: ICD-10-CM

## 2021-04-19 DIAGNOSIS — M54.2 CHRONIC NECK PAIN: ICD-10-CM

## 2021-04-19 PROCEDURE — DRYNDL PR CUSTOM DRY NEEDLING SELF PAY: Performed by: PHYSICAL THERAPIST

## 2021-04-19 PROCEDURE — 97140 MANUAL THERAPY 1/> REGIONS: CPT | Performed by: PHYSICAL THERAPIST
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
## 2021-04-21 ENCOUNTER — TREATMENT (OUTPATIENT)
Dept: PHYSICAL THERAPY | Facility: CLINIC | Age: 41
End: 2021-04-21

## 2021-04-21 DIAGNOSIS — M54.2 NECK PAIN: Primary | ICD-10-CM

## 2021-04-21 DIAGNOSIS — Z98.1 S/P CERVICAL SPINAL FUSION: ICD-10-CM

## 2021-04-21 DIAGNOSIS — M79.7 FIBROMYALGIA: ICD-10-CM

## 2021-04-21 PROCEDURE — 97035 APP MDLTY 1+ULTRASOUND EA 15: CPT | Performed by: PHYSICAL THERAPIST

## 2021-04-21 PROCEDURE — 97140 MANUAL THERAPY 1/> REGIONS: CPT | Performed by: PHYSICAL THERAPIST

## 2021-04-21 PROCEDURE — 97110 THERAPEUTIC EXERCISES: CPT | Performed by: PHYSICAL THERAPIST

## 2021-04-21 NOTE — PROGRESS NOTES
Physical Therapy Daily Progress Note    Patient: Bienvenido Carter   : 1980  Diagnosis/ICD-10 Code:  Neck pain [M54.2]  Referring practitioner: Osiel Hills MD  Date of Initial Visit: Type: THERAPY  Noted: 3/3/2021  Today's Date: 2021  Patient seen for 10 sessions           Subjective I did feel better after the last needling session    Objective   See Exercise, Manual, and Modality Logs for complete treatment.     Exercises:  -scap ret and shoulder ext, yellow, x20  -supine B shoulder ER, yellow, 2x10  -supine shoulder D2 flexion, yellow, x10 each    Manual:  STM to B UT, levator muscles, and cervical PVMs with light suboccipital release    Assessment/Plan  Pt tolerated the dry needling session 2 days ago much better. Her tone of her B Ut and levator are much more relaxed and she reports less pain         Timed:    Manual Therapy:    15     mins  14584;  Therapeutic Exercise:    15     mins  25780;     Neuromuscular Namita:        mins  45860;    Therapeutic Activity:          mins  47035;     Gait Training:           mins  87308;     Ultrasound:     10     mins  80392;    Electrical Stimulation:         mins  25617 ( );  Iontophoresis         mins 81279;  Aquatic Therapy         mins 52081;  Dry Needling                   mins    Untimed:  Electrical Stimulation:         mins  18234 ( );  Mechanical Traction:         mins  03566;     Timed Treatment:  40    mins   Total Treatment:     40   mins  Katie Medel, PT  Physical Therapist

## 2021-04-26 ENCOUNTER — TREATMENT (OUTPATIENT)
Dept: PHYSICAL THERAPY | Facility: CLINIC | Age: 41
End: 2021-04-26

## 2021-04-26 DIAGNOSIS — M54.2 NECK PAIN: Primary | ICD-10-CM

## 2021-04-26 DIAGNOSIS — Z98.1 S/P CERVICAL SPINAL FUSION: ICD-10-CM

## 2021-04-26 DIAGNOSIS — M79.7 FIBROMYALGIA: ICD-10-CM

## 2021-04-26 PROCEDURE — 97035 APP MDLTY 1+ULTRASOUND EA 15: CPT | Performed by: PHYSICAL THERAPIST

## 2021-04-26 PROCEDURE — DRYNDL PR CUSTOM DRY NEEDLING SELF PAY: Performed by: PHYSICAL THERAPIST

## 2021-04-26 PROCEDURE — 97140 MANUAL THERAPY 1/> REGIONS: CPT | Performed by: PHYSICAL THERAPIST

## 2021-04-26 NOTE — PROGRESS NOTES
Physical Therapy Daily Progress Note    Patient: Bienvenido Carter   : 1980  Diagnosis/ICD-10 Code:  Neck pain [M54.2]  Referring practitioner: Osiel Hills MD  Date of Initial Visit: Type: THERAPY  Noted: 3/3/2021  Today's Date: 2021  Patient seen for 11 sessions           Subjective  Pain today 6-7/10, really tight in the neck muscles    Objective   See Exercise, Manual, and Modality Logs for complete treatment.     Manual:  STM to B UT, levator muscles, and cervical PVMs with light suboccipital release    Dry needling, using threading and direct techniques, obtaining verbal consent to treat after discussing benefits and risks. Patient position during treatment: Supine. Muscles treated: B UT and levator. Response: Multiple LTR with .30x.50mm needles.Clean needle technique observed at all times, precautions for lung fields, neurovascular structures observed. Manual palpation and assessment performed before, during, and after session.    Assessment/Plan  Pt is getting some relief from dry needling. She still has R>L sided tightness of her UT and levator muscles. She reports that she is still not sleeping well, but that her anxiety has been bad. She is seeing her therapist.          Timed:    Manual Therapy:    20     mins  62326;  Therapeutic Exercise:         mins  63143;     Neuromuscular Namita:        mins  88649;    Therapeutic Activity:          mins  43406;     Gait Training:           mins  43981;     Ultrasound:     10     mins  40983;    Electrical Stimulation:         mins  37552 ( );  Iontophoresis         mins 19162;  Aquatic Therapy         mins 97458;  Dry Needling              10     mins    Untimed:  Electrical Stimulation:         mins  36420 ( );  Mechanical Traction:         mins  48871;     Timed Treatment:   40   mins   Total Treatment:     40   mins  Katie Medel, PT  Physical Therapist

## 2021-05-07 RX ORDER — GABAPENTIN 600 MG/1
TABLET ORAL
Qty: 540 TABLET | Refills: 3 | Status: SHIPPED | OUTPATIENT
Start: 2021-05-07 | End: 2021-07-30 | Stop reason: SDUPTHER

## 2021-05-07 NOTE — TELEPHONE ENCOUNTER
RX refill request from pharmacy    Patient was last seen: 11-11-20    Patients next appointment: 05-10-21

## 2021-06-02 ENCOUNTER — TELEPHONE (OUTPATIENT)
Dept: PHYSICAL THERAPY | Facility: CLINIC | Age: 41
End: 2021-06-02
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
## 2021-06-17 ENCOUNTER — TREATMENT (OUTPATIENT)
Dept: PHYSICAL THERAPY | Facility: CLINIC | Age: 41
End: 2021-06-17

## 2021-06-17 DIAGNOSIS — R26.9 ABNORMAL GAIT: ICD-10-CM

## 2021-06-17 DIAGNOSIS — R29.898 LEG WEAKNESS, BILATERAL: Primary | ICD-10-CM

## 2021-06-17 DIAGNOSIS — R26.2 DIFFICULTY WALKING: ICD-10-CM

## 2021-06-17 DIAGNOSIS — Z91.81 RISK FOR FALLS: ICD-10-CM

## 2021-06-17 DIAGNOSIS — Z78.9 DIFFICULTY NAVIGATING STAIRS: ICD-10-CM

## 2021-06-17 PROCEDURE — 97110 THERAPEUTIC EXERCISES: CPT | Performed by: PHYSICAL THERAPIST

## 2021-06-17 PROCEDURE — 97162 PT EVAL MOD COMPLEX 30 MIN: CPT | Performed by: PHYSICAL THERAPIST
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
## 2021-06-21 ENCOUNTER — TREATMENT (OUTPATIENT)
Dept: PHYSICAL THERAPY | Facility: CLINIC | Age: 41
End: 2021-06-21

## 2021-06-21 DIAGNOSIS — Z91.81 RISK FOR FALLS: ICD-10-CM

## 2021-06-21 DIAGNOSIS — R29.898 LEG WEAKNESS, BILATERAL: Primary | ICD-10-CM

## 2021-06-21 DIAGNOSIS — R26.2 DIFFICULTY WALKING: ICD-10-CM

## 2021-06-21 DIAGNOSIS — R26.9 ABNORMAL GAIT: ICD-10-CM

## 2021-06-21 DIAGNOSIS — Z78.9 DIFFICULTY NAVIGATING STAIRS: ICD-10-CM

## 2021-06-21 PROCEDURE — 97110 THERAPEUTIC EXERCISES: CPT | Performed by: PHYSICAL THERAPIST

## 2021-06-21 PROCEDURE — 97112 NEUROMUSCULAR REEDUCATION: CPT | Performed by: PHYSICAL THERAPIST

## 2021-06-21 NOTE — PROGRESS NOTES
Physical Therapy Daily Progress Note    Patient: Bienvenido Carter   : 1980  Diagnosis/ICD-10 Code:  Leg weakness, bilateral [R29.898]  Referring practitioner: Regan Miller MD  Date of Initial Visit: Type: THERAPY  Noted: 2021  Today's Date: 2021  Patient seen for 2 sessions           Subjective: Bienvenido stated that she is experiencing pain in the R low back and abdomen.  She rates pain at 10/10 at this moment.     Objective     Observation:  Bienvenido arrived for treatment 15 minutes late.    Treatment  1. AAROM, supine hip flexion/extension, knee flexion/extension, x 10, B  2. Quad sets, supine with one knee bent, roll under her knee, x 10, B  3. Heel slides, x 10, B  4. Ankle pumps, x 20, B  5. 1/12 of a mile ambulating around the track  6. Patient education:  Heel slides, quad sets, with roll under knee, and ankle pumps were issued to do at home.    Assessment & Plan     Assessment  Assessment details: Bienvenido required verbal cues for exercise technique and education for self care.  She tolerated treatment well.     Plan  Plan details: Monitor her response to today's treatment and continue as indicated.            Timed:    Manual Therapy:         mins  78871;  Therapeutic Exercise:    20     mins  04429;     Neuromuscular Namita:    10    mins  30255;    Therapeutic Activity:          mins  54271;     Gait Training:           mins  71485;     Ultrasound:          mins  15520;    Electrical Stimulation:         mins  90617 ( );  Iontophoresis         mins 63881;  Aquatic Therapy         mins 95421;  Dry Needling                   mins    Untimed:  Electrical Stimulation:         mins  97514 ( );  Mechanical Traction:         mins  26741;     Timed Treatment:   30   mins   Total Treatment:     30   mins  Tonio Mckeon PT  Physical Therapist

## 2021-06-29 ENCOUNTER — TREATMENT (OUTPATIENT)
Dept: PHYSICAL THERAPY | Facility: CLINIC | Age: 41
End: 2021-06-29

## 2021-06-29 DIAGNOSIS — R26.2 DIFFICULTY WALKING: ICD-10-CM

## 2021-06-29 DIAGNOSIS — R26.9 ABNORMAL GAIT: ICD-10-CM

## 2021-06-29 DIAGNOSIS — Z78.9 DIFFICULTY NAVIGATING STAIRS: ICD-10-CM

## 2021-06-29 DIAGNOSIS — Z91.81 RISK FOR FALLS: ICD-10-CM

## 2021-06-29 DIAGNOSIS — R29.898 LEG WEAKNESS, BILATERAL: Primary | ICD-10-CM

## 2021-06-29 PROCEDURE — 97530 THERAPEUTIC ACTIVITIES: CPT | Performed by: PHYSICAL THERAPIST

## 2021-06-29 PROCEDURE — 97110 THERAPEUTIC EXERCISES: CPT | Performed by: PHYSICAL THERAPIST

## 2021-06-29 NOTE — PROGRESS NOTES
Physical Therapy Daily Progress Note    Patient: Bienvenido Carter   : 1980  Diagnosis/ICD-10 Code:  Leg weakness, bilateral [R29.898]  Referring practitioner: Regan Miller MD  Date of Initial Visit: Type: THERAPY  Noted: 2021  Today's Date: 2021  Patient seen for 3 sessions           Subjective my wound vac is out now. Have a gauze bandage over it. I fell 3 times over the last week    Objective     Exercises-  -heel slides with strap, x10  -quad set 10x 5 sec hold with towel folded under knee  -glut set in hooklying, 15 x 5 sec  -hip add iso with ball 10x 5 sec hold  -hooklying B OH flexion (to about 120 deg) x10  -gait around 2nd floor machines (about 150ft) with SC and HHA for balance.     Education on diet and eating more caloric foods as she is now down to about 73lb.     Assessment/Plan  Pt has had 3 falls over the past week. Her father, a neurologist, is present with her today and reports that they think her dose of Seroquel is too high and it was causing some ataxia. She is continuing to use her RWx in the community and for most household distances. She does have help at home for 4 hours a day with an aid. She went to [employer] and got a strap to help her get her L LE into the bed/car, etc.          Timed:    Manual Therapy:         mins  60997;  Therapeutic Exercise:    30     mins  84912;     Neuromuscular Namita:        mins  06251;    Therapeutic Activity:     10     mins  71551;     Gait Training:           mins  23929;     Ultrasound:          mins  02469;    Electrical Stimulation:         mins  68465 ( );  Iontophoresis         mins 49621;  Aquatic Therapy         mins 78392;  Dry Needling                   mins    Untimed:  Electrical Stimulation:         mins  56204 ( );  Mechanical Traction:         mins  58207;     Timed Treatment:   40   mins   Total Treatment:     40   mins  Katie Medel, PT  Physical Therapist

## 2021-07-07 ENCOUNTER — TREATMENT (OUTPATIENT)
Dept: PHYSICAL THERAPY | Facility: CLINIC | Age: 41
End: 2021-07-07

## 2021-07-07 DIAGNOSIS — R29.898 LEG WEAKNESS, BILATERAL: Primary | ICD-10-CM

## 2021-07-07 DIAGNOSIS — R26.9 ABNORMAL GAIT: ICD-10-CM

## 2021-07-07 DIAGNOSIS — Z78.9 DIFFICULTY NAVIGATING STAIRS: ICD-10-CM

## 2021-07-07 DIAGNOSIS — Z91.81 RISK FOR FALLS: ICD-10-CM

## 2021-07-07 DIAGNOSIS — R26.2 DIFFICULTY WALKING: ICD-10-CM

## 2021-07-07 PROCEDURE — 97110 THERAPEUTIC EXERCISES: CPT | Performed by: PHYSICAL THERAPIST

## 2021-07-07 NOTE — PROGRESS NOTES
Physical Therapy Daily Progress Note    Patient: Bienvenido Carter   : 1980  Diagnosis/ICD-10 Code:  Leg weakness, bilateral [R29.898]  Referring practitioner: Regan Miller MD  Date of Initial Visit: Type: THERAPY  Noted: 2021  Today's Date: 2021  Patient seen for 4 sessions           Subjective: Bienvenido reported that her nerve pain in the L thigh is bad today.  She also notices some numbness in the R LB area.     Objective     Observation: Bienvenido arrived 10 minutes late for her session.    Treatment  1. Gait:  She ambulated 160 feet, using the SBQC in her R hand, with a 3 point technique with verbal cues for technique.  2. Heel slides, active assist using strap, x 10, B  3. Quad sets, roll under knee, x 10, B  4. Hip adduction, isometric, squeezing ball, hook lying, x 5, stopping due to nerve pain in the L thigh area  5. Gluteal set, hook lying, x 10    Assessment & Plan     Assessment  Assessment details: Pain was up today and some of the exercises were more painful than her previous visit.  She required verbal cues for exercise technique.     Plan  Plan details: Monitor the effect of today's treatment and progress as tolerated.              Timed:    Manual Therapy:         mins  38556;  Therapeutic Exercise:    25     mins  14563;     Neuromuscular Namita:    5    mins  33885;    Therapeutic Activity:          mins  08118;     Gait Training:           mins  88931;     Ultrasound:          mins  03292;    Electrical Stimulation:         mins  21331 ( );  Iontophoresis         mins 07867;  Aquatic Therapy         mins 24886;  Dry Needling                   mins    Untimed:  Electrical Stimulation:         mins  34140 ( );  Mechanical Traction:         mins  10095;     Timed Treatment:   30   mins   Total Treatment:     30   mins  Tonio Mckeon, PT  Physical Therapist

## 2021-07-09 ENCOUNTER — TREATMENT (OUTPATIENT)
Dept: PHYSICAL THERAPY | Facility: CLINIC | Age: 41
End: 2021-07-09

## 2021-07-09 DIAGNOSIS — R29.898 LEG WEAKNESS, BILATERAL: Primary | ICD-10-CM

## 2021-07-09 DIAGNOSIS — Z91.81 RISK FOR FALLS: ICD-10-CM

## 2021-07-09 DIAGNOSIS — Z78.9 DIFFICULTY NAVIGATING STAIRS: ICD-10-CM

## 2021-07-09 DIAGNOSIS — R26.2 DIFFICULTY WALKING: ICD-10-CM

## 2021-07-09 DIAGNOSIS — R26.9 ABNORMAL GAIT: ICD-10-CM

## 2021-07-09 PROCEDURE — 97110 THERAPEUTIC EXERCISES: CPT | Performed by: PHYSICAL THERAPIST

## 2021-07-09 PROCEDURE — 97112 NEUROMUSCULAR REEDUCATION: CPT | Performed by: PHYSICAL THERAPIST

## 2021-07-09 NOTE — PROGRESS NOTES
Physical Therapy Daily Progress Note    Patient: Bienvenido Carter   : 1980  Diagnosis/ICD-10 Code:  Leg weakness, bilateral [R29.898]  Referring practitioner: Regan Miller MD  Date of Initial Visit: Type: THERAPY  Noted: 2021  Today's Date: 2021  Patient seen for 5 sessions           Subjective: Bienvenido reported that the nerve pain in the L thigh has become more pronounced.    Objective     Observation:  Bienvenido arrived 15 minutes late for treatment    Treatment  1. NuStep, seat at 3, arms at 5, x 5 minutes, work load of 3  2. UBE, standing, 1 minute forward, 1 minute reverse  3. Caryn leg press, seat at 5, avoiding full knee extension, 35 lbs., 10 x 2  4. Seated shoulder extension, yellow theraband, 10 x 2     Assessment & Plan     Assessment  Assessment details: Bienvenido tolerated treatment well.  She required verbal cues and explanation for correct exercise technique.      Plan  Plan details: Moniotor the effect of today's treatment and continue as indicated.              Timed:    Manual Therapy:         mins  10413;  Therapeutic Exercise:    20     mins  81253;     Neuromuscular Namita:    5    mins  46293;    Therapeutic Activity:          mins  72659;     Gait Training:           mins  95947;     Ultrasound:          mins  30389;    Electrical Stimulation:         mins  32657 ( );  Iontophoresis         mins 28297;  Aquatic Therapy         mins 71166;  Dry Needling                   mins    Untimed:  Electrical Stimulation:         mins  93755 ( );  Mechanical Traction:         mins  42757;     Timed Treatment:   25   mins   Total Treatment:     25   mins  Tonio Mckeon PT  Physical Therapist

## 2021-07-12 ENCOUNTER — TELEPHONE (OUTPATIENT)
Dept: NEUROSURGERY | Facility: CLINIC | Age: 41
End: 2021-07-12

## 2021-07-12 NOTE — TELEPHONE ENCOUNTER
Caller: Bienvenido Carter    Relationship to patient: Self    Best call back number: [phone]    Chief complaint:RESCHEDULE APPT.    Type of visit:FOLLOW UP    Requested date:ASAP    If rescheduling, when is the original appointment: NA    Additional notes:PATIENT CALLED AND STATED THAT SHE WANTED TO MAKE HER APPT. FOR HER FOLLOW UP. PT STATES THAT SHE HAD TO MISS HER APPT. BEFORE DUE TO UNFORSEEN CIRCUMSTANCES. PLEASE ADVISE IF PT CAN BE SCHEDULED WITH  FOR NEXT AVAILABLE. THANK YOU
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
## 2021-07-13 NOTE — TELEPHONE ENCOUNTER
MADE APPT. WITH THE PT FOR FOLLOW UP WITH  IN October 2021 ADVISED PT SHE NEEDS TO COMPLETE XRAY BEFORE APPT.-DOES PTS ORDER FOR XRAY NEED TO BE RE-ORDERED? PLEASE ADVISE THANK YOU!

## 2021-07-14 ENCOUNTER — TELEPHONE (OUTPATIENT)
Dept: NEUROLOGY | Facility: CLINIC | Age: 41
End: 2021-07-14

## 2021-07-14 NOTE — TELEPHONE ENCOUNTER
Patient is scheduled to come in tomorrow but she wanted to see if you could give her call before appt. She stated that she's having nerve pain in her left leg and in her abdomen.

## 2021-07-15 ENCOUNTER — OFFICE VISIT (OUTPATIENT)
Dept: NEUROLOGY | Facility: CLINIC | Age: 41
End: 2021-07-15

## 2021-07-15 VITALS
HEIGHT: 60 IN | DIASTOLIC BLOOD PRESSURE: 60 MMHG | OXYGEN SATURATION: 98 % | SYSTOLIC BLOOD PRESSURE: 102 MMHG | BODY MASS INDEX: 14.02 KG/M2 | HEART RATE: 94 BPM | WEIGHT: 71.4 LBS

## 2021-07-15 DIAGNOSIS — R29.898 WEAKNESS OF LEFT LOWER EXTREMITY: Primary | ICD-10-CM

## 2021-07-15 PROBLEM — A49.02 METHICILLIN RESISTANT STAPHYLOCOCCUS AUREUS INFECTION: Status: ACTIVE | Noted: 2021-06-04

## 2021-07-15 PROBLEM — J45.909 ASTHMA: Status: ACTIVE | Noted: 2021-07-15

## 2021-07-15 PROCEDURE — 99215 OFFICE O/P EST HI 40 MIN: CPT | Performed by: PSYCHIATRY & NEUROLOGY

## 2021-07-15 RX ORDER — MELATONIN
2000 DAILY
COMMUNITY
Start: 2021-06-28
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
## 2021-07-23 ENCOUNTER — TREATMENT (OUTPATIENT)
Dept: PHYSICAL THERAPY | Facility: CLINIC | Age: 41
End: 2021-07-23

## 2021-07-23 DIAGNOSIS — R26.2 DIFFICULTY WALKING: ICD-10-CM

## 2021-07-23 DIAGNOSIS — Z91.81 RISK FOR FALLS: ICD-10-CM

## 2021-07-23 DIAGNOSIS — R26.9 ABNORMAL GAIT: ICD-10-CM

## 2021-07-23 DIAGNOSIS — R29.898 LEG WEAKNESS, BILATERAL: Primary | ICD-10-CM

## 2021-07-23 DIAGNOSIS — M54.2 NECK PAIN: ICD-10-CM

## 2021-07-23 DIAGNOSIS — Z78.9 DIFFICULTY NAVIGATING STAIRS: ICD-10-CM

## 2021-07-23 PROCEDURE — 97110 THERAPEUTIC EXERCISES: CPT | Performed by: PHYSICAL THERAPIST

## 2021-07-23 NOTE — PROGRESS NOTES
Physical Therapy Daily Progress Note    Patient: Bienvenido Carter   : 1980  Diagnosis/ICD-10 Code:  Leg weakness, bilateral [R29.898]  Referring practitioner: Regan Miller MD  Date of Initial Visit: Type: THERAPY  Noted: 2021  Today's Date: 2021  Patient seen for 6 sessions           Subjective Bienvenido was 15 min late today    Objective     Exercises:  -NuStep, level 3, 5 min  -step up on 2 inch book (with attempt for opposite hip flexion) x10 each holding to pole and SC  -UBE 2.5 min, 1.25 min fwd and back  -Caryn hip abd 8 lb x10  -[place] single leg press, 35 lb, x20 on R, 30 lb x10 on L  -calf raises, x10 working on equal WBing on LEs.       Assessment/Plan  Raised up patient's walker and SC another notch and she is too forward flexed with her gait. She is having a hard time straightening up due to the incision on her abdomen. Discussed and demonstrated scar massage to help her prevent any adhesions.          Timed:    Manual Therapy:         mins  01813;  Therapeutic Exercise:    30     mins  18007;     Neuromuscular Namita:        mins  89969;    Therapeutic Activity:          mins  54088;     Gait Training:           mins  71640;     Ultrasound:          mins  22788;    Electrical Stimulation:         mins  59174 ( );  Iontophoresis         mins 53803;  Aquatic Therapy         mins 64507;  Dry Needling                   mins    Untimed:  Electrical Stimulation:         mins  46369 ( );  Mechanical Traction:         mins  25981;     Timed Treatment:   30   mins   Total Treatment:     30   mins  Katie Medel, PT  Physical Therapist

## 2021-07-26 ENCOUNTER — TREATMENT (OUTPATIENT)
Dept: PHYSICAL THERAPY | Facility: CLINIC | Age: 41
End: 2021-07-26

## 2021-07-26 DIAGNOSIS — R26.9 ABNORMAL GAIT: ICD-10-CM

## 2021-07-26 DIAGNOSIS — Z91.81 RISK FOR FALLS: ICD-10-CM

## 2021-07-26 DIAGNOSIS — R26.2 DIFFICULTY WALKING: ICD-10-CM

## 2021-07-26 DIAGNOSIS — Z78.9 DIFFICULTY NAVIGATING STAIRS: ICD-10-CM

## 2021-07-26 DIAGNOSIS — R29.898 LEG WEAKNESS, BILATERAL: Primary | ICD-10-CM

## 2021-07-26 PROCEDURE — 97110 THERAPEUTIC EXERCISES: CPT | Performed by: PHYSICAL THERAPIST

## 2021-07-26 PROCEDURE — 97530 THERAPEUTIC ACTIVITIES: CPT | Performed by: PHYSICAL THERAPIST
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
## 2021-07-30 ENCOUNTER — TELEPHONE (OUTPATIENT)
Dept: NEUROSURGERY | Facility: CLINIC | Age: 41
End: 2021-07-30

## 2021-07-30 DIAGNOSIS — Z98.1 HISTORY OF SPINAL FUSION: Primary | ICD-10-CM

## 2021-07-30 RX ORDER — GABAPENTIN 600 MG/1
TABLET ORAL
Qty: 540 TABLET | Refills: 3 | Status: SHIPPED | OUTPATIENT
Start: 2021-07-30 | End: 2021-10-13 | Stop reason: ALTCHOICE

## 2021-07-30 NOTE — TELEPHONE ENCOUNTER
Pt would like an increase of her Gabapentin 600mg 2tid.  Dr Carter would like to speak with Dr Hills  [phone]
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
## 2021-08-02 ENCOUNTER — TREATMENT (OUTPATIENT)
Dept: PHYSICAL THERAPY | Facility: CLINIC | Age: 41
End: 2021-08-02

## 2021-08-02 DIAGNOSIS — R29.898 LEG WEAKNESS, BILATERAL: Primary | ICD-10-CM

## 2021-08-02 DIAGNOSIS — Z91.81 RISK FOR FALLS: ICD-10-CM

## 2021-08-02 DIAGNOSIS — Z78.9 DIFFICULTY NAVIGATING STAIRS: ICD-10-CM

## 2021-08-02 DIAGNOSIS — R26.2 DIFFICULTY WALKING: ICD-10-CM

## 2021-08-02 DIAGNOSIS — R26.9 ABNORMAL GAIT: ICD-10-CM

## 2021-08-02 PROCEDURE — 97110 THERAPEUTIC EXERCISES: CPT | Performed by: PHYSICAL THERAPIST
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
## 2021-08-06 ENCOUNTER — TREATMENT (OUTPATIENT)
Dept: PHYSICAL THERAPY | Facility: CLINIC | Age: 41
End: 2021-08-06

## 2021-08-06 DIAGNOSIS — Z91.81 RISK FOR FALLS: ICD-10-CM

## 2021-08-06 DIAGNOSIS — R26.2 DIFFICULTY WALKING: ICD-10-CM

## 2021-08-06 DIAGNOSIS — R29.898 LEG WEAKNESS, BILATERAL: Primary | ICD-10-CM

## 2021-08-06 DIAGNOSIS — R26.9 ABNORMAL GAIT: ICD-10-CM

## 2021-08-06 DIAGNOSIS — Z78.9 DIFFICULTY NAVIGATING STAIRS: ICD-10-CM

## 2021-08-06 PROCEDURE — 97110 THERAPEUTIC EXERCISES: CPT | Performed by: PHYSICAL THERAPIST
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
## 2021-08-09 ENCOUNTER — TREATMENT (OUTPATIENT)
Dept: PHYSICAL THERAPY | Facility: CLINIC | Age: 41
End: 2021-08-09

## 2021-08-09 DIAGNOSIS — Z91.81 RISK FOR FALLS: ICD-10-CM

## 2021-08-09 DIAGNOSIS — Z78.9 DIFFICULTY NAVIGATING STAIRS: ICD-10-CM

## 2021-08-09 DIAGNOSIS — R29.898 LEG WEAKNESS, BILATERAL: Primary | ICD-10-CM

## 2021-08-09 DIAGNOSIS — R26.2 DIFFICULTY WALKING: ICD-10-CM

## 2021-08-09 DIAGNOSIS — R26.9 ABNORMAL GAIT: ICD-10-CM

## 2021-08-09 PROCEDURE — 97110 THERAPEUTIC EXERCISES: CPT | Performed by: PHYSICAL THERAPIST

## 2021-08-09 NOTE — PROGRESS NOTES
Physical Therapy Daily Progress Note    Patient: Bienvenido Carter   : 1980  Diagnosis/ICD-10 Code:  Leg weakness, bilateral [R29.898]  Referring practitioner: Regan Miller MD  Date of Initial Visit: Type: THERAPY  Noted: 2021  Today's Date: 2021  Patient seen for 10 sessions           Subjective I haven't been able to get in touch with Dr. SANCHEZ's office. Having a lot of pain into B arms to the hands. Also still having intestinal issues.     Objective   See Exercise, Manual, and Modality Logs for complete treatment.     Exercises:  -supine OH flexion, 2x10  -small bridge 2x5  -supine march, alt LE, 2x5  -quad set 10x 5 sec with towel roll on L LE defer  -LAQ x10 each  -scap ret and shoulder ext, yellow x10 each  -NuStep, level 3, 4 min   -step up on 2 inch book (with attempt for opposite hip flexion) x10 each holding to pole and SC  - defer  -UBE 2.5 min, 1.25 min fwd and back defer  -[place] hip abd 8 lb x10  -[place] single leg press, 35 lb, x10 on each  -calf raises, x10 working on equal WBing on LEs  - defer    Assessment/Plan  Pt continues with decreased core and LE strength. She has pain with lifting her L LE into hip flexion, so getting in/out of car or bed is difficult. She has complaints of continued UE pain and difficulty raising her R>L arms in the mornings.          Timed:    Manual Therapy:         mins  68393;  Therapeutic Exercise:    40     mins  36686;     Neuromuscular Namita:        mins  61598;    Therapeutic Activity:          mins  45802;     Gait Training:           mins  14104;     Ultrasound:          mins  54731;    Electrical Stimulation:         mins  67542 ( );  Iontophoresis         mins 50622;  Aquatic Therapy         mins 33756;  Dry Needling                   mins    Untimed:  Electrical Stimulation:         mins  57739 ( );  Mechanical Traction:         mins  01392;     Timed Treatment:   40   mins   Total Treatment:     40   mins  Katie Medel  PT  Physical Therapist

## 2021-08-13 ENCOUNTER — TREATMENT (OUTPATIENT)
Dept: PHYSICAL THERAPY | Facility: CLINIC | Age: 41
End: 2021-08-13

## 2021-08-13 DIAGNOSIS — R26.2 DIFFICULTY WALKING: ICD-10-CM

## 2021-08-13 DIAGNOSIS — Z78.9 DIFFICULTY NAVIGATING STAIRS: ICD-10-CM

## 2021-08-13 DIAGNOSIS — R29.898 LEG WEAKNESS, BILATERAL: Primary | ICD-10-CM

## 2021-08-13 PROCEDURE — 97112 NEUROMUSCULAR REEDUCATION: CPT | Performed by: PHYSICAL THERAPIST

## 2021-08-13 PROCEDURE — 97110 THERAPEUTIC EXERCISES: CPT | Performed by: PHYSICAL THERAPIST

## 2021-08-13 PROCEDURE — 97116 GAIT TRAINING THERAPY: CPT | Performed by: PHYSICAL THERAPIST
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
## 2021-08-17 ENCOUNTER — TREATMENT (OUTPATIENT)
Dept: PHYSICAL THERAPY | Facility: CLINIC | Age: 41
End: 2021-08-17

## 2021-08-17 DIAGNOSIS — M54.2 NECK PAIN: ICD-10-CM

## 2021-08-17 DIAGNOSIS — Z91.81 RISK FOR FALLS: ICD-10-CM

## 2021-08-17 DIAGNOSIS — R26.2 DIFFICULTY WALKING: ICD-10-CM

## 2021-08-17 DIAGNOSIS — R29.898 LEG WEAKNESS, BILATERAL: Primary | ICD-10-CM

## 2021-08-17 DIAGNOSIS — Z78.9 DIFFICULTY NAVIGATING STAIRS: ICD-10-CM

## 2021-08-17 DIAGNOSIS — R26.9 ABNORMAL GAIT: ICD-10-CM

## 2021-08-17 PROCEDURE — 97110 THERAPEUTIC EXERCISES: CPT | Performed by: PHYSICAL THERAPIST

## 2021-08-17 NOTE — PROGRESS NOTES
Physical Therapy Daily Progress Note    Patient: Bienvenido Carter   : 1980  Diagnosis/ICD-10 Code:  Leg weakness, bilateral [R29.898]  Referring practitioner: Regan Miller MD  Date of Initial Visit: Type: THERAPY  Noted: 2021  Today's Date: 2021  Patient seen for 12 sessions           Subjective Went to wound care. She has a little area where it seems like pus is going to come out of it.     Objective        Exercises:  -supine OH flexion, 2x10  -small bridge 2x5  -supine march, alt LE, 2x10  -quad set 10x 5 sec with towel roll on L LE defer  -LAQ x10 each  -scap ret and shoulder ext, yellow x10 each  -supine D2 flexion, yellow band, x10 each  - hook lying, hip clams with core bracing, yellow theraband, x 10  -NuStep, level 3, 4 min defer  -step up on 2 inch book (with attempt for opposite hip flexion) x10 each holding to pole and SC  - defer        Assessment/Plan  Looked at patient's incision in her abdomen. In the thickened area below her navel, she has a small area with a white spot under the skin. It looks to be a possible suture working its way to the surface. Recommended that patient take pictures daily of the area to watch progression for wound care team. Rec that patient start walking more around her condo complex using the walker to build up her endurance and strength         Timed:    Manual Therapy:         mins  74033;  Therapeutic Exercise:    40     mins  41019;     Neuromuscular Namita:        mins  51751;    Therapeutic Activity:          mins  41986;     Gait Training:           mins  93468;     Ultrasound:          mins  22204;    Electrical Stimulation:         mins  55875 ( );  Iontophoresis         mins 57931;  Aquatic Therapy         mins 78088;  Dry Needling                   mins    Untimed:  Electrical Stimulation:         mins  76472 ( );  Mechanical Traction:         mins  15674;     Timed Treatment:   40   mins   Total Treatment:     40    mins  Katie Medel, PT  Physical Therapist

## 2021-08-24 ENCOUNTER — TREATMENT (OUTPATIENT)
Dept: PHYSICAL THERAPY | Facility: CLINIC | Age: 41
End: 2021-08-24

## 2021-08-24 DIAGNOSIS — R26.9 ABNORMAL GAIT: ICD-10-CM

## 2021-08-24 DIAGNOSIS — Z91.81 RISK FOR FALLS: ICD-10-CM

## 2021-08-24 DIAGNOSIS — Z78.9 DIFFICULTY NAVIGATING STAIRS: ICD-10-CM

## 2021-08-24 DIAGNOSIS — R26.2 DIFFICULTY WALKING: ICD-10-CM

## 2021-08-24 DIAGNOSIS — R29.898 LEG WEAKNESS, BILATERAL: Primary | ICD-10-CM

## 2021-08-24 DIAGNOSIS — M54.2 NECK PAIN: ICD-10-CM

## 2021-08-24 PROCEDURE — 97110 THERAPEUTIC EXERCISES: CPT | Performed by: PHYSICAL THERAPIST

## 2021-08-24 NOTE — PROGRESS NOTES
Physical Therapy Daily Progress Note    Patient: Bienvenido Carter   : 1980  Diagnosis/ICD-10 Code:  Leg weakness, bilateral [R29.898]  Referring practitioner: Regan Miller MD  Date of Initial Visit: Type: THERAPY  Noted: 2021  Today's Date: 2021  Patient seen for 13 sessions           Subjective Pt is 10 min late today. She is agitated from her visit with her surgeon's office and her pain not being addressed. She is basically moved back into her condo now.     Objective   See Exercise, Manual, and Modality Logs for complete treatment.     Exercises:  -supine OH flexion, 2x10  -small bridge 2x5  -supine march, alt LE, 2x10  -quad set 10x 5 sec with towel roll on L LE defer  -LAQ x10 each  -scap ret and shoulder ext, yellow x10 each  -supine D2 flexion, yellow band, x10 each  - hook lying, hip clams with core bracing, yellow theraband, x 10  -sit to stand from std height chair x5  -NuStep, level 3, 4 min defer  -step up on 2 inch book (with attempt for opposite hip flexion) x10 each holding to pole and SC  - defer    Assessment/Plan  Pt is still concerned about the area in her abdominal incision that is firm and painful. She went to her surgeon's office today and saw a PA or NP, not the MD. She doesn't feel that her questions were addressed. She reports more pain in the incision area and difficulty now sleeping on her R side. She      is unable to sleep on the L due to the L thigh scar/incision. Suggested that she talk about this with her primary care         Timed:    Manual Therapy:         mins  98746;  Therapeutic Exercise:    38     mins  39771;     Neuromuscular Namita:        mins  26798;    Therapeutic Activity:          mins  47007;     Gait Training:           mins  59147;     Ultrasound:          mins  47193;    Electrical Stimulation:         mins  58535 ( );  Iontophoresis         mins 43504;  Aquatic Therapy         mins 61387;  Dry Needling                    mins    Untimed:  Electrical Stimulation:         mins  67978 ( );  Mechanical Traction:         mins  50606;     Timed Treatment:   38   mins   Total Treatment:     38   mins  Katie Medel, PT  Physical Therapist

## 2021-08-31 ENCOUNTER — TREATMENT (OUTPATIENT)
Dept: PHYSICAL THERAPY | Facility: CLINIC | Age: 41
End: 2021-08-31

## 2021-08-31 DIAGNOSIS — R26.2 DIFFICULTY WALKING: ICD-10-CM

## 2021-08-31 DIAGNOSIS — M54.2 NECK PAIN: ICD-10-CM

## 2021-08-31 DIAGNOSIS — Z91.81 RISK FOR FALLS: ICD-10-CM

## 2021-08-31 DIAGNOSIS — Z78.9 DIFFICULTY NAVIGATING STAIRS: ICD-10-CM

## 2021-08-31 DIAGNOSIS — R26.9 ABNORMAL GAIT: ICD-10-CM

## 2021-08-31 DIAGNOSIS — R29.898 LEG WEAKNESS, BILATERAL: Primary | ICD-10-CM

## 2021-08-31 PROCEDURE — 97110 THERAPEUTIC EXERCISES: CPT | Performed by: PHYSICAL THERAPIST
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
## 2021-09-08 ENCOUNTER — TREATMENT (OUTPATIENT)
Dept: PHYSICAL THERAPY | Facility: CLINIC | Age: 41
End: 2021-09-08

## 2021-09-08 DIAGNOSIS — Z78.9 DIFFICULTY NAVIGATING STAIRS: ICD-10-CM

## 2021-09-08 DIAGNOSIS — R26.2 DIFFICULTY WALKING: ICD-10-CM

## 2021-09-08 DIAGNOSIS — M54.2 NECK PAIN: ICD-10-CM

## 2021-09-08 DIAGNOSIS — Z91.81 RISK FOR FALLS: ICD-10-CM

## 2021-09-08 DIAGNOSIS — R26.9 ABNORMAL GAIT: ICD-10-CM

## 2021-09-08 DIAGNOSIS — R29.898 LEG WEAKNESS, BILATERAL: Primary | ICD-10-CM

## 2021-09-08 PROCEDURE — 97110 THERAPEUTIC EXERCISES: CPT | Performed by: PHYSICAL THERAPIST

## 2021-09-08 NOTE — PROGRESS NOTES
Physical Therapy Daily Progress Note    Patient: Bienvenido Carter   : 1980  Diagnosis/ICD-10 Code:  Leg weakness, bilateral [R29.898]  Referring practitioner: Regan Miller MD  Date of Initial Visit: Type: THERAPY  Noted: 2021  Today's Date: 2021  Patient seen for 15 sessions           Subjective I had and EMG yesterday at  and the L LE has been more sore    Objective     Exercises:  -supine OH flexion, 2x10  -small bridge 2x5  -supine march, alt LE, 2x10  -SLR 2x5 each  -quad set 10x 5 sec with towel roll on L LE defer  -LAQ x10 each  -scap ret and shoulder ext, yellow x10 each  -supine D2 flexion, yellow band, x10 each  - hook lying, hip clams with core bracing, yellow theraband, x 10   -sit to stand from std height chair x5  -NuStep, level 3, 4 min defer  -step up on 2 inch book x10 with single hand hold for support - defer    Assessment/Plan  Pt was in a little more pain today with her exercises, bed mobility, and transfers. She had an EMG for her L LE yesterday but has been in more pain since Friday. She does have helpers that come to her home to take her to the store, do chores, but she reports that the last one she had was not very helpful and she had to carry more things than she felt comfortable doing. She still does not have complete would closure of her abdomen and has an appt with another surgeon to have it looked at soon.          Timed:    Manual Therapy:         mins  67772;  Therapeutic Exercise:    40     mins  52755;     Neuromuscular Namita:        mins  47558;    Therapeutic Activity:          mins  51632;     Gait Training:           mins  84177;     Ultrasound:          mins  64114;    Electrical Stimulation:         mins  30414 ( );  Iontophoresis         mins 89988;  Aquatic Therapy         mins 40441;  Dry Needling                   mins    Untimed:  Electrical Stimulation:         mins  96941 ( );  Mechanical Traction:         mins  89483;     Timed  Treatment:   40   mins   Total Treatment:     40   mins  Katie Medel, PT  Physical Therapist

## 2021-09-14 ENCOUNTER — OFFICE VISIT (OUTPATIENT)
Dept: SURGERY | Facility: CLINIC | Age: 41
End: 2021-09-14

## 2021-09-14 VITALS
WEIGHT: 71.6 LBS | HEIGHT: 60 IN | OXYGEN SATURATION: 99 % | HEART RATE: 100 BPM | TEMPERATURE: 97 F | DIASTOLIC BLOOD PRESSURE: 66 MMHG | SYSTOLIC BLOOD PRESSURE: 108 MMHG | BODY MASS INDEX: 14.06 KG/M2 | RESPIRATION RATE: 20 BRPM

## 2021-09-14 DIAGNOSIS — R10.84 GENERALIZED ABDOMINAL PAIN: Primary | ICD-10-CM

## 2021-09-14 PROCEDURE — 99204 OFFICE O/P NEW MOD 45 MIN: CPT | Performed by: SURGERY

## 2021-09-14 RX ORDER — OXYCODONE HYDROCHLORIDE 10 MG/1
10 TABLET ORAL 4 TIMES DAILY PRN
COMMUNITY
Start: 2021-07-17

## 2021-09-14 RX ORDER — TRIAMCINOLONE ACETONIDE 1 MG/G
CREAM TOPICAL
COMMUNITY
Start: 2021-08-23

## 2021-09-14 RX ORDER — AMOXICILLIN 250 MG
1 CAPSULE ORAL
COMMUNITY
Start: 2021-09-02 | End: 2021-12-01

## 2021-09-14 RX ORDER — SACCHAROMYCES BOULARDII 250 MG
250 CAPSULE ORAL DAILY
COMMUNITY
Start: 2021-07-15

## 2021-09-14 RX ORDER — LANOLIN ALCOHOL/MO/W.PET/CERES
325 CREAM (GRAM) TOPICAL DAILY
COMMUNITY
Start: 2021-09-02 | End: 2021-10-07
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
## 2021-09-15 ENCOUNTER — TREATMENT (OUTPATIENT)
Dept: PHYSICAL THERAPY | Facility: CLINIC | Age: 41
End: 2021-09-15

## 2021-09-15 DIAGNOSIS — R26.9 ABNORMAL GAIT: ICD-10-CM

## 2021-09-15 DIAGNOSIS — R29.898 LEG WEAKNESS, BILATERAL: Primary | ICD-10-CM

## 2021-09-15 DIAGNOSIS — Z91.81 RISK FOR FALLS: ICD-10-CM

## 2021-09-15 DIAGNOSIS — R26.2 DIFFICULTY WALKING: ICD-10-CM

## 2021-09-15 DIAGNOSIS — M54.2 NECK PAIN: ICD-10-CM

## 2021-09-15 DIAGNOSIS — Z78.9 DIFFICULTY NAVIGATING STAIRS: ICD-10-CM

## 2021-09-15 PROCEDURE — 97110 THERAPEUTIC EXERCISES: CPT | Performed by: PHYSICAL THERAPIST
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
## 2021-09-22 ENCOUNTER — TREATMENT (OUTPATIENT)
Dept: PHYSICAL THERAPY | Facility: CLINIC | Age: 41
End: 2021-09-22

## 2021-09-22 DIAGNOSIS — R29.898 LEG WEAKNESS, BILATERAL: Primary | ICD-10-CM

## 2021-09-22 DIAGNOSIS — Z78.9 DIFFICULTY NAVIGATING STAIRS: ICD-10-CM

## 2021-09-22 DIAGNOSIS — R26.2 DIFFICULTY WALKING: ICD-10-CM

## 2021-09-22 DIAGNOSIS — R26.9 ABNORMAL GAIT: ICD-10-CM

## 2021-09-22 DIAGNOSIS — Z91.81 RISK FOR FALLS: ICD-10-CM

## 2021-09-22 PROCEDURE — 97110 THERAPEUTIC EXERCISES: CPT | Performed by: PHYSICAL THERAPIST
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
## 2021-09-23 DIAGNOSIS — R10.84 GENERALIZED ABDOMINAL PAIN: Primary | ICD-10-CM

## 2021-09-27 ENCOUNTER — HOSPITAL ENCOUNTER (EMERGENCY)
Facility: HOSPITAL | Age: 41
Discharge: HOME | End: 2021-09-27
Attending: EMERGENCY MEDICINE | Admitting: EMERGENCY MEDICINE

## 2021-09-27 ENCOUNTER — APPOINTMENT (OUTPATIENT)
Dept: CT IMAGING | Facility: HOSPITAL | Age: 41
End: 2021-09-27

## 2021-09-27 VITALS
DIASTOLIC BLOOD PRESSURE: 88 MMHG | OXYGEN SATURATION: 100 % | SYSTOLIC BLOOD PRESSURE: 131 MMHG | RESPIRATION RATE: 18 BRPM | HEART RATE: 83 BPM | TEMPERATURE: 98 F | BODY MASS INDEX: 14.14 KG/M2 | HEIGHT: 60 IN | WEIGHT: 72 LBS

## 2021-09-27 DIAGNOSIS — T81.30XA WOUND DEHISCENCE: Primary | ICD-10-CM

## 2021-09-27 LAB
ALBUMIN SERPL-MCNC: 4.3 G/DL (ref 3.5–5.2)
ALBUMIN/GLOB SERPL: 1.5 G/DL
ALP SERPL-CCNC: 63 U/L (ref 39–117)
ALT SERPL W P-5'-P-CCNC: 9 U/L (ref 1–33)
ANION GAP SERPL CALCULATED.3IONS-SCNC: 10.4 MMOL/L (ref 5–15)
AST SERPL-CCNC: 9 U/L (ref 1–32)
BASOPHILS # BLD AUTO: 0.04 10*3/MM3 (ref 0–0.2)
BASOPHILS NFR BLD AUTO: 0.4 % (ref 0–1.5)
BILIRUB SERPL-MCNC: <0.2 MG/DL (ref 0–1.2)
BUN SERPL-MCNC: 10 MG/DL (ref 6–20)
BUN/CREAT SERPL: 19.6 (ref 7–25)
CALCIUM SPEC-SCNC: 10 MG/DL (ref 8.6–10.5)
CHLORIDE SERPL-SCNC: 99 MMOL/L (ref 98–107)
CO2 SERPL-SCNC: 29.6 MMOL/L (ref 22–29)
CREAT SERPL-MCNC: 0.51 MG/DL (ref 0.57–1)
DEPRECATED RDW RBC AUTO: 45.2 FL (ref 37–54)
EOSINOPHIL # BLD AUTO: 0.18 10*3/MM3 (ref 0–0.4)
EOSINOPHIL NFR BLD AUTO: 2 % (ref 0.3–6.2)
ERYTHROCYTE [DISTWIDTH] IN BLOOD BY AUTOMATED COUNT: 15.3 % (ref 12.3–15.4)
GFR SERPL CREATININE-BSD FRML MDRD: 133 ML/MIN/1.73
GFR SERPL CREATININE-BSD FRML MDRD: >150 ML/MIN/1.73
GLOBULIN UR ELPH-MCNC: 2.8 GM/DL
GLUCOSE SERPL-MCNC: 78 MG/DL (ref 65–99)
HCG SERPL QL: NEGATIVE
HCT VFR BLD AUTO: 35.8 % (ref 34–46.6)
HGB BLD-MCNC: 11.5 G/DL (ref 12–15.9)
IMM GRANULOCYTES # BLD AUTO: 0.03 10*3/MM3 (ref 0–0.05)
IMM GRANULOCYTES NFR BLD AUTO: 0.3 % (ref 0–0.5)
LYMPHOCYTES # BLD AUTO: 3.79 10*3/MM3 (ref 0.7–3.1)
LYMPHOCYTES NFR BLD AUTO: 41.3 % (ref 19.6–45.3)
MCH RBC QN AUTO: 26.3 PG (ref 26.6–33)
MCHC RBC AUTO-ENTMCNC: 32.1 G/DL (ref 31.5–35.7)
MCV RBC AUTO: 81.7 FL (ref 79–97)
MONOCYTES # BLD AUTO: 0.71 10*3/MM3 (ref 0.1–0.9)
MONOCYTES NFR BLD AUTO: 7.7 % (ref 5–12)
NEUTROPHILS NFR BLD AUTO: 4.43 10*3/MM3 (ref 1.7–7)
NEUTROPHILS NFR BLD AUTO: 48.3 % (ref 42.7–76)
NRBC BLD AUTO-RTO: 0 /100 WBC (ref 0–0.2)
PLATELET # BLD AUTO: 332 10*3/MM3 (ref 140–450)
PMV BLD AUTO: 9.8 FL (ref 6–12)
POTASSIUM SERPL-SCNC: 4.1 MMOL/L (ref 3.5–5.2)
PROT SERPL-MCNC: 7.1 G/DL (ref 6–8.5)
RBC # BLD AUTO: 4.38 10*6/MM3 (ref 3.77–5.28)
SODIUM SERPL-SCNC: 139 MMOL/L (ref 136–145)
WBC # BLD AUTO: 9.18 10*3/MM3 (ref 3.4–10.8)

## 2021-09-27 PROCEDURE — 99283 EMERGENCY DEPT VISIT LOW MDM: CPT

## 2021-09-27 PROCEDURE — 85025 COMPLETE CBC W/AUTO DIFF WBC: CPT | Performed by: EMERGENCY MEDICINE

## 2021-09-27 PROCEDURE — 74177 CT ABD & PELVIS W/CONTRAST: CPT

## 2021-09-27 PROCEDURE — 84703 CHORIONIC GONADOTROPIN ASSAY: CPT | Performed by: EMERGENCY MEDICINE

## 2021-09-27 PROCEDURE — 25010000002 IOPAMIDOL 61 % SOLUTION: Performed by: EMERGENCY MEDICINE

## 2021-09-27 PROCEDURE — 80053 COMPREHEN METABOLIC PANEL: CPT | Performed by: EMERGENCY MEDICINE

## 2021-09-27 RX ORDER — SODIUM CHLORIDE 0.9 % (FLUSH) 0.9 %
10 SYRINGE (ML) INJECTION AS NEEDED
Status: DISCONTINUED | OUTPATIENT
Start: 2021-09-27 | End: 2021-09-27 | Stop reason: HOSPADM

## 2021-09-27 RX ADMIN — IOPAMIDOL 85 ML: 612 INJECTION, SOLUTION INTRAVENOUS at 14:25
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
## 2021-09-29 ENCOUNTER — TREATMENT (OUTPATIENT)
Dept: PHYSICAL THERAPY | Facility: CLINIC | Age: 41
End: 2021-09-29

## 2021-09-29 DIAGNOSIS — R26.2 DIFFICULTY WALKING: ICD-10-CM

## 2021-09-29 DIAGNOSIS — R26.9 ABNORMAL GAIT: ICD-10-CM

## 2021-09-29 DIAGNOSIS — R29.898 LEG WEAKNESS, BILATERAL: Primary | ICD-10-CM

## 2021-09-29 DIAGNOSIS — Z78.9 DIFFICULTY NAVIGATING STAIRS: ICD-10-CM

## 2021-09-29 DIAGNOSIS — Z91.81 RISK FOR FALLS: ICD-10-CM

## 2021-09-29 DIAGNOSIS — G56.21 ULNAR NEUROPATHY AT ELBOW, RIGHT: Primary | ICD-10-CM

## 2021-09-29 PROCEDURE — 97110 THERAPEUTIC EXERCISES: CPT | Performed by: PHYSICAL THERAPIST

## 2021-09-29 PROCEDURE — 97530 THERAPEUTIC ACTIVITIES: CPT | Performed by: PHYSICAL THERAPIST
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
## 2021-10-05 ENCOUNTER — TREATMENT (OUTPATIENT)
Dept: PHYSICAL THERAPY | Facility: CLINIC | Age: 41
End: 2021-10-05

## 2021-10-05 ENCOUNTER — TELEPHONE (OUTPATIENT)
Dept: NEUROSURGERY | Facility: CLINIC | Age: 41
End: 2021-10-05

## 2021-10-05 DIAGNOSIS — R26.9 ABNORMAL GAIT: ICD-10-CM

## 2021-10-05 DIAGNOSIS — Z91.81 RISK FOR FALLS: ICD-10-CM

## 2021-10-05 DIAGNOSIS — R29.898 LEG WEAKNESS, BILATERAL: Primary | ICD-10-CM

## 2021-10-05 DIAGNOSIS — Z78.9 DIFFICULTY NAVIGATING STAIRS: ICD-10-CM

## 2021-10-05 DIAGNOSIS — R26.2 DIFFICULTY WALKING: ICD-10-CM

## 2021-10-05 PROCEDURE — 97110 THERAPEUTIC EXERCISES: CPT | Performed by: PHYSICAL THERAPIST

## 2021-10-05 NOTE — TELEPHONE ENCOUNTER
Left message for patient to return call.            ----- Message from Osiel Hills MD sent at 10/4/2021  6:04 PM EDT -----  Regarding: FW: Prescription Question  Contact: [phone]      ----- Message -----  From: Almita Fields MA  Sent: 10/4/2021   3:52 PM EDT  To: Osiel Hills MD  Subject: FW: Prescription Question                        Patient is scheduled for 10-20-21  ----- Message -----  From: Bienvenido Carter  Sent: 10/4/2021   3:28 PM EDT  To: [facility] [place]  Subject: Prescription Question                            Pending my appt. Oct. 20th, the Gabapentin is not working well. Therefore can you plz prescribe Pregabalin instead of Gabapentin. I need a refill now, please. 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 Thank you.

## 2021-10-05 NOTE — PROGRESS NOTES
Physical Therapy Daily Progress Note    Patient: Bienvenido Carter   : 1980  Diagnosis/ICD-10 Code:  Leg weakness, bilateral [R29.898]  Referring practitioner: Regan Miller MD  Date of Initial Visit: Type: THERAPY  Noted: 2021  Today's Date: 10/5/2021  Patient seen for 19 sessions           Subjective Pt hasn't been sleeping well this past week. She is still dealing with her incision being open (has a call in to Dr Ratliff's office). Also have a call to Dr. Hills's office to talk about gabapentin, she doesn't feel that it has been helping with her pain (neck, LB, abdomen, L thigh).    Objective      Exercises:  -small bridge 2x10  -supine shoulder press, 1lb, x20  -supine march, alt LE, 2x10  -SLR 2x10 each  -quad set 10x 5 sec with towel roll on L LE defer  -LAQ x10 each  -scap ret and shoulder ext, red x20 each  -supine D2 flexion, yellow band, x15 each  - hook lying, hip clams with core bracing, yellow theraband, x 15  -sit to stand from std height chair x10  -NuStep, level 3, 4 min defer  -step up on 2 inch book x10 with single hand hold for support - defer    Assessment/Plan  Pt having complaints of pain in multiple areas that are not being helped with meds. She has calls into her neurosurgeon, general surgeon (about her wound), and an appt this week with the neurologist for an EMG on her UEs. She continues with pain into the R UE, numbness and swelling in her hand, and reports of dropping items. She is exhibiting greater ease with transfers in the clinic (bed mobility and sit to stand)         Timed:    Manual Therapy:         mins  25805;  Therapeutic Exercise:    42     mins  42633;     Neuromuscular Namita:        mins  82298;    Therapeutic Activity:          mins  17794;     Gait Training:           mins  34877;     Ultrasound:          mins  77470;    Electrical Stimulation:         mins  04487 ( );  Iontophoresis         mins 64802;  Aquatic Therapy         mins  15018;  Dry Needling                   mins    Untimed:  Electrical Stimulation:         mins  28502 ( );  Mechanical Traction:         mins  62617;     Timed Treatment:   42   mins   Total Treatment:     42   mins  Katie Medel, PT  Physical Therapist

## 2021-10-06 ENCOUNTER — TELEPHONE (OUTPATIENT)
Dept: NEUROSURGERY | Facility: CLINIC | Age: 41
End: 2021-10-06

## 2021-10-07 ENCOUNTER — PROCEDURE VISIT (OUTPATIENT)
Dept: NEUROLOGY | Facility: CLINIC | Age: 41
End: 2021-10-07

## 2021-10-07 ENCOUNTER — TREATMENT (OUTPATIENT)
Dept: PHYSICAL THERAPY | Facility: CLINIC | Age: 41
End: 2021-10-07

## 2021-10-07 VITALS — WEIGHT: 71 LBS | HEIGHT: 60 IN | BODY MASS INDEX: 13.94 KG/M2

## 2021-10-07 DIAGNOSIS — R26.9 ABNORMAL GAIT: ICD-10-CM

## 2021-10-07 DIAGNOSIS — G56.03 BILATERAL CARPAL TUNNEL SYNDROME: ICD-10-CM

## 2021-10-07 DIAGNOSIS — M79.7 FIBROMYALGIA: Primary | ICD-10-CM

## 2021-10-07 DIAGNOSIS — Z78.9 DIFFICULTY NAVIGATING STAIRS: ICD-10-CM

## 2021-10-07 DIAGNOSIS — R26.2 DIFFICULTY WALKING: ICD-10-CM

## 2021-10-07 DIAGNOSIS — M54.12 CERVICAL RADICULOPATHY AT C7: ICD-10-CM

## 2021-10-07 DIAGNOSIS — R29.898 LEG WEAKNESS, BILATERAL: Primary | ICD-10-CM

## 2021-10-07 DIAGNOSIS — G56.21 ULNAR NEUROPATHY OF RIGHT UPPER EXTREMITY: ICD-10-CM

## 2021-10-07 DIAGNOSIS — G56.21 ULNAR NEUROPATHY AT ELBOW, RIGHT: ICD-10-CM

## 2021-10-07 DIAGNOSIS — M79.2 NEUROPATHIC PAIN: ICD-10-CM

## 2021-10-07 PROCEDURE — 95886 MUSC TEST DONE W/N TEST COMP: CPT | Performed by: PSYCHIATRY & NEUROLOGY

## 2021-10-07 PROCEDURE — 95911 NRV CNDJ TEST 9-10 STUDIES: CPT | Performed by: PSYCHIATRY & NEUROLOGY

## 2021-10-07 PROCEDURE — 97110 THERAPEUTIC EXERCISES: CPT | Performed by: PHYSICAL THERAPIST

## 2021-10-07 RX ORDER — DOXYCYCLINE HYCLATE 100 MG
100 TABLET ORAL 2 TIMES DAILY
COMMUNITY
Start: 2021-10-01 | End: 2021-11-03

## 2021-10-07 RX ORDER — DIVALPROEX SODIUM 500 MG/1
1000 TABLET, EXTENDED RELEASE ORAL DAILY
COMMUNITY
Start: 2021-09-15

## 2021-10-07 RX ORDER — LISDEXAMFETAMINE DIMESYLATE 30 MG/1
30 CAPSULE ORAL EVERY MORNING
COMMUNITY
Start: 2021-09-16 | End: 2022-08-02

## 2021-10-07 RX ORDER — PREGABALIN 50 MG/1
50 CAPSULE ORAL 2 TIMES DAILY
Qty: 60 CAPSULE | Refills: 5 | Status: SHIPPED | OUTPATIENT
Start: 2021-10-07 | End: 2021-10-13 | Stop reason: SDUPTHER
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
## 2021-10-07 NOTE — PROGRESS NOTES
Physical Therapy Daily Progress Note    Patient: Bienvenido Carter   : 1980  Diagnosis/ICD-10 Code:  Leg weakness, bilateral [R29.898]  Referring practitioner: Regan Miller MD  Date of Initial Visit: Type: THERAPY  Noted: 2021  Today's Date: 10/7/2021  Patient seen for 20 sessions           Subjective Pt saw neurologist, Dr. Miller, and had EMG on the B UE to check for carpal tunnel progression. She is switching from gabapentin to pregabalin    Objective      Exercises:  -small bridge 2x10  -supine shoulder press, x20  -supine march, alt LE, 2x10  -SLR 2x10 each  -quad set 10x 5 sec with towel roll on L LE   -LAQ x10 each  -scap ret and shoulder ext, red x20 each  -supine D2 flexion, yellow band, x15 each  defer  - hook lying, hip clams with core bracing, yellow theraband, x 15  -sit to stand from std height chair x10  -NuStep, level 3, 4 min defer  -step up on 2 inch book x10 with single hand hold for support - defer    Assessment/Plan  Pt had an UE EMG today. She is having more pain since she stopped taking her gabapentin and started taking pregabalin today. She is having difficulty putting pressure through her R hand on the walker and is having more shooting pains into the L thigh. Will see next week how she has transitioned with her meds and what her pain is like         Timed:    Manual Therapy:         mins  40836;  Therapeutic Exercise:    40     mins  28169;     Neuromuscular Namita:        mins  65100;    Therapeutic Activity:          mins  25825;     Gait Training:           mins  90752;     Ultrasound:          mins  55670;    Electrical Stimulation:         mins  75144 ( );  Iontophoresis         mins 52665;  Aquatic Therapy         mins 42141;  Dry Needling                   mins    Untimed:  Electrical Stimulation:         mins  44335 ( );  Mechanical Traction:         mins  86265;     Timed Treatment:   40   mins   Total Treatment:     40   mins  Katie Medel,  PT  Physical Therapist

## 2021-10-12 ENCOUNTER — OFFICE VISIT (OUTPATIENT)
Dept: SURGERY | Facility: CLINIC | Age: 41
End: 2021-10-12

## 2021-10-12 ENCOUNTER — TELEPHONE (OUTPATIENT)
Dept: PHYSICAL THERAPY | Facility: CLINIC | Age: 41
End: 2021-10-12

## 2021-10-12 VITALS
DIASTOLIC BLOOD PRESSURE: 74 MMHG | HEART RATE: 91 BPM | HEIGHT: 60 IN | RESPIRATION RATE: 18 BRPM | TEMPERATURE: 97.7 F | BODY MASS INDEX: 13.31 KG/M2 | WEIGHT: 67.8 LBS | SYSTOLIC BLOOD PRESSURE: 126 MMHG | OXYGEN SATURATION: 98 %

## 2021-10-12 DIAGNOSIS — T81.41XA INFECTION INVOLVING STITCH WITH ABSCESS: Primary | ICD-10-CM

## 2021-10-12 PROCEDURE — 99213 OFFICE O/P EST LOW 20 MIN: CPT | Performed by: SURGERY

## 2021-10-12 RX ORDER — SULFAMETHOXAZOLE AND TRIMETHOPRIM 800; 160 MG/1; MG/1
1 TABLET ORAL 2 TIMES DAILY
Qty: 28 TABLET | Refills: 0 | Status: SHIPPED | OUTPATIENT
Start: 2021-10-12 | End: 2021-10-26
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
## 2021-10-13 DIAGNOSIS — M79.7 FIBROMYALGIA: ICD-10-CM

## 2021-10-13 DIAGNOSIS — M79.2 NEUROPATHIC PAIN: ICD-10-CM

## 2021-10-13 PROBLEM — T81.41XA INFECTION INVOLVING STITCH WITH ABSCESS: Status: ACTIVE | Noted: 2021-10-13

## 2021-10-13 RX ORDER — PREGABALIN 100 MG/1
100 CAPSULE ORAL 2 TIMES DAILY
Qty: 60 CAPSULE | Refills: 3 | Status: SHIPPED | OUTPATIENT
Start: 2021-10-13 | End: 2021-11-04 | Stop reason: SDUPTHER

## 2021-10-14 ENCOUNTER — TREATMENT (OUTPATIENT)
Dept: PHYSICAL THERAPY | Facility: CLINIC | Age: 41
End: 2021-10-14

## 2021-10-14 DIAGNOSIS — Z91.81 RISK FOR FALLS: ICD-10-CM

## 2021-10-14 DIAGNOSIS — R26.9 ABNORMAL GAIT: ICD-10-CM

## 2021-10-14 DIAGNOSIS — Z78.9 DIFFICULTY NAVIGATING STAIRS: ICD-10-CM

## 2021-10-14 DIAGNOSIS — R26.2 DIFFICULTY WALKING: ICD-10-CM

## 2021-10-14 DIAGNOSIS — R29.898 LEG WEAKNESS, BILATERAL: Primary | ICD-10-CM

## 2021-10-14 PROCEDURE — 97110 THERAPEUTIC EXERCISES: CPT | Performed by: PHYSICAL THERAPIST

## 2021-10-14 NOTE — PROGRESS NOTES
Physical Therapy Daily Progress Note    Patient: Bienvenido Carter   : 1980  Diagnosis/ICD-10 Code:  Leg weakness, bilateral [R29.898]  Referring practitioner: Regan Miller MD  Date of Initial Visit: Type: THERAPY  Noted: 2021  Today's Date: 10/14/2021  Patient seen for 21 sessions           Subjective Pt met with surgeon and is going to have another abdominal surgery on 2021    Objective     Exercises:  -small bridge 2x10  -supine shoulder press, x20  -supine march, alt LE, 2x10  -SLR 2x10 each  -quad set 10x 5 sec with towel roll on L LE   -LAQ x10 each  -scap ret and shoulder ext, red x20 each  -supine D2 flexion, yellow band, x15 each   - hook lying, hip clams with core bracing, red band, x20  -sit to stand from std height chair x10  -NuStep, level 3, 4 min defer  -step up on 2 inch book x10 with single hand hold for support - defer    Assessment/Plan  Pt met with the surgeon yesterday. She is going to have another abdominal surgery for suture removal to help prevent future abscesses. That is going to take place on 2021. So she is not sure when she will be allowed to come back to therapy after this time. Will continue to work on her strengthening until that time. She has been cleared to drive, so brought herself to therapy today and used her SC from the car to the clinic         Timed:    Manual Therapy:         mins  97999;  Therapeutic Exercise:    45     mins  56446;     Neuromuscular Namita:        mins  77863;    Therapeutic Activity:          mins  86108;     Gait Training:           mins  16752;     Ultrasound:          mins  96608;    Electrical Stimulation:         mins  92541 ( );  Iontophoresis         mins 40133;  Aquatic Therapy         mins 84005;  Dry Needling                   mins    Untimed:  Electrical Stimulation:         mins  76918 ( );  Mechanical Traction:         mins  24066;     Timed Treatment:   45   mins   Total Treatment:     45    mins  Katie Medel, PT  Physical Therapist

## 2021-10-19 ENCOUNTER — TELEPHONE (OUTPATIENT)
Dept: NEUROLOGY | Facility: CLINIC | Age: 41
End: 2021-10-19

## 2021-10-19 NOTE — TELEPHONE ENCOUNTER
Provider: DR. SCHAFFER    Caller: MATT    Relationship to Patient: SELF    Phone Number: [phone]    Reason for Call: PT STATES THAT WHEN SHE SAW DR. SCHAFFER FOR HER EMG, HE HAD ORDERED LABWORK.  PT DID NOT KNOW WHERE TO GO TO HAVE LABWORK DONE AND STATES THAT ACCORDING TO HER PAPERWORK, LAB ORDERS ARE NOW .  PT IS ASKING FOR LABS TO BE RE-ORDERED AND IS ASKING FOR SOMEONE TO LET HER KNOW VIA [employer] WHERE IS IS TO GO TO HAVE THOSE LABS DRAWN.    When was the patient last seen: 10/07/21

## 2021-10-20 ENCOUNTER — HOSPITAL ENCOUNTER (OUTPATIENT)
Dept: GENERAL RADIOLOGY | Facility: HOSPITAL | Age: 41
Discharge: HOME | End: 2021-10-20
Admitting: NEUROLOGICAL SURGERY

## 2021-10-20 DIAGNOSIS — Z98.1 HISTORY OF SPINAL FUSION: ICD-10-CM

## 2021-10-20 DIAGNOSIS — G89.29 CHRONIC NECK PAIN: ICD-10-CM

## 2021-10-20 DIAGNOSIS — M54.2 CHRONIC NECK PAIN: ICD-10-CM

## 2021-10-20 PROCEDURE — 72050 X-RAY EXAM NECK SPINE 4/5VWS: CPT

## 2021-10-21 ENCOUNTER — TREATMENT (OUTPATIENT)
Dept: PHYSICAL THERAPY | Facility: CLINIC | Age: 41
End: 2021-10-21

## 2021-10-21 ENCOUNTER — APPOINTMENT (OUTPATIENT)
Dept: INFUSION THERAPY | Facility: HOSPITAL | Age: 41
End: 2021-10-21

## 2021-10-21 DIAGNOSIS — Z91.81 RISK FOR FALLS: ICD-10-CM

## 2021-10-21 DIAGNOSIS — R26.9 ABNORMAL GAIT: ICD-10-CM

## 2021-10-21 DIAGNOSIS — R26.2 DIFFICULTY WALKING: ICD-10-CM

## 2021-10-21 DIAGNOSIS — Z78.9 DIFFICULTY NAVIGATING STAIRS: ICD-10-CM

## 2021-10-21 DIAGNOSIS — R29.898 LEG WEAKNESS, BILATERAL: Primary | ICD-10-CM

## 2021-10-21 PROCEDURE — 97110 THERAPEUTIC EXERCISES: CPT | Performed by: PHYSICAL THERAPIST
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
## 2021-10-28 ENCOUNTER — TREATMENT (OUTPATIENT)
Dept: PHYSICAL THERAPY | Facility: CLINIC | Age: 41
End: 2021-10-28

## 2021-10-28 DIAGNOSIS — Z78.9 DIFFICULTY NAVIGATING STAIRS: ICD-10-CM

## 2021-10-28 DIAGNOSIS — R26.9 ABNORMAL GAIT: ICD-10-CM

## 2021-10-28 DIAGNOSIS — R29.898 LEG WEAKNESS, BILATERAL: Primary | ICD-10-CM

## 2021-10-28 DIAGNOSIS — R26.2 DIFFICULTY WALKING: ICD-10-CM

## 2021-10-28 PROCEDURE — 97110 THERAPEUTIC EXERCISES: CPT | Performed by: PHYSICAL THERAPIST
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
## 2021-11-03 ENCOUNTER — LAB (OUTPATIENT)
Dept: LAB | Facility: SURGERY CENTER | Age: 41
End: 2021-11-03

## 2021-11-03 DIAGNOSIS — T81.41XA INFECTION INVOLVING STITCH WITH ABSCESS: ICD-10-CM

## 2021-11-03 LAB — SARS-COV-2 ORF1AB RESP QL NAA+PROBE: NOT DETECTED

## 2021-11-03 PROCEDURE — U0004 COV-19 TEST NON-CDC HGH THRU: HCPCS | Performed by: SURGERY

## 2021-11-03 PROCEDURE — C9803 HOPD COVID-19 SPEC COLLECT: HCPCS

## 2021-11-03 RX ORDER — MULTIPLE VITAMINS W/ MINERALS TAB 9MG-400MCG
1 TAB ORAL DAILY
COMMUNITY

## 2021-11-03 NOTE — SIGNIFICANT NOTE
Education provided the Patient on the following:    - Nothing to Eat or Drink after MN the night before the procedure  - The date of your procedure, your are welcome to have one visitor at bedside or remain within 10-15 minutes of Islam [place]  -Please wear warm socks when you arrive for your Surgery  -Remove all jewelry and leave any valuables before arriving on the date of your procedure (all will have to be removed before leaving preop)  -You will need to arrive at 1130 on 11/5 Surgery  -Feel free to contact us at: [phone] with any additional questions/concerns

## 2021-11-04 DIAGNOSIS — M79.7 FIBROMYALGIA: ICD-10-CM

## 2021-11-04 DIAGNOSIS — M79.2 NEUROPATHIC PAIN: ICD-10-CM

## 2021-11-04 RX ORDER — PREGABALIN 100 MG/1
200 CAPSULE ORAL 2 TIMES DAILY
Qty: 120 CAPSULE | Refills: 5 | Status: SHIPPED | OUTPATIENT
Start: 2021-11-04 | End: 2022-02-14 | Stop reason: SDUPTHER

## 2021-11-05 ENCOUNTER — HOSPITAL ENCOUNTER (OUTPATIENT)
Facility: SURGERY CENTER | Age: 41
Setting detail: HOSPITAL OUTPATIENT SURGERY
Discharge: HOME | End: 2021-11-05
Attending: SURGERY | Admitting: SURGERY

## 2021-11-05 ENCOUNTER — ANESTHESIA (OUTPATIENT)
Dept: SURGERY | Facility: SURGERY CENTER | Age: 41
End: 2021-11-05

## 2021-11-05 ENCOUNTER — ANESTHESIA EVENT (OUTPATIENT)
Dept: SURGERY | Facility: SURGERY CENTER | Age: 41
End: 2021-11-05

## 2021-11-05 VITALS
WEIGHT: 72 LBS | OXYGEN SATURATION: 100 % | RESPIRATION RATE: 16 BRPM | HEIGHT: 60 IN | BODY MASS INDEX: 14.14 KG/M2 | HEART RATE: 89 BPM | SYSTOLIC BLOOD PRESSURE: 125 MMHG | TEMPERATURE: 97.8 F | DIASTOLIC BLOOD PRESSURE: 88 MMHG

## 2021-11-05 DIAGNOSIS — T81.41XA INFECTION INVOLVING STITCH WITH ABSCESS: ICD-10-CM

## 2021-11-05 LAB
B-HCG UR QL: NEGATIVE
EXPIRATION DATE: NORMAL
INTERNAL NEGATIVE CONTROL: NEGATIVE
INTERNAL POSITIVE CONTROL: POSITIVE
Lab: NORMAL

## 2021-11-05 PROCEDURE — 25010000002 FENTANYL CITRATE (PF) 50 MCG/ML SOLUTION: Performed by: ANESTHESIOLOGY

## 2021-11-05 PROCEDURE — 81025 URINE PREGNANCY TEST: CPT | Performed by: SURGERY

## 2021-11-05 PROCEDURE — 10121 INC&RMVL FB SUBQ TISS COMP: CPT | Performed by: SURGERY

## 2021-11-05 PROCEDURE — 25010000002 KETOROLAC TROMETHAMINE PER 15 MG: Performed by: ANESTHESIOLOGY

## 2021-11-05 PROCEDURE — 25010000002 PROPOFOL 10 MG/ML EMULSION: Performed by: ANESTHESIOLOGY

## 2021-11-05 PROCEDURE — 25010000002 ONDANSETRON PER 1 MG: Performed by: ANESTHESIOLOGY

## 2021-11-05 PROCEDURE — 0FDC3ZX EXTRACTION OF AMPULLA OF VATER, PERCUTANEOUS APPROACH, DIAGNOSTIC: ICD-10-PCS | Performed by: ANESTHESIOLOGY

## 2021-11-05 PROCEDURE — 25010000002 DEXAMETHASONE PER 1 MG: Performed by: ANESTHESIOLOGY

## 2021-11-05 PROCEDURE — 25010000002 HYDROMORPHONE 1 MG/ML SOLUTION: Performed by: ANESTHESIOLOGY

## 2021-11-05 PROCEDURE — 25010000002 MIDAZOLAM PER 1 MG: Performed by: ANESTHESIOLOGY

## 2021-11-05 PROCEDURE — 25010000002 PHENYLEPHRINE 10 MG/ML SOLUTION: Performed by: ANESTHESIOLOGY

## 2021-11-05 RX ORDER — DIPHENHYDRAMINE HYDROCHLORIDE 50 MG/ML
12.5 INJECTION INTRAMUSCULAR; INTRAVENOUS
Status: DISCONTINUED | OUTPATIENT
Start: 2021-11-05 | End: 2021-11-05 | Stop reason: HOSPADM

## 2021-11-05 RX ORDER — FENTANYL CITRATE 50 UG/ML
50 INJECTION, SOLUTION INTRAMUSCULAR; INTRAVENOUS
Status: DISCONTINUED | OUTPATIENT
Start: 2021-11-05 | End: 2021-11-05 | Stop reason: HOSPADM

## 2021-11-05 RX ORDER — IBUPROFEN 200 MG
600 TABLET ORAL ONCE AS NEEDED
Status: DISCONTINUED | OUTPATIENT
Start: 2021-11-05 | End: 2021-11-05 | Stop reason: HOSPADM

## 2021-11-05 RX ORDER — SODIUM CHLORIDE, SODIUM LACTATE, POTASSIUM CHLORIDE, CALCIUM CHLORIDE 600; 310; 30; 20 MG/100ML; MG/100ML; MG/100ML; MG/100ML
9 INJECTION, SOLUTION INTRAVENOUS CONTINUOUS
Status: DISCONTINUED | OUTPATIENT
Start: 2021-11-05 | End: 2021-11-05 | Stop reason: HOSPADM

## 2021-11-05 RX ORDER — DIPHENHYDRAMINE HCL 25 MG
25 TABLET ORAL
Status: DISCONTINUED | OUTPATIENT
Start: 2021-11-05 | End: 2021-11-05 | Stop reason: HOSPADM

## 2021-11-05 RX ORDER — MIDAZOLAM HYDROCHLORIDE 1 MG/ML
1 INJECTION INTRAMUSCULAR; INTRAVENOUS
Status: DISCONTINUED | OUTPATIENT
Start: 2021-11-05 | End: 2021-11-05 | Stop reason: HOSPADM

## 2021-11-05 RX ORDER — LIDOCAINE HYDROCHLORIDE 20 MG/ML
INJECTION, SOLUTION INFILTRATION; PERINEURAL AS NEEDED
Status: DISCONTINUED | OUTPATIENT
Start: 2021-11-05 | End: 2021-11-05 | Stop reason: SURG

## 2021-11-05 RX ORDER — DEXAMETHASONE SODIUM PHOSPHATE 4 MG/ML
INJECTION, SOLUTION INTRA-ARTICULAR; INTRALESIONAL; INTRAMUSCULAR; INTRAVENOUS; SOFT TISSUE AS NEEDED
Status: DISCONTINUED | OUTPATIENT
Start: 2021-11-05 | End: 2021-11-05 | Stop reason: SURG

## 2021-11-05 RX ORDER — ONDANSETRON 2 MG/ML
INJECTION INTRAMUSCULAR; INTRAVENOUS AS NEEDED
Status: DISCONTINUED | OUTPATIENT
Start: 2021-11-05 | End: 2021-11-05 | Stop reason: SURG

## 2021-11-05 RX ORDER — FLUMAZENIL 0.1 MG/ML
0.2 INJECTION INTRAVENOUS AS NEEDED
Status: DISCONTINUED | OUTPATIENT
Start: 2021-11-05 | End: 2021-11-05 | Stop reason: HOSPADM

## 2021-11-05 RX ORDER — FENTANYL CITRATE 50 UG/ML
INJECTION, SOLUTION INTRAMUSCULAR; INTRAVENOUS AS NEEDED
Status: DISCONTINUED | OUTPATIENT
Start: 2021-11-05 | End: 2021-11-05 | Stop reason: SURG

## 2021-11-05 RX ORDER — MAGNESIUM HYDROXIDE 1200 MG/15ML
LIQUID ORAL AS NEEDED
Status: DISCONTINUED | OUTPATIENT
Start: 2021-11-05 | End: 2021-11-05 | Stop reason: HOSPADM

## 2021-11-05 RX ORDER — OXYCODONE HYDROCHLORIDE AND ACETAMINOPHEN 5; 325 MG/1; MG/1
1 TABLET ORAL EVERY 4 HOURS PRN
Status: DISCONTINUED | OUTPATIENT
Start: 2021-11-05 | End: 2021-11-05 | Stop reason: HOSPADM

## 2021-11-05 RX ORDER — PROPOFOL 10 MG/ML
VIAL (ML) INTRAVENOUS AS NEEDED
Status: DISCONTINUED | OUTPATIENT
Start: 2021-11-05 | End: 2021-11-05 | Stop reason: SURG

## 2021-11-05 RX ORDER — NALOXONE HCL 0.4 MG/ML
0.2 VIAL (ML) INJECTION AS NEEDED
Status: DISCONTINUED | OUTPATIENT
Start: 2021-11-05 | End: 2021-11-05 | Stop reason: HOSPADM

## 2021-11-05 RX ORDER — OXYCODONE HYDROCHLORIDE AND ACETAMINOPHEN 5; 325 MG/1; MG/1
1 TABLET ORAL EVERY 4 HOURS PRN
Qty: 30 TABLET | Refills: 0 | Status: SHIPPED | OUTPATIENT
Start: 2021-11-05 | End: 2021-11-10

## 2021-11-05 RX ORDER — PHENYLEPHRINE HYDROCHLORIDE 10 MG/ML
INJECTION INTRAVENOUS AS NEEDED
Status: DISCONTINUED | OUTPATIENT
Start: 2021-11-05 | End: 2021-11-05 | Stop reason: SURG

## 2021-11-05 RX ORDER — CLINDAMYCIN PHOSPHATE 900 MG/50ML
900 INJECTION, SOLUTION INTRAVENOUS ONCE
Status: COMPLETED | OUTPATIENT
Start: 2021-11-05 | End: 2021-11-05

## 2021-11-05 RX ORDER — ONDANSETRON 4 MG/1
4 TABLET, FILM COATED ORAL EVERY 4 HOURS PRN
Qty: 15 TABLET | Refills: 1 | Status: SHIPPED | OUTPATIENT
Start: 2021-11-05 | End: 2022-02-21

## 2021-11-05 RX ORDER — BUPIVACAINE HYDROCHLORIDE AND EPINEPHRINE 2.5; 5 MG/ML; UG/ML
INJECTION, SOLUTION EPIDURAL; INFILTRATION; INTRACAUDAL; PERINEURAL AS NEEDED
Status: DISCONTINUED | OUTPATIENT
Start: 2021-11-05 | End: 2021-11-05 | Stop reason: HOSPADM

## 2021-11-05 RX ORDER — KETOROLAC TROMETHAMINE 30 MG/ML
INJECTION, SOLUTION INTRAMUSCULAR; INTRAVENOUS AS NEEDED
Status: DISCONTINUED | OUTPATIENT
Start: 2021-11-05 | End: 2021-11-05 | Stop reason: SURG

## 2021-11-05 RX ADMIN — MIDAZOLAM 1 MG: 1 INJECTION INTRAMUSCULAR; INTRAVENOUS at 12:51

## 2021-11-05 RX ADMIN — PROPOFOL 180 MG: 10 INJECTION, EMULSION INTRAVENOUS at 13:51

## 2021-11-05 RX ADMIN — OXYCODONE HYDROCHLORIDE AND ACETAMINOPHEN 1 TABLET: 5; 325 TABLET ORAL at 15:06

## 2021-11-05 RX ADMIN — PHENYLEPHRINE HYDROCHLORIDE 100 MCG: 10 INJECTION INTRAVENOUS at 13:54

## 2021-11-05 RX ADMIN — KETOROLAC TROMETHAMINE 30 MG: 30 INJECTION, SOLUTION INTRAMUSCULAR at 13:56

## 2021-11-05 RX ADMIN — PHENYLEPHRINE HYDROCHLORIDE 100 MCG: 10 INJECTION INTRAVENOUS at 14:20

## 2021-11-05 RX ADMIN — SODIUM CHLORIDE, POTASSIUM CHLORIDE, SODIUM LACTATE AND CALCIUM CHLORIDE 9 ML/HR: 600; 310; 30; 20 INJECTION, SOLUTION INTRAVENOUS at 12:34

## 2021-11-05 RX ADMIN — PHENYLEPHRINE HYDROCHLORIDE 100 MCG: 10 INJECTION INTRAVENOUS at 14:14

## 2021-11-05 RX ADMIN — PHENYLEPHRINE HYDROCHLORIDE 100 MCG: 10 INJECTION INTRAVENOUS at 14:01

## 2021-11-05 RX ADMIN — ONDANSETRON 4 MG: 2 INJECTION INTRAMUSCULAR; INTRAVENOUS at 13:56

## 2021-11-05 RX ADMIN — PHENYLEPHRINE HYDROCHLORIDE 100 MCG: 10 INJECTION INTRAVENOUS at 14:09

## 2021-11-05 RX ADMIN — FAMOTIDINE 20 MG: 10 INJECTION INTRAVENOUS at 12:36

## 2021-11-05 RX ADMIN — CLINDAMYCIN PHOSPHATE 600 MG: 900 INJECTION, SOLUTION INTRAVENOUS at 13:47

## 2021-11-05 RX ADMIN — HYDROMORPHONE HYDROCHLORIDE 0.5 MG: 1 INJECTION, SOLUTION INTRAMUSCULAR; INTRAVENOUS; SUBCUTANEOUS at 15:05

## 2021-11-05 RX ADMIN — DEXAMETHASONE SODIUM PHOSPHATE 8 MG: 4 INJECTION, SOLUTION INTRAMUSCULAR; INTRAVENOUS at 13:56

## 2021-11-05 RX ADMIN — FENTANYL CITRATE 50 MCG: 50 INJECTION, SOLUTION INTRAMUSCULAR; INTRAVENOUS at 14:17

## 2021-11-05 RX ADMIN — FENTANYL CITRATE 50 MCG: 50 INJECTION, SOLUTION INTRAMUSCULAR; INTRAVENOUS at 14:05

## 2021-11-05 RX ADMIN — PHENYLEPHRINE HYDROCHLORIDE 100 MCG: 10 INJECTION INTRAVENOUS at 13:57

## 2021-11-05 RX ADMIN — LIDOCAINE HYDROCHLORIDE 50 MG: 20 INJECTION, SOLUTION INFILTRATION; PERINEURAL at 13:51
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
## 2021-11-05 NOTE — ANESTHESIA PREPROCEDURE EVALUATION
Anesthesia Evaluation     Patient summary reviewed and Nursing notes reviewed   no history of anesthetic complications:  NPO Solid Status: > 8 hours  NPO Liquid Status: > 2 hours           Airway   Mallampati: II  TM distance: >3 FB  Neck ROM: full  no difficulty expected  Dental - normal exam     Pulmonary - normal exam   (+) a smoker Former, asthma,  (-) COPD, lung cancer  Cardiovascular - normal exam  Exercise tolerance: good (4-7 METS)    Rhythm: regular  Rate: normal    (+) hypertension well controlled,   (-) valvular problems/murmurs, past MI, CAD, dysrhythmias, angina, CHF, orthopnea, cardiac stents, CABG, pericardial effusion      Neuro/Psych  (+) headaches, numbness, psychiatric history Anxiety, Depression and Bipolar,     (-) seizures, TIA, CVA  GI/Hepatic/Renal/Endo - negative ROS   (-) hiatal hernia, GERD, PUD, hepatitis, liver disease, no renal disease, diabetes, GI bleed, no thyroid disorder    Musculoskeletal     (+) chronic pain, myalgias, neck pain,   Abdominal  - normal exam   Substance History - negative use     OB/GYN negative ob/gyn ROS         Other   arthritis,                      Anesthesia Plan    ASA 2     general     intravenous induction     Anesthetic plan, all risks, benefits, and alternatives have been provided, discussed and informed consent has been obtained with: patient.

## 2021-11-05 NOTE — DISCHARGE INSTRUCTIONS
DISCHARGE INSTRUCTIONS FOR MINOR SURGERY  DR. ROSENBERG  [phone]      Post-Op Minor Surgery    1. If you had any sedation, expect to rest much of the day of surgery, but do get up several times to reduce the risk of getting a clot in your legs.     2. If you had any sedation, eat and drink lightly at first. For example: jello, ginger ale, chicken noodle soup, crackers and other bland food types on the day of surgery. Do not take pain pills on an empty stomach.     3. Your clear “tegaderm” dressing is water resistant but not water proof. You can take a shower with it in place but do not submerge in water for about one week.     4. The clear dressing can be left on for 2 days if desired and will keep the incision sterile. This also acts nicely as a bandage or safeguard of the area from a friction standpoint. If however the area under the clear dressing begins to itch or get red, you can remove it sooner.  It is not unusual for your dressing to be spotted dark brown the day after surgery and does not require changing.     5. Do not drive while taking pain medications.     6. No strenuous activity for 1 week.     7. In general, if you have a sedentary job, you can return to work in 1-4 days. If heavy lifting is required, 2 weeks.     8. It is not unusual to be constipated by the narcotics given during and after surgery. If needed, common over the counter laxatives can be used temporarily.     9. Call for your pathology report on the tissue removed one day next week. No follow up visit is needed unless there is a problem. If your incision becomes red, swollen, tender, or warm, please call the office at [phone] between 9AM-5PM.     Dina Rosenberg MD  General, Minimally Invasive and Endoscopic Surgery  [facility] [place]    [address] [facility] [address] [facility] [address]    [address]  [place] [address]               [place] [address]    P: [phone]  F: [phone]    Cc:  Robert Allen  MD Luis

## 2021-11-05 NOTE — INTERVAL H&P NOTE
"/70 (BP Location: Left arm, Patient Position: Lying)   Pulse 81   Temp 98.2 °F (36.8 °C) (Temporal)   Resp 16   Ht 152.4 cm (60\")   Wt 32.7 kg (72 lb)   LMP 04/30/2021   SpO2 99%   BMI 14.06 kg/m²     H&P reviewed. The patient was examined and there are no changes to the H&P.        "

## 2021-11-05 NOTE — INTERVAL H&P NOTE
"Ht 152.4 cm (60\")   Wt 33.6 kg (74 lb)   LMP 04/30/2021   BMI 14.45 kg/m²     H&P reviewed. The patient was examined and there are no changes to the H&P.        "

## 2021-11-05 NOTE — ANESTHESIA POSTPROCEDURE EVALUATION
"Patient: Bienvenido Carter    Procedure Summary     Date: 11/05/21 Room / Location: [facility] OR 03 / [facility]    Anesthesia Start: 1347 Anesthesia Stop: 1434    Procedure: WOUND EXPLORATION MIDLINEABDOMEN AND STITCH REMOVAL (N/A ) Diagnosis:       Infection involving stitch with abscess      (Infection involving stitch with abscess [T81.41XA])    Surgeons: Dina Ratliff MD Provider: Lior Thakur MD    Anesthesia Type: general ASA Status: 2          Anesthesia Type: general    Vitals  Vitals Value Taken Time   /79 11/05/21 1455   Temp 36.6 °C (97.8 °F) 11/05/21 1435   Pulse 87 11/05/21 1455   Resp 16 11/05/21 1455   SpO2 100 % 11/05/21 1455           Post Anesthesia Care and Evaluation    Patient location during evaluation: bedside  Patient participation: complete - patient participated  Level of consciousness: awake and alert  Pain score: 0  Pain management: adequate  Airway patency: patent  Anesthetic complications: No anesthetic complications    Cardiovascular status: acceptable  Respiratory status: acceptable  Hydration status: acceptable    Comments: /79   Pulse 87   Temp 36.6 °C (97.8 °F) (Infrared)   Resp 16   Ht 152.4 cm (60\")   Wt 32.7 kg (72 lb)   LMP 04/30/2021   SpO2 100%   BMI 14.06 kg/m²       "

## 2021-11-08 ENCOUNTER — LAB (OUTPATIENT)
Dept: LAB | Facility: HOSPITAL | Age: 41
End: 2021-11-08

## 2021-11-08 DIAGNOSIS — M79.2 NEUROPATHIC PAIN: ICD-10-CM

## 2021-11-08 LAB
ERYTHROCYTE [SEDIMENTATION RATE] IN BLOOD: 9 MM/HR (ref 0–20)
FOLATE SERPL-MCNC: 15.4 NG/ML (ref 4.78–24.2)
HBA1C MFR BLD: 5.63 % (ref 4.8–5.6)
RPR SER QL: NORMAL
T4 FREE SERPL-MCNC: 1.27 NG/DL (ref 0.93–1.7)
TSH SERPL DL<=0.05 MIU/L-ACNC: 6.97 UIU/ML (ref 0.27–4.2)
VIT B12 BLD-MCNC: 844 PG/ML (ref 211–946)

## 2021-11-08 PROCEDURE — 83825 ASSAY OF MERCURY: CPT | Performed by: PSYCHIATRY & NEUROLOGY

## 2021-11-08 PROCEDURE — 36415 COLL VENOUS BLD VENIPUNCTURE: CPT | Performed by: PSYCHIATRY & NEUROLOGY

## 2021-11-08 PROCEDURE — 82595 ASSAY OF CRYOGLOBULIN: CPT

## 2021-11-08 PROCEDURE — 84155 ASSAY OF PROTEIN SERUM: CPT | Performed by: PSYCHIATRY & NEUROLOGY

## 2021-11-08 PROCEDURE — 82525 ASSAY OF COPPER: CPT

## 2021-11-08 PROCEDURE — 82746 ASSAY OF FOLIC ACID SERUM: CPT | Performed by: PSYCHIATRY & NEUROLOGY

## 2021-11-08 PROCEDURE — 85652 RBC SED RATE AUTOMATED: CPT | Performed by: PSYCHIATRY & NEUROLOGY

## 2021-11-08 PROCEDURE — 84443 ASSAY THYROID STIM HORMONE: CPT | Performed by: PSYCHIATRY & NEUROLOGY

## 2021-11-08 PROCEDURE — 82175 ASSAY OF ARSENIC: CPT | Performed by: PSYCHIATRY & NEUROLOGY

## 2021-11-08 PROCEDURE — 84165 PROTEIN E-PHORESIS SERUM: CPT | Performed by: PSYCHIATRY & NEUROLOGY

## 2021-11-08 PROCEDURE — 82607 VITAMIN B-12: CPT | Performed by: PSYCHIATRY & NEUROLOGY

## 2021-11-08 PROCEDURE — 83655 ASSAY OF LEAD: CPT | Performed by: PSYCHIATRY & NEUROLOGY

## 2021-11-08 PROCEDURE — 86592 SYPHILIS TEST NON-TREP QUAL: CPT | Performed by: PSYCHIATRY & NEUROLOGY

## 2021-11-08 PROCEDURE — 83036 HEMOGLOBIN GLYCOSYLATED A1C: CPT | Performed by: PSYCHIATRY & NEUROLOGY

## 2021-11-08 PROCEDURE — 84439 ASSAY OF FREE THYROXINE: CPT | Performed by: PSYCHIATRY & NEUROLOGY

## 2021-11-08 PROCEDURE — 82784 ASSAY IGA/IGD/IGG/IGM EACH: CPT

## 2021-11-08 PROCEDURE — 86334 IMMUNOFIX E-PHORESIS SERUM: CPT

## 2021-11-09 LAB
ALBUMIN SERPL ELPH-MCNC: 3.4 G/DL (ref 2.9–4.4)
ALBUMIN/GLOB SERPL: 1.2 {RATIO} (ref 0.7–1.7)
ALPHA1 GLOB SERPL ELPH-MCNC: 0.2 G/DL (ref 0–0.4)
ALPHA2 GLOB SERPL ELPH-MCNC: 0.7 G/DL (ref 0.4–1)
B-GLOBULIN SERPL ELPH-MCNC: 0.9 G/DL (ref 0.7–1.3)
GAMMA GLOB SERPL ELPH-MCNC: 0.9 G/DL (ref 0.4–1.8)
GLOBULIN SER CALC-MCNC: 2.8 G/DL (ref 2.2–3.9)
IGA SERPL-MCNC: 225 MG/DL (ref 87–352)
IGG SERPL-MCNC: 907 MG/DL (ref 586–1602)
IGM SERPL-MCNC: 78 MG/DL (ref 26–217)
LABORATORY COMMENT REPORT: NORMAL
M PROTEIN SERPL ELPH-MCNC: NORMAL G/DL
PROT PATTERN SERPL ELPH-IMP: NORMAL
PROT PATTERN SERPL IFE-IMP: NORMAL
PROT SERPL-MCNC: 6.2 G/DL (ref 6–8.5)

## 2021-11-10 ENCOUNTER — OFFICE VISIT (OUTPATIENT)
Dept: SURGERY | Facility: CLINIC | Age: 41
End: 2021-11-10

## 2021-11-10 VITALS
HEIGHT: 60 IN | SYSTOLIC BLOOD PRESSURE: 122 MMHG | DIASTOLIC BLOOD PRESSURE: 74 MMHG | BODY MASS INDEX: 15.47 KG/M2 | OXYGEN SATURATION: 96 % | WEIGHT: 78.8 LBS | RESPIRATION RATE: 18 BRPM | HEART RATE: 91 BPM | TEMPERATURE: 97.2 F

## 2021-11-10 DIAGNOSIS — Z09 FOLLOW UP: Primary | ICD-10-CM

## 2021-11-10 LAB
ARSENIC BLD-MCNC: 2 UG/L (ref 2–23)
LEAD BLDV-MCNC: 1 UG/DL (ref 0–4)
MERCURY BLD-MCNC: <1 UG/L (ref 0–14.9)

## 2021-11-10 PROCEDURE — 99024 POSTOP FOLLOW-UP VISIT: CPT | Performed by: SURGERY

## 2021-11-10 RX ORDER — CLINDAMYCIN HYDROCHLORIDE 300 MG/1
300 CAPSULE ORAL 3 TIMES DAILY
Qty: 30 CAPSULE | Refills: 0 | Status: SHIPPED | OUTPATIENT
Start: 2021-11-10

## 2021-11-10 RX ORDER — SUMATRIPTAN 50 MG/1
50 TABLET, FILM COATED ORAL
COMMUNITY
Start: 2021-11-09 | End: 2022-11-09

## 2021-11-10 NOTE — PROGRESS NOTES
"Chief complaint:    Chief Complaint   Patient presents with   • Abdominal Pain     below insition       History of Present Illness    This is Bienvenido Carter 41 y.o. status post wound exploration with removal of stitches.  Patient is having some drainage was concerned.  Patient denies fever, chills, nausea or vomiting.  Patient's pain feels like needles and pins stabbing sensation.      The following portions of the patient's history were reviewed and updated as appropriate: allergies, current medications, past family history, past medical history, past social history, past surgical history and problem list.    Vitals:    11/10/21 1003   Resp: 18   Weight: 35.7 kg (78 lb 12.8 oz)   Height: 152.4 cm (60\")       Physical Exam  Gen.: Patient is alert and oriented ×3, no acute distress  HEENT: Normal cephalic, atraumatic, moist mucous membranes, anicteric  Incision is intact with nylon, the inferior part of the wound has a tiny opening of the wound edges that is draining serosanguineous fluid    Assessment/plan:    S/P wound exploration with removal of stitches  I will place the patient on antibiotics  I have instructed the patient not lift greater than 10 pounds for total of 6 weeks from the time of surgery.   I have instructed the patient follow-up as needed.    Dina Ratliff MD  General, Minimally Invasive and Endoscopic Surgery  [facility]    [facility] [address] [place] [address]    [address]  [place] [address]               [place] [address]    P: [phone]  F: [phone]    Cc:  Robert Allen MD  "

## 2021-11-11 ENCOUNTER — OFFICE VISIT (OUTPATIENT)
Dept: NEUROLOGY | Facility: CLINIC | Age: 41
End: 2021-11-11

## 2021-11-11 VITALS
SYSTOLIC BLOOD PRESSURE: 102 MMHG | HEART RATE: 84 BPM | BODY MASS INDEX: 15.31 KG/M2 | DIASTOLIC BLOOD PRESSURE: 60 MMHG | OXYGEN SATURATION: 96 % | HEIGHT: 60 IN | WEIGHT: 78 LBS

## 2021-11-11 DIAGNOSIS — R79.89 ELEVATED TSH: ICD-10-CM

## 2021-11-11 DIAGNOSIS — G56.21 ULNAR NEUROPATHY AT ELBOW OF RIGHT UPPER EXTREMITY: ICD-10-CM

## 2021-11-11 DIAGNOSIS — G56.10 MEDIAN NERVE NEUROPATHY, UNSPECIFIED LATERALITY: Primary | ICD-10-CM

## 2021-11-11 LAB — COPPER SERPL-MCNC: 104 UG/DL (ref 80–158)

## 2021-11-11 PROCEDURE — 99214 OFFICE O/P EST MOD 30 MIN: CPT | Performed by: PHYSICIAN ASSISTANT
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
## 2021-11-12 LAB — CRYOGLOB SER QL 1D COLD INC: NORMAL

## 2021-11-16 ENCOUNTER — OFFICE VISIT (OUTPATIENT)
Dept: SURGERY | Facility: CLINIC | Age: 41
End: 2021-11-16

## 2021-11-16 VITALS
TEMPERATURE: 97.5 F | DIASTOLIC BLOOD PRESSURE: 68 MMHG | RESPIRATION RATE: 18 BRPM | OXYGEN SATURATION: 96 % | BODY MASS INDEX: 15.51 KG/M2 | HEIGHT: 60 IN | HEART RATE: 94 BPM | SYSTOLIC BLOOD PRESSURE: 102 MMHG | WEIGHT: 79 LBS

## 2021-11-16 DIAGNOSIS — Z09 FOLLOW UP: Primary | ICD-10-CM

## 2021-11-16 PROCEDURE — 99212 OFFICE O/P EST SF 10 MIN: CPT | Performed by: SURGERY

## 2021-11-16 NOTE — PROGRESS NOTES
"Chief complaint:    Chief Complaint   Patient presents with   • Post-op     brown color coming out of wound    • Wound Check       History of Present Illness    This is Bienvenido Carter 41 y.o. status post scar exploration with suture removal.  Patient reports that she is having a small amount of brown drainage from the incision.   Patient denies fever, chills, nausea or vomiting.  Patient's pain is well-controlled.      The following portions of the patient's history were reviewed and updated as appropriate: allergies, current medications, past family history, past medical history, past social history, past surgical history and problem list.    Vitals:    11/16/21 1002   Resp: 18   Weight: 35.8 kg (79 lb)   Height: 152.4 cm (60\")       Physical Exam  Gen.: Patient is alert and oriented ×3, no acute distress  HEENT: Normal cephalic, atraumatic, moist mucous membranes, anicteric  Incision appears to have some gapping between the sutures, no erythema    Assessment/plan:    S/P scar exploration with suture removal  I have removed his sutures today in the office and applied bacitracin    I have instructed the patient follow-up in 2 weeks.    Dina Ratliff MD  General, Minimally Invasive and Endoscopic Surgery  [facility]    [address] [facility] [address] [place] [address] [address]    [address]  [place] [id number]               [place] [address]    P: [phone]  F: [phone]    Cc:  Robert Allen MD  "

## 2021-11-24 DIAGNOSIS — T81.41XA INFECTION INVOLVING STITCH WITH ABSCESS: Primary | ICD-10-CM

## 2021-11-30 ENCOUNTER — OFFICE VISIT (OUTPATIENT)
Dept: SURGERY | Facility: CLINIC | Age: 41
End: 2021-11-30

## 2021-11-30 VITALS
HEIGHT: 60 IN | RESPIRATION RATE: 16 BRPM | SYSTOLIC BLOOD PRESSURE: 98 MMHG | WEIGHT: 78.4 LBS | DIASTOLIC BLOOD PRESSURE: 68 MMHG | HEART RATE: 89 BPM | BODY MASS INDEX: 15.39 KG/M2 | OXYGEN SATURATION: 98 % | TEMPERATURE: 98.4 F

## 2021-11-30 DIAGNOSIS — Z09 FOLLOW UP: Primary | ICD-10-CM

## 2021-11-30 PROCEDURE — 99024 POSTOP FOLLOW-UP VISIT: CPT | Performed by: SURGERY

## 2021-12-02 DIAGNOSIS — T81.41XA INFECTION INVOLVING STITCH WITH ABSCESS: Primary | ICD-10-CM

## 2021-12-10 ENCOUNTER — OFFICE VISIT (OUTPATIENT)
Dept: WOUND CARE | Facility: HOSPITAL | Age: 41
End: 2021-12-10

## 2021-12-10 PROCEDURE — G0463 HOSPITAL OUTPT CLINIC VISIT: HCPCS

## 2021-12-10 PROCEDURE — 97602 WOUND(S) CARE NON-SELECTIVE: CPT

## 2021-12-17 ENCOUNTER — OFFICE VISIT (OUTPATIENT)
Dept: WOUND CARE | Facility: HOSPITAL | Age: 41
End: 2021-12-17

## 2021-12-17 PROCEDURE — G0463 HOSPITAL OUTPT CLINIC VISIT: HCPCS

## 2021-12-21 ENCOUNTER — OFFICE VISIT (OUTPATIENT)
Dept: SURGERY | Facility: CLINIC | Age: 41
End: 2021-12-21

## 2021-12-21 VITALS
OXYGEN SATURATION: 98 % | BODY MASS INDEX: 15.76 KG/M2 | HEIGHT: 60 IN | TEMPERATURE: 97.4 F | SYSTOLIC BLOOD PRESSURE: 100 MMHG | DIASTOLIC BLOOD PRESSURE: 68 MMHG | RESPIRATION RATE: 18 BRPM | HEART RATE: 78 BPM | WEIGHT: 80.3 LBS

## 2021-12-21 DIAGNOSIS — Z09 FOLLOW UP: Primary | ICD-10-CM

## 2021-12-21 PROCEDURE — 99024 POSTOP FOLLOW-UP VISIT: CPT | Performed by: SURGERY

## 2021-12-21 RX ORDER — AMOXICILLIN 250 MG
CAPSULE ORAL
COMMUNITY
Start: 2021-12-02

## 2021-12-21 NOTE — PROGRESS NOTES
"Chief complaint:    Chief Complaint   Patient presents with   • Post-op     INFECTION INVOLVING WOUND STITCH W/ ABSCESS       History of Present Illness    This is Bienvenido Carter 41 y.o. status post removal of stitches.  Patient reports wound is no longer draining.  Patient denies fever, chills, nausea or vomiting.  Patient's pain is well-controlled.      The following portions of the patient's history were reviewed and updated as appropriate: allergies, current medications, past family history, past medical history, past social history, past surgical history and problem list.    Vitals:    12/21/21 1155   BP: 100/68   BP Location: Left arm   Patient Position: Sitting   Cuff Size: Pediatric   Pulse: 78   Resp: 18   Temp: 97.4 °F (36.3 °C)   TempSrc: Temporal   SpO2: 98%   Weight: 36.4 kg (80 lb 4.8 oz)   Height: 152.4 cm (60\")       Physical Exam  Gen.: Patient is alert and oriented ×3, no acute distress  HEENT: Normal cephalic, atraumatic, moist mucous membranes, anicteric  Incision is well-healed without evidence of infection.    Assessment/plan:    S/P removal of stitches  I have instructed the patient not lift greater than 10 pounds for total of 6 weeks from the time of surgery.   I have instructed the patient follow-up as needed.    Dina Ratliff MD  General, Minimally Invasive and Endoscopic Surgery  [facility]    [address] [facility] [address] [place] [address] [address]    [address]  [address] [address]    P: [phone]  F: [phone]    Cc:  Robert Allen MD  "

## 2021-12-29 ENCOUNTER — TELEPHONE (OUTPATIENT)
Dept: SURGERY | Facility: CLINIC | Age: 41
End: 2021-12-29

## 2021-12-29 ENCOUNTER — OFFICE VISIT (OUTPATIENT)
Dept: WOUND CARE | Facility: HOSPITAL | Age: 41
End: 2021-12-29

## 2021-12-29 PROCEDURE — G0463 HOSPITAL OUTPT CLINIC VISIT: HCPCS

## 2021-12-29 NOTE — TELEPHONE ENCOUNTER
Spoke to pt and made sure that it wasn't an emergency and let her know Dr Ratliff is out of the office but will be taking calls on Monday and that I will go over everything then. Pt assured me nothing was an emergency and that she could wait until Monday

## 2022-01-03 DIAGNOSIS — M62.81 MUSCLE WEAKNESS (GENERALIZED): Primary | ICD-10-CM

## 2022-01-03 NOTE — TELEPHONE ENCOUNTER
Spoke to pt and she sees a neurologist already and will follow up for them and put in order for PT. Pt understood everything that was discussed

## 2022-01-03 NOTE — TELEPHONE ENCOUNTER
Discussed with Dr Ratliff and she stated PT is fine and also she will need to see neurology for the rest of her problems. Left vm to discuss this with pt

## 2022-01-13 ENCOUNTER — TELEPHONE (OUTPATIENT)
Dept: NEUROLOGY | Facility: CLINIC | Age: 42
End: 2022-01-13

## 2022-01-13 NOTE — TELEPHONE ENCOUNTER
Caller: Christopher Cartershell SAMSON    Relationship: Self    Best call back number: [phone]    What was the call regarding: PT CALLED STATING SHE BELIEVES HER INSURANCE IS REQUIRING AN UPDATED PA FOR LYRICA MEDICATION.    PT STATES SHE HAS BEEN OUT OF THE LYRICA MEDICATION FOR NEARLY A WEEK. PT STATES THAT BECAUSE OF THIS SHE HAS NOT SLEPT MUCH IN THE LAST WEEK DUE TO HER RESTLESS LEG SYNDROME PAIN.    PT WOULD LIKE TO DISCUSS IN FURTHER DETAIL WITH DR. SCHAFFER PAINS THAT SHE IS HAVING IN HER ABDOMEN WHEN SHE IS URINATING/DEFACATING. PT STATES SHE HAS SPOKE WITH HER SURGEON, DR. ROSENBERG, REGARDING THESE CONCERNS BUT WAS ADVISED THAT SHE SHOULD CONSULT FURTHER WITH DR. SCHAFFER. I DID TRY WARM-TRANSFERRING CALL TO THE OFFICE FOR FURTHER EVAL, HOWEVER, OFFICE HAD ALREADY CLOSED. I ADVISE PT THAT IF THESE CONCERNS WERE TO WORSEN BEFORE OFFICE WAS ABLE TO F/U, SHE SHOULD GO TO THE NEAREST EMERGENCY ROOM FOR FURTHER EVAL. PT VERBALIZED UNDERSTANDING.    Do you require a callback: YES, PLEASE.    PLEASE REVIEW AND ADVISE.

## 2022-01-14 ENCOUNTER — OFFICE VISIT (OUTPATIENT)
Dept: WOUND CARE | Facility: HOSPITAL | Age: 42
End: 2022-01-14

## 2022-01-14 PROCEDURE — G0463 HOSPITAL OUTPT CLINIC VISIT: HCPCS
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
## 2022-01-14 NOTE — TELEPHONE ENCOUNTER
PA was completed waiting on response from insurance I have called patient lvm to make her aware. Previous message patient is requesting a callback regarding the pain she is having.
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
## 2022-01-19 ENCOUNTER — OFFICE VISIT (OUTPATIENT)
Dept: NEUROSURGERY | Facility: CLINIC | Age: 42
End: 2022-01-19

## 2022-01-19 ENCOUNTER — TELEPHONE (OUTPATIENT)
Dept: SURGERY | Facility: CLINIC | Age: 42
End: 2022-01-19

## 2022-01-19 VITALS
WEIGHT: 81.6 LBS | SYSTOLIC BLOOD PRESSURE: 100 MMHG | TEMPERATURE: 97.9 F | BODY MASS INDEX: 16.02 KG/M2 | DIASTOLIC BLOOD PRESSURE: 60 MMHG | HEIGHT: 60 IN | HEART RATE: 98 BPM | OXYGEN SATURATION: 99 %

## 2022-01-19 DIAGNOSIS — M54.2 CHRONIC NECK PAIN: Primary | ICD-10-CM

## 2022-01-19 DIAGNOSIS — Z98.1 HISTORY OF SPINAL FUSION: ICD-10-CM

## 2022-01-19 DIAGNOSIS — G89.29 CHRONIC NECK PAIN: Primary | ICD-10-CM

## 2022-01-19 DIAGNOSIS — M51.36 DDD (DEGENERATIVE DISC DISEASE), LUMBAR: ICD-10-CM

## 2022-01-19 PROBLEM — M51.369 DDD (DEGENERATIVE DISC DISEASE), LUMBAR: Status: ACTIVE | Noted: 2022-01-19

## 2022-01-19 PROCEDURE — 99214 OFFICE O/P EST MOD 30 MIN: CPT | Performed by: NEUROLOGICAL SURGERY
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
## 2022-01-25 ENCOUNTER — TREATMENT (OUTPATIENT)
Dept: PHYSICAL THERAPY | Facility: CLINIC | Age: 42
End: 2022-01-25

## 2022-01-25 DIAGNOSIS — M62.81 MUSCLE WEAKNESS (GENERALIZED): ICD-10-CM

## 2022-01-25 DIAGNOSIS — M54.2 CHRONIC NECK PAIN: ICD-10-CM

## 2022-01-25 DIAGNOSIS — M79.7 FIBROMYALGIA: ICD-10-CM

## 2022-01-25 DIAGNOSIS — M54.50 CHRONIC BILATERAL LOW BACK PAIN WITHOUT SCIATICA: ICD-10-CM

## 2022-01-25 DIAGNOSIS — R29.898 LEG WEAKNESS, BILATERAL: Primary | ICD-10-CM

## 2022-01-25 DIAGNOSIS — Z98.890 H/O ABDOMINAL SURGERY: ICD-10-CM

## 2022-01-25 DIAGNOSIS — G89.29 CHRONIC NECK PAIN: ICD-10-CM

## 2022-01-25 DIAGNOSIS — G89.29 CHRONIC BILATERAL LOW BACK PAIN WITHOUT SCIATICA: ICD-10-CM

## 2022-01-25 DIAGNOSIS — Z98.1 HISTORY OF FUSION OF CERVICAL SPINE: ICD-10-CM

## 2022-01-25 PROCEDURE — 97530 THERAPEUTIC ACTIVITIES: CPT | Performed by: PHYSICAL THERAPIST

## 2022-01-25 PROCEDURE — 97162 PT EVAL MOD COMPLEX 30 MIN: CPT | Performed by: PHYSICAL THERAPIST
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
## 2022-02-01 ENCOUNTER — OFFICE VISIT (OUTPATIENT)
Dept: SURGERY | Facility: CLINIC | Age: 42
End: 2022-02-01

## 2022-02-01 ENCOUNTER — TREATMENT (OUTPATIENT)
Dept: PHYSICAL THERAPY | Facility: CLINIC | Age: 42
End: 2022-02-01

## 2022-02-01 VITALS
RESPIRATION RATE: 16 BRPM | OXYGEN SATURATION: 98 % | HEART RATE: 91 BPM | SYSTOLIC BLOOD PRESSURE: 102 MMHG | HEIGHT: 60 IN | DIASTOLIC BLOOD PRESSURE: 66 MMHG | WEIGHT: 81.6 LBS | BODY MASS INDEX: 16.02 KG/M2 | TEMPERATURE: 97.3 F

## 2022-02-01 DIAGNOSIS — G89.29 CHRONIC BILATERAL LOW BACK PAIN WITHOUT SCIATICA: ICD-10-CM

## 2022-02-01 DIAGNOSIS — M54.2 CHRONIC NECK PAIN: ICD-10-CM

## 2022-02-01 DIAGNOSIS — Z09 FOLLOW UP: Primary | ICD-10-CM

## 2022-02-01 DIAGNOSIS — M62.81 MUSCLE WEAKNESS (GENERALIZED): ICD-10-CM

## 2022-02-01 DIAGNOSIS — M79.7 FIBROMYALGIA: ICD-10-CM

## 2022-02-01 DIAGNOSIS — M54.50 CHRONIC BILATERAL LOW BACK PAIN WITHOUT SCIATICA: ICD-10-CM

## 2022-02-01 DIAGNOSIS — Z98.1 HISTORY OF FUSION OF CERVICAL SPINE: ICD-10-CM

## 2022-02-01 DIAGNOSIS — Z98.890 H/O ABDOMINAL SURGERY: ICD-10-CM

## 2022-02-01 DIAGNOSIS — G89.29 CHRONIC NECK PAIN: ICD-10-CM

## 2022-02-01 DIAGNOSIS — R29.898 LEG WEAKNESS, BILATERAL: Primary | ICD-10-CM

## 2022-02-01 PROCEDURE — 97110 THERAPEUTIC EXERCISES: CPT | Performed by: PHYSICAL THERAPIST

## 2022-02-01 PROCEDURE — 99212 OFFICE O/P EST SF 10 MIN: CPT | Performed by: SURGERY

## 2022-02-01 NOTE — PROGRESS NOTES
"Chief complaint:    Chief Complaint   Patient presents with   • Follow-up     possible stitch       History of Present Illness    This is Bienvenido Carter 41 y.o. she is complaining of incisional pain.  Patient has had some stitches removed that was poking through her previous laparotomy incision.  Patient denies fever, chills, nausea or vomiting.  Patient's pain is well-controlled.      The following portions of the patient's history were reviewed and updated as appropriate: allergies, current medications, past family history, past medical history, past social history, past surgical history and problem list.    Vitals:    02/01/22 0956   Weight: 37 kg (81 lb 9.6 oz)   Height: 152.4 cm (60\")       Physical Exam  Gen.: Patient is alert and oriented ×3, no acute distress  HEENT: Normal cephalic, atraumatic, moist mucous membranes, anicteric  I have palpated incisional hernia unable to feel any other sutures just dense scar tissue    Assessment/plan:    Incisional pain with dense scar tissue  I have recommended that the patient do circular massage of the scar tissue to soften it up.  I have also recommended vitamin E.  I have instructed the patient follow-up as needed.    Dina Ratliff MD  General, Minimally Invasive and Endoscopic Surgery  [facility]    [address]   [address]    [address]  [place], [place] [address] [place] [id number]    P: [phone]  F: [phone]    Cc:  Robert Allen MD  "

## 2022-02-01 NOTE — PROGRESS NOTES
Physical Therapy Daily Progress Note    Patient: Bienvenido Carter   : 1980  Diagnosis/ICD-10 Code:  Leg weakness, bilateral [R29.898]  Referring practitioner: Dina Ratliff MD  Date of Initial Visit: Type: THERAPY  Noted: 2022  Today's Date: 2022  Patient seen for 2 sessions           Subjective it's been a bad day. I am hurting all over    Objective     Exercise:  -hip add iso, x20 with 5 sec hold  -LTR 15x 5 sec hold  -supine B OH flexion, x10 with 5 sec hold  -diastasis recti correction with sheet carlos crossed over abdomen, 10x 5 sec hold  -SLR 2x10 each  -bridge x20  -hip abd/ER with yellow band x10  -B shoulder ER in supine, yellow x20  -hamstring stretch in 90/90 3x20 sec each    Assessment/Plan  Pt saw surgeon this am. She did another incision check and felt that there were no other sutures in there, just scar tissue. Doesn't have a follow up session. Working on progressing patient core and general strength.          Timed:    Manual Therapy:         mins  96180;  Therapeutic Exercise:    43     mins  45938;     Neuromuscular Namita:        mins  67983;    Therapeutic Activity:          mins  63121;     Gait Training:           mins  06090;     Ultrasound:          mins  58565;    Electrical Stimulation:         mins  93391 ( );  Iontophoresis         mins 89297;  Aquatic Therapy         mins 02492;  Dry Needling                   mins 80318/  (Self-pay)    Untimed:  Electrical Stimulation:         mins  73821 ( );  Traction:         mins  98770;     Timed Treatment:   43   mins   Total Treatment:     43   mins    Katie Medel, PT  Physical Therapist    [place] License:[id number]

## 2022-02-14 DIAGNOSIS — M79.7 FIBROMYALGIA: ICD-10-CM

## 2022-02-14 DIAGNOSIS — M79.2 NEUROPATHIC PAIN: ICD-10-CM

## 2022-02-14 DIAGNOSIS — G56.23 ULNAR NEUROPATHY OF BOTH UPPER EXTREMITIES: Primary | ICD-10-CM

## 2022-02-14 RX ORDER — PREGABALIN 100 MG/1
CAPSULE ORAL
Qty: 150 CAPSULE | Refills: 5 | Status: SHIPPED | OUTPATIENT
Start: 2022-02-14 | End: 2022-06-17 | Stop reason: SDUPTHER

## 2022-02-18 ENCOUNTER — TREATMENT (OUTPATIENT)
Dept: PHYSICAL THERAPY | Facility: CLINIC | Age: 42
End: 2022-02-18

## 2022-02-18 DIAGNOSIS — Z98.890 H/O ABDOMINAL SURGERY: ICD-10-CM

## 2022-02-18 DIAGNOSIS — Z98.1 HISTORY OF FUSION OF CERVICAL SPINE: ICD-10-CM

## 2022-02-18 DIAGNOSIS — G89.29 CHRONIC NECK PAIN: ICD-10-CM

## 2022-02-18 DIAGNOSIS — M62.81 MUSCLE WEAKNESS (GENERALIZED): ICD-10-CM

## 2022-02-18 DIAGNOSIS — M54.50 CHRONIC BILATERAL LOW BACK PAIN WITHOUT SCIATICA: ICD-10-CM

## 2022-02-18 DIAGNOSIS — G89.29 CHRONIC BILATERAL LOW BACK PAIN WITHOUT SCIATICA: ICD-10-CM

## 2022-02-18 DIAGNOSIS — M54.2 CHRONIC NECK PAIN: ICD-10-CM

## 2022-02-18 DIAGNOSIS — R29.898 LEG WEAKNESS, BILATERAL: Primary | ICD-10-CM

## 2022-02-18 DIAGNOSIS — M79.7 FIBROMYALGIA: ICD-10-CM

## 2022-02-18 PROCEDURE — 97110 THERAPEUTIC EXERCISES: CPT | Performed by: PHYSICAL THERAPIST

## 2022-02-18 NOTE — PROGRESS NOTES
Physical Therapy Daily Progress Note    Patient: Bienvenido Carter   : 1980  Diagnosis/ICD-10 Code:  Leg weakness, bilateral [R29.898]  Referring practitioner: Dina Ratliff MD  Date of Initial Visit: Type: THERAPY  Noted: 2022  Today's Date: 2022  Patient seen for 3 sessions           Subjective Pt fell a little over a week ago, tripped and landed on the L side hurting her shoulder. She was able to get in to see Dr. Randall, got xrays. Had an MRI yesterday at [place] and is awaiting the results.     Objective       Exercise:  -hip add iso, x20 with 5 sec hold  -LTR 15x 5 sec hold  -supine B OH flexion, x10 with 5 sec hold  -diastasis recti correction with sheet carlos crossed over abdomen, 10x 5 sec hold  (defer today due to L shoulder pain)  -SLR 2x10 each  -bridge x20  -hip abd/ER with yellow band x10  -B shoulder ER in supine, yellow x20  -hamstring stretch in 90/90 3x20 sec each    Assessment/Plan  Pt tripped last week and fell on her L side. She felt a pop in the L shoulder. She has been having difficulty moving the arm above 90 deg, driving, and dressing/sleeping. Awaiting MRI results prior to moving forward with any UE strengthening          Timed:    Manual Therapy:         mins  38542;  Therapeutic Exercise:    40     mins  66029;     Neuromuscular Namita:        mins  39397;    Therapeutic Activity:          mins  30338;     Gait Training:           mins  23544;     Ultrasound:          mins  93507;    Electrical Stimulation:         mins  50760 ( );  Iontophoresis         mins 24984;  Aquatic Therapy         mins 25377;  Dry Needling                   mins 18901/  (Self-pay)    Untimed:  Electrical Stimulation:         mins  48335 ( );  Traction:         mins  57867;     Timed Treatment:   40   mins   Total Treatment:     40   mins    Katie Medel, PT  Physical Therapist    [place] License:[id number]

## 2022-02-21 ENCOUNTER — TREATMENT (OUTPATIENT)
Dept: PHYSICAL THERAPY | Facility: CLINIC | Age: 42
End: 2022-02-21

## 2022-02-21 DIAGNOSIS — M54.50 CHRONIC BILATERAL LOW BACK PAIN WITHOUT SCIATICA: ICD-10-CM

## 2022-02-21 DIAGNOSIS — R26.2 DIFFICULTY WALKING: ICD-10-CM

## 2022-02-21 DIAGNOSIS — M62.81 MUSCLE WEAKNESS (GENERALIZED): ICD-10-CM

## 2022-02-21 DIAGNOSIS — Z98.1 HISTORY OF FUSION OF CERVICAL SPINE: ICD-10-CM

## 2022-02-21 DIAGNOSIS — G89.29 CHRONIC NECK PAIN: ICD-10-CM

## 2022-02-21 DIAGNOSIS — M54.2 CHRONIC NECK PAIN: ICD-10-CM

## 2022-02-21 DIAGNOSIS — R29.898 LEG WEAKNESS, BILATERAL: Primary | ICD-10-CM

## 2022-02-21 DIAGNOSIS — G89.29 CHRONIC BILATERAL LOW BACK PAIN WITHOUT SCIATICA: ICD-10-CM

## 2022-02-21 DIAGNOSIS — Z98.890 H/O ABDOMINAL SURGERY: ICD-10-CM

## 2022-02-21 DIAGNOSIS — M79.7 FIBROMYALGIA: ICD-10-CM

## 2022-02-21 PROCEDURE — 97110 THERAPEUTIC EXERCISES: CPT | Performed by: PHYSICAL THERAPIST

## 2022-02-21 RX ORDER — ONDANSETRON 4 MG/1
TABLET, FILM COATED ORAL
Qty: 15 TABLET | Refills: 1 | Status: SHIPPED | OUTPATIENT
Start: 2022-02-21

## 2022-02-21 NOTE — PROGRESS NOTES
Physical Therapy Daily Progress Note    Patient: Bienvenido Carter   : 1980  Diagnosis/ICD-10 Code:  Leg weakness, bilateral [R29.898]  Referring practitioner: Dina Ratliff MD  Date of Initial Visit: Type: THERAPY  Noted: 2022  Today's Date: 2022  Patient seen for 4 sessions           Subjective Saw MD today about her MRI shoulder. It as negative, likely just soreness and inflammation from the fall. She got an injection in the L shoulder today. Also has an order for PT at home    Objective      Exercise:  -hip add iso, x20 with 5 sec hold  -LTR 15x 5 sec hold  -supine B OH flexion ( deg) with yellow band, x15   -SLR 2x10 each  -bridge x20  -hip abd/ER with yellow band x10  -B shoulder ER in supine, yellow x20  -hamstring stretch in 90/90 3x20 sec each    Assessment/Plan  Pt a little better after the session with her pain levels. She is having some L shoulder pain after a fall last week, but reports that she saw her ortho today, the MRI was negative, and she got an injection. She had mild to mod antalgia with decreased weight shift and stance time on the L LE. Advised to use her cane when her pain levels in her L thigh were increased         Timed:    Manual Therapy:         mins  71662;  Therapeutic Exercise:    43     mins  59933;     Neuromuscular Namita:        mins  88992;    Therapeutic Activity:          mins  54634;     Gait Training:           mins  79339;     Ultrasound:          mins  86827;    Electrical Stimulation:         mins  99897 ( );  Iontophoresis         mins 66753;  Aquatic Therapy         mins 13182;  Dry Needling                   mins 30110/  (Self-pay)    Untimed:  Electrical Stimulation:         mins  35013 ( );  Traction:         mins  56731;     Timed Treatment:   43   mins   Total Treatment:     43   mins    Katie Medel, PT  Physical Therapist    [place] License:[id number]

## 2022-02-25 ENCOUNTER — TREATMENT (OUTPATIENT)
Dept: PHYSICAL THERAPY | Facility: CLINIC | Age: 42
End: 2022-02-25

## 2022-02-25 DIAGNOSIS — Z98.890 H/O ABDOMINAL SURGERY: ICD-10-CM

## 2022-02-25 DIAGNOSIS — G89.29 CHRONIC NECK PAIN: ICD-10-CM

## 2022-02-25 DIAGNOSIS — M54.2 CHRONIC NECK PAIN: ICD-10-CM

## 2022-02-25 DIAGNOSIS — M62.81 MUSCLE WEAKNESS (GENERALIZED): ICD-10-CM

## 2022-02-25 DIAGNOSIS — R26.2 DIFFICULTY WALKING: ICD-10-CM

## 2022-02-25 DIAGNOSIS — R29.898 LEG WEAKNESS, BILATERAL: Primary | ICD-10-CM

## 2022-02-25 DIAGNOSIS — Z98.1 HISTORY OF FUSION OF CERVICAL SPINE: ICD-10-CM

## 2022-02-25 DIAGNOSIS — M54.50 CHRONIC BILATERAL LOW BACK PAIN WITHOUT SCIATICA: ICD-10-CM

## 2022-02-25 DIAGNOSIS — G89.29 CHRONIC BILATERAL LOW BACK PAIN WITHOUT SCIATICA: ICD-10-CM

## 2022-02-25 DIAGNOSIS — M79.7 FIBROMYALGIA: ICD-10-CM

## 2022-02-25 PROCEDURE — 97110 THERAPEUTIC EXERCISES: CPT | Performed by: PHYSICAL THERAPIST

## 2022-02-25 NOTE — PROGRESS NOTES
30-Day / 10-Visit Progress Note         Patient: Bienvenido Carter   : 1980  Diagnosis/ICD-10 Code:  Leg weakness, bilateral [R29.898]  Referring practitioner: Dina Ratliff MD  Date of Initial Visit: Type: THERAPY  Noted: 2022  Today's Date: 2022  Patient seen for 5 sessions      Subjective:     Clinical Progress: unchanged, slight improvement  Home Program Compliance: Yes  Treatment has included:  therapeutic exercise and patient education with home exercise program     Subjective Pain at best 4/10 and at most 10/10 (usually in the evening and during the night, waking up). She is having R carpal tunnel surgery on 2022 wit Dr. Rico. Dr. Miller increased her Lyrica mg. Taking one Lyrica, Advil, and Tylenol PM to help sleep.   Objective     Exercise:  -hip add iso, x20 with 5 sec hold  -LTR 15x 5 sec hold  -supine B OH flexion ( deg) with yellow band, x15   -SLR 2x10 each  -bridge x20  -hip abd/ER with yellow band x10  -B shoulder ER in supine, yellow x20  -hamstring stretch in 90/90 3x20 sec each       Assessment & Plan     Assessment    Assessment details: Bienvenido Carter has been seen for 5 physical therapy sessions for weakness and decreased tolerance to activity.  Treatment has included therapeutic exercise and patient education with home exercise program . Progress to physical therapy goals is slow. She is getting back into exercises, but had a recent trip and fall that caused more pain in her shoulder and L LE. She intermittently uses a SC for gait if her L LE pain is higher level. She has tenderness over the L thigh, hypersensitive with light touch. Exhibiting greater ease with bed mobility and transfers.  She will benefit from continued skilled physical therapy to address remaining impairments and functional limitations.     Prognosis: good    Goals  Plan Goals: will be independent with education for symptom management, joint protection and strategies to minimize  stress on affected tissues (ONGOING)   2. Pt to improve pain complaints to no more than 8/10 with ADLs (ONGOING)   3. Pt to lift 8lb in order to carry grocery bags from her car without increased pain (ONGOING) has pain with lifting    LT wks  1. Pt to improve pain complaints to no more than 5/10 with ADLs (ONGOING)   2. Pt to improve trunk and LE strength by 1/2 to 1 MMT grade (ONGOING)   3. Pt to improve Oswestry Back index score from 72% to 50% for overall functional improvement (ONGOING)   4. Pt to improve score on LEFS from 31.25% to 55% for greater ease negotiating the community (ONGOING)   5. Pt to be independent with HEP for ROM, flexibility and core strength (ONGOING)     Plan  Therapy options: will be seen for skilled therapy services  Frequency: 2x week  Duration in weeks: 8  Treatment plan discussed with: patient           Recommendations: Continue as planned  Timeframe: 2 months  Prognosis to achieve goals: good    PT Signature: Katie Medel PT, CDNT    License Number: [id number]    Electronically signed by Katie Medel PT, 22, 2:27 PM EST      Based upon review of the patient's progress and continued therapy plan, it is my medical opinion that Bienvenido Carter should continue physical therapy treatment at [facility]  [address] [place] [address] [address] [place] [address]  [phone].    Signature: __________________________________  Dian Ratliff MD    Timed:  Manual Therapy:         mins  38219;  Therapeutic Exercise:    43     mins  97802;     Neuromuscular Namita:        mins  13134;    Therapeutic Activity:          mins  53660;     Gait Training:           mins  41576;     Ultrasound:          mins  65428;    Iontophoresis         mins 16865;  Dry Needling                   mins 94386/ (Self-pay)    Untimed:  Electrical Stimulation:         mins  66929 ( );  Traction:         mins  11048;     Timed Treatment:   43   mins    Total Treatment:     43   mins

## 2022-03-01 ENCOUNTER — TELEPHONE (OUTPATIENT)
Dept: PHYSICAL THERAPY | Facility: CLINIC | Age: 42
End: 2022-03-01

## 2022-03-04 ENCOUNTER — TELEPHONE (OUTPATIENT)
Dept: NEUROSURGERY | Facility: CLINIC | Age: 42
End: 2022-03-04

## 2022-03-04 ENCOUNTER — TREATMENT (OUTPATIENT)
Dept: PHYSICAL THERAPY | Facility: CLINIC | Age: 42
End: 2022-03-04

## 2022-03-04 DIAGNOSIS — R29.898 LEG WEAKNESS, BILATERAL: Primary | ICD-10-CM

## 2022-03-04 DIAGNOSIS — M54.50 CHRONIC BILATERAL LOW BACK PAIN WITHOUT SCIATICA: ICD-10-CM

## 2022-03-04 DIAGNOSIS — M79.7 FIBROMYALGIA: ICD-10-CM

## 2022-03-04 DIAGNOSIS — Z98.890 H/O ABDOMINAL SURGERY: ICD-10-CM

## 2022-03-04 DIAGNOSIS — R26.2 DIFFICULTY WALKING: ICD-10-CM

## 2022-03-04 DIAGNOSIS — G89.29 CHRONIC NECK PAIN: ICD-10-CM

## 2022-03-04 DIAGNOSIS — G89.29 CHRONIC BILATERAL LOW BACK PAIN WITHOUT SCIATICA: ICD-10-CM

## 2022-03-04 DIAGNOSIS — M62.81 MUSCLE WEAKNESS (GENERALIZED): ICD-10-CM

## 2022-03-04 DIAGNOSIS — M48.02 SPINAL STENOSIS OF CERVICAL REGION: Primary | ICD-10-CM

## 2022-03-04 DIAGNOSIS — M54.2 CHRONIC NECK PAIN: ICD-10-CM

## 2022-03-04 DIAGNOSIS — Z98.1 HISTORY OF FUSION OF CERVICAL SPINE: ICD-10-CM

## 2022-03-04 PROCEDURE — 97140 MANUAL THERAPY 1/> REGIONS: CPT | Performed by: PHYSICAL THERAPIST

## 2022-03-04 PROCEDURE — 97110 THERAPEUTIC EXERCISES: CPT | Performed by: PHYSICAL THERAPIST

## 2022-03-04 RX ORDER — METHYLPREDNISOLONE 4 MG/1
TABLET ORAL
Qty: 1 EACH | Refills: 0 | Status: SHIPPED | OUTPATIENT
Start: 2022-03-04 | End: 2022-09-27 | Stop reason: DRUGHIGH

## 2022-03-04 NOTE — PROGRESS NOTES
Physical Therapy Daily Progress Note    Patient: Bienvenido Carter   : 1980  Diagnosis/ICD-10 Code:  Leg weakness, bilateral [R29.898]  Referring practitioner: Dina Ratliff MD  Date of Initial Visit: Type: THERAPY  Noted: 2022  Today's Date: 3/4/2022  Patient seen for 6 sessions           Subjective Pt woke up 3 days ago, was moving in bed and had a pop in her neck. She had a lot of pain, feels swollen    Objective     Exercise:  -hip add iso, x20 with 5 sec hold  -LTR 10x 5 sec hold  -SLR 2x10 each  -bridge x20  -hip abd/ER with yellow band x10  -B shoulder ER in supine, yellow x20  -hamstring stretch in 90/90 3x20 sec each     Manual:  STM to B UT and levator muscles with ischemic pressure to the R levator for trigger point reduction    Assessment/Plan  Pt with some acute pain, tenderness, and tightness of the R levator muscle. She was moving in bed a few days ago and felt a pop in the neck. MD is Rx a prednisilone dose pack for her to start taking. She did have reduced neck pain following her session         Timed:    Manual Therapy:    15     mins  28103;  Therapeutic Exercise:    25     mins  56745;     Neuromuscular Namita:        mins  26690;    Therapeutic Activity:          mins  20683;     Gait Training:           mins  62581;     Ultrasound:          mins  11025;    Electrical Stimulation:         mins  85041 ( );  Iontophoresis         mins 19246;  Aquatic Therapy         mins 35016;  Dry Needling                   mins 78997/  (Self-pay)    Untimed:  Electrical Stimulation:         mins  20369 ( );  Traction:         mins  53750;     Timed Treatment:   40   mins   Total Treatment:     40   mins    Katie Medel PT, CDNT  Physical Therapist    [place] License:[id number]

## 2022-03-04 NOTE — PROGRESS NOTES
MDP called in to [facility] [phone].  Cerv xray AP/LAT with Flex/ext added to chart.  Sent pt a msg through [facility].     Please schedule with Dr SANCHEZ when he gets back per Dr SANCHEZ.

## 2022-03-07 ENCOUNTER — HOSPITAL ENCOUNTER (OUTPATIENT)
Dept: GENERAL RADIOLOGY | Facility: HOSPITAL | Age: 42
Discharge: HOME | End: 2022-03-07
Admitting: NEUROLOGICAL SURGERY

## 2022-03-07 ENCOUNTER — TELEPHONE (OUTPATIENT)
Dept: PHYSICAL THERAPY | Facility: OTHER | Age: 42
End: 2022-03-07

## 2022-03-07 DIAGNOSIS — M48.02 SPINAL STENOSIS OF CERVICAL REGION: ICD-10-CM

## 2022-03-07 PROCEDURE — 72052 X-RAY EXAM NECK SPINE 6/>VWS: CPT

## 2022-03-11 ENCOUNTER — OFFICE VISIT (OUTPATIENT)
Dept: NEUROLOGY | Facility: CLINIC | Age: 42
End: 2022-03-11

## 2022-03-11 ENCOUNTER — TRANSCRIBE ORDERS (OUTPATIENT)
Dept: PHYSICAL THERAPY | Facility: CLINIC | Age: 42
End: 2022-03-11

## 2022-03-11 ENCOUNTER — TREATMENT (OUTPATIENT)
Dept: PHYSICAL THERAPY | Facility: CLINIC | Age: 42
End: 2022-03-11

## 2022-03-11 VITALS
HEART RATE: 95 BPM | WEIGHT: 85.2 LBS | SYSTOLIC BLOOD PRESSURE: 102 MMHG | DIASTOLIC BLOOD PRESSURE: 82 MMHG | OXYGEN SATURATION: 98 % | BODY MASS INDEX: 16.73 KG/M2 | HEIGHT: 60 IN

## 2022-03-11 DIAGNOSIS — R26.2 DIFFICULTY WALKING: ICD-10-CM

## 2022-03-11 DIAGNOSIS — M62.81 MUSCLE WEAKNESS (GENERALIZED): ICD-10-CM

## 2022-03-11 DIAGNOSIS — R29.898 LEG WEAKNESS, BILATERAL: Primary | ICD-10-CM

## 2022-03-11 DIAGNOSIS — M54.50 CHRONIC BILATERAL LOW BACK PAIN WITHOUT SCIATICA: ICD-10-CM

## 2022-03-11 DIAGNOSIS — Z78.9 DIFFICULTY NAVIGATING STAIRS: ICD-10-CM

## 2022-03-11 DIAGNOSIS — M25.512 BILATERAL SHOULDER PAIN, UNSPECIFIED CHRONICITY: Primary | ICD-10-CM

## 2022-03-11 DIAGNOSIS — Z98.1 HISTORY OF FUSION OF CERVICAL SPINE: ICD-10-CM

## 2022-03-11 DIAGNOSIS — M54.2 CHRONIC NECK PAIN: ICD-10-CM

## 2022-03-11 DIAGNOSIS — G89.29 CHRONIC NECK PAIN: ICD-10-CM

## 2022-03-11 DIAGNOSIS — G89.29 CHRONIC BILATERAL LOW BACK PAIN WITHOUT SCIATICA: ICD-10-CM

## 2022-03-11 DIAGNOSIS — Z98.890 H/O ABDOMINAL SURGERY: ICD-10-CM

## 2022-03-11 DIAGNOSIS — M54.16 LUMBAR RADICULOPATHY: Primary | ICD-10-CM

## 2022-03-11 DIAGNOSIS — M79.7 FIBROMYALGIA: ICD-10-CM

## 2022-03-11 DIAGNOSIS — M25.511 BILATERAL SHOULDER PAIN, UNSPECIFIED CHRONICITY: Primary | ICD-10-CM

## 2022-03-11 PROBLEM — M79.605 PAIN IN LEFT LEG: Status: ACTIVE | Noted: 2021-09-07

## 2022-03-11 PROBLEM — F90.0 ATTENTION DEFICIT HYPERACTIVITY DISORDER (ADHD), PREDOMINANTLY INATTENTIVE TYPE: Status: ACTIVE | Noted: 2017-05-17

## 2022-03-11 PROBLEM — F31.10 BIPOLAR AFFECTIVE DISORDER, CURRENT EPISODE MANIC: Status: ACTIVE | Noted: 2020-06-23

## 2022-03-11 PROBLEM — F60.3 BORDERLINE PERSONALITY DISORDER: Status: ACTIVE | Noted: 2017-07-19

## 2022-03-11 PROBLEM — R20.2 PARESTHESIA OF LEFT LOWER EXTREMITY: Status: ACTIVE | Noted: 2021-09-07

## 2022-03-11 PROCEDURE — 99214 OFFICE O/P EST MOD 30 MIN: CPT | Performed by: PSYCHIATRY & NEUROLOGY

## 2022-03-11 PROCEDURE — 97110 THERAPEUTIC EXERCISES: CPT | Performed by: PHYSICAL THERAPIST

## 2022-03-11 RX ORDER — QUETIAPINE FUMARATE 25 MG/1
TABLET, FILM COATED ORAL
COMMUNITY
Start: 2022-02-28 | End: 2022-08-02

## 2022-03-11 NOTE — PROGRESS NOTES
Physical Therapy Daily Progress Note    Patient: Bienvenido Carter   : 1980  Diagnosis/ICD-10 Code:  Leg weakness, bilateral [R29.898]  Referring practitioner: Dina Ratliff MD  Date of Initial Visit: Type: THERAPY  Noted: 2022  Today's Date: 3/11/2022  Patient seen for 7 sessions           Subjective I was playing with my niece and nephew over the weekend. A few days later, she was really struggling to walk, almost dragging the L LE    Objective       Exercise:  -hip add iso, x20 with 5 sec hold  -LTR 10x 5 sec hold  -SLR 2x10 each  -bridge x20  -hip abd/ER with yellow band x10  -B shoulder ER in supine, yellow x20  -scap ret and shoulder, yellow, x15  -2 inch step ups, x12 each  -hamstring stretch in 90/90 3x20 sec each     Assessment/Plan  Pt saw her neurologist today. Due to her issues she has been having with her LEs, he has ordered an MRI. She was playing with her niece and nephew over the weekend and didn't have any issues at the time, but a couple of days later she had such weakness in her L LE that she was dragging her leg around to try and walk. She is improved today. Her gait is moderately antalgic with a SC, decreased weight shift and stance time on the L LE         Timed:    Manual Therapy:         mins  65604;  Therapeutic Exercise:    43     mins  76253;     Neuromuscular Namita:        mins  06870;    Therapeutic Activity:          mins  21711;     Gait Training:           mins  20405;     Ultrasound:          mins  88523;    Electrical Stimulation:         mins  51817 ( );  Iontophoresis         mins 95478;  Aquatic Therapy         mins 37593;  Dry Needling                   mins /  (Self-pay)    Untimed:  Electrical Stimulation:         mins  43275 ( );  Traction:         mins  37452;     Timed Treatment:   43   mins   Total Treatment:     43   mins    Katie Medel PT, CDNT  Physical Therapist    [place] License:[id number]
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
## 2022-03-14 ENCOUNTER — TREATMENT (OUTPATIENT)
Dept: PHYSICAL THERAPY | Facility: CLINIC | Age: 42
End: 2022-03-14

## 2022-03-14 DIAGNOSIS — G89.29 CHRONIC NECK PAIN: ICD-10-CM

## 2022-03-14 DIAGNOSIS — R26.2 DIFFICULTY WALKING: ICD-10-CM

## 2022-03-14 DIAGNOSIS — Z78.9 DIFFICULTY NAVIGATING STAIRS: ICD-10-CM

## 2022-03-14 DIAGNOSIS — M79.7 FIBROMYALGIA: ICD-10-CM

## 2022-03-14 DIAGNOSIS — M54.2 CHRONIC NECK PAIN: ICD-10-CM

## 2022-03-14 DIAGNOSIS — M54.50 CHRONIC BILATERAL LOW BACK PAIN WITHOUT SCIATICA: ICD-10-CM

## 2022-03-14 DIAGNOSIS — R26.9 ABNORMAL GAIT: ICD-10-CM

## 2022-03-14 DIAGNOSIS — M62.81 MUSCLE WEAKNESS (GENERALIZED): ICD-10-CM

## 2022-03-14 DIAGNOSIS — R29.898 LEG WEAKNESS, BILATERAL: Primary | ICD-10-CM

## 2022-03-14 DIAGNOSIS — Z98.1 HISTORY OF FUSION OF CERVICAL SPINE: ICD-10-CM

## 2022-03-14 DIAGNOSIS — Z98.890 H/O ABDOMINAL SURGERY: ICD-10-CM

## 2022-03-14 DIAGNOSIS — G89.29 CHRONIC BILATERAL LOW BACK PAIN WITHOUT SCIATICA: ICD-10-CM

## 2022-03-14 PROCEDURE — 97110 THERAPEUTIC EXERCISES: CPT | Performed by: PHYSICAL THERAPIST
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
## 2022-03-15 ENCOUNTER — TELEPHONE (OUTPATIENT)
Dept: NEUROSURGERY | Facility: CLINIC | Age: 42
End: 2022-03-15

## 2022-03-18 ENCOUNTER — TREATMENT (OUTPATIENT)
Dept: PHYSICAL THERAPY | Facility: CLINIC | Age: 42
End: 2022-03-18

## 2022-03-18 DIAGNOSIS — Z78.9 DIFFICULTY NAVIGATING STAIRS: ICD-10-CM

## 2022-03-18 DIAGNOSIS — M62.81 MUSCLE WEAKNESS (GENERALIZED): ICD-10-CM

## 2022-03-18 DIAGNOSIS — M54.2 CHRONIC NECK PAIN: ICD-10-CM

## 2022-03-18 DIAGNOSIS — R29.898 LEG WEAKNESS, BILATERAL: Primary | ICD-10-CM

## 2022-03-18 DIAGNOSIS — Z98.1 HISTORY OF FUSION OF CERVICAL SPINE: ICD-10-CM

## 2022-03-18 DIAGNOSIS — G89.29 CHRONIC BILATERAL LOW BACK PAIN WITHOUT SCIATICA: ICD-10-CM

## 2022-03-18 DIAGNOSIS — Z98.890 H/O ABDOMINAL SURGERY: ICD-10-CM

## 2022-03-18 DIAGNOSIS — M54.50 CHRONIC BILATERAL LOW BACK PAIN WITHOUT SCIATICA: ICD-10-CM

## 2022-03-18 DIAGNOSIS — M79.7 FIBROMYALGIA: ICD-10-CM

## 2022-03-18 DIAGNOSIS — R26.2 DIFFICULTY WALKING: ICD-10-CM

## 2022-03-18 DIAGNOSIS — R26.9 ABNORMAL GAIT: ICD-10-CM

## 2022-03-18 DIAGNOSIS — G89.29 CHRONIC NECK PAIN: ICD-10-CM

## 2022-03-18 PROCEDURE — 97110 THERAPEUTIC EXERCISES: CPT | Performed by: PHYSICAL THERAPIST

## 2022-03-18 NOTE — PROGRESS NOTES
Physical Therapy Daily Progress Note    Patient: Bienvenido Carter   : 1980  Diagnosis/ICD-10 Code:  Leg weakness, bilateral [R29.898]  Referring practitioner: Dina Ratliff MD  Date of Initial Visit: Type: THERAPY  Noted: 2022  Today's Date: 3/18/2022  Patient seen for 9 sessions           Subjective I have been having issues this week with my sciatic nerve, has been very painful on the R especially. Was trying to get up from the chair last night and it gave out. Took some advil and it helped some    Objective      Exercise:  -hip add iso, x20 with 5 sec hold  -LTR 10x 5 sec hold  -SLR 2x10 each  -bridge x20  -hip abd/ER with red band 2x10  -B shoulder ER in supine, red x20  -scap ret and shoulder, yellow, x15  -piriformis and ITband 3x20 sec each  -hamstring stretch in 90/90 3x20 sec each     Assessment/Plan  Pt has been having some sciatic pain this week. Her gait with the SC has had more compensation, more antalgia, over the past couple of weeks. Suggested that she use her walker more on the days she has increased pain so her gait doesn't exacerbate the issue. Also given piriformis and ITband stretching.          Timed:    Manual Therapy:         mins  79978;  Therapeutic Exercise:    43     mins  12789;     Neuromuscular Namita:        mins  71498;    Therapeutic Activity:          mins  11876;     Gait Training:           mins  86312;     Ultrasound:          mins  70731;    Electrical Stimulation:         mins  97944 ( );  Iontophoresis         mins 86546;  Aquatic Therapy         mins 68746;  Dry Needling                   mins /  (Self-pay)    Untimed:  Electrical Stimulation:         mins  80588 ( );  Traction:         mins  37732;     Timed Treatment:   43   mins   Total Treatment:     43   mins    Katie Medel PT, CDNT  Physical Therapist    [place] License:[id number]

## 2022-03-21 ENCOUNTER — TREATMENT (OUTPATIENT)
Dept: PHYSICAL THERAPY | Facility: CLINIC | Age: 42
End: 2022-03-21

## 2022-03-21 DIAGNOSIS — Z98.890 H/O ABDOMINAL SURGERY: ICD-10-CM

## 2022-03-21 DIAGNOSIS — M62.81 MUSCLE WEAKNESS (GENERALIZED): ICD-10-CM

## 2022-03-21 DIAGNOSIS — Z98.1 HISTORY OF FUSION OF CERVICAL SPINE: ICD-10-CM

## 2022-03-21 DIAGNOSIS — M54.50 CHRONIC BILATERAL LOW BACK PAIN WITHOUT SCIATICA: ICD-10-CM

## 2022-03-21 DIAGNOSIS — G89.29 CHRONIC NECK PAIN: ICD-10-CM

## 2022-03-21 DIAGNOSIS — M54.2 CHRONIC NECK PAIN: ICD-10-CM

## 2022-03-21 DIAGNOSIS — G89.29 CHRONIC BILATERAL LOW BACK PAIN WITHOUT SCIATICA: ICD-10-CM

## 2022-03-21 DIAGNOSIS — R26.2 DIFFICULTY WALKING: ICD-10-CM

## 2022-03-21 DIAGNOSIS — R29.898 LEG WEAKNESS, BILATERAL: Primary | ICD-10-CM

## 2022-03-21 DIAGNOSIS — R26.9 ABNORMAL GAIT: ICD-10-CM

## 2022-03-21 DIAGNOSIS — Z78.9 DIFFICULTY NAVIGATING STAIRS: ICD-10-CM

## 2022-03-21 DIAGNOSIS — M79.7 FIBROMYALGIA: ICD-10-CM

## 2022-03-21 PROCEDURE — 97110 THERAPEUTIC EXERCISES: CPT | Performed by: PHYSICAL THERAPIST

## 2022-03-21 NOTE — PROGRESS NOTES
Physical Therapy Daily Progress Note    Patient: Bienvenido Carter   : 1980  Diagnosis/ICD-10 Code:  Leg weakness, bilateral [R29.898]  Referring practitioner: Dina Ratliff MD  Date of Initial Visit: Type: THERAPY  Noted: 2022  Today's Date: 3/21/2022  Patient seen for 10 sessions           Subjective doing ok today. Did get to see my niece and nephew this weekend.     Objective     Exercise:  -hip add iso, x20 with 5 sec hold  -LTR 10x 5 sec hold  -SLR 2x10 each  -bridge x20  -hip abd/ER with red band 2x10 B, and x10 single leg  -B shoulder ER in supine, red x20  -[place] hip abd, 20lb x20  -kieser single leg press, R=65lb x20 and L=55lb x20  -2 inch step up, x10 each  -piriformis and ITband 3x20 sec each  -hamstring stretch in 90/90 3x20 sec each     Assessment/Plan  Gait is mild to moderately antalgic with her SC. She continues with pain and weakness in the L LE, stretching/burning pain in the scar tissue of the L thigh with certain movements. She cannot tolerate pressure around the scar tissue. Continuing to work on her core and general strengthening         Timed:    Manual Therapy:         mins  03773;  Therapeutic Exercise:    43     mins  48581;     Neuromuscular Namita:        mins  01678;    Therapeutic Activity:          mins  13948;     Gait Training:           mins  04868;     Ultrasound:          mins  22127;    Electrical Stimulation:         mins  28630 ( );  Iontophoresis         mins 60477;  Aquatic Therapy         mins 94932;  Dry Needling                   mins /  (Self-pay)    Untimed:  Electrical Stimulation:         mins  40343 ( );  Traction:         mins  28443;     Timed Treatment:   43   mins   Total Treatment:     43   mins    Katie Medel PT, CDNT  Physical Therapist    [place] License:[id number]

## 2022-03-23 ENCOUNTER — TREATMENT (OUTPATIENT)
Dept: PHYSICAL THERAPY | Facility: CLINIC | Age: 42
End: 2022-03-23

## 2022-03-23 DIAGNOSIS — Z98.1 HISTORY OF FUSION OF CERVICAL SPINE: ICD-10-CM

## 2022-03-23 DIAGNOSIS — Z98.890 H/O ABDOMINAL SURGERY: ICD-10-CM

## 2022-03-23 DIAGNOSIS — G89.29 CHRONIC NECK PAIN: ICD-10-CM

## 2022-03-23 DIAGNOSIS — M54.2 CHRONIC NECK PAIN: ICD-10-CM

## 2022-03-23 DIAGNOSIS — R26.2 DIFFICULTY WALKING: ICD-10-CM

## 2022-03-23 DIAGNOSIS — G89.29 CHRONIC BILATERAL LOW BACK PAIN WITHOUT SCIATICA: ICD-10-CM

## 2022-03-23 DIAGNOSIS — R26.9 ABNORMAL GAIT: ICD-10-CM

## 2022-03-23 DIAGNOSIS — M79.7 FIBROMYALGIA: ICD-10-CM

## 2022-03-23 DIAGNOSIS — M54.50 CHRONIC BILATERAL LOW BACK PAIN WITHOUT SCIATICA: ICD-10-CM

## 2022-03-23 DIAGNOSIS — M62.81 MUSCLE WEAKNESS (GENERALIZED): ICD-10-CM

## 2022-03-23 DIAGNOSIS — Z78.9 DIFFICULTY NAVIGATING STAIRS: ICD-10-CM

## 2022-03-23 DIAGNOSIS — R29.898 LEG WEAKNESS, BILATERAL: Primary | ICD-10-CM

## 2022-03-23 PROCEDURE — 97110 THERAPEUTIC EXERCISES: CPT | Performed by: PHYSICAL THERAPIST

## 2022-03-24 ENCOUNTER — HOSPITAL ENCOUNTER (OUTPATIENT)
Dept: MRI IMAGING | Facility: HOSPITAL | Age: 42
Discharge: HOME | End: 2022-03-24
Admitting: PSYCHIATRY & NEUROLOGY

## 2022-03-24 DIAGNOSIS — M54.16 LUMBAR RADICULOPATHY: ICD-10-CM

## 2022-03-24 PROCEDURE — 72148 MRI LUMBAR SPINE W/O DYE: CPT

## 2022-03-30 ENCOUNTER — OFFICE VISIT (OUTPATIENT)
Dept: SURGERY | Facility: CLINIC | Age: 42
End: 2022-03-30

## 2022-03-30 VITALS
HEART RATE: 83 BPM | HEIGHT: 60 IN | SYSTOLIC BLOOD PRESSURE: 104 MMHG | BODY MASS INDEX: 17.08 KG/M2 | OXYGEN SATURATION: 98 % | TEMPERATURE: 97.6 F | RESPIRATION RATE: 18 BRPM | DIASTOLIC BLOOD PRESSURE: 68 MMHG | WEIGHT: 87 LBS

## 2022-03-30 DIAGNOSIS — R93.5 ABNORMAL CT SCAN, PELVIS: Primary | ICD-10-CM

## 2022-03-30 PROBLEM — H66.90 INFECTION OF EAR: Status: ACTIVE | Noted: 2022-03-30

## 2022-03-30 PROBLEM — D64.9 ANEMIA: Status: ACTIVE | Noted: 2022-03-30

## 2022-03-30 PROBLEM — G43.909 MIGRAINE HEADACHE: Status: ACTIVE | Noted: 2022-03-30

## 2022-03-30 PROBLEM — M51.37 DEGENERATION OF INTERVERTEBRAL DISC OF LUMBOSACRAL REGION: Status: ACTIVE | Noted: 2022-01-19

## 2022-03-30 PROBLEM — M51.379 DEGENERATION OF INTERVERTEBRAL DISC OF LUMBOSACRAL REGION: Status: ACTIVE | Noted: 2022-01-19

## 2022-03-30 PROCEDURE — 99213 OFFICE O/P EST LOW 20 MIN: CPT | Performed by: SURGERY

## 2022-03-30 RX ORDER — DEXTROAMPHETAMINE SACCHARATE, AMPHETAMINE ASPARTATE, DEXTROAMPHETAMINE SULFATE AND AMPHETAMINE SULFATE 2.5; 2.5; 2.5; 2.5 MG/1; MG/1; MG/1; MG/1
TABLET ORAL
COMMUNITY
Start: 2022-03-29 | End: 2022-12-19
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
## 2022-04-06 ENCOUNTER — TELEPHONE (OUTPATIENT)
Dept: PHYSICAL THERAPY | Facility: CLINIC | Age: 42
End: 2022-04-06
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
## 2022-04-12 ENCOUNTER — TELEPHONE (OUTPATIENT)
Dept: NEUROLOGY | Facility: CLINIC | Age: 42
End: 2022-04-12

## 2022-04-27 ENCOUNTER — TREATMENT (OUTPATIENT)
Dept: PHYSICAL THERAPY | Facility: CLINIC | Age: 42
End: 2022-04-27

## 2022-04-27 DIAGNOSIS — Z98.890 H/O ABDOMINAL SURGERY: ICD-10-CM

## 2022-04-27 DIAGNOSIS — R26.2 DIFFICULTY WALKING: ICD-10-CM

## 2022-04-27 DIAGNOSIS — Z98.1 HISTORY OF FUSION OF CERVICAL SPINE: ICD-10-CM

## 2022-04-27 DIAGNOSIS — M54.2 CHRONIC NECK PAIN: ICD-10-CM

## 2022-04-27 DIAGNOSIS — R29.898 LEG WEAKNESS, BILATERAL: Primary | ICD-10-CM

## 2022-04-27 DIAGNOSIS — G89.29 CHRONIC NECK PAIN: ICD-10-CM

## 2022-04-27 DIAGNOSIS — Z91.81 RISK FOR FALLS: ICD-10-CM

## 2022-04-27 DIAGNOSIS — M62.81 MUSCLE WEAKNESS (GENERALIZED): ICD-10-CM

## 2022-04-27 DIAGNOSIS — R26.9 ABNORMAL GAIT: ICD-10-CM

## 2022-04-27 DIAGNOSIS — M79.7 FIBROMYALGIA: ICD-10-CM

## 2022-04-27 PROCEDURE — 97530 THERAPEUTIC ACTIVITIES: CPT | Performed by: PHYSICAL THERAPIST

## 2022-04-27 PROCEDURE — 97140 MANUAL THERAPY 1/> REGIONS: CPT | Performed by: PHYSICAL THERAPIST

## 2022-04-27 PROCEDURE — 97110 THERAPEUTIC EXERCISES: CPT | Performed by: PHYSICAL THERAPIST
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
## 2022-05-27 ENCOUNTER — TELEPHONE (OUTPATIENT)
Dept: PHYSICAL THERAPY | Facility: CLINIC | Age: 42
End: 2022-05-27

## 2022-06-10 DIAGNOSIS — G43.909 MIGRAINE WITHOUT STATUS MIGRAINOSUS, NOT INTRACTABLE, UNSPECIFIED MIGRAINE TYPE: ICD-10-CM

## 2022-06-10 DIAGNOSIS — M79.7 FIBROMYALGIA: Primary | ICD-10-CM

## 2022-06-10 RX ORDER — CYCLOBENZAPRINE HCL 10 MG
10 TABLET ORAL
Qty: 30 TABLET | Refills: 2 | Status: SHIPPED | OUTPATIENT
Start: 2022-06-10 | End: 2023-03-01 | Stop reason: SDUPTHER

## 2022-06-14 ENCOUNTER — TREATMENT (OUTPATIENT)
Dept: PHYSICAL THERAPY | Facility: CLINIC | Age: 42
End: 2022-06-14

## 2022-06-14 DIAGNOSIS — R26.9 ABNORMAL GAIT: ICD-10-CM

## 2022-06-14 DIAGNOSIS — M79.7 FIBROMYALGIA: ICD-10-CM

## 2022-06-14 DIAGNOSIS — M54.2 CHRONIC NECK PAIN: ICD-10-CM

## 2022-06-14 DIAGNOSIS — M54.50 CHRONIC BILATERAL LOW BACK PAIN WITHOUT SCIATICA: ICD-10-CM

## 2022-06-14 DIAGNOSIS — Z78.9 DIFFICULTY NAVIGATING STAIRS: ICD-10-CM

## 2022-06-14 DIAGNOSIS — R26.2 DIFFICULTY WALKING: ICD-10-CM

## 2022-06-14 DIAGNOSIS — Z98.1 HISTORY OF FUSION OF CERVICAL SPINE: ICD-10-CM

## 2022-06-14 DIAGNOSIS — M62.81 MUSCLE WEAKNESS (GENERALIZED): ICD-10-CM

## 2022-06-14 DIAGNOSIS — Z98.890 H/O ABDOMINAL SURGERY: ICD-10-CM

## 2022-06-14 DIAGNOSIS — R29.898 LEG WEAKNESS, BILATERAL: Primary | ICD-10-CM

## 2022-06-14 DIAGNOSIS — G89.29 CHRONIC BILATERAL LOW BACK PAIN WITHOUT SCIATICA: ICD-10-CM

## 2022-06-14 DIAGNOSIS — G89.29 CHRONIC NECK PAIN: ICD-10-CM

## 2022-06-14 PROCEDURE — 97110 THERAPEUTIC EXERCISES: CPT | Performed by: PHYSICAL THERAPIST

## 2022-06-14 PROCEDURE — 97530 THERAPEUTIC ACTIVITIES: CPT | Performed by: PHYSICAL THERAPIST
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
## 2022-06-17 ENCOUNTER — OFFICE VISIT (OUTPATIENT)
Dept: NEUROLOGY | Facility: CLINIC | Age: 42
End: 2022-06-17

## 2022-06-17 VITALS
HEIGHT: 60 IN | HEART RATE: 100 BPM | DIASTOLIC BLOOD PRESSURE: 70 MMHG | SYSTOLIC BLOOD PRESSURE: 120 MMHG | OXYGEN SATURATION: 98 % | WEIGHT: 84.8 LBS | BODY MASS INDEX: 16.65 KG/M2

## 2022-06-17 DIAGNOSIS — G56.03 BILATERAL CARPAL TUNNEL SYNDROME: Primary | ICD-10-CM

## 2022-06-17 DIAGNOSIS — G56.21 ULNAR NEUROPATHY AT ELBOW OF RIGHT UPPER EXTREMITY: ICD-10-CM

## 2022-06-17 DIAGNOSIS — M48.061 LUMBAR FORAMINAL STENOSIS: ICD-10-CM

## 2022-06-17 DIAGNOSIS — M79.2 NEUROPATHIC PAIN: ICD-10-CM

## 2022-06-17 DIAGNOSIS — M79.7 FIBROMYALGIA: ICD-10-CM

## 2022-06-17 DIAGNOSIS — M47.12 CERVICAL SPONDYLOSIS WITH MYELOPATHY: ICD-10-CM

## 2022-06-17 PROCEDURE — 99214 OFFICE O/P EST MOD 30 MIN: CPT | Performed by: PSYCHIATRY & NEUROLOGY

## 2022-06-17 RX ORDER — ARIPIPRAZOLE 5 MG/1
5 TABLET ORAL DAILY
COMMUNITY
Start: 2022-06-07 | End: 2022-12-19

## 2022-06-17 RX ORDER — PREGABALIN 100 MG/1
100 CAPSULE ORAL 4 TIMES DAILY
Qty: 120 CAPSULE | Refills: 5 | Status: SHIPPED | OUTPATIENT
Start: 2022-06-17 | End: 2022-12-19 | Stop reason: SDUPTHER
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
## 2022-06-22 ENCOUNTER — TELEPHONE (OUTPATIENT)
Dept: PHYSICAL THERAPY | Facility: OTHER | Age: 42
End: 2022-06-22

## 2022-06-24 ENCOUNTER — OFFICE (OUTPATIENT)
Dept: URBAN - METROPOLITAN AREA CLINIC 65 | Facility: CLINIC | Age: 42
End: 2022-06-24

## 2022-06-24 VITALS
HEIGHT: 60 IN | DIASTOLIC BLOOD PRESSURE: 76 MMHG | WEIGHT: 85 LBS | SYSTOLIC BLOOD PRESSURE: 120 MMHG | HEART RATE: 104 BPM

## 2022-06-24 DIAGNOSIS — K21.9 GASTRO-ESOPHAGEAL REFLUX DISEASE WITHOUT ESOPHAGITIS: ICD-10-CM

## 2022-06-24 DIAGNOSIS — R11.0 NAUSEA: ICD-10-CM

## 2022-06-24 DIAGNOSIS — R15.9 FULL INCONTINENCE OF FECES: ICD-10-CM

## 2022-06-24 PROCEDURE — 99203 OFFICE O/P NEW LOW 30 MIN: CPT | Performed by: INTERNAL MEDICINE

## 2022-06-28 ENCOUNTER — TREATMENT (OUTPATIENT)
Dept: PHYSICAL THERAPY | Facility: CLINIC | Age: 42
End: 2022-06-28

## 2022-06-28 DIAGNOSIS — G89.29 CHRONIC NECK PAIN: ICD-10-CM

## 2022-06-28 DIAGNOSIS — Z98.1 HISTORY OF FUSION OF CERVICAL SPINE: ICD-10-CM

## 2022-06-28 DIAGNOSIS — G89.29 CHRONIC BILATERAL LOW BACK PAIN WITHOUT SCIATICA: ICD-10-CM

## 2022-06-28 DIAGNOSIS — M54.2 CHRONIC NECK PAIN: ICD-10-CM

## 2022-06-28 DIAGNOSIS — R26.2 DIFFICULTY WALKING: ICD-10-CM

## 2022-06-28 DIAGNOSIS — M79.7 FIBROMYALGIA: ICD-10-CM

## 2022-06-28 DIAGNOSIS — Z98.890 H/O ABDOMINAL SURGERY: ICD-10-CM

## 2022-06-28 DIAGNOSIS — R26.9 ABNORMAL GAIT: ICD-10-CM

## 2022-06-28 DIAGNOSIS — R29.898 LEG WEAKNESS, BILATERAL: Primary | ICD-10-CM

## 2022-06-28 DIAGNOSIS — M54.50 CHRONIC BILATERAL LOW BACK PAIN WITHOUT SCIATICA: ICD-10-CM

## 2022-06-28 DIAGNOSIS — M62.81 MUSCLE WEAKNESS (GENERALIZED): ICD-10-CM

## 2022-06-28 DIAGNOSIS — Z78.9 DIFFICULTY NAVIGATING STAIRS: ICD-10-CM

## 2022-06-28 PROCEDURE — 97110 THERAPEUTIC EXERCISES: CPT | Performed by: PHYSICAL THERAPIST

## 2022-06-28 NOTE — PROGRESS NOTES
Physical Therapy Daily Treatment Note    Patient: Bienvenido Carter   : 1980  Diagnosis/ICD-10 Code:  Leg weakness, bilateral [R29.898]  Referring practitioner: Dina Ratliff MD  Date of Initial Visit: Type: THERAPY  Noted: 2022  Today's Date: 2022  Patient seen for 14 sessions           Subjective started hand and elbow therapy this week. I have been feeling better overall and able to start back into some of my hobbies again    Objective     Exercise:  -hip add iso, x20 with 5 sec hold  -bridge x20  -Matrix leg press, 20lb 3x10  -Matrix hamstring curl, 10lb 2x10  -Matrix knee extension, 10lb 2x10  -Matrix hip abd, 30lb 2x10  -Matrix hip add, 30lb 2x10  -cable rows, 7.5lb 2x10  -4 inch fwd and lateral step ups, x20 each B    Assessment/Plan  Pt has been able to get out more in the community for walks and back into some of her art projects. She has just now been able to start using more weight without increased pain. Will continue to work on a full body program to help her improve her function         Timed:    Manual Therapy:         mins  05595;  Therapeutic Exercise:    40     mins  52700;     Neuromuscular Namita:        mins  28678;    Therapeutic Activity:          mins  65487;     Gait Training:           mins  50692;     Ultrasound:          mins  80165;    Electrical Stimulation:         mins  96326 ( );  Iontophoresis         mins 11720;  Aquatic Therapy         mins 66332;  Dry Needling                   mins 08007/  (Self-pay)    Untimed:  Electrical Stimulation:         mins  81340 ( );  Traction:         mins  05336;     Timed Treatment:   40   mins   Total Treatment:     40   mins    Katie Medel PT, CDNT  Physical Therapist    [place] License:[id number]

## 2022-07-01 ENCOUNTER — TREATMENT (OUTPATIENT)
Dept: PHYSICAL THERAPY | Facility: CLINIC | Age: 42
End: 2022-07-01

## 2022-07-01 DIAGNOSIS — R26.2 DIFFICULTY WALKING: ICD-10-CM

## 2022-07-01 DIAGNOSIS — M54.2 CHRONIC NECK PAIN: ICD-10-CM

## 2022-07-01 DIAGNOSIS — G89.29 CHRONIC NECK PAIN: ICD-10-CM

## 2022-07-01 DIAGNOSIS — Z98.890 H/O ABDOMINAL SURGERY: ICD-10-CM

## 2022-07-01 DIAGNOSIS — Z98.1 HISTORY OF FUSION OF CERVICAL SPINE: ICD-10-CM

## 2022-07-01 DIAGNOSIS — M62.81 MUSCLE WEAKNESS (GENERALIZED): ICD-10-CM

## 2022-07-01 DIAGNOSIS — R29.898 LEG WEAKNESS, BILATERAL: Primary | ICD-10-CM

## 2022-07-01 DIAGNOSIS — M79.7 FIBROMYALGIA: ICD-10-CM

## 2022-07-01 DIAGNOSIS — R26.9 ABNORMAL GAIT: ICD-10-CM

## 2022-07-01 PROCEDURE — 97530 THERAPEUTIC ACTIVITIES: CPT | Performed by: PHYSICAL THERAPIST

## 2022-07-01 PROCEDURE — 97110 THERAPEUTIC EXERCISES: CPT | Performed by: PHYSICAL THERAPIST

## 2022-07-01 NOTE — PROGRESS NOTES
Physical Therapy Daily Treatment Note    Patient: Bienvenido Carter   : 1980  Diagnosis/ICD-10 Code:  Leg weakness, bilateral [R29.898]  Referring practitioner: Dina Ratliff MD  Date of Initial Visit: Type: THERAPY  Noted: 2022  Today's Date: 2022  Patient seen for 15 sessions           Subjective I was a little sore after last time, but not bad    Objective        Exercise:  -hip add iso, x20 with 5 sec hold  -bridge x20  -Matrix leg press, 20lb 3x10  -Matrix hamstring curl, 10lb 2x10  -Matrix knee extension, 10lb 2x10  -Matrix hip abd, 30lb 2x10  -Matrix hip add, 30lb 2x10  -cable rows, 7.5lb 2x10  -4 inch fwd and lateral step ups, x20 each B  -hamstring 90/90 stretch 3x20 sec    Assessment/Plan  Edda did well with her last session starting to work on a gym program, as she is thinking about joining [facility] with her parents. She is having to have a lumpectomy at the end of the month and will have to put PT on hold for a couple of weeks.          Timed:    Manual Therapy:         mins  46678;  Therapeutic Exercise:    33     mins  47165;     Neuromuscular Namita:        mins  98904;    Therapeutic Activity:     8     mins  82789;     Gait Training:           mins  72780;     Ultrasound:         mins  04191;    Electrical Stimulation:         mins  06275 ( );  Iontophoresis         mins 70804;  Aquatic Therapy         mins 98147;  Dry Needling                   mins 11252/  (Self-pay)    Untimed:  Electrical Stimulation:         mins  75379 ( );  Traction:         mins  06582;     Timed Treatment:   41   mins   Total Treatment:     41   mins    Katie Medel, PT, CDNT  Physical Therapist    [place] License:[id number]

## 2022-07-06 ENCOUNTER — TREATMENT (OUTPATIENT)
Dept: PHYSICAL THERAPY | Facility: CLINIC | Age: 42
End: 2022-07-06

## 2022-07-06 DIAGNOSIS — G89.29 CHRONIC NECK PAIN: ICD-10-CM

## 2022-07-06 DIAGNOSIS — R26.9 ABNORMAL GAIT: ICD-10-CM

## 2022-07-06 DIAGNOSIS — M54.50 CHRONIC BILATERAL LOW BACK PAIN WITHOUT SCIATICA: ICD-10-CM

## 2022-07-06 DIAGNOSIS — Z98.1 HISTORY OF FUSION OF CERVICAL SPINE: ICD-10-CM

## 2022-07-06 DIAGNOSIS — Z78.9 DIFFICULTY NAVIGATING STAIRS: ICD-10-CM

## 2022-07-06 DIAGNOSIS — M79.7 FIBROMYALGIA: ICD-10-CM

## 2022-07-06 DIAGNOSIS — Z98.890 H/O ABDOMINAL SURGERY: ICD-10-CM

## 2022-07-06 DIAGNOSIS — M54.2 CHRONIC NECK PAIN: ICD-10-CM

## 2022-07-06 DIAGNOSIS — M62.81 MUSCLE WEAKNESS (GENERALIZED): ICD-10-CM

## 2022-07-06 DIAGNOSIS — G89.29 CHRONIC BILATERAL LOW BACK PAIN WITHOUT SCIATICA: ICD-10-CM

## 2022-07-06 DIAGNOSIS — R29.898 LEG WEAKNESS, BILATERAL: Primary | ICD-10-CM

## 2022-07-06 DIAGNOSIS — R26.2 DIFFICULTY WALKING: ICD-10-CM

## 2022-07-06 PROCEDURE — 97110 THERAPEUTIC EXERCISES: CPT | Performed by: PHYSICAL THERAPIST

## 2022-07-06 NOTE — PROGRESS NOTES
Physical Therapy Daily Treatment Note    Patient: Bienvenido Carter   : 1980  Diagnosis/ICD-10 Code:  Leg weakness, bilateral [R29.898]  Referring practitioner: Dina Ratliff MD  Date of Initial Visit: Type: THERAPY  Noted: 2022  Today's Date: 2022  Patient seen for 16 sessions           Subjective my leg is hurting today for some reason. I haven't been fishing, too hot, so haven't been on uneven ground    Objective        Exercise:  -hip add iso, x20 with 5 sec hold  -bridge x20  -Matrix leg press, 25lb 3x10  -Matrix hamstring curl, 10lb 2x10  -Matrix knee extension, 10lb 2x10  -Matrix hip abd, 30lb 2x10  -Matrix hip add, 30lb 2x10  -cable rows, 7.5lb 2x10  -4 inch fwd and lateral step ups, x20 each B  DEFER  -hamstring 90/90 stretch 3x20 sec  -sciatic nerve glide on L 2x 10  -piriformis stretch 3x20 sec B      Assessment/Plan  Edda has been has been having some L posterior thigh and calf pain. She is (+) for a SLR, started a neural glide, which did help with her pain. Continuing to progress her strength to help get her back to her ADLs and leisure activities         Timed:    Manual Therapy:         mins  25262;  Therapeutic Exercise:    43     mins  78684;     Neuromuscular Namita:        mins  35889;    Therapeutic Activity:          mins  23953;     Gait Training:           mins  05474;     Ultrasound:          mins  22737;    Electrical Stimulation:         mins  48671 ( );  Iontophoresis         mins 04072;  Aquatic Therapy         mins 73345;  Dry Needling                   mins 77310/  (Self-pay)    Untimed:  Electrical Stimulation:         mins  66779 ( );  Traction:         mins  16435;     Timed Treatment:   43   mins   Total Treatment:     43   mins    Katie Medel PT, CDNT  Physical Therapist    [place] License:[id number]

## 2022-07-11 ENCOUNTER — TREATMENT (OUTPATIENT)
Dept: PHYSICAL THERAPY | Facility: CLINIC | Age: 42
End: 2022-07-11

## 2022-07-11 DIAGNOSIS — R26.9 ABNORMAL GAIT: ICD-10-CM

## 2022-07-11 DIAGNOSIS — G89.29 CHRONIC BILATERAL LOW BACK PAIN WITHOUT SCIATICA: ICD-10-CM

## 2022-07-11 DIAGNOSIS — Z78.9 DIFFICULTY NAVIGATING STAIRS: ICD-10-CM

## 2022-07-11 DIAGNOSIS — G89.29 CHRONIC NECK PAIN: ICD-10-CM

## 2022-07-11 DIAGNOSIS — M79.7 FIBROMYALGIA: ICD-10-CM

## 2022-07-11 DIAGNOSIS — R26.2 DIFFICULTY WALKING: ICD-10-CM

## 2022-07-11 DIAGNOSIS — Z98.890 H/O ABDOMINAL SURGERY: ICD-10-CM

## 2022-07-11 DIAGNOSIS — M62.81 MUSCLE WEAKNESS (GENERALIZED): ICD-10-CM

## 2022-07-11 DIAGNOSIS — Z98.1 HISTORY OF FUSION OF CERVICAL SPINE: ICD-10-CM

## 2022-07-11 DIAGNOSIS — R29.898 LEG WEAKNESS, BILATERAL: Primary | ICD-10-CM

## 2022-07-11 DIAGNOSIS — M54.50 CHRONIC BILATERAL LOW BACK PAIN WITHOUT SCIATICA: ICD-10-CM

## 2022-07-11 DIAGNOSIS — M54.2 CHRONIC NECK PAIN: ICD-10-CM

## 2022-07-11 PROCEDURE — 97110 THERAPEUTIC EXERCISES: CPT | Performed by: PHYSICAL THERAPIST

## 2022-07-11 NOTE — PROGRESS NOTES
Physical Therapy Daily Treatment Note    Patient: Bienvenido Carter   : 1980  Diagnosis/ICD-10 Code:  Leg weakness, bilateral [R29.898]  Referring practitioner: Dina Ratliff MD  Date of Initial Visit: Type: THERAPY  Noted: 2022  Today's Date: 2022  Patient seen for 17 sessions           Subjective so sorry about Friday, alarm didn't go off    Objective       Exercise:  -hip add iso, x20 with 5 sec hold  -bridge x20  -Matrix leg press, 25lb 3x10  -Matrix hamstring curl, 10lb 2x10  -Matrix knee extension, 10lb 2x10  -Matrix hip abd, 35lb 2x10  -Matrix hip add, 35lb 2x10  -cable rows, 7.5lb 2x10  -4 inch fwd step ups, x20 each B    -hamstring 90/90 stretch 3x20 sec  -sciatic nerve glide on L 2x 10  -piriformis stretch 3x20 sec B       Assessment/Plan  Her L posterior leg pain is improving with hamstring stretching and nerve glides. She has a very small body frame, but she has been able to progress her strengthen exercises in the gym without joint complaints. She is starting to use the stairs more in her condo unit.          Timed:    Manual Therapy:         mins  04658;  Therapeutic Exercise:    43     mins  30176;     Neuromuscular Namita:        mins  24444;    Therapeutic Activity:          mins  54828;     Gait Training:           mins  79240;     Ultrasound:          mins  60856;    Electrical Stimulation:         mins  94350 ( );  Iontophoresis         mins 06334;  Aquatic Therapy         mins 87248;  Dry Needling                   mins /  (Self-pay)    Untimed:  Electrical Stimulation:         mins  36843 ( );  Traction:         mins  53393;     Timed Treatment:   43   mins   Total Treatment:     43   mins    Katie Medel PT, CDNT  Physical Therapist    [place] License:[id number]

## 2022-07-13 ENCOUNTER — TREATMENT (OUTPATIENT)
Dept: PHYSICAL THERAPY | Facility: CLINIC | Age: 42
End: 2022-07-13

## 2022-07-13 DIAGNOSIS — M54.50 CHRONIC BILATERAL LOW BACK PAIN WITHOUT SCIATICA: ICD-10-CM

## 2022-07-13 DIAGNOSIS — R26.9 ABNORMAL GAIT: ICD-10-CM

## 2022-07-13 DIAGNOSIS — Z78.9 DIFFICULTY NAVIGATING STAIRS: ICD-10-CM

## 2022-07-13 DIAGNOSIS — R26.2 DIFFICULTY WALKING: ICD-10-CM

## 2022-07-13 DIAGNOSIS — Z91.81 RISK FOR FALLS: ICD-10-CM

## 2022-07-13 DIAGNOSIS — Z98.1 HISTORY OF FUSION OF CERVICAL SPINE: ICD-10-CM

## 2022-07-13 DIAGNOSIS — M54.2 CHRONIC NECK PAIN: ICD-10-CM

## 2022-07-13 DIAGNOSIS — G89.29 CHRONIC BILATERAL LOW BACK PAIN WITHOUT SCIATICA: ICD-10-CM

## 2022-07-13 DIAGNOSIS — R29.898 LEG WEAKNESS, BILATERAL: Primary | ICD-10-CM

## 2022-07-13 DIAGNOSIS — M62.81 MUSCLE WEAKNESS (GENERALIZED): ICD-10-CM

## 2022-07-13 DIAGNOSIS — Z98.890 H/O ABDOMINAL SURGERY: ICD-10-CM

## 2022-07-13 DIAGNOSIS — M79.7 FIBROMYALGIA: ICD-10-CM

## 2022-07-13 DIAGNOSIS — G89.29 CHRONIC NECK PAIN: ICD-10-CM

## 2022-07-13 PROCEDURE — 97110 THERAPEUTIC EXERCISES: CPT | Performed by: PHYSICAL THERAPIST

## 2022-07-13 PROCEDURE — 97530 THERAPEUTIC ACTIVITIES: CPT | Performed by: PHYSICAL THERAPIST

## 2022-07-13 NOTE — PROGRESS NOTES
Physical Therapy Daily Treatment Note    Patient: Bienvenido Carter   : 1980  Diagnosis/ICD-10 Code:  Leg weakness, bilateral [R29.898]  Referring practitioner: Dina Ratliff MD  Date of Initial Visit: Type: THERAPY  Noted: 2022  Today's Date: 2022  Patient seen for 18 sessions           Subjective I have been having to take the Lyrica at night as I have been having more L LE symptoms (supposed to take 3 at night, but have been taking only one, now taking 2). I have been going to hand therapy, still dropping things with my R hand a lot.     Objective     Exercise:  -hip add iso, x20 with 5 sec hold  -bridge x20  -Matrix leg press, 25lb 3x10  -Matrix hamstring curl, 10lb 2x10  -Matrix knee extension, 10lb 2x10  -Matrix hip abd, 35lb 2x10  -Matrix hip add, 35lb 2x10  -cable rows, 7.5lb 2x10 DEFER  -4 inch fwd and lateral step ups, x20 each B    -hamstring 90/90 stretch 3x20 sec  -sciatic nerve glide on L 2x 10  -piriformis stretch 3x20 sec B    Assessment/Plan  Edda has been having a little more L LE pain in the evening. She is allowed to take 3 Lyrica in the evening, but had gotten down to one pill. Over the last few days, she has been taking 2 pills in the evening to help her leg pain in order to sleep. She is progressing with her strength and tolerance to activity in her home and community         Timed:    Manual Therapy:         mins  26081;  Therapeutic Exercise:    33     mins  22873;     Neuromuscular Namita:        mins  61560;    Therapeutic Activity:     10     mins  18160;     Gait Training:           mins  51771;     Ultrasound:          mins  24234;    Electrical Stimulation:         mins  39142 ( );  Iontophoresis         mins 84201;  Aquatic Therapy         mins 32516;  Dry Needling                   mins /  (Self-pay)    Untimed:  Electrical Stimulation:         mins  35228 ( );  Traction:         mins  37392;     Timed Treatment:   43   mins   Total  Treatment:     43   mins    Katie Medel, PT, CDNT  Physical Therapist    [place] License:[id number]   ambulatory

## 2022-07-18 ENCOUNTER — TREATMENT (OUTPATIENT)
Dept: PHYSICAL THERAPY | Facility: CLINIC | Age: 42
End: 2022-07-18

## 2022-07-18 DIAGNOSIS — M54.50 CHRONIC BILATERAL LOW BACK PAIN WITHOUT SCIATICA: ICD-10-CM

## 2022-07-18 DIAGNOSIS — R29.898 LEG WEAKNESS, BILATERAL: Primary | ICD-10-CM

## 2022-07-18 DIAGNOSIS — Z78.9 DIFFICULTY NAVIGATING STAIRS: ICD-10-CM

## 2022-07-18 DIAGNOSIS — M62.81 MUSCLE WEAKNESS (GENERALIZED): ICD-10-CM

## 2022-07-18 DIAGNOSIS — G89.29 CHRONIC NECK PAIN: ICD-10-CM

## 2022-07-18 DIAGNOSIS — Z98.1 HISTORY OF FUSION OF CERVICAL SPINE: ICD-10-CM

## 2022-07-18 DIAGNOSIS — R26.2 DIFFICULTY WALKING: ICD-10-CM

## 2022-07-18 DIAGNOSIS — R26.9 ABNORMAL GAIT: ICD-10-CM

## 2022-07-18 DIAGNOSIS — M79.7 FIBROMYALGIA: ICD-10-CM

## 2022-07-18 DIAGNOSIS — M54.2 CHRONIC NECK PAIN: ICD-10-CM

## 2022-07-18 DIAGNOSIS — G89.29 CHRONIC BILATERAL LOW BACK PAIN WITHOUT SCIATICA: ICD-10-CM

## 2022-07-18 DIAGNOSIS — Z98.890 H/O ABDOMINAL SURGERY: ICD-10-CM

## 2022-07-18 DIAGNOSIS — Z91.81 RISK FOR FALLS: ICD-10-CM

## 2022-07-18 PROCEDURE — 97110 THERAPEUTIC EXERCISES: CPT | Performed by: PHYSICAL THERAPIST

## 2022-07-18 PROCEDURE — 97530 THERAPEUTIC ACTIVITIES: CPT | Performed by: PHYSICAL THERAPIST

## 2022-07-18 NOTE — PROGRESS NOTES
Physical Therapy Daily Treatment Note    Patient: Bienvenido Carter   : 1980  Diagnosis/ICD-10 Code:  Leg weakness, bilateral [R29.898]  Referring practitioner: Dina Ratliff MD  Date of Initial Visit: Type: THERAPY  Noted: 2022  Today's Date: 2022  Patient seen for 19 sessions           Subjective been doing more getting my condo back together.     Objective     Exercise:  -hip add iso, x20 with 5 sec hold  -bridge x20  -SLR 2x10 each  -Matrix leg press, 30lb 3x10  -Matrix hamstring curl, 12.5lb 2x10  -Matrix knee extension, 10lb 2x10  -Matrix hip abd, 35lb 2x10  -Matrix hip add, 35lb 2x10  -cable rows, 7.5lb 2x10   -4 inch fwd and lateral step ups, x20 each B    -hamstring 90/90 stretch 3x20 sec  -sciatic nerve glide on L 2x 10  -piriformis stretch 3x20 sec B       Assessment/Plan  Edda still has pain and is taking pain meds about 3x a day. She is ambulating with a normal gait pattern and has not used an AD into/out of therapy for several weeks.          Timed:    Manual Therapy:         mins  23383;  Therapeutic Exercise:    30     mins  91437;     Neuromuscular Namtia:        mins  64584;    Therapeutic Activity:     15     mins  68440;     Gait Training:           mins  93768;     Ultrasound:          mins  58883;    Electrical Stimulation:         mins  68977 ( );  Iontophoresis         mins 25903;  Aquatic Therapy         mins 61982;  Dry Needling                   mins 08283/  (Self-pay)    Untimed:  Electrical Stimulation:         mins  11071 ( );  Traction:        mins  05779;     Timed Treatment:  45    mins   Total Treatment:     45   mins    Katie Medel PT, CDNT  Physical Therapist    [place] License:[id number]

## 2022-07-20 ENCOUNTER — TELEPHONE (OUTPATIENT)
Dept: PHYSICAL THERAPY | Facility: CLINIC | Age: 42
End: 2022-07-20
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
## 2022-08-02 ENCOUNTER — TELEPHONE (OUTPATIENT)
Dept: NEUROLOGY | Facility: CLINIC | Age: 42
End: 2022-08-02

## 2022-08-02 ENCOUNTER — OFFICE VISIT (OUTPATIENT)
Dept: NEUROLOGY | Facility: CLINIC | Age: 42
End: 2022-08-02

## 2022-08-02 VITALS
SYSTOLIC BLOOD PRESSURE: 116 MMHG | OXYGEN SATURATION: 99 % | BODY MASS INDEX: 15.92 KG/M2 | DIASTOLIC BLOOD PRESSURE: 70 MMHG | HEART RATE: 112 BPM | WEIGHT: 81.1 LBS | HEIGHT: 60 IN

## 2022-08-02 DIAGNOSIS — G43.119 INTRACTABLE MIGRAINE WITH AURA WITHOUT STATUS MIGRAINOSUS: Primary | ICD-10-CM

## 2022-08-02 PROCEDURE — 99213 OFFICE O/P EST LOW 20 MIN: CPT | Performed by: NURSE PRACTITIONER

## 2022-08-02 RX ORDER — ROPINIROLE 0.25 MG/1
TABLET, FILM COATED ORAL
COMMUNITY
Start: 2022-08-01
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
## 2022-08-11 ENCOUNTER — PATIENT ROUNDING (BHMG ONLY) (OUTPATIENT)
Dept: NEUROLOGY | Facility: CLINIC | Age: 42
End: 2022-08-11

## 2022-08-19 ENCOUNTER — TELEPHONE (OUTPATIENT)
Dept: PHYSICAL THERAPY | Facility: OTHER | Age: 42
End: 2022-08-19
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
## 2022-08-24 ENCOUNTER — TREATMENT (OUTPATIENT)
Dept: PHYSICAL THERAPY | Facility: CLINIC | Age: 42
End: 2022-08-24

## 2022-08-24 DIAGNOSIS — G89.29 CHRONIC BILATERAL LOW BACK PAIN WITHOUT SCIATICA: ICD-10-CM

## 2022-08-24 DIAGNOSIS — Z78.9 DIFFICULTY NAVIGATING STAIRS: ICD-10-CM

## 2022-08-24 DIAGNOSIS — Z98.1 HISTORY OF FUSION OF CERVICAL SPINE: ICD-10-CM

## 2022-08-24 DIAGNOSIS — M54.50 CHRONIC BILATERAL LOW BACK PAIN WITHOUT SCIATICA: ICD-10-CM

## 2022-08-24 DIAGNOSIS — M54.2 CHRONIC NECK PAIN: ICD-10-CM

## 2022-08-24 DIAGNOSIS — R26.2 DIFFICULTY WALKING: ICD-10-CM

## 2022-08-24 DIAGNOSIS — R26.9 ABNORMAL GAIT: ICD-10-CM

## 2022-08-24 DIAGNOSIS — G89.29 CHRONIC NECK PAIN: ICD-10-CM

## 2022-08-24 DIAGNOSIS — M79.7 FIBROMYALGIA: ICD-10-CM

## 2022-08-24 DIAGNOSIS — M62.81 MUSCLE WEAKNESS (GENERALIZED): ICD-10-CM

## 2022-08-24 DIAGNOSIS — R29.898 LEG WEAKNESS, BILATERAL: Primary | ICD-10-CM

## 2022-08-24 PROCEDURE — 97110 THERAPEUTIC EXERCISES: CPT | Performed by: PHYSICAL THERAPIST
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
## 2022-08-26 ENCOUNTER — TREATMENT (OUTPATIENT)
Dept: PHYSICAL THERAPY | Facility: CLINIC | Age: 42
End: 2022-08-26

## 2022-08-26 DIAGNOSIS — M54.50 CHRONIC BILATERAL LOW BACK PAIN WITHOUT SCIATICA: ICD-10-CM

## 2022-08-26 DIAGNOSIS — Z78.9 DIFFICULTY NAVIGATING STAIRS: ICD-10-CM

## 2022-08-26 DIAGNOSIS — R29.898 LEG WEAKNESS, BILATERAL: Primary | ICD-10-CM

## 2022-08-26 DIAGNOSIS — Z98.1 HISTORY OF FUSION OF CERVICAL SPINE: ICD-10-CM

## 2022-08-26 DIAGNOSIS — G89.29 CHRONIC NECK PAIN: ICD-10-CM

## 2022-08-26 DIAGNOSIS — G89.29 CHRONIC BILATERAL LOW BACK PAIN WITHOUT SCIATICA: ICD-10-CM

## 2022-08-26 DIAGNOSIS — M79.7 FIBROMYALGIA: ICD-10-CM

## 2022-08-26 DIAGNOSIS — R26.9 ABNORMAL GAIT: ICD-10-CM

## 2022-08-26 DIAGNOSIS — R26.2 DIFFICULTY WALKING: ICD-10-CM

## 2022-08-26 DIAGNOSIS — M62.81 MUSCLE WEAKNESS (GENERALIZED): ICD-10-CM

## 2022-08-26 DIAGNOSIS — M54.2 CHRONIC NECK PAIN: ICD-10-CM

## 2022-08-26 DIAGNOSIS — Z98.890 H/O ABDOMINAL SURGERY: ICD-10-CM

## 2022-08-26 PROCEDURE — 97110 THERAPEUTIC EXERCISES: CPT | Performed by: PHYSICAL THERAPIST

## 2022-08-26 PROCEDURE — 97530 THERAPEUTIC ACTIVITIES: CPT | Performed by: PHYSICAL THERAPIST

## 2022-08-26 NOTE — PROGRESS NOTES
Physical Therapy Daily Treatment Note    Patient: Bienvenido Carter   : 1980  Diagnosis/ICD-10 Code:  Leg weakness, bilateral [R29.898]  Referring practitioner: Tino Randall MD  Date of Initial Visit: Type: THERAPY  Noted: 2022  Today's Date: 2022  Patient seen for 21 sessions           Subjective doing ok today    Objective     Exercise:  -hip add iso, x20 with 5 sec hold  -bridge x20  -SLR 2x10 each  -Matrix leg press, 35lb 3x10    -Matrix hamstring curl, 12.5lb 2x10   -Matrix knee extension, 10lb 2x10   -Matrix hip abd, 35lb 2x10   -Matrix hip add, 35lb 2x10   -cable rows, 7.5lb 2x10   -cable lat pull downs, 12.5lb x20  -cable triceps ext, 10lb, x20  -4 inch fwd and lateral step ups, x20 each B  (cues to activate core to help with balance, needing light hand hold assist)  -hamstring 90/90 stretch 3x20 sec  -sciatic nerve glide on L 2x 10  -piriformis stretch 3x20 sec B      Assessment/Plan  Working to build her strength and stability, get her both a gym and home HEP that she can continue with          Timed:    Manual Therapy:         mins  32164;  Therapeutic Exercise:    30     mins  14046;     Neuromuscular Namita:        mins  84561;    Therapeutic Activity:     10     mins  15843;     Gait Training:           mins  80552;     Ultrasound:          mins  13459;    Electrical Stimulation:         mins  45192 ( );  Iontophoresis         mins 00366;  Aquatic Therapy         mins 55162;  Dry Needling                   mins /  (Self-pay)    Untimed:  Electrical Stimulation:         mins  05991 ( );  Traction:         mins  50976;     Timed Treatment:   40   mins   Total Treatment:     40   mins    Katie Medel PT, CDNT  Physical Therapist    [place] License:[id number]

## 2022-08-31 ENCOUNTER — TREATMENT (OUTPATIENT)
Dept: PHYSICAL THERAPY | Facility: CLINIC | Age: 42
End: 2022-08-31

## 2022-08-31 DIAGNOSIS — R26.9 ABNORMAL GAIT: ICD-10-CM

## 2022-08-31 DIAGNOSIS — M62.81 MUSCLE WEAKNESS (GENERALIZED): ICD-10-CM

## 2022-08-31 DIAGNOSIS — R29.898 LEG WEAKNESS, BILATERAL: Primary | ICD-10-CM

## 2022-08-31 DIAGNOSIS — Z98.890 H/O ABDOMINAL SURGERY: ICD-10-CM

## 2022-08-31 DIAGNOSIS — G89.29 CHRONIC NECK PAIN: ICD-10-CM

## 2022-08-31 DIAGNOSIS — R26.2 DIFFICULTY WALKING: ICD-10-CM

## 2022-08-31 DIAGNOSIS — M79.7 FIBROMYALGIA: ICD-10-CM

## 2022-08-31 DIAGNOSIS — Z78.9 DIFFICULTY NAVIGATING STAIRS: ICD-10-CM

## 2022-08-31 DIAGNOSIS — Z98.1 HISTORY OF FUSION OF CERVICAL SPINE: ICD-10-CM

## 2022-08-31 DIAGNOSIS — G89.29 CHRONIC BILATERAL LOW BACK PAIN WITHOUT SCIATICA: ICD-10-CM

## 2022-08-31 DIAGNOSIS — M54.2 CHRONIC NECK PAIN: ICD-10-CM

## 2022-08-31 DIAGNOSIS — M54.50 CHRONIC BILATERAL LOW BACK PAIN WITHOUT SCIATICA: ICD-10-CM

## 2022-08-31 PROCEDURE — 97110 THERAPEUTIC EXERCISES: CPT | Performed by: PHYSICAL THERAPIST

## 2022-08-31 NOTE — PROGRESS NOTES
Physical Therapy Daily Treatment Note    Patient: Bienvenido Carter   : 1980  Diagnosis/ICD-10 Code:  Leg weakness, bilateral [R29.898]  Referring practitioner: Tino Randall MD  Date of Initial Visit: Type: THERAPY  Noted: 2022  Today's Date: 2022  Patient seen for 22 sessions           Subjective went fishing this am, harder walking back up the hill/uneven ground    Objective      Exercise:  -hip add iso, x20 with 5 sec hold  -bridge x20  -SLR 2x10 each  -Matrix leg press, 37.5lb 3x15    -Matrix hamstring curl, 12.5lb 3x10   -Matrix knee extension, 10lb 3x10   -Matrix hip abd, 37.5lb 2x10   -Matrix hip add, 37.5lb 2x10   -cable rows, 7.5lb 2x10  DEFER  -cable lat pull downs, 12.5lb x20  -cable triceps ext, 10lb, x20  -4 inch fwd and lateral step ups, x20 each B  (cues to activate core to help with balance, needing light hand hold assist)  DEFER  -hamstring 90/90 stretch 3x20 sec  -sciatic nerve glide on L 2x 10  -piriformis stretch 3x20 sec B    Assessment/Plan  Getting her strength improved since her last surgery. She is talking to her parents about joining [facility] as a family for her to continue working out. Able to increase some of her resistance today without issue.          Timed:    Manual Therapy:         mins  23235;  Therapeutic Exercise:    43     mins  15881;     Neuromuscular Namita:        mins  86215;    Therapeutic Activity:         mins  04534;     Gait Training:           mins  51352;     Ultrasound:          mins  52000;    Electrical Stimulation:         mins  40623 ( );  Iontophoresis         mins 65948;  Aquatic Therapy         mins 99835;  Dry Needling                   mins /  (Self-pay)    Untimed:  Electrical Stimulation:         mins  41806 ( );  Traction:         mins  88382;     Timed Treatment:   43   mins   Total Treatment:     43   mins    Katie Medel PT, CDNT  Physical Therapist    [place] License:[id number]

## 2022-09-15 ENCOUNTER — HOSPITAL ENCOUNTER (OUTPATIENT)
Dept: MRI IMAGING | Facility: HOSPITAL | Age: 42
Discharge: HOME | End: 2022-09-15

## 2022-09-15 DIAGNOSIS — G43.119 INTRACTABLE MIGRAINE WITH AURA WITHOUT STATUS MIGRAINOSUS: ICD-10-CM

## 2022-09-16 ENCOUNTER — TELEPHONE (OUTPATIENT)
Dept: NEUROLOGY | Facility: CLINIC | Age: 42
End: 2022-09-16

## 2022-09-16 NOTE — TELEPHONE ENCOUNTER
Caller: Bienvenido Carter    Relationship: Self    Best call back number: [phone]    What is the best time to reach you: ANY     Who are you requesting to speak with (clinical staff, provider,  specific staff member): DONITA        What was the call regarding: PATIENT TELEPHONED RE: F/U ON INSURANCE APPROVAL FOR BOTOX    PLEASE CALL & ADVISE    THANK YOU

## 2022-09-20 DIAGNOSIS — G43.119 INTRACTABLE MIGRAINE WITH AURA WITHOUT STATUS MIGRAINOSUS: Primary | ICD-10-CM

## 2022-09-21 NOTE — TELEPHONE ENCOUNTER
Caller: Bienvenido Carter     Relationship: Self     Best call back number: [phone]     What is the best time to reach you: ANY      Who are you requesting to speak with (clinical staff, provider,  specific staff member): DONITA           What was the call regarding: PATIENT IS CALLING AGAIN HER MIGRAINE ARE MUCH WORSE WANTS THE STATUS ON INSURANCE APPROVAL FOR BOTOX.     PLEASE CALL & ADVISE     THANK YOU

## 2022-09-23 ENCOUNTER — PATIENT MESSAGE (OUTPATIENT)
Dept: NEUROSURGERY | Facility: CLINIC | Age: 42
End: 2022-09-23

## 2022-09-27 RX ORDER — METHYLPREDNISOLONE 4 MG/1
TABLET ORAL
Qty: 21 TABLET | Refills: 0 | Status: SHIPPED | OUTPATIENT
Start: 2022-09-27 | End: 2022-12-19 | Stop reason: SDUPTHER

## 2022-09-27 NOTE — TELEPHONE ENCOUNTER
I called and spoke with the patient and advised her of orders being placed. She voiced understanding.

## 2022-10-06 ENCOUNTER — HOSPITAL ENCOUNTER (OUTPATIENT)
Dept: MRI IMAGING | Facility: HOSPITAL | Age: 42
Discharge: HOME | End: 2022-10-06
Admitting: NURSE PRACTITIONER

## 2022-10-06 PROCEDURE — A9577 INJ MULTIHANCE: HCPCS | Performed by: NURSE PRACTITIONER

## 2022-10-06 PROCEDURE — 0 GADOBENATE DIMEGLUMINE 529 MG/ML SOLUTION: Performed by: NURSE PRACTITIONER

## 2022-10-06 PROCEDURE — 70553 MRI BRAIN STEM W/O & W/DYE: CPT

## 2022-10-06 RX ADMIN — GADOBENATE DIMEGLUMINE 8 ML: 529 INJECTION, SOLUTION INTRAVENOUS at 13:35
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
## 2022-11-14 ENCOUNTER — OFFICE (OUTPATIENT)
Dept: URBAN - METROPOLITAN AREA PATHOLOGY 4 | Facility: PATHOLOGY | Age: 42
End: 2022-11-14

## 2022-11-14 ENCOUNTER — AMBULATORY SURGICAL CENTER (OUTPATIENT)
Dept: URBAN - METROPOLITAN AREA SURGERY 20 | Facility: SURGERY | Age: 42
End: 2022-11-14
Payer: MEDICAID

## 2022-11-14 VITALS — HEIGHT: 60 IN

## 2022-11-14 DIAGNOSIS — K29.70 GASTRITIS, UNSPECIFIED, WITHOUT BLEEDING: ICD-10-CM

## 2022-11-14 DIAGNOSIS — R13.10 DYSPHAGIA, UNSPECIFIED: ICD-10-CM

## 2022-11-14 DIAGNOSIS — K22.2 ESOPHAGEAL OBSTRUCTION: ICD-10-CM

## 2022-11-14 DIAGNOSIS — K31.89 OTHER DISEASES OF STOMACH AND DUODENUM: ICD-10-CM

## 2022-11-14 DIAGNOSIS — K22.70 BARRETT'S ESOPHAGUS WITHOUT DYSPLASIA: ICD-10-CM

## 2022-11-14 LAB
GI HISTOLOGY: A. SELECT: (no result)
GI HISTOLOGY: B. SELECT: (no result)
GI HISTOLOGY: PDF REPORT: (no result)

## 2022-11-14 PROCEDURE — 43239 EGD BIOPSY SINGLE/MULTIPLE: CPT | Performed by: INTERNAL MEDICINE

## 2022-11-14 PROCEDURE — 43450 DILATE ESOPHAGUS 1/MULT PASS: CPT | Mod: 59 | Performed by: INTERNAL MEDICINE

## 2022-11-14 PROCEDURE — 88305 TISSUE EXAM BY PATHOLOGIST: CPT | Performed by: INTERNAL MEDICINE

## 2022-11-16 ENCOUNTER — OFFICE VISIT (OUTPATIENT)
Dept: NEUROSURGERY | Facility: CLINIC | Age: 42
End: 2022-11-16

## 2022-11-16 VITALS
BODY MASS INDEX: 15.31 KG/M2 | OXYGEN SATURATION: 97 % | DIASTOLIC BLOOD PRESSURE: 68 MMHG | HEIGHT: 60 IN | WEIGHT: 78 LBS | HEART RATE: 79 BPM | SYSTOLIC BLOOD PRESSURE: 100 MMHG | RESPIRATION RATE: 16 BRPM

## 2022-11-16 DIAGNOSIS — G89.29 CHRONIC NECK PAIN: Primary | ICD-10-CM

## 2022-11-16 DIAGNOSIS — M48.02 SPINAL STENOSIS OF CERVICAL REGION: ICD-10-CM

## 2022-11-16 DIAGNOSIS — N39.498 OTHER URINARY INCONTINENCE: ICD-10-CM

## 2022-11-16 DIAGNOSIS — M51.36 DDD (DEGENERATIVE DISC DISEASE), LUMBAR: ICD-10-CM

## 2022-11-16 DIAGNOSIS — M54.2 CHRONIC NECK PAIN: Primary | ICD-10-CM

## 2022-11-16 DIAGNOSIS — Z98.1 HISTORY OF FUSION OF CERVICAL SPINE: ICD-10-CM

## 2022-11-16 PROBLEM — R32 ABSENCE OF BLADDER CONTINENCE: Status: ACTIVE | Noted: 2022-11-16

## 2022-11-16 PROCEDURE — 99214 OFFICE O/P EST MOD 30 MIN: CPT | Performed by: NEUROLOGICAL SURGERY

## 2022-12-19 ENCOUNTER — OFFICE VISIT (OUTPATIENT)
Dept: NEUROLOGY | Facility: CLINIC | Age: 42
End: 2022-12-19

## 2022-12-19 VITALS
SYSTOLIC BLOOD PRESSURE: 90 MMHG | DIASTOLIC BLOOD PRESSURE: 68 MMHG | WEIGHT: 78 LBS | HEIGHT: 60 IN | BODY MASS INDEX: 15.31 KG/M2 | OXYGEN SATURATION: 98 % | HEART RATE: 90 BPM

## 2022-12-19 DIAGNOSIS — G56.03 BILATERAL CARPAL TUNNEL SYNDROME: Primary | ICD-10-CM

## 2022-12-19 DIAGNOSIS — M25.559 HIP PAIN: ICD-10-CM

## 2022-12-19 DIAGNOSIS — M79.2 NEUROPATHIC PAIN: ICD-10-CM

## 2022-12-19 DIAGNOSIS — M79.7 FIBROMYALGIA: ICD-10-CM

## 2022-12-19 PROBLEM — N90.89 VULVAR LESION: Status: ACTIVE | Noted: 2022-08-19

## 2022-12-19 PROBLEM — R23.2 HOT FLASHES: Status: ACTIVE | Noted: 2022-07-21

## 2022-12-19 PROBLEM — N92.6 IRREGULAR MENSES: Status: ACTIVE | Noted: 2022-07-21

## 2022-12-19 PROBLEM — D25.1 INTRAMURAL LEIOMYOMA OF UTERUS: Status: ACTIVE | Noted: 2022-07-21

## 2022-12-19 PROBLEM — Z91.89 AT HIGH RISK FOR BREAST CANCER: Status: ACTIVE | Noted: 2022-09-12

## 2022-12-19 PROBLEM — D50.9 IRON DEFICIENCY ANEMIA: Status: ACTIVE | Noted: 2022-11-12

## 2022-12-19 PROBLEM — N83.201 CYST OF RIGHT OVARY: Status: ACTIVE | Noted: 2022-07-21

## 2022-12-19 PROBLEM — N85.8 ATROPHIC ENDOMETRIUM: Status: ACTIVE | Noted: 2022-08-19

## 2022-12-19 PROBLEM — N60.92 ATYPICAL DUCTAL HYPERPLASIA OF LEFT BREAST: Status: ACTIVE | Noted: 2022-07-01

## 2022-12-19 PROBLEM — R10.32 LLQ PAIN: Status: ACTIVE | Noted: 2022-07-21

## 2022-12-19 PROBLEM — R61 NIGHT SWEATS: Status: ACTIVE | Noted: 2022-07-21

## 2022-12-19 PROBLEM — R39.15 URINARY URGENCY: Status: ACTIVE | Noted: 2022-11-08

## 2022-12-19 PROCEDURE — 99214 OFFICE O/P EST MOD 30 MIN: CPT | Performed by: PSYCHIATRY & NEUROLOGY

## 2022-12-19 RX ORDER — ARIPIPRAZOLE 10 MG/1
TABLET ORAL
COMMUNITY
Start: 2022-10-03

## 2022-12-19 RX ORDER — FAMOTIDINE 40 MG/1
TABLET, FILM COATED ORAL
COMMUNITY
Start: 2022-11-22

## 2022-12-19 RX ORDER — PREGABALIN 100 MG/1
100 CAPSULE ORAL 4 TIMES DAILY
Qty: 120 CAPSULE | Refills: 5 | Status: SHIPPED | OUTPATIENT
Start: 2022-12-19

## 2022-12-19 RX ORDER — METHYLPREDNISOLONE 4 MG/1
TABLET ORAL
Qty: 21 TABLET | Refills: 0 | Status: SHIPPED | OUTPATIENT
Start: 2022-12-19

## 2022-12-19 RX ORDER — LISDEXAMFETAMINE DIMESYLATE 30 MG/1
CAPSULE ORAL
COMMUNITY
Start: 2022-12-10
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
## 2022-12-24 ENCOUNTER — APPOINTMENT (OUTPATIENT)
Dept: MRI IMAGING | Facility: HOSPITAL | Age: 42
End: 2022-12-24

## 2023-01-03 ENCOUNTER — PROCEDURE VISIT (OUTPATIENT)
Dept: NEUROLOGY | Facility: CLINIC | Age: 43
End: 2023-01-03
Payer: MEDICARE

## 2023-01-03 DIAGNOSIS — G56.21 ULNAR NEUROPATHY AT ELBOW OF RIGHT UPPER EXTREMITY: ICD-10-CM

## 2023-01-03 DIAGNOSIS — G56.03 BILATERAL CARPAL TUNNEL SYNDROME: Primary | ICD-10-CM

## 2023-01-03 PROCEDURE — 95910 NRV CNDJ TEST 7-8 STUDIES: CPT | Performed by: PSYCHIATRY & NEUROLOGY

## 2023-01-03 PROCEDURE — 95886 MUSC TEST DONE W/N TEST COMP: CPT | Performed by: PSYCHIATRY & NEUROLOGY

## 2023-01-03 NOTE — PROGRESS NOTES
EMG and Nerve Conduction Studies    I.      Instrument used: Neuromax 1002  II.     Please see data sheets for tabular summary of NCS and details on methods, temperatures and lab standards.   III.    EMG muscles tested for upper extremity studies include the deltoid, biceps, triceps, pronator teres, extensor digitorum communis, first dorsal interosseous and abductor pollicis brevis.    IV.   EMG muscles tested for lower extremity studies include the vastus lateralis, tibialis anterior, peroneus longus, medial gastrocnemius and extensor digitorum brevis.    V.    Additional muscles tested as needed.  Paraspinal muscles tested as needed.   VI.   Please see data sheets for tabular summary of EMG findings.   VII. The complete report includes the data sheets.      Indication: Numbness in both hands, right greater than left  History: 42-year-old [place] female with history of numbness in both hands who previously was found to have bilateral carpal tunnel syndrome, severe on the right and moderate on the left along with a moderate right ulnar neuropathy at the elbow.  Previous EMG performed by me on 10/7/2021 showed the median motor distal latencies of 6.3 ms on the right and 5.0 ms on the left.  But conduction velocity was mildly slowed on the right at 47.1 m/s.  The right ulnar motor conduction velocity was slow across the elbow at 46.2 m/s but normal on the left.    The patient underwent a right carpal tunnel release and ulnar nerve transposition.  The patient states that subsequently she has noticed increased numbness in both hands.    Study requests right upper extremity study      Ht: 152.4 cm  Wt: 35.4 kg; BMI 15.23  HbA1C:   Lab Results   Component Value Date    HGBA1C 5.63 (H) 11/08/2021     TSH:   Lab Results   Component Value Date    TSH 6.970 (H) 11/08/2021       Technical summary:  Nerve conduction studies were obtained in both arms.  Skin temperatures were at least 32 °C measured on the palms after warming the  hands.  Needle examination was obtained on selected muscles of the right arm.    Results:  1.  Right median antidromic sensory distal latency and amplitude.  2.  Prolonged right median orthodromic palmar sensory distal latency at 2.3 ms with normal amplitude.  Upper limit of normal left median orthodromic palmar sensory distal latency at 2.2 ms with normal amplitude.  3.  Normal median motor distal latencies, conduction velocities and amplitudes.  4.  Normal absolute right ulnar motor conduction velocity in the short segment across the elbow at 50 m/s with much faster velocity below the elbow at 81.5 m/s.  The difference of 31.5 m/s is a mild abnormality.  Normal distal latency and amplitudes.  Normal left ulnar motor conduction velocities with normal distal latency and amplitudes.  5.  Needle examination of selected muscles in the right arm showed normal insertional activities throughout.  There were normal appearing early motor units but recruitment was severely reduced in a ratcheting fashion with complaints of pain.    Impression:  Abnormal study showing a mild residual right median neuropathy at the wrist and mild residual right ulnar neuropathy at the elbow.  There was no evidence of a left median or ulnar neuropathy by this study.  There was no evidence of a right cervical radiculopathy by this study.  Study results were discussed with the patient.    Poncho Miller M.D.              Dictated utilizing Dragon dictation.

## 2023-01-03 NOTE — LETTER
January 3, 2023     Alex Rico MD  [address] [facility] [address]  [facility] [place] [address]    Patient: Bienvenido Carter   YOB: 1980   Date of Visit: 1/3/2023       Dear Dr. Jacky MD:    Thank you for referring Bienvenido Carter to me for evaluation. Below are the relevant portions of my assessment and plan of care.    If you have questions, please do not hesitate to call me. I look forward to following Bienvenido along with you.         Sincerely,        Poncho Miller MD        CC: MD Paul Mora Greg N, MD  01/03/23 1630  Signed  EMG and Nerve Conduction Studies    I.      Instrument used: Neuromax 1002  II.     Please see data sheets for tabular summary of NCS and details on methods, temperatures and lab standards.   III.    EMG muscles tested for upper extremity studies include the deltoid, biceps, triceps, pronator teres, extensor digitorum communis, first dorsal interosseous and abductor pollicis brevis.    IV.   EMG muscles tested for lower extremity studies include the vastus lateralis, tibialis anterior, peroneus longus, medial gastrocnemius and extensor digitorum brevis.    V.    Additional muscles tested as needed.  Paraspinal muscles tested as needed.   VI.   Please see data sheets for tabular summary of EMG findings.   VII. The complete report includes the data sheets.      Indication: Numbness in both hands, right greater than left  History: 42-year-old [place] female with history of numbness in both hands who previously was found to have bilateral carpal tunnel syndrome, severe on the right and moderate on the left along with a moderate right ulnar neuropathy at the elbow.  Previous EMG performed by me on 10/7/2021 showed the median motor distal latencies of 6.3 ms on the right and 5.0 ms on the left.  But conduction velocity was mildly slowed on the right at 47.1 m/s.  The right ulnar motor conduction velocity was slow across the elbow at 46.2 m/s but normal on  the left.    The patient underwent a right carpal tunnel release and ulnar nerve transposition.  The patient states that subsequently she has noticed increased numbness in both hands.    Study requests right upper extremity study      Ht: 152.4 cm  Wt: 35.4 kg; BMI 15.23  HbA1C:   Lab Results   Component Value Date    HGBA1C 5.63 (H) 11/08/2021     TSH:   Lab Results   Component Value Date    TSH 6.970 (H) 11/08/2021       Technical summary:  Nerve conduction studies were obtained in both arms.  Skin temperatures were at least 32 °C measured on the palms after warming the hands.  Needle examination was obtained on selected muscles of the right arm.    Results:  1.  Right median antidromic sensory distal latency and amplitude.  2.  Prolonged right median orthodromic palmar sensory distal latency at 2.3 ms with normal amplitude.  Upper limit of normal left median orthodromic palmar sensory distal latency at 2.2 ms with normal amplitude.  3.  Normal median motor distal latencies, conduction velocities and amplitudes.  4.  Normal absolute right ulnar motor conduction velocity in the short segment across the elbow at 50 m/s with much faster velocity below the elbow at 81.5 m/s.  The difference of 31.5 m/s is a mild abnormality.  Normal distal latency and amplitudes.  Normal left ulnar motor conduction velocities with normal distal latency and amplitudes.  5.  Needle examination of selected muscles in the right arm showed normal insertional activities throughout.  There were normal appearing early motor units but recruitment was severely reduced in a ratcheting fashion with complaints of pain.    Impression:  Abnormal study showing a mild residual right median neuropathy at the wrist and mild residual right ulnar neuropathy at the elbow.  There was no evidence of a left median or ulnar neuropathy by this study.  There was no evidence of a right cervical radiculopathy by this study.  Study results were discussed with the  patient.    Poncho Miller M.D.              Dictated utilizing Dragon dictation.

## 2023-01-04 ENCOUNTER — HOSPITAL ENCOUNTER (OUTPATIENT)
Dept: MRI IMAGING | Facility: HOSPITAL | Age: 43
End: 2023-01-04

## 2023-01-05 ENCOUNTER — HOSPITAL ENCOUNTER (OUTPATIENT)
Dept: GENERAL RADIOLOGY | Facility: HOSPITAL | Age: 43
Discharge: HOME | End: 2023-01-05
Payer: MEDICARE

## 2023-01-05 ENCOUNTER — HOSPITAL ENCOUNTER (OUTPATIENT)
Dept: MRI IMAGING | Facility: HOSPITAL | Age: 43
Discharge: HOME | End: 2023-01-05
Payer: MEDICARE

## 2023-01-05 DIAGNOSIS — Z98.1 HISTORY OF FUSION OF CERVICAL SPINE: ICD-10-CM

## 2023-01-05 DIAGNOSIS — M48.02 SPINAL STENOSIS OF CERVICAL REGION: ICD-10-CM

## 2023-01-05 DIAGNOSIS — M54.2 CHRONIC NECK PAIN: ICD-10-CM

## 2023-01-05 DIAGNOSIS — G89.29 CHRONIC NECK PAIN: ICD-10-CM

## 2023-01-05 PROCEDURE — 0 GADOBENATE DIMEGLUMINE 529 MG/ML SOLUTION: Performed by: NEUROLOGICAL SURGERY

## 2023-01-05 PROCEDURE — 72156 MRI NECK SPINE W/O & W/DYE: CPT

## 2023-01-05 PROCEDURE — A9577 INJ MULTIHANCE: HCPCS | Performed by: NEUROLOGICAL SURGERY

## 2023-01-05 PROCEDURE — 72050 X-RAY EXAM NECK SPINE 4/5VWS: CPT

## 2023-01-05 RX ADMIN — GADOBENATE DIMEGLUMINE 6 ML: 529 INJECTION, SOLUTION INTRAVENOUS at 16:21

## 2023-01-19 ENCOUNTER — HOSPITAL ENCOUNTER (OUTPATIENT)
Dept: PHYSICAL THERAPY | Facility: HOSPITAL | Age: 43
Discharge: HOME | End: 2023-01-19
Admitting: PSYCHIATRY & NEUROLOGY
Payer: MEDICARE

## 2023-01-19 DIAGNOSIS — G89.29 CHRONIC BILATERAL LOW BACK PAIN WITH LEFT-SIDED SCIATICA: Primary | ICD-10-CM

## 2023-01-19 DIAGNOSIS — M54.42 CHRONIC BILATERAL LOW BACK PAIN WITH LEFT-SIDED SCIATICA: Primary | ICD-10-CM

## 2023-01-19 DIAGNOSIS — R29.898 LEFT LEG WEAKNESS: ICD-10-CM

## 2023-01-19 PROCEDURE — 97110 THERAPEUTIC EXERCISES: CPT

## 2023-01-19 PROCEDURE — 97161 PT EVAL LOW COMPLEX 20 MIN: CPT
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
## 2023-01-25 ENCOUNTER — HOSPITAL ENCOUNTER (OUTPATIENT)
Dept: PHYSICAL THERAPY | Facility: HOSPITAL | Age: 43
Setting detail: THERAPIES SERIES
Discharge: HOME | End: 2023-01-25
Payer: MEDICARE

## 2023-01-25 DIAGNOSIS — M54.42 CHRONIC BILATERAL LOW BACK PAIN WITH LEFT-SIDED SCIATICA: Primary | ICD-10-CM

## 2023-01-25 DIAGNOSIS — R29.898 LEG WEAKNESS, BILATERAL: ICD-10-CM

## 2023-01-25 DIAGNOSIS — R29.898 LEFT LEG WEAKNESS: ICD-10-CM

## 2023-01-25 DIAGNOSIS — G89.29 CHRONIC BILATERAL LOW BACK PAIN WITH LEFT-SIDED SCIATICA: Primary | ICD-10-CM

## 2023-01-25 PROCEDURE — 97110 THERAPEUTIC EXERCISES: CPT
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
## 2023-01-31 ENCOUNTER — HOSPITAL ENCOUNTER (OUTPATIENT)
Dept: PHYSICAL THERAPY | Facility: HOSPITAL | Age: 43
Setting detail: THERAPIES SERIES
Discharge: HOME | End: 2023-01-31
Payer: MEDICARE

## 2023-01-31 DIAGNOSIS — G89.29 CHRONIC BILATERAL LOW BACK PAIN WITH LEFT-SIDED SCIATICA: Primary | ICD-10-CM

## 2023-01-31 DIAGNOSIS — M54.42 CHRONIC BILATERAL LOW BACK PAIN WITH LEFT-SIDED SCIATICA: Primary | ICD-10-CM

## 2023-01-31 DIAGNOSIS — R29.898 LEFT LEG WEAKNESS: ICD-10-CM

## 2023-01-31 DIAGNOSIS — R29.898 LEG WEAKNESS, BILATERAL: ICD-10-CM

## 2023-01-31 PROCEDURE — 97110 THERAPEUTIC EXERCISES: CPT

## 2023-02-02 DIAGNOSIS — M48.02 SPINAL STENOSIS OF CERVICAL REGION: Primary | ICD-10-CM

## 2023-02-02 DIAGNOSIS — M51.36 DDD (DEGENERATIVE DISC DISEASE), LUMBAR: ICD-10-CM

## 2023-02-03 ENCOUNTER — HOSPITAL ENCOUNTER (OUTPATIENT)
Dept: PHYSICAL THERAPY | Facility: HOSPITAL | Age: 43
Setting detail: THERAPIES SERIES
Discharge: HOME | End: 2023-02-03
Payer: MEDICARE

## 2023-02-03 DIAGNOSIS — G89.29 CHRONIC BILATERAL LOW BACK PAIN WITH LEFT-SIDED SCIATICA: Primary | ICD-10-CM

## 2023-02-03 DIAGNOSIS — M54.42 CHRONIC BILATERAL LOW BACK PAIN WITH LEFT-SIDED SCIATICA: Primary | ICD-10-CM

## 2023-02-03 DIAGNOSIS — R29.898 LEFT LEG WEAKNESS: ICD-10-CM

## 2023-02-03 PROCEDURE — 97110 THERAPEUTIC EXERCISES: CPT | Performed by: PHYSICAL THERAPIST
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
## 2023-02-09 ENCOUNTER — HOSPITAL ENCOUNTER (OUTPATIENT)
Dept: PHYSICAL THERAPY | Facility: HOSPITAL | Age: 43
Setting detail: THERAPIES SERIES
Discharge: HOME | End: 2023-02-09
Payer: MEDICARE

## 2023-02-09 DIAGNOSIS — M54.42 CHRONIC BILATERAL LOW BACK PAIN WITH LEFT-SIDED SCIATICA: Primary | ICD-10-CM

## 2023-02-09 DIAGNOSIS — R29.898 LEG WEAKNESS, BILATERAL: ICD-10-CM

## 2023-02-09 DIAGNOSIS — G89.29 CHRONIC BILATERAL LOW BACK PAIN WITH LEFT-SIDED SCIATICA: Primary | ICD-10-CM

## 2023-02-09 DIAGNOSIS — R29.898 LEFT LEG WEAKNESS: ICD-10-CM

## 2023-02-09 PROCEDURE — 97110 THERAPEUTIC EXERCISES: CPT
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
## 2023-02-10 DIAGNOSIS — M48.02 SPINAL STENOSIS OF CERVICAL REGION: Primary | ICD-10-CM

## 2023-02-14 ENCOUNTER — HOSPITAL ENCOUNTER (OUTPATIENT)
Dept: PHYSICAL THERAPY | Facility: HOSPITAL | Age: 43
Setting detail: THERAPIES SERIES
Discharge: HOME | End: 2023-02-14
Payer: MEDICARE

## 2023-02-14 DIAGNOSIS — M54.42 CHRONIC BILATERAL LOW BACK PAIN WITH LEFT-SIDED SCIATICA: Primary | ICD-10-CM

## 2023-02-14 DIAGNOSIS — G89.29 CHRONIC BILATERAL LOW BACK PAIN WITH LEFT-SIDED SCIATICA: Primary | ICD-10-CM

## 2023-02-14 DIAGNOSIS — R29.898 LEFT LEG WEAKNESS: ICD-10-CM

## 2023-02-14 DIAGNOSIS — R29.898 LEG WEAKNESS, BILATERAL: ICD-10-CM

## 2023-02-14 PROCEDURE — 97110 THERAPEUTIC EXERCISES: CPT
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
## 2023-02-21 ENCOUNTER — HOSPITAL ENCOUNTER (OUTPATIENT)
Dept: PHYSICAL THERAPY | Facility: HOSPITAL | Age: 43
Setting detail: THERAPIES SERIES
Discharge: HOME | End: 2023-02-21
Payer: MEDICARE

## 2023-02-21 DIAGNOSIS — G89.29 CHRONIC BILATERAL LOW BACK PAIN WITH LEFT-SIDED SCIATICA: Primary | ICD-10-CM

## 2023-02-21 DIAGNOSIS — R29.898 LEFT LEG WEAKNESS: ICD-10-CM

## 2023-02-21 DIAGNOSIS — M54.42 CHRONIC BILATERAL LOW BACK PAIN WITH LEFT-SIDED SCIATICA: Primary | ICD-10-CM

## 2023-02-21 DIAGNOSIS — R29.898 LEG WEAKNESS, BILATERAL: ICD-10-CM

## 2023-02-21 PROCEDURE — 97530 THERAPEUTIC ACTIVITIES: CPT

## 2023-02-21 PROCEDURE — 97110 THERAPEUTIC EXERCISES: CPT
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
## 2023-02-24 ENCOUNTER — HOSPITAL ENCOUNTER (OUTPATIENT)
Dept: PHYSICAL THERAPY | Facility: HOSPITAL | Age: 43
Setting detail: THERAPIES SERIES
Discharge: HOME | End: 2023-02-24
Payer: MEDICARE

## 2023-02-24 DIAGNOSIS — G89.29 CHRONIC BILATERAL LOW BACK PAIN WITH LEFT-SIDED SCIATICA: Primary | ICD-10-CM

## 2023-02-24 DIAGNOSIS — R29.898 LEG WEAKNESS, BILATERAL: ICD-10-CM

## 2023-02-24 DIAGNOSIS — R29.898 LEFT LEG WEAKNESS: ICD-10-CM

## 2023-02-24 DIAGNOSIS — M54.42 CHRONIC BILATERAL LOW BACK PAIN WITH LEFT-SIDED SCIATICA: Primary | ICD-10-CM

## 2023-02-24 PROCEDURE — 97530 THERAPEUTIC ACTIVITIES: CPT

## 2023-02-24 PROCEDURE — 97110 THERAPEUTIC EXERCISES: CPT
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
## 2023-02-28 ENCOUNTER — HOSPITAL ENCOUNTER (OUTPATIENT)
Dept: PHYSICAL THERAPY | Facility: HOSPITAL | Age: 43
Setting detail: THERAPIES SERIES
Discharge: HOME | End: 2023-02-28
Payer: MEDICARE

## 2023-02-28 DIAGNOSIS — M54.42 CHRONIC BILATERAL LOW BACK PAIN WITH LEFT-SIDED SCIATICA: Primary | ICD-10-CM

## 2023-02-28 DIAGNOSIS — G89.29 CHRONIC BILATERAL LOW BACK PAIN WITH LEFT-SIDED SCIATICA: Primary | ICD-10-CM

## 2023-02-28 DIAGNOSIS — R29.898 LEFT LEG WEAKNESS: ICD-10-CM

## 2023-02-28 DIAGNOSIS — R29.898 LEG WEAKNESS, BILATERAL: ICD-10-CM

## 2023-02-28 PROCEDURE — 97110 THERAPEUTIC EXERCISES: CPT

## 2023-02-28 PROCEDURE — 97530 THERAPEUTIC ACTIVITIES: CPT

## 2023-03-01 RX ORDER — CYCLOBENZAPRINE HCL 10 MG
10 TABLET ORAL
Qty: 30 TABLET | Refills: 2 | Status: SHIPPED | OUTPATIENT
Start: 2023-03-01 | End: 2024-02-29

## 2023-03-03 ENCOUNTER — APPOINTMENT (OUTPATIENT)
Dept: PHYSICAL THERAPY | Facility: HOSPITAL | Age: 43
End: 2023-03-03
Payer: MEDICARE

## 2023-03-07 ENCOUNTER — TELEPHONE (OUTPATIENT)
Dept: PHYSICAL THERAPY | Facility: HOSPITAL | Age: 43
End: 2023-03-07
Payer: MEDICARE

## 2023-03-07 NOTE — TELEPHONE ENCOUNTER
Patient did not show for today's physical therapy appointment. Left VM regarding missed appointment and next scheduled appointment date/time with reminder of clinic phone number and request to call 24 hours ahead if needing to cancel/change appointment.

## 2023-03-10 ENCOUNTER — HOSPITAL ENCOUNTER (OUTPATIENT)
Dept: PHYSICAL THERAPY | Facility: HOSPITAL | Age: 43
Setting detail: THERAPIES SERIES
Discharge: HOME | End: 2023-03-10
Payer: MEDICARE

## 2023-03-10 DIAGNOSIS — G89.29 CHRONIC BILATERAL LOW BACK PAIN WITH LEFT-SIDED SCIATICA: Primary | ICD-10-CM

## 2023-03-10 DIAGNOSIS — R29.898 LEG WEAKNESS, BILATERAL: ICD-10-CM

## 2023-03-10 DIAGNOSIS — M54.42 CHRONIC BILATERAL LOW BACK PAIN WITH LEFT-SIDED SCIATICA: Primary | ICD-10-CM

## 2023-03-10 DIAGNOSIS — R29.898 LEFT LEG WEAKNESS: ICD-10-CM

## 2023-03-10 PROCEDURE — 97110 THERAPEUTIC EXERCISES: CPT | Performed by: PHYSICAL THERAPIST

## 2023-03-10 PROCEDURE — 97530 THERAPEUTIC ACTIVITIES: CPT | Performed by: PHYSICAL THERAPIST
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
## 2023-03-14 ENCOUNTER — HOSPITAL ENCOUNTER (OUTPATIENT)
Dept: PHYSICAL THERAPY | Facility: HOSPITAL | Age: 43
Setting detail: THERAPIES SERIES
Discharge: HOME | End: 2023-03-14
Payer: MEDICARE

## 2023-03-14 DIAGNOSIS — M54.42 CHRONIC BILATERAL LOW BACK PAIN WITH LEFT-SIDED SCIATICA: Primary | ICD-10-CM

## 2023-03-14 DIAGNOSIS — R29.898 LEG WEAKNESS, BILATERAL: ICD-10-CM

## 2023-03-14 DIAGNOSIS — G89.29 CHRONIC BILATERAL LOW BACK PAIN WITH LEFT-SIDED SCIATICA: Primary | ICD-10-CM

## 2023-03-14 DIAGNOSIS — R29.898 LEFT LEG WEAKNESS: ICD-10-CM

## 2023-03-14 PROCEDURE — 97110 THERAPEUTIC EXERCISES: CPT

## 2023-03-14 PROCEDURE — 97530 THERAPEUTIC ACTIVITIES: CPT

## 2023-03-16 ENCOUNTER — APPOINTMENT (OUTPATIENT)
Dept: PHYSICAL THERAPY | Facility: HOSPITAL | Age: 43
End: 2023-03-16
Payer: MEDICARE

## 2023-03-24 ENCOUNTER — HOSPITAL ENCOUNTER (OUTPATIENT)
Dept: PHYSICAL THERAPY | Facility: HOSPITAL | Age: 43
Setting detail: THERAPIES SERIES
Discharge: HOME | End: 2023-03-24
Payer: MEDICARE

## 2023-03-24 DIAGNOSIS — R26.2 DIFFICULTY WALKING: ICD-10-CM

## 2023-03-24 DIAGNOSIS — M79.7 FIBROMYALGIA: ICD-10-CM

## 2023-03-24 DIAGNOSIS — M62.81 MUSCLE WEAKNESS (GENERALIZED): ICD-10-CM

## 2023-03-24 DIAGNOSIS — M54.42 CHRONIC BILATERAL LOW BACK PAIN WITH LEFT-SIDED SCIATICA: Primary | ICD-10-CM

## 2023-03-24 DIAGNOSIS — Z78.9 DIFFICULTY NAVIGATING STAIRS: ICD-10-CM

## 2023-03-24 DIAGNOSIS — Z98.1 HISTORY OF FUSION OF CERVICAL SPINE: ICD-10-CM

## 2023-03-24 DIAGNOSIS — G89.29 CHRONIC NECK PAIN: ICD-10-CM

## 2023-03-24 DIAGNOSIS — G89.29 CHRONIC BILATERAL LOW BACK PAIN WITHOUT SCIATICA: ICD-10-CM

## 2023-03-24 DIAGNOSIS — M54.2 CHRONIC NECK PAIN: ICD-10-CM

## 2023-03-24 DIAGNOSIS — R29.898 LEG WEAKNESS, BILATERAL: ICD-10-CM

## 2023-03-24 DIAGNOSIS — M54.50 CHRONIC BILATERAL LOW BACK PAIN WITHOUT SCIATICA: ICD-10-CM

## 2023-03-24 DIAGNOSIS — R29.898 LEFT LEG WEAKNESS: ICD-10-CM

## 2023-03-24 DIAGNOSIS — G89.29 CHRONIC BILATERAL LOW BACK PAIN WITH LEFT-SIDED SCIATICA: Primary | ICD-10-CM

## 2023-03-24 DIAGNOSIS — Z98.890 H/O ABDOMINAL SURGERY: ICD-10-CM

## 2023-03-24 PROCEDURE — 97110 THERAPEUTIC EXERCISES: CPT

## 2023-03-24 PROCEDURE — 97140 MANUAL THERAPY 1/> REGIONS: CPT
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
## 2023-03-29 ENCOUNTER — HOSPITAL ENCOUNTER (OUTPATIENT)
Dept: PHYSICAL THERAPY | Facility: HOSPITAL | Age: 43
Setting detail: THERAPIES SERIES
Discharge: HOME | End: 2023-03-29
Payer: MEDICARE

## 2023-03-29 DIAGNOSIS — M54.42 CHRONIC BILATERAL LOW BACK PAIN WITH LEFT-SIDED SCIATICA: Primary | ICD-10-CM

## 2023-03-29 DIAGNOSIS — R29.898 LEG WEAKNESS, BILATERAL: ICD-10-CM

## 2023-03-29 DIAGNOSIS — R29.898 LEFT LEG WEAKNESS: ICD-10-CM

## 2023-03-29 DIAGNOSIS — G89.29 CHRONIC BILATERAL LOW BACK PAIN WITH LEFT-SIDED SCIATICA: Primary | ICD-10-CM

## 2023-03-29 PROCEDURE — 97110 THERAPEUTIC EXERCISES: CPT
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
## 2023-04-11 ENCOUNTER — TELEPHONE (OUTPATIENT)
Dept: NEUROLOGY | Facility: CLINIC | Age: 43
End: 2023-04-11
Payer: MEDICARE

## 2023-04-11 NOTE — TELEPHONE ENCOUNTER
I was sent some F/U info on this pt from a Lázaro Shirley since I am on the treating list. I saw that she was going to start Botox which had apparently been approved but it did not happen and I was wondering what happened.    darrell

## 2023-04-12 ENCOUNTER — HOSPITAL ENCOUNTER (OUTPATIENT)
Dept: PHYSICAL THERAPY | Facility: HOSPITAL | Age: 43
Setting detail: THERAPIES SERIES
Discharge: HOME | End: 2023-04-12
Payer: MEDICARE

## 2023-04-12 DIAGNOSIS — M62.81 MUSCLE WEAKNESS (GENERALIZED): ICD-10-CM

## 2023-04-12 DIAGNOSIS — G89.29 CHRONIC NECK PAIN: Primary | ICD-10-CM

## 2023-04-12 DIAGNOSIS — M54.2 CHRONIC NECK PAIN: Primary | ICD-10-CM

## 2023-04-12 DIAGNOSIS — M79.7 FIBROMYALGIA: ICD-10-CM

## 2023-04-12 PROCEDURE — 97110 THERAPEUTIC EXERCISES: CPT

## 2023-04-12 PROCEDURE — 97161 PT EVAL LOW COMPLEX 20 MIN: CPT

## 2023-04-12 PROCEDURE — 97140 MANUAL THERAPY 1/> REGIONS: CPT
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
## 2023-04-14 ENCOUNTER — HOSPITAL ENCOUNTER (OUTPATIENT)
Dept: PHYSICAL THERAPY | Facility: HOSPITAL | Age: 43
Setting detail: THERAPIES SERIES
Discharge: HOME | End: 2023-04-14

## 2023-04-14 DIAGNOSIS — M54.2 CHRONIC NECK PAIN: Primary | ICD-10-CM

## 2023-04-14 DIAGNOSIS — G89.29 CHRONIC NECK PAIN: Primary | ICD-10-CM

## 2023-04-14 DIAGNOSIS — M62.81 MUSCLE WEAKNESS (GENERALIZED): ICD-10-CM

## 2023-04-14 DIAGNOSIS — M79.7 FIBROMYALGIA: ICD-10-CM
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
## 2023-04-16 NOTE — TELEPHONE ENCOUNTER
Looks like she called in Friday about unilateral numbness which is a new symptom. CT brain negative. She is scheduled for July. I need to see her ASAP. Check for any cancellations. I can discuss her HAs then.    darrell

## 2023-04-17 ENCOUNTER — HOSPITAL ENCOUNTER (OUTPATIENT)
Dept: PHYSICAL THERAPY | Facility: HOSPITAL | Age: 43
Setting detail: THERAPIES SERIES
Discharge: HOME | End: 2023-04-17
Payer: MEDICARE

## 2023-04-17 DIAGNOSIS — M54.2 CHRONIC NECK PAIN: Primary | ICD-10-CM

## 2023-04-17 DIAGNOSIS — M79.7 FIBROMYALGIA: ICD-10-CM

## 2023-04-17 DIAGNOSIS — M62.81 MUSCLE WEAKNESS (GENERALIZED): ICD-10-CM

## 2023-04-17 DIAGNOSIS — G89.29 CHRONIC NECK PAIN: Primary | ICD-10-CM

## 2023-04-17 PROCEDURE — 97110 THERAPEUTIC EXERCISES: CPT
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
## 2023-04-19 ENCOUNTER — TELEPHONE (OUTPATIENT)
Dept: PHYSICAL THERAPY | Facility: HOSPITAL | Age: 43
End: 2023-04-19
Payer: MEDICARE
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
## 2023-04-21 ENCOUNTER — OFFICE VISIT (OUTPATIENT)
Dept: NEUROLOGY | Facility: CLINIC | Age: 43
End: 2023-04-21
Payer: MEDICARE

## 2023-04-21 VITALS
DIASTOLIC BLOOD PRESSURE: 60 MMHG | SYSTOLIC BLOOD PRESSURE: 100 MMHG | BODY MASS INDEX: 15.82 KG/M2 | WEIGHT: 80.6 LBS | OXYGEN SATURATION: 100 % | HEART RATE: 80 BPM | HEIGHT: 60 IN

## 2023-04-21 DIAGNOSIS — M79.7 FIBROMYALGIA: ICD-10-CM

## 2023-04-21 DIAGNOSIS — M79.2 NEUROPATHIC PAIN: ICD-10-CM

## 2023-04-21 DIAGNOSIS — R20.0 HEMISENSORY LOSS: Primary | ICD-10-CM

## 2023-04-21 DIAGNOSIS — I69.30 SEQUELAE, POST-STROKE: ICD-10-CM

## 2023-04-21 RX ORDER — PREGABALIN 100 MG/1
100 CAPSULE ORAL 4 TIMES DAILY
Qty: 120 CAPSULE | Refills: 5 | Status: SHIPPED | OUTPATIENT
Start: 2023-04-21

## 2023-04-21 RX ORDER — MOXIFLOXACIN HCL 0.5 %
DROPS OPHTHALMIC (EYE)
COMMUNITY
Start: 2023-01-16

## 2023-04-21 RX ORDER — MELATONIN
2 DAILY
Qty: 60 TABLET | Refills: 2 | COMMUNITY
Start: 2023-01-31 | End: 2023-05-01
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
## 2023-04-24 ENCOUNTER — APPOINTMENT (OUTPATIENT)
Dept: PHYSICAL THERAPY | Facility: HOSPITAL | Age: 43
End: 2023-04-24
Payer: MEDICARE

## 2023-04-26 ENCOUNTER — HOSPITAL ENCOUNTER (OUTPATIENT)
Dept: PHYSICAL THERAPY | Facility: HOSPITAL | Age: 43
Setting detail: THERAPIES SERIES
Discharge: HOME | End: 2023-04-26
Payer: MEDICARE

## 2023-04-26 DIAGNOSIS — M79.7 FIBROMYALGIA: ICD-10-CM

## 2023-04-26 DIAGNOSIS — M54.2 CHRONIC NECK PAIN: Primary | ICD-10-CM

## 2023-04-26 DIAGNOSIS — G89.29 CHRONIC NECK PAIN: Primary | ICD-10-CM

## 2023-04-26 DIAGNOSIS — M62.81 MUSCLE WEAKNESS (GENERALIZED): ICD-10-CM
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
## 2023-05-01 ENCOUNTER — LAB (OUTPATIENT)
Dept: LAB | Facility: HOSPITAL | Age: 43
End: 2023-05-01
Payer: MEDICARE

## 2023-05-01 ENCOUNTER — HOSPITAL ENCOUNTER (OUTPATIENT)
Dept: PHYSICAL THERAPY | Facility: HOSPITAL | Age: 43
Setting detail: THERAPIES SERIES
Discharge: HOME | End: 2023-05-01
Payer: MEDICARE

## 2023-05-01 DIAGNOSIS — M54.2 CHRONIC NECK PAIN: Primary | ICD-10-CM

## 2023-05-01 DIAGNOSIS — I69.30 SEQUELAE, POST-STROKE: ICD-10-CM

## 2023-05-01 DIAGNOSIS — R20.0 HEMISENSORY LOSS: ICD-10-CM

## 2023-05-01 DIAGNOSIS — M79.7 FIBROMYALGIA: ICD-10-CM

## 2023-05-01 DIAGNOSIS — G89.29 CHRONIC NECK PAIN: Primary | ICD-10-CM

## 2023-05-01 DIAGNOSIS — M62.81 MUSCLE WEAKNESS (GENERALIZED): ICD-10-CM

## 2023-05-01 LAB
ALBUMIN SERPL-MCNC: 4.5 G/DL (ref 3.5–5.2)
ALBUMIN/GLOB SERPL: 2 G/DL
ALP SERPL-CCNC: 54 U/L (ref 39–117)
ALT SERPL W P-5'-P-CCNC: 13 U/L (ref 1–33)
ANION GAP SERPL CALCULATED.3IONS-SCNC: 10 MMOL/L (ref 5–15)
AST SERPL-CCNC: 13 U/L (ref 1–32)
BILIRUB SERPL-MCNC: 0.3 MG/DL (ref 0–1.2)
BUN SERPL-MCNC: 16 MG/DL (ref 6–20)
BUN/CREAT SERPL: 23.5 (ref 7–25)
CALCIUM SPEC-SCNC: 9.5 MG/DL (ref 8.6–10.5)
CHLORIDE SERPL-SCNC: 101 MMOL/L (ref 98–107)
CHOLEST SERPL-MCNC: 213 MG/DL (ref 0–200)
CO2 SERPL-SCNC: 26 MMOL/L (ref 22–29)
CREAT SERPL-MCNC: 0.68 MG/DL (ref 0.57–1)
EGFRCR SERPLBLD CKD-EPI 2021: 111 ML/MIN/1.73
GLOBULIN UR ELPH-MCNC: 2.2 GM/DL
GLUCOSE SERPL-MCNC: 112 MG/DL (ref 65–99)
HDLC SERPL-MCNC: 60 MG/DL (ref 40–60)
LDLC SERPL CALC-MCNC: 124 MG/DL (ref 0–100)
LDLC/HDLC SERPL: 2.01 {RATIO}
POTASSIUM SERPL-SCNC: 4 MMOL/L (ref 3.5–5.2)
PROT SERPL-MCNC: 6.7 G/DL (ref 6–8.5)
SODIUM SERPL-SCNC: 137 MMOL/L (ref 136–145)
TRIGL SERPL-MCNC: 163 MG/DL (ref 0–150)
VLDLC SERPL-MCNC: 29 MG/DL (ref 5–40)

## 2023-05-01 PROCEDURE — 80061 LIPID PANEL: CPT

## 2023-05-01 PROCEDURE — 97110 THERAPEUTIC EXERCISES: CPT

## 2023-05-01 PROCEDURE — 80053 COMPREHEN METABOLIC PANEL: CPT | Performed by: PSYCHIATRY & NEUROLOGY
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
## 2023-05-03 ENCOUNTER — HOSPITAL ENCOUNTER (OUTPATIENT)
Dept: PHYSICAL THERAPY | Facility: HOSPITAL | Age: 43
Setting detail: THERAPIES SERIES
Discharge: HOME | End: 2023-05-03
Payer: MEDICARE

## 2023-05-03 DIAGNOSIS — M54.2 CHRONIC NECK PAIN: Primary | ICD-10-CM

## 2023-05-03 DIAGNOSIS — G89.29 CHRONIC NECK PAIN: Primary | ICD-10-CM

## 2023-05-03 DIAGNOSIS — M62.81 MUSCLE WEAKNESS (GENERALIZED): ICD-10-CM

## 2023-05-03 DIAGNOSIS — M79.7 FIBROMYALGIA: ICD-10-CM
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
## 2023-05-10 ENCOUNTER — HOSPITAL ENCOUNTER (OUTPATIENT)
Dept: PHYSICAL THERAPY | Facility: HOSPITAL | Age: 43
Setting detail: THERAPIES SERIES
Discharge: HOME | End: 2023-05-10
Payer: MEDICARE

## 2023-05-10 DIAGNOSIS — M62.81 MUSCLE WEAKNESS (GENERALIZED): ICD-10-CM

## 2023-05-10 DIAGNOSIS — G89.29 CHRONIC NECK PAIN: Primary | ICD-10-CM

## 2023-05-10 DIAGNOSIS — M54.2 CHRONIC NECK PAIN: Primary | ICD-10-CM

## 2023-05-10 DIAGNOSIS — M79.7 FIBROMYALGIA: ICD-10-CM

## 2023-05-10 PROCEDURE — 97110 THERAPEUTIC EXERCISES: CPT
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
## 2023-05-26 ENCOUNTER — HOSPITAL ENCOUNTER (OUTPATIENT)
Dept: MRI IMAGING | Facility: HOSPITAL | Age: 43
Discharge: HOME | End: 2023-05-26
Payer: MEDICARE

## 2023-05-26 DIAGNOSIS — R20.0 HEMISENSORY LOSS: ICD-10-CM

## 2023-05-26 DIAGNOSIS — I69.30 SEQUELAE, POST-STROKE: ICD-10-CM

## 2023-06-14 ENCOUNTER — HOSPITAL ENCOUNTER (OUTPATIENT)
Dept: PHYSICAL THERAPY | Facility: HOSPITAL | Age: 43
Discharge: HOME | End: 2023-06-14
Admitting: FAMILY MEDICINE

## 2023-06-14 DIAGNOSIS — M79.7 FIBROMYALGIA: ICD-10-CM

## 2023-06-14 DIAGNOSIS — M62.81 MUSCLE WEAKNESS (GENERALIZED): ICD-10-CM

## 2023-06-14 DIAGNOSIS — M54.2 CHRONIC NECK PAIN: Primary | ICD-10-CM

## 2023-06-14 DIAGNOSIS — G89.29 CHRONIC NECK PAIN: Primary | ICD-10-CM
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
## 2023-07-21 PROBLEM — K25.3 ACUTE GASTRIC ULCER WITHOUT HEMORRHAGE OR PERFORATION: Status: ACTIVE | Noted: 2023-07-21

## 2023-07-21 PROBLEM — M54.9 BACK PAIN: Status: ACTIVE | Noted: 2023-07-21

## 2023-07-21 PROBLEM — G47.19 EXCESSIVE DAYTIME SLEEPINESS: Status: ACTIVE | Noted: 2023-03-07

## 2023-07-24 DIAGNOSIS — M79.7 FIBROMYALGIA: ICD-10-CM

## 2023-07-24 DIAGNOSIS — M79.2 NEUROPATHIC PAIN: ICD-10-CM

## 2023-07-24 RX ORDER — PREGABALIN 100 MG/1
CAPSULE ORAL
Qty: 150 CAPSULE | Refills: 5 | Status: SHIPPED | OUTPATIENT
Start: 2023-07-24

## 2023-08-09 ENCOUNTER — HOSPITAL ENCOUNTER (OUTPATIENT)
Dept: MRI IMAGING | Facility: HOSPITAL | Age: 43
Discharge: HOME | End: 2023-08-09
Payer: MEDICARE

## 2023-08-09 PROCEDURE — A9577 INJ MULTIHANCE: HCPCS | Performed by: PSYCHIATRY & NEUROLOGY

## 2023-08-09 PROCEDURE — 0 GADOBENATE DIMEGLUMINE 529 MG/ML SOLUTION: Performed by: PSYCHIATRY & NEUROLOGY

## 2023-08-09 PROCEDURE — 70544 MR ANGIOGRAPHY HEAD W/O DYE: CPT

## 2023-08-09 PROCEDURE — 70553 MRI BRAIN STEM W/O & W/DYE: CPT

## 2023-08-09 PROCEDURE — 70549 MR ANGIOGRAPH NECK W/O&W/DYE: CPT

## 2023-08-09 RX ADMIN — GADOBENATE DIMEGLUMINE 7 ML: 529 INJECTION, SOLUTION INTRAVENOUS at 16:40

## 2023-08-16 ENCOUNTER — HOSPITAL ENCOUNTER (OUTPATIENT)
Dept: PHYSICAL THERAPY | Facility: HOSPITAL | Age: 43
Setting detail: THERAPIES SERIES
Discharge: HOME | End: 2023-08-16

## 2023-08-16 DIAGNOSIS — M79.7 FIBROMYALGIA: ICD-10-CM

## 2023-08-16 DIAGNOSIS — M54.2 CHRONIC NECK PAIN: Primary | ICD-10-CM

## 2023-08-16 DIAGNOSIS — G89.29 CHRONIC NECK PAIN: Primary | ICD-10-CM

## 2023-08-16 DIAGNOSIS — M62.81 MUSCLE WEAKNESS (GENERALIZED): ICD-10-CM
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
## 2023-10-30 ENCOUNTER — HOSPITAL ENCOUNTER (OUTPATIENT)
Dept: MRI IMAGING | Facility: HOSPITAL | Age: 43
Discharge: HOME | End: 2023-10-30
Payer: MEDICARE

## 2023-10-30 DIAGNOSIS — M54.16 LUMBAR RADICULOPATHY: ICD-10-CM

## 2023-10-30 PROCEDURE — 72148 MRI LUMBAR SPINE W/O DYE: CPT

## 2023-11-01 NOTE — TELEPHONE ENCOUNTER
Called patient and left message that she should let her pain physician prescribe the pregabalin.  She can call if she has any questions.                ----- Message from Osiel Hills MD sent at 10/4/2021  6:04 PM EDT -----  Regarding: FW: Prescription Question  Contact: [phone]      ----- Message -----  From: Almita Fields MA  Sent: 10/4/2021   3:52 PM EDT  To: Osiel Hills MD  Subject: FW: Prescription Question                        Patient is scheduled for 10-20-21  ----- Message -----  From: Bienvenido Carter  Sent: 10/4/2021   3:28 PM EDT  To: [facility]  Subject: Prescription Question                            Pending my appt. Oct. 20th, the Gabapentin is not working well. Therefore can you plz prescribe Pregabalin instead of Gabapentin. I need a refill now, please. 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 Thank you.        Subjective:    CC: Pain of the Left Knee (Started 1-2 months ago. Some swelling at times, knee gives out sometimes when standing for long periods of time. )       HPI:  Patient comes in today complaining of left knee pain.  Patient states she is been having pain in her knee for the last 46 weeks, she states now today, she has no pain.  She states it was a nagging type pain.  She does have a history of a previous ACL reconstruction medial meniscus proximally 2 years ago.    ROS: Refer to HPI for pertinent ROS. All other 12 point systems negative.    Objective:  Vitals:    11/01/23 1033   BP: (!) 148/89   Pulse: 88   Temp: 99 °F (37.2 °C)        Physical Exam:  The patient is well-nourished, well-developed and in no apparent distress, pleasant and cooperative. Examination of the left lower extremity compartments are soft and warm. Skin is intact. There are no signs or symptoms of DVT or infection. There is no obvious joint effusion. There is no erythema. Tender to palpation along the anterior aspect, very minimal if any today , left knee range of motion is 0-115. The knee is stable to exam with varus and valgus stressing. Negative anterior and posterior drawer. Negative Lachman´s.  Negative Roly's test. Patella grind is positive, Negative for apprehension. Neurovascularly intact distally.    Images:  X-rays three views of the left knee demonstrate previous ACL reconstruction, hardware in appropriate position, mild arthritic changes. Images Reviewed and discussed with patient.    Assessment:  1. Left knee pain, unspecified chronicity  - X-Ray Knee 3 View Left; Future    2. Chondromalacia of left knee    3. Sprain of collateral ligament of left knee, initial encounter        Plan:  At this time we discussed her physical exam and x-ray findings.  We have discussed Mobic with appropriate precautions, low-impact activities, knee exercises.  Patient will call or return sooner if her pain does return.    Follow UP: No  follow-ups on file.

## 2023-11-03 ENCOUNTER — OFFICE VISIT (OUTPATIENT)
Dept: NEUROLOGY | Facility: CLINIC | Age: 43
End: 2023-11-03
Payer: MEDICARE

## 2023-11-03 ENCOUNTER — TELEPHONE (OUTPATIENT)
Dept: NEUROLOGY | Facility: CLINIC | Age: 43
End: 2023-11-03
Payer: MEDICARE

## 2023-11-03 VITALS
OXYGEN SATURATION: 98 % | BODY MASS INDEX: 16.13 KG/M2 | DIASTOLIC BLOOD PRESSURE: 68 MMHG | SYSTOLIC BLOOD PRESSURE: 98 MMHG | HEART RATE: 97 BPM | WEIGHT: 80 LBS | HEIGHT: 59 IN

## 2023-11-03 DIAGNOSIS — M79.7 FIBROMYALGIA: ICD-10-CM

## 2023-11-03 DIAGNOSIS — G60.9 IDIOPATHIC PERIPHERAL NEUROPATHY: Primary | ICD-10-CM

## 2023-11-03 DIAGNOSIS — G43.909 MIGRAINE WITHOUT STATUS MIGRAINOSUS, NOT INTRACTABLE, UNSPECIFIED MIGRAINE TYPE: ICD-10-CM

## 2023-11-03 DIAGNOSIS — M54.16 LUMBAR RADICULOPATHY: ICD-10-CM

## 2023-11-03 DIAGNOSIS — G56.03 BILATERAL CARPAL TUNNEL SYNDROME: ICD-10-CM

## 2023-11-03 DIAGNOSIS — K11.7 SIALORRHEA: ICD-10-CM

## 2023-11-03 DIAGNOSIS — M79.2 NEUROPATHIC PAIN: ICD-10-CM

## 2023-11-03 DIAGNOSIS — R13.10 DYSPHAGIA, UNSPECIFIED TYPE: ICD-10-CM

## 2023-11-03 PROBLEM — N70.92 OVARIAN ABSCESS: Status: ACTIVE | Noted: 2023-11-03
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
## 2023-11-03 NOTE — TELEPHONE ENCOUNTER
jacob hoff Key: [id number] - PA Case ID: [id number] help? Call us at [phone]  Status  Sent to [facility]"[employer]"  Drug  Pregabalin 100MG capsules  Form  [facility] (2017 NCPDP)

## 2023-11-06 ENCOUNTER — TELEPHONE (OUTPATIENT)
Dept: NEUROLOGY | Facility: CLINIC | Age: 43
End: 2023-11-06
Payer: MEDICARE

## 2023-11-06 NOTE — TELEPHONE ENCOUNTER
jacob hoff Key: [id number] - PA Case ID: [id number] help? Call us at [phone]  Outcome  Approvedon November 3  CaseId:[id number];Status:Approved;Review Type:Prior Auth;Coverage Start Date:10/04/2023;Coverage End Date:11/02/2024;  Drug  Pregabalin 100MG capsules  Form  [facility] Medicare Electronic PA Form (2017 NCPDP)    Notified pt that PA has been approved

## 2023-11-27 ENCOUNTER — HOSPITAL ENCOUNTER (OUTPATIENT)
Dept: GENERAL RADIOLOGY | Facility: HOSPITAL | Age: 43
Discharge: HOME | End: 2023-11-27
Payer: MEDICARE

## 2023-11-27 DIAGNOSIS — K11.7 SIALORRHEA: ICD-10-CM

## 2023-11-27 DIAGNOSIS — R13.10 DYSPHAGIA, UNSPECIFIED TYPE: ICD-10-CM

## 2023-11-27 PROCEDURE — 92611 MOTION FLUOROSCOPY/SWALLOW: CPT | Performed by: SPEECH-LANGUAGE PATHOLOGIST

## 2023-11-27 PROCEDURE — 74230 X-RAY XM SWLNG FUNCJ C+: CPT

## 2023-11-27 RX ADMIN — BARIUM SULFATE 55 ML: 0.81 POWDER, FOR SUSPENSION ORAL at 13:36

## 2023-11-27 RX ADMIN — BARIUM SULFATE 1 TEASPOON(S): 0.6 CREAM ORAL at 13:36

## 2023-11-27 RX ADMIN — BARIUM SULFATE 4 ML: 980 POWDER, FOR SUSPENSION ORAL at 13:36
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
## 2024-01-15 ENCOUNTER — OFFICE VISIT (OUTPATIENT)
Dept: NEUROSURGERY | Facility: CLINIC | Age: 44
End: 2024-01-15
Payer: MEDICARE

## 2024-01-15 VITALS
SYSTOLIC BLOOD PRESSURE: 120 MMHG | BODY MASS INDEX: 16.13 KG/M2 | RESPIRATION RATE: 20 BRPM | WEIGHT: 80 LBS | DIASTOLIC BLOOD PRESSURE: 62 MMHG | HEIGHT: 59 IN

## 2024-01-15 DIAGNOSIS — R29.898 WEAKNESS OF LEFT LEG: ICD-10-CM

## 2024-01-15 DIAGNOSIS — M51.36 DDD (DEGENERATIVE DISC DISEASE), LUMBAR: ICD-10-CM

## 2024-01-15 DIAGNOSIS — R20.0 BILATERAL HAND NUMBNESS: ICD-10-CM

## 2024-01-15 DIAGNOSIS — Z98.1 HISTORY OF FUSION OF CERVICAL SPINE: Primary | ICD-10-CM

## 2024-01-15 PROCEDURE — 99214 OFFICE O/P EST MOD 30 MIN: CPT | Performed by: NEUROLOGICAL SURGERY

## 2024-01-15 PROCEDURE — 1160F RVW MEDS BY RX/DR IN RCRD: CPT | Performed by: NEUROLOGICAL SURGERY

## 2024-01-15 PROCEDURE — 1159F MED LIST DOCD IN RCRD: CPT | Performed by: NEUROLOGICAL SURGERY

## 2024-02-13 ENCOUNTER — TRANSCRIBE ORDERS (OUTPATIENT)
Dept: PHYSICAL THERAPY | Facility: CLINIC | Age: 44
End: 2024-02-13
Payer: MEDICARE

## 2024-02-13 DIAGNOSIS — M25.512 BILATERAL SHOULDER PAIN, UNSPECIFIED CHRONICITY: Primary | ICD-10-CM

## 2024-02-13 DIAGNOSIS — M25.511 BILATERAL SHOULDER PAIN, UNSPECIFIED CHRONICITY: Primary | ICD-10-CM

## 2024-02-27 DIAGNOSIS — M79.7 FIBROMYALGIA: ICD-10-CM

## 2024-02-27 DIAGNOSIS — M79.2 NEUROPATHIC PAIN: ICD-10-CM

## 2024-02-29 ENCOUNTER — TREATMENT (OUTPATIENT)
Dept: PHYSICAL THERAPY | Facility: CLINIC | Age: 44
End: 2024-02-29
Payer: MEDICARE

## 2024-02-29 DIAGNOSIS — G89.29 CHRONIC PAIN OF BOTH SHOULDERS: Primary | ICD-10-CM

## 2024-02-29 DIAGNOSIS — M75.41 SHOULDER IMPINGEMENT SYNDROME, RIGHT: ICD-10-CM

## 2024-02-29 DIAGNOSIS — M25.511 CHRONIC PAIN OF BOTH SHOULDERS: Primary | ICD-10-CM

## 2024-02-29 DIAGNOSIS — R53.1 DECREASED STRENGTH: ICD-10-CM

## 2024-02-29 DIAGNOSIS — M25.512 CHRONIC PAIN OF BOTH SHOULDERS: Primary | ICD-10-CM

## 2024-02-29 DIAGNOSIS — R68.89 DECREASED FUNCTIONAL ACTIVITY TOLERANCE: ICD-10-CM

## 2024-02-29 DIAGNOSIS — M75.42 SHOULDER IMPINGEMENT SYNDROME, LEFT: ICD-10-CM

## 2024-02-29 DIAGNOSIS — M25.612 DECREASED ROM OF LEFT SHOULDER: ICD-10-CM

## 2024-02-29 PROCEDURE — 97110 THERAPEUTIC EXERCISES: CPT | Performed by: PHYSICAL THERAPIST

## 2024-02-29 PROCEDURE — 97162 PT EVAL MOD COMPLEX 30 MIN: CPT | Performed by: PHYSICAL THERAPIST

## 2024-02-29 RX ORDER — PREGABALIN 100 MG/1
CAPSULE ORAL
Qty: 150 CAPSULE | Refills: 4 | Status: SHIPPED | OUTPATIENT
Start: 2024-02-29

## 2024-02-29 NOTE — PROGRESS NOTES
Physical Therapy Initial Evaluation and Plan of Care    [facility]  [address] [place] [address]  [phone] (phone)  [phone] (fax)      Patient: Bienvenido Carter   : 1980  Diagnosis/ICD-10 Code:  Chronic pain of both shoulders [M25.511, G89.29, M25.512]  Referring practitioner: Tino Randall MD  Date of Initial Visit: 2024  Today's Date: 2024  Patient seen for 1 sessions    Visit Diagnoses:    ICD-10-CM ICD-9-CM   1. Chronic pain of both shoulders  M25.511 719.41    G89.29 338.29    M25.512    2. Decreased ROM of left shoulder  M25.612 719.51   3. Decreased functional activity tolerance  R68.89 780.99   4. Decreased strength  R53.1 780.79   5. Shoulder impingement syndrome, left  M75.42 726.2   6. Shoulder impingement syndrome, right  M75.41 726.2              Subjective Evaluation    History of Present Illness  Mechanism of injury: -bilateral shoulder pain   -taking off coat very quickly and she heard a crack and a pop   -pain was intense and she went to the ER to be evaluated   -She experiences stiffness and decreased ROM in R   -Having trouble getting dressed   -Driving has been hard   -Feels like shoulder locks up   -Showering has been really hard      Patient Occupation: Patient is currently not working- looking for jobs Pain  Current pain rating: 3  At best pain rating: 3  At worst pain ratin (R shoulder: 4/5)  Quality: tight, throbbing, discomfort and sharp  Relieving factors: ice, change in position and medications (muscle relaxor and oxycodone (3x day))  Aggravating factors: lifting, outstretched reach, repetitive movement, sleeping, movement and overhead activity  Progression: worsening    Social Support  Lives in: condominium (lives on second)  Lives with: alone    Hand dominance: right    Treatments  Previous treatment: injection treatment and physical therapy (bilateral injections: some relief in R shoulder,  just didn't last as long)  Current treatment: medication and physical therapy  Patient Goals  Patient goal: wants to be able to workout normally again, be able to lift objects, carry groceries, complete ADL           Objective          Static Posture     Thoracic Spine  Flattened thoracic spine.    Palpation   Left   Hypertonic in the infraspinatus and teres minor.   Tenderness of the infraspinatus and teres minor.   Trigger point to infraspinatus and teres minor.     Right   Hypertonic in the infraspinatus and teres minor. Tenderness of the infraspinatus and teres minor.   Trigger point to infraspinatus and teres minor.     Tenderness     Left Shoulder   Tenderness in the bicipital groove, infraspinatus tendon, subscapularis tendon and supraspinatus tendon.     Right Shoulder  Tenderness in the bicipital groove and subscapularis tendon.     Additional Tenderness Details  Hypersensitivity to the touch     Active Range of Motion   Left Shoulder   Flexion: 74 degrees   Abduction: 95 degrees   External rotation BTH: C5   Internal rotation BTB: L1     Right Shoulder   Flexion: 120 degrees   Abduction: 116 degrees   External rotation BTH: T1   Internal rotation BTB: T11     Strength/Myotome Testing     Left Shoulder     Planes of Motion   Flexion: 3+   Abduction: 3+   External rotation at 0°: 3+   Internal rotation at 0°: 3+     Isolated Muscles   Supraspinatus: 4-     Right Shoulder     Planes of Motion   Flexion: 4   Abduction: 4   External rotation at 0°: 4   Internal rotation at 0°: 4     Additional Strength Details  Strength testing revealed pain in all motions    Tests     Left Shoulder   Positive empty can, Hawkin's and Neer's.     Right Shoulder   Positive Hawkin's and Neer's.   Negative empty can.     Additional Tests Details  Pain in posterior aspect of L shoulder with special tests  Left Shoulder Flexibility Comments:   Decreased ROM   Right Shoulder Flexibility Comments:   Decreased ROM         Exercises    -Isometrics at wall: ER, IR, deltoid push press: 1x10 each with 5 sec hold   -Instruction on table slides       Functional Outcome Score: 83% SPADI     Patient has difficulty with:   -washing hair   -washing back   -putting on shirt   -overhead reaching   -carrying an object more than 10 pounds         Assessment & Plan       Assessment  Impairments: abnormal muscle firing, abnormal or restricted ROM, activity intolerance, impaired physical strength, lacks appropriate home exercise program and pain with function   Functional limitations: carrying objects, lifting, sleeping, pulling, pushing, uncomfortable because of pain, moving in bed, reaching behind back, reaching overhead and unable to perform repetitive tasks   Assessment details: Bienvenido Carter is a 43 y.o. year-old female referred to physical therapy for bilateral shoulder pain. She presents with a evolving clinical presentation. Patient had recent MRI of L shoulder- results pending.  She has comorbidities of history surgery on R shoulder, calcific LH biceps tendons, fibromyalgia, chronic neck pain , and personal factors of living alone that may affect her progress in the plan of care. Pt has decreased L shoulder ROM in flex=74, abd=95, ER-C5 (with pain), and IR L1, and decreased strength. Patient also demonstrates hypersensitivity to light touch involving L shoulder. She has decreased ROM in R shoulder flexion =120 deg, abd=116 deg, ER=T1, and IR=T11 also with pain and decreased strength. Signs and symptoms are consistent with B shoulder impingement. Pt would benefit from therapy to help improve ROM, strength, tolerance to overhead activities, and functional mobility to improve her ability to return to the gym.    Prognosis: good    Goals  Plan Goals: ST weeks  1. Patient will be independent with education for symptom management, joint protection and strategies to minimize stress on affected tissues  2. Pt to improve L shoulder ROM in flex from  74 degrees to 100 degrees, abd from 95 degrees to 120, ER from C5 to T1 and IR from L1 to T9 for improvement in overall functional mobility during ADL's.   3. Pt to improve pain complaints from 8/10 to no more than 4/10 with ADLs to demonstrate improvement in tolerance to activities.     LT weeks  1. Pt to improve L shoulder ROM in flex from 100 degrees to 140, abd from 120 to 150, ER from T1 to T3 and IR from T9 to T5 to demonstrate improved ability to shower.   2. Pt to improve R shoulder AROM in flex=150 deg, abd=150 deg, ER=T2, and IR=T9 for greater ease with OH activities, dressing and showering.   3. Pt to improve pain complaints from 4/10 to no more than 2/10 with ADLs to demonstrate improved ability to tolerate UE exercises at the gym.   4. Pt to improve shoulder strength from 3+/5 to 4+/5 to demonstrate improved ability to carry groceries from her car into her condo.   5. Pt to improve SPADI score from 83% to 40% for overall functional improvement with washing her hair, washing her back, putting on shirt, overhead reaching, and carrying an object that is greater than 10 pounds   6. Patient will be independent and compliant with advanced home exercise program to facilitate self-management of symptoms.      Plan  Therapy options: will be seen for skilled therapy services  Planned modality interventions: high voltage pulsed current (pain management), TENS, ultrasound, cryotherapy, dry needling, thermotherapy (hydrocollator packs) and hydrotherapy  Planned therapy interventions: ADL retraining, body mechanics training, flexibility, functional ROM exercises, home exercise program, IADL retraining, joint mobilization, manual therapy, postural training, soft tissue mobilization, strengthening, stretching, therapeutic activities and neuromuscular re-education  Frequency: 2x week  Duration in weeks: 12  Treatment plan discussed with: patient      Timed:         Manual Therapy:    0     mins  69626;      Therapeutic Exercise:    15     mins  31409;     Neuromuscular Namita:    0    mins  72404;    Therapeutic Activity:     0     mins  22110;     Gait Trainin     mins  76010;     Ultrasound:     0     mins  95252;    Self Care                       0     mins   25994      Un-Timed:  Electrical Stimulation:    0     mins  02031 ( );  Dry Needling  (1-2 muscles)            0     mins 23077 (Self-pay)  Dry Needling (3-4 muscles) 0     mins 17998 (Self-pay)  Dry Needling Trial    0     mins DRYNDLTRIAL  (No Charge)  Traction     0     mins 56481  Low Eval     0     Mins  24934  Mod Eval     0     Mins  90425  High Eval                       0     Mins  84880    Timed Treatment:   15   mins   Total Treatment:     45   mins    PT SIGNATURE: KANE Moseley License Number: [id number]    Electronically signed by Renae Lai, PT Student, 24, 9:02 AM EST    I have reviewed the notes, assessments, and/or procedures performed by Renae Lai, Physical Therapy Student, I concur with her/his documentation of Bienvenido SAMSON Norabeulahpretty      DATE TREATMENT INITIATED: 2024    Initial Certification  Certification Period: 2024  I certify that the therapy services are furnished while this patient is under my care.  The services outlined above are required by this patient, and will be reviewed every 90 days.     PHYSICIAN: Tino Randall MD   NPI: [id number]                                         DATE:     Please sign and return via fax to [phone] Thank you, [facility].
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
## 2024-03-06 ENCOUNTER — TREATMENT (OUTPATIENT)
Dept: PHYSICAL THERAPY | Facility: CLINIC | Age: 44
End: 2024-03-06
Payer: MEDICARE

## 2024-03-06 DIAGNOSIS — M25.511 CHRONIC PAIN OF BOTH SHOULDERS: Primary | ICD-10-CM

## 2024-03-06 DIAGNOSIS — M75.42 SHOULDER IMPINGEMENT SYNDROME, LEFT: ICD-10-CM

## 2024-03-06 DIAGNOSIS — G89.29 CHRONIC PAIN OF BOTH SHOULDERS: Primary | ICD-10-CM

## 2024-03-06 DIAGNOSIS — M75.41 SHOULDER IMPINGEMENT SYNDROME, RIGHT: ICD-10-CM

## 2024-03-06 DIAGNOSIS — M25.612 DECREASED ROM OF LEFT SHOULDER: ICD-10-CM

## 2024-03-06 DIAGNOSIS — M25.512 CHRONIC PAIN OF BOTH SHOULDERS: Primary | ICD-10-CM

## 2024-03-06 DIAGNOSIS — R53.1 DECREASED STRENGTH: ICD-10-CM

## 2024-03-06 DIAGNOSIS — R68.89 DECREASED FUNCTIONAL ACTIVITY TOLERANCE: ICD-10-CM

## 2024-03-06 PROCEDURE — 97110 THERAPEUTIC EXERCISES: CPT | Performed by: PHYSICAL THERAPIST

## 2024-03-06 PROCEDURE — 97035 APP MDLTY 1+ULTRASOUND EA 15: CPT | Performed by: PHYSICAL THERAPIST

## 2024-03-06 PROCEDURE — 97112 NEUROMUSCULAR REEDUCATION: CPT | Performed by: PHYSICAL THERAPIST

## 2024-03-06 NOTE — PROGRESS NOTES
Physical Therapy Daily Treatment Note    [facility]  [address] [place] [address]  [phone] (phone)  [phone] (fax)    Patient: Bienvenido Carter   : 1980  Diagnosis/ICD-10 Code:  Chronic pain of both shoulders [M25.511, G89.29, M25.512]  Referring practitioner: Tino Randall MD  Today's Date: 3/6/2024  Patient seen for 2 sessions    Visit Diagnoses:    ICD-10-CM ICD-9-CM   1. Chronic pain of both shoulders  M25.511 719.41    G89.29 338.29    M25.512    2. Decreased ROM of left shoulder  M25.612 719.51   3. Decreased functional activity tolerance  R68.89 780.99   4. Decreased strength  R53.1 780.79   5. Shoulder impingement syndrome, left  M75.42 726.2   6. Shoulder impingement syndrome, right  M75.41 726.2              Subjective Patient states that she is doing well today- she has follow up with her doctor tomorrow to discuss MRI results. She is still experiencing some pain and discomfort in both shoulders L>R. She has continued using ice.     Objective     Therapeutic Exercise/ Neuro-Re education   -Isometric deltoid press into wall: 1x10 bilateral UE   -Isometric ER at wall: 1z10 bilateral UE   -Isometric IR at wall: 1x10 bilateral UE   -Shoulder retraction using yellow band: 2x10   -Shoulder extension using yellow band: 2x10     Cues for form with all exercises    See modality treatment log         Assessment/Plan    Patient tolerates progression of exercises well today. There was noticeable swelling in posterior aspect of L shoulder with increased tenderness. Pt will continue to benefit from skilled PT interventions to address bilateral shoulder strength, ROM, tolerance to activities, and overall functional mobility.        Timed:    Manual Therapy:    0     mins  61383;  Therapeutic Exercise:    25     mins  48593;     Neuromuscular Namita:    10    mins  96172;    Therapeutic Activity:     0     mins  14757;     Gait Trainin      mins  65852;     Ultrasound:     10     mins  66868;    Aquatic Therapy    0     mins 04519;  Self Care                       0     mins   71768        Untimed:  Electrical Stimulation:    0     mins  67789 ( );  Traction:    0     mins  34876;   Dry Needling  (1-2 muscles)            0     mins 20560 (Self-pay)  Dry Needling (3-4 muscles) 0     20561 (Self-pay)  Dry Needling Trial    0     DRYNDLTRIAL  (No Charge)    Timed Treatment:   45   mins   Total Treatment:     45   mins    Katie Medel, PT  Physical Therapist    [place] License:[id number]    I have reviewed the notes, assessments, and/or procedures performed by Renae Lai, Physical Therapy Student, I concur with her/his documentation of Bienvenido Carter

## 2024-03-08 ENCOUNTER — TREATMENT (OUTPATIENT)
Dept: PHYSICAL THERAPY | Facility: CLINIC | Age: 44
End: 2024-03-08
Payer: MEDICARE

## 2024-03-08 DIAGNOSIS — M25.612 DECREASED ROM OF LEFT SHOULDER: ICD-10-CM

## 2024-03-08 DIAGNOSIS — M25.511 CHRONIC PAIN OF BOTH SHOULDERS: Primary | ICD-10-CM

## 2024-03-08 DIAGNOSIS — R68.89 DECREASED FUNCTIONAL ACTIVITY TOLERANCE: ICD-10-CM

## 2024-03-08 DIAGNOSIS — M75.42 SHOULDER IMPINGEMENT SYNDROME, LEFT: ICD-10-CM

## 2024-03-08 DIAGNOSIS — M75.41 SHOULDER IMPINGEMENT SYNDROME, RIGHT: ICD-10-CM

## 2024-03-08 DIAGNOSIS — G89.29 CHRONIC PAIN OF BOTH SHOULDERS: Primary | ICD-10-CM

## 2024-03-08 DIAGNOSIS — M25.512 CHRONIC PAIN OF BOTH SHOULDERS: Primary | ICD-10-CM

## 2024-03-08 DIAGNOSIS — R53.1 DECREASED STRENGTH: ICD-10-CM

## 2024-03-08 PROCEDURE — 97110 THERAPEUTIC EXERCISES: CPT | Performed by: PHYSICAL THERAPIST

## 2024-03-08 PROCEDURE — 97035 APP MDLTY 1+ULTRASOUND EA 15: CPT | Performed by: PHYSICAL THERAPIST

## 2024-03-08 NOTE — PROGRESS NOTES
Physical Therapy Daily Treatment Note    [facility]  [address] [place] [address]  [phone] (phone)  [phone] (fax)    Patient: Bienvenido Carter   : 1980  Diagnosis/ICD-10 Code:  Chronic pain of both shoulders [M25.511, G89.29, M25.512]  Referring practitioner: Tino Randall MD  Today's Date: 3/8/2024  Patient seen for 3 sessions    Visit Diagnoses:    ICD-10-CM ICD-9-CM   1. Chronic pain of both shoulders  M25.511 719.41    G89.29 338.29    M25.512    2. Decreased ROM of left shoulder  M25.612 719.51   3. Decreased functional activity tolerance  R68.89 780.99   4. Decreased strength  R53.1 780.79   5. Shoulder impingement syndrome, left  M75.42 726.2   6. Shoulder impingement syndrome, right  M75.41 726.2              Subjective Goes to follow-up appointment after therapy session today. Shoulder started to hurt last night. She did have some relief from ultrasound.     Objective     Therapeutic Exercise/ Neuro-Re education   -Isometric deltoid press into wall: 1x10 bilateral UE   -Isometric ER at wall: 1z10 bilateral UE   -Isometric IR at wall: 1x10 bilateral UE   -Shoulder retraction using yellow band: 2x10   -Shoulder extension using yellow band: 2x10   -Pulley's: 2x10 with 3 sec isometric hold      Cues for form with all exercises     See modality treatment log       Assessment/Plan    Patient tolerated strengthening and ROM exercises well today. Ultrasound provided some relief towards end of session. Improvement noted in visual presentation of swelling/inflammation of L shoulder. Pt will continue to benefit from skilled PT interventions to address shoulder ROM, strength, tolerance to activities, and overall functional mobility.        Timed:    Manual Therapy:    0     mins  35413;  Therapeutic Exercise:    25     mins  31820;     Neuromuscular Namita:    5    mins  88750;    Therapeutic Activity:     0     mins  32759;     Gait  Trainin     mins  47108;     Ultrasound:     10     mins  75175;    Aquatic Therapy    0     mins 69510;  Self Care                       0     mins   00866        Untimed:  Electrical Stimulation:    0     mins  87099 ( );  Traction:    0     mins  06105;   Dry Needling  (1-2 muscles)            0     mins  (Self-pay)  Dry Needling (3-4 muscles) 0      (Self-pay)  Dry Needling Trial    0     DRYNDLTRIAL  (No Charge)    Timed Treatment:   40   mins   Total Treatment:     40   mins    Katie Medel, PT  Physical Therapist    [place] License:[id number]    I have reviewed the notes, assessments, and/or procedures performed by Renae Lai, Physical Therapy Student, I concur with her/his documentation of Bienvenido Carter

## 2024-03-15 ENCOUNTER — TREATMENT (OUTPATIENT)
Dept: PHYSICAL THERAPY | Facility: CLINIC | Age: 44
End: 2024-03-15
Payer: MEDICARE

## 2024-03-15 DIAGNOSIS — R53.1 DECREASED STRENGTH: ICD-10-CM

## 2024-03-15 DIAGNOSIS — G89.29 CHRONIC PAIN OF BOTH SHOULDERS: Primary | ICD-10-CM

## 2024-03-15 DIAGNOSIS — M75.42 SHOULDER IMPINGEMENT SYNDROME, LEFT: ICD-10-CM

## 2024-03-15 DIAGNOSIS — M25.511 CHRONIC PAIN OF BOTH SHOULDERS: Primary | ICD-10-CM

## 2024-03-15 DIAGNOSIS — M25.612 DECREASED ROM OF LEFT SHOULDER: ICD-10-CM

## 2024-03-15 DIAGNOSIS — M25.512 CHRONIC PAIN OF BOTH SHOULDERS: Primary | ICD-10-CM

## 2024-03-15 DIAGNOSIS — R68.89 DECREASED FUNCTIONAL ACTIVITY TOLERANCE: ICD-10-CM

## 2024-03-15 PROCEDURE — 97140 MANUAL THERAPY 1/> REGIONS: CPT | Performed by: PHYSICAL THERAPIST

## 2024-03-15 PROCEDURE — 97035 APP MDLTY 1+ULTRASOUND EA 15: CPT | Performed by: PHYSICAL THERAPIST

## 2024-03-15 PROCEDURE — 97110 THERAPEUTIC EXERCISES: CPT | Performed by: PHYSICAL THERAPIST

## 2024-03-15 NOTE — PROGRESS NOTES
Physical Therapy Daily Treatment Note    Patient: Bienvenido Carter   : 1980  Diagnosis/ICD-10 Code:  Chronic pain of both shoulders [M25.511, G89.29, M25.512]  Referring practitioner: Tino Randall MD  Date of Initial Visit: Type: THERAPY  Noted: 2024  Today's Date: 3/15/2024  Patient seen for 4 sessions    Visit Diagnoses:    ICD-10-CM ICD-9-CM   1. Chronic pain of both shoulders  M25.511 719.41    G89.29 338.29    M25.512    2. Decreased ROM of left shoulder  M25.612 719.51   3. Decreased functional activity tolerance  R68.89 780.99   4. Decreased strength  R53.1 780.79   5. Shoulder impingement syndrome, left  M75.42 726.2       [facility]  [address] [place] [address]  [phone] (phone)  [phone] (fax)         Subjective MD wants her to continue with therapy. Pt doesn't want to do any surgery on the shoulder    Objective   See Modality Logs for complete treatment.     Therapeutic Exercise/ Neuro-Re education/Manual:   -Shoulder retraction using yellow band: 2x10   -Shoulder extension using yellow band: 2x10   -Pulley's: 2x10 with 3 sec isometric hold in flexion and abd  -supine shoulder press, 2x10  -SL ER with towel under arm, 2x10 each   Cues for form with all exercises  -STM to L UT and levator muscles, pect lift, PROM in all planes to L shoulder with gentle overpressure at end ROM.        Assessment/Plan  Pt RTMD this week. She does have a L RC tear but she does not want to do surgery so will continue with PT. She is reporting some decrease in her pain levels. She still has some swelling in the L upper arm, using ice at home to help. Continue to progress her strength program and work on pain reduction.          Timed:    Manual Therapy:    10     mins  78597;  Therapeutic Exercise:    20     mins  33941;     Neuromuscular Namita:   5    mins  44610;    Therapeutic Activity:          mins  54095;     Gait Training:           mins   17639;     Ultrasound:     10     mins  73844;    Electrical Stimulation:         mins  99220 ( );  Iontophoresis         mins 33609;  Aquatic Therapy         mins 36463;      Untimed:  Electrical Stimulation:         mins  35203 ( );  Traction:         mins  75470;   Dry Needling   (1-2 muscles)             mins 20560 (Self-pay)  Dry Needling (3-4 muscles)           mins 20561 (Self-pay)  Dry Needling Trial              mins DRYNDLTRIAL  (No Charge)    Timed Treatment:   45   mins   Total Treatment:     45   mins    Katie Medel PT, CDNT  Physical Therapist    [place] License:[id number]

## 2024-03-21 ENCOUNTER — TREATMENT (OUTPATIENT)
Dept: PHYSICAL THERAPY | Facility: CLINIC | Age: 44
End: 2024-03-21
Payer: MEDICARE

## 2024-03-21 DIAGNOSIS — M25.612 DECREASED ROM OF LEFT SHOULDER: ICD-10-CM

## 2024-03-21 DIAGNOSIS — M75.42 SHOULDER IMPINGEMENT SYNDROME, LEFT: ICD-10-CM

## 2024-03-21 DIAGNOSIS — R68.89 DECREASED FUNCTIONAL ACTIVITY TOLERANCE: ICD-10-CM

## 2024-03-21 DIAGNOSIS — M25.512 CHRONIC PAIN OF BOTH SHOULDERS: Primary | ICD-10-CM

## 2024-03-21 DIAGNOSIS — G89.29 CHRONIC PAIN OF BOTH SHOULDERS: Primary | ICD-10-CM

## 2024-03-21 DIAGNOSIS — M25.511 CHRONIC PAIN OF BOTH SHOULDERS: Primary | ICD-10-CM

## 2024-03-21 DIAGNOSIS — R53.1 DECREASED STRENGTH: ICD-10-CM

## 2024-03-21 DIAGNOSIS — M75.41 SHOULDER IMPINGEMENT SYNDROME, RIGHT: ICD-10-CM

## 2024-03-21 PROCEDURE — 97110 THERAPEUTIC EXERCISES: CPT | Performed by: PHYSICAL THERAPIST

## 2024-03-21 PROCEDURE — 97035 APP MDLTY 1+ULTRASOUND EA 15: CPT | Performed by: PHYSICAL THERAPIST

## 2024-03-21 PROCEDURE — 97140 MANUAL THERAPY 1/> REGIONS: CPT | Performed by: PHYSICAL THERAPIST

## 2024-03-21 NOTE — PROGRESS NOTES
Physical Therapy Daily Treatment Note    [facility]  [address] [place] [address]  [phone] (phone)  [phone] (fax)    Patient: Bienvenido Carter   : 1980  Diagnosis/ICD-10 Code:  Chronic pain of both shoulders [M25.511, G89.29, M25.512]  Referring practitioner: Tino Randall MD  Today's Date: 3/21/2024  Patient seen for 5 sessions    Visit Diagnoses:    ICD-10-CM ICD-9-CM   1. Chronic pain of both shoulders  M25.511 719.41    G89.29 338.29    M25.512    2. Decreased ROM of left shoulder  M25.612 719.51   3. Decreased functional activity tolerance  R68.89 780.99   4. Decreased strength  R53.1 780.79   5. Shoulder impingement syndrome, left  M75.42 726.2   6. Shoulder impingement syndrome, right  M75.41 726.2              Subjective Patient states that she is feeling better- has not had any increase in pain or flare ups this week. Reaching has still been challenging- she is able to lift light weight objects like groceries.     Objective     See Modality Logs for complete treatment.      Therapeutic Exercise/ Neuro-Re education/Manual:   -Shoulder retraction using yellow band: 2x10   -Shoulder extension using yellow band: 2x10   -Pulley's: 2x10 with 3 sec isometric hold in flexion and abd  -supine shoulder press, 2x10 1 pound weight   -SL ER with towel under arm, 2x10 each 1 pound weight   Cues for form with all exercises  -STM to L UT and levator muscles, pect lift, PROM in all planes to L shoulder with gentle overpressure at end ROM.      Assessment/Plan  Patient tolerated treatment well and was able progress to weighted strengthening exercises- no increase in pain noted. Patient had relief after STM and ultra sound treatment. Pt will continue to benefit from skilled PT interventions to address UE strength, ROM, tolerance to activities, and overall functional mobility.          Timed:    Manual Therapy:    10     mins  46411;  Therapeutic  Exercise:    20     mins  53532;     Neuromuscular Namita:    5    mins  13308;    Therapeutic Activity:     0     mins  64941;     Gait Trainin     mins  00888;     Ultrasound:     10     mins  00501;    Aquatic Therapy    0     mins 10808;  Self Care                       00     mins   32986        Untimed:  Electrical Stimulation:    0     mins  86001 ( );  Traction:    0     mins  90077;   Dry Needling  (1-2 muscles)            0     mins  (Self-pay)  Dry Needling (3-4 muscles) 0      (Self-pay)  Dry Needling Trial    0     DRYNDLTRIAL  (No Charge)    Timed Treatment:   45   mins   Total Treatment:     45   mins    Katie Medel, PT  Physical Therapist    [place] License:[id number]    I have reviewed the notes, assessments, and/or procedures performed by Renae Lai, Physical Therapy Student, I concur with her/his documentation of Bienvenido Carter

## 2024-03-28 ENCOUNTER — TREATMENT (OUTPATIENT)
Dept: PHYSICAL THERAPY | Facility: CLINIC | Age: 44
End: 2024-03-28
Payer: MEDICARE

## 2024-03-28 DIAGNOSIS — M75.42 SHOULDER IMPINGEMENT SYNDROME, LEFT: ICD-10-CM

## 2024-03-28 DIAGNOSIS — G89.29 CHRONIC PAIN OF BOTH SHOULDERS: Primary | ICD-10-CM

## 2024-03-28 DIAGNOSIS — M75.41 SHOULDER IMPINGEMENT SYNDROME, RIGHT: ICD-10-CM

## 2024-03-28 DIAGNOSIS — M25.511 CHRONIC PAIN OF BOTH SHOULDERS: Primary | ICD-10-CM

## 2024-03-28 DIAGNOSIS — R68.89 DECREASED FUNCTIONAL ACTIVITY TOLERANCE: ICD-10-CM

## 2024-03-28 DIAGNOSIS — M25.612 DECREASED ROM OF LEFT SHOULDER: ICD-10-CM

## 2024-03-28 DIAGNOSIS — R53.1 DECREASED STRENGTH: ICD-10-CM

## 2024-03-28 DIAGNOSIS — M25.512 CHRONIC PAIN OF BOTH SHOULDERS: Primary | ICD-10-CM

## 2024-03-28 NOTE — PROGRESS NOTES
Physical Therapy Daily Treatment Note    [facility]  [address] [place] [address]  [phone] (phone)  [phone] (fax)    Patient: Bienvenido Carter   : 1980  Diagnosis/ICD-10 Code:  Chronic pain of both shoulders [M25.511, G89.29, M25.512]  Referring practitioner: Tino Randall MD  Today's Date: 3/28/2024  Patient seen for 6 sessions    Visit Diagnoses:    ICD-10-CM ICD-9-CM   1. Chronic pain of both shoulders  M25.511 719.41    G89.29 338.29    M25.512    2. Decreased ROM of left shoulder  M25.612 719.51   3. Decreased functional activity tolerance  R68.89 780.99   4. Decreased strength  R53.1 780.79   5. Shoulder impingement syndrome, left  M75.42 726.2   6. Shoulder impingement syndrome, right  M75.41 726.2              Subjective Patient states that she is doing well- she is able to lift items that are a little heavier than before. She is  to the touch on some areas around the shoulder.     Objective     Therapeutic Exercise/ Neuro-Re education/Manual:   -Shoulder retraction using red band: 2x10   -Shoulder extension using red band: 2x10   -Pulley's: 2x10 with 3 sec isometric hold in flexion and abd    -STM to L UT and levator muscles, infraspinatus, and teres minor following dry needling to help improve blood flow to the area and reduce pain    Dry needling, using threading and direct techniques, obtaining written and verbal consent to treat after discussing benefits and risks.   Patient position during treatment: supine and R SL. Muscles treated: B UT and levator, L infraspinatus and teres minor. Response: LTRs.Clean needle technique observed at all times, precautions for lung fields, neurovascular structures observed. Manual palpation and assessment performed before, during, and after session.  (Manual and dry needling were performed by Katie Medel, PT )     Assessment/Plan    Patient tolerated progression of  strengthening exercises with no increase in pain or discomfort. Patient did trial of dry needling and tolerated treatment well. Will continue to progress exercises as appropriate. Patient will continue to benefit from skilled PT interventions to address shoulder strength, ROM, stability, tolerance to activities, and overall functional mobility.        Timed:    Manual Therapy:    10     mins  44818;  Therapeutic Exercise:    20     mins  42776;     Neuromuscular Namita:        mins  37083;    Therapeutic Activity:          mins  44973;     Gait Training:           mins  49022;     Ultrasound:          mins  53550;    Aquatic Therapy         mins 00380;  Self Care                            mins   95833        Untimed:  Electrical Stimulation:         mins  28685 ( );  Traction:         mins  62602;   Dry Needling  (1-2 muscles)            15     mins 20560 (Self-pay)  Dry Needling (3-4 muscles)      20561 (Self-pay)  Dry Needling Trial         DRYNDLTRIAL  (No Charge)    Timed Treatment:   30   mins   Total Treatment:     45   mins    Katie Medel, PT  Physical Therapist    [place] License:[id number]    I have reviewed the notes, assessments, and/or procedures performed by Renae Lai, Physical Therapy Student, I concur with her/his documentation of Bienvenido Carter

## 2024-04-01 ENCOUNTER — TREATMENT (OUTPATIENT)
Dept: PHYSICAL THERAPY | Facility: CLINIC | Age: 44
End: 2024-04-01
Payer: MEDICARE

## 2024-04-01 DIAGNOSIS — M75.41 SHOULDER IMPINGEMENT SYNDROME, RIGHT: ICD-10-CM

## 2024-04-01 DIAGNOSIS — M25.512 CHRONIC PAIN OF BOTH SHOULDERS: Primary | ICD-10-CM

## 2024-04-01 DIAGNOSIS — G89.29 CHRONIC PAIN OF BOTH SHOULDERS: Primary | ICD-10-CM

## 2024-04-01 DIAGNOSIS — R53.1 DECREASED STRENGTH: ICD-10-CM

## 2024-04-01 DIAGNOSIS — M75.42 SHOULDER IMPINGEMENT SYNDROME, LEFT: ICD-10-CM

## 2024-04-01 DIAGNOSIS — R68.89 DECREASED FUNCTIONAL ACTIVITY TOLERANCE: ICD-10-CM

## 2024-04-01 DIAGNOSIS — M25.612 DECREASED ROM OF LEFT SHOULDER: ICD-10-CM

## 2024-04-01 DIAGNOSIS — M25.511 CHRONIC PAIN OF BOTH SHOULDERS: Primary | ICD-10-CM

## 2024-04-01 PROCEDURE — 97112 NEUROMUSCULAR REEDUCATION: CPT | Performed by: PHYSICAL THERAPIST

## 2024-04-01 PROCEDURE — 97035 APP MDLTY 1+ULTRASOUND EA 15: CPT | Performed by: PHYSICAL THERAPIST

## 2024-04-01 PROCEDURE — 97110 THERAPEUTIC EXERCISES: CPT | Performed by: PHYSICAL THERAPIST

## 2024-04-01 NOTE — PROGRESS NOTES
30-Day / 10-Visit Progress Note         Patient: Bienvenido Carter   : 1980  Diagnosis/ICD-10 Code:  Chronic pain of both shoulders [M25.511, G89.29, M25.512]  Referring practitioner: Tino Randall MD  Date of Initial Visit: Type: THERAPY  Noted: 2024  Today's Date: 2024  Patient seen for 7 sessions    Visit Diagnoses:    ICD-10-CM ICD-9-CM   1. Chronic pain of both shoulders  M25.511 719.41    G89.29 338.29    M25.512    2. Decreased ROM of left shoulder  M25.612 719.51   3. Decreased functional activity tolerance  R68.89 780.99   4. Decreased strength  R53.1 780.79   5. Shoulder impingement syndrome, left  M75.42 726.2   6. Shoulder impingement syndrome, right  M75.41 726.2       [facility] [place] [address]  [phone] (phone)  [phone] (fax)    Subjective:     Clinical Progress: improved  Home Program Compliance: Yes  Treatment has included:  therapeutic exercise, manual therapy, neuro-muscular retraining , ultrasound, patient education with home exercise program , and dry needling     Subjective  Pain at most in the R shoulder no more than 3/10 and in the L shoulder 7/10    Objective          Active Range of Motion   Left Shoulder   Flexion: 125 degrees   Abduction: 90 degrees   External rotation BTH: T2   Internal rotation BTB: T12     Right Shoulder   Flexion: 150 degrees   Abduction: 112 degrees   External rotation BTH: T2   Internal rotation BTB: T10     Strength/Myotome Testing     Left Shoulder     Planes of Motion   Flexion: 4-   Abduction: 4-   External rotation at 0°: 4-   Internal rotation at 0°: 4     Right Shoulder     Planes of Motion   Flexion: 4   Abduction: 4   External rotation at 0°: 4   Internal rotation at 0°: 4         See Modality Logs for complete treatment.       Therapeutic Exercise/ Neuro-Re education/Manual:   -Shoulder retraction using red band: 2x10   -Shoulder extension using red  band: 2x10   -Pulley's: 2x10 with 3 sec isometric hold in flexion and abd  -supine shoulder press, 2x10 1 pound weight   -SL ER with towel under arm, 2x10 each 1 pound weight   -supine shoulder horiz abd 2x10, red band (some pain on the L during the last set)  -supine shoulder flexion, red band, 2x10 on R only  Cues for form with all exercises  -STM to L UT and levator muscles, pect lift, PROM in all planes to L shoulder with gentle overpressure at end ROM.  DEFER    Functional Outcome Score:   SPADI=50.7%    Assessment & Plan       Assessment  Assessment details: Bienvenido Carter has been seen for 7 physical therapy sessions for B shoulder pain.  Treatment has included therapeutic exercise, manual therapy, neuro-muscular retraining , ultrasound, patient education with home exercise program , and dry needling . Progress to physical therapy goals is fair. She has made gains in her ROM, strength, and decreased pain on the R shoulder, but her L shoulder is still painful and swollen, especially on the middle and posterior deltoid.  She will benefit from continued skilled physical therapy to address remaining impairments and functional limitations.     Prognosis: good    Goals  Plan Goals: ST weeks  1. Patient will be independent with education for symptom management, joint protection and strategies to minimize stress on affected tissues (PART MET)   2. Pt to improve L shoulder ROM in flex from 74 degrees to 100 degrees, abd from 95 degrees to 120, ER from C5 to T1 and IR from L1 to T9 for improvement in overall functional mobility during ADL's. (PART MET)  (ONGOING)   3. Pt to improve pain complaints from 8/10 to no more than 4/10 with ADLs to demonstrate improvement in tolerance to activities. (PART MET) for R shoulder, no more than 7/10 for L shoulder     LT weeks  1. Pt to improve L shoulder ROM in flex from 100 degrees to 140, abd from 120 to 150, ER from T1 to T3 and IR from T9 to T5 to demonstrate  improved ability to shower.  (ONGOING)   2. Pt to improve R shoulder AROM in flex=150 deg, abd=150 deg, ER=T2, and IR=T9 for greater ease with OH activities, dressing and showering. (PART MET) (ONGOING)   3. Pt to improve pain complaints from 4/10 to no more than 2/10 with ADLs to demonstrate improved ability to tolerate UE exercises at the gym. (ONGOING)   4. Pt to improve shoulder strength from 3+/5 to 4+/5 to demonstrate improved ability to carry groceries from her car into her condo. (ONGOING) progressing  5. Pt to improve SPADI score from 83% to 40% for overall functional improvement with washing her hair, washing her back, putting on shirt, overhead reaching, and carrying an object that is greater than 10 pounds (ONGOING) improved to 51%  6. Patient will be independent and compliant with advanced home exercise program to facilitate self-management of symptoms. (ONGOING)       Plan  Therapy options: will be seen for skilled therapy services  Frequency: 2x week  Duration in weeks: 8  Treatment plan discussed with: patient           Recommendations: Continue as planned  Timeframe: 2 months  Prognosis to achieve goals: good    PT Signature: Katie Medel PT, CDNT    License Number: [id number]    Electronically signed by Katie Medel PT, 04/01/24, 9:06 AM EDT      Based upon review of the patient's progress and continued therapy plan, it is my medical opinion that Bienvenido Carter should continue physical therapy treatment at [facility]  [address] [place] [address] [address] [place] [address]  [phone].    Signature: __________________________________  Tino Randall MD    Timed:  Manual Therapy:         mins  85367;  Therapeutic Exercise:    25     mins  44223;     Neuromuscular Namita:    8    mins  19364;    Therapeutic Activity:          mins  81332;     Gait Training:           mins  26431;     Ultrasound:     10     mins  86879;    Iontophoresis          mins 52087;    Untimed:  Electrical Stimulation:         mins  99445 ( );  Traction:         mins  08899;   Dry Needling   (1-2 muscles)            mins 20560 (Self-pay)  Dry Needling (3-4 muscles)             mins 20561 (Self-pay)  Dry Needling Trial                 mins DRYNDLTRIAL  (No Charge)    Timed Treatment:   43   mins   Total Treatment:     43   mins

## 2024-04-03 ENCOUNTER — TREATMENT (OUTPATIENT)
Dept: PHYSICAL THERAPY | Facility: CLINIC | Age: 44
End: 2024-04-03
Payer: MEDICARE

## 2024-04-03 DIAGNOSIS — R68.89 DECREASED FUNCTIONAL ACTIVITY TOLERANCE: ICD-10-CM

## 2024-04-03 DIAGNOSIS — M75.41 SHOULDER IMPINGEMENT SYNDROME, RIGHT: ICD-10-CM

## 2024-04-03 DIAGNOSIS — M75.42 SHOULDER IMPINGEMENT SYNDROME, LEFT: ICD-10-CM

## 2024-04-03 DIAGNOSIS — M25.512 CHRONIC PAIN OF BOTH SHOULDERS: Primary | ICD-10-CM

## 2024-04-03 DIAGNOSIS — M25.511 CHRONIC PAIN OF BOTH SHOULDERS: Primary | ICD-10-CM

## 2024-04-03 DIAGNOSIS — G89.29 CHRONIC PAIN OF BOTH SHOULDERS: Primary | ICD-10-CM

## 2024-04-03 DIAGNOSIS — M25.612 DECREASED ROM OF LEFT SHOULDER: ICD-10-CM

## 2024-04-03 DIAGNOSIS — R53.1 DECREASED STRENGTH: ICD-10-CM

## 2024-04-03 PROCEDURE — 97140 MANUAL THERAPY 1/> REGIONS: CPT | Performed by: PHYSICAL THERAPIST

## 2024-04-03 PROCEDURE — 97110 THERAPEUTIC EXERCISES: CPT | Performed by: PHYSICAL THERAPIST

## 2024-04-03 NOTE — PROGRESS NOTES
Physical Therapy Daily Treatment Note / Progress Note    [facility]  [address] [place] [address]  [phone] (phone)  [phone] (fax)    Patient: Bienvenido Carter   : 1980  Diagnosis/ICD-10 Code:  Chronic pain of both shoulders [M25.511, G89.29, M25.512]  Referring practitioner: Tino Randall MD  Today's Date: 4/3/2024  Patient seen for 8 sessions    Visit Diagnoses:    ICD-10-CM ICD-9-CM   1. Chronic pain of both shoulders  M25.511 719.41    G89.29 338.29    M25.512    2. Decreased ROM of left shoulder  M25.612 719.51   3. Decreased functional activity tolerance  R68.89 780.99   4. Decreased strength  R53.1 780.79   5. Shoulder impingement syndrome, left  M75.42 726.2   6. Shoulder impingement syndrome, right  M75.41 726.2              Subjective Evaluation    History of Present Illness    Subjective comment: L shoulder pain worse last couple of days 8/10, R 2/10 - feel a little something but not much.       Objective     AROM:   Left Shoulder   Flexion: 125 degrees   Abduction: 90 degrees   External rotation BTH: T2   Internal rotation BTB: T12   Right Shoulder   Flexion: 150 degrees   Abduction: 112 degrees   External rotation BTH: T2   Internal rotation BTB: T10      MMT:   Left Shoulder   Flexion: 4-   Abduction: 4-   External rotation at 0°: 4-   Internal rotation at 0°: 4   Right Shoulder   Flexion: 4   Abduction: 4   External rotation at 0°: 4   Internal rotation at 0°: 4      Functional Outcome Score:   SPADI=50.7%     Therapeutic Exercise/ Neuro-Re education/Manual:   -Shoulder retraction using red band: 2x10   -Shoulder extension using red band: 2x10   -Pulley's: 2x10 with 3 sec isometric hold in flexion and abd  -supine shoulder press, 2x10 1 pound weight   -SL ER with towel under arm, 2x10 each 1 pound weight   -supine shoulder horiz abd 2x10, red band (some pain/discomfort on the L noted during 2nd set)  -supine shoulder  flexion, red band, 2x10 on R only  Cues for form with all exercises    Manual:    -STM to L UT, levator, teres minor, and subscap tissues, PROM in all planes to L shoulder with gentle overpressure at end ROM.        Assessment & Plan       Assessment  Assessment details: Bienvenido Carter has been seen for 8 physical therapy sessions for B shoulder pain.  Her treatment has included therapeutic exercise, manual therapy, neuro-muscular retraining, ultrasound, patient education with home exercise program, and dry needling.  Per patient, her MRI was positive for L RC tear but does not want to pursue surgery at this time.  She notes some improvement in R > L shoulder pain and function since starting therapy.  She demonstrates improvement in B shoulder flexion AROM and MMT but continues to have pain / limitations with L >R shoulder function especially with reaching, lifting/carrying, and overhead activities.  She will continue to benefit from skilled physical therapy to address remaining impairments and functional limitations.   Prognosis: good    Goals  Plan Goals: ST weeks  1. Patient will be independent with education for symptom management, joint protection and strategies to minimize stress on affected tissues (PART MET)   2. Pt to improve L shoulder ROM in flex from 74 degrees to 100 degrees, abd from 95 degrees to 120, ER from C5 to T1 and IR from L1 to T9 for improvement in overall functional mobility during ADL's. (PART MET)  (ONGOING)   3. Pt to improve pain complaints from 8/10 to no more than 4/10 with ADLs to demonstrate improvement in tolerance to activities. (PART MET) for R shoulder, no more than 7/10 for L shoulder     LT weeks  1. Pt to improve L shoulder ROM in flex from 100 degrees to 140, abd from 120 to 150, ER from T1 to T3 and IR from T9 to T5 to demonstrate improved ability to shower.  (ONGOING)   2. Pt to improve R shoulder AROM in flex=150 deg, abd=150 deg, ER=T2, and IR=T9 for  greater ease with OH activities, dressing and showering. (PART MET) (ONGOING)   3. Pt to improve pain complaints from 4/10 to no more than 2/10 with ADLs to demonstrate improved ability to tolerate UE exercises at the gym. (ONGOING)   4. Pt to improve shoulder strength from 3+/5 to 4+/5 to demonstrate improved ability to carry groceries from her car into her condo. (ONGOING) progressing  5. Pt to improve SPADI score from 83% to 40% for overall functional improvement with washing her hair, washing her back, putting on shirt, overhead reaching, and carrying an object that is greater than 10 pounds (ONGOING) improved to 51%  6. Patient will be independent and compliant with advanced home exercise program to facilitate self-management of symptoms. (ONGOING)       Plan  Therapy options: will be seen for skilled therapy services  Frequency: 2x week  Duration in weeks: 8  Treatment plan discussed with: patient               Timed:    Manual Therapy:    12    mins  03970;  Therapeutic Exercise:    24     mins  65934;     Neuromuscular Namita:    6    mins  70464;    Therapeutic Activity:     -     mins  19511;     Gait Training:      -     mins  38483;     Ultrasound:     -     mins  01597;    Aquatic Therapy    -     mins 31734;  Self Care                       -     mins   43634        Untimed:  Electrical Stimulation:    -     mins  56874 ( );  Traction:    -     mins  78000;   Dry Needling  (1-2 muscles)            -     mins 20560 (Self-pay)  Dry Needling (3-4 muscles) -     20561 (Self-pay)  Dry Needling Trial    -     DRYNDLTRIAL  (No Charge)    Timed Treatment:   42   mins   Total Treatment:     42   mins    Subha Tai PT  Physical Therapist    [place] License:  [id number]

## 2024-04-10 ENCOUNTER — TREATMENT (OUTPATIENT)
Dept: PHYSICAL THERAPY | Facility: CLINIC | Age: 44
End: 2024-04-10
Payer: MEDICARE

## 2024-04-10 ENCOUNTER — OFFICE VISIT (OUTPATIENT)
Dept: SURGERY | Facility: CLINIC | Age: 44
End: 2024-04-10
Payer: MEDICARE

## 2024-04-10 VITALS
WEIGHT: 87.6 LBS | HEIGHT: 59 IN | DIASTOLIC BLOOD PRESSURE: 72 MMHG | BODY MASS INDEX: 17.66 KG/M2 | SYSTOLIC BLOOD PRESSURE: 118 MMHG

## 2024-04-10 DIAGNOSIS — M25.512 CHRONIC PAIN OF BOTH SHOULDERS: Primary | ICD-10-CM

## 2024-04-10 DIAGNOSIS — R53.1 DECREASED STRENGTH: ICD-10-CM

## 2024-04-10 DIAGNOSIS — M75.41 SHOULDER IMPINGEMENT SYNDROME, RIGHT: ICD-10-CM

## 2024-04-10 DIAGNOSIS — M75.42 SHOULDER IMPINGEMENT SYNDROME, LEFT: ICD-10-CM

## 2024-04-10 DIAGNOSIS — G89.29 CHRONIC PAIN OF BOTH SHOULDERS: Primary | ICD-10-CM

## 2024-04-10 DIAGNOSIS — M79.605 LEFT LEG PAIN: Primary | ICD-10-CM

## 2024-04-10 DIAGNOSIS — R68.89 DECREASED FUNCTIONAL ACTIVITY TOLERANCE: ICD-10-CM

## 2024-04-10 DIAGNOSIS — M25.511 CHRONIC PAIN OF BOTH SHOULDERS: Primary | ICD-10-CM

## 2024-04-10 DIAGNOSIS — M25.612 DECREASED ROM OF LEFT SHOULDER: ICD-10-CM

## 2024-04-10 PROCEDURE — 97110 THERAPEUTIC EXERCISES: CPT | Performed by: PHYSICAL THERAPIST

## 2024-04-10 PROCEDURE — 97035 APP MDLTY 1+ULTRASOUND EA 15: CPT | Performed by: PHYSICAL THERAPIST

## 2024-04-10 NOTE — PROGRESS NOTES
Physical Therapy Daily Treatment Note    Patient: Bienvenido Carter   : 1980  Diagnosis/ICD-10 Code:  Chronic pain of both shoulders [M25.511, G89.29, M25.512]  Referring practitioner: Tino Randall MD  Date of Initial Visit: Type: THERAPY  Noted: 2024  Today's Date: 4/10/2024  Patient seen for 9 sessions    Visit Diagnoses:    ICD-10-CM ICD-9-CM   1. Chronic pain of both shoulders  M25.511 719.41    G89.29 338.29    M25.512    2. Decreased ROM of left shoulder  M25.612 719.51   3. Decreased functional activity tolerance  R68.89 780.99   4. Decreased strength  R53.1 780.79   5. Shoulder impingement syndrome, left  M75.42 726.2   6. Shoulder impingement syndrome, right  M75.41 726.2       [facility] [place] [address]  [phone] (phone)  [phone] (fax)         Subjective I was out of town and sleeping in another bed, the L shoulder has really been hurting the last few days    Objective   See Modality Logs for complete treatment.     Therapeutic Exercise/ Neuro-Re education/Manual:   -Shoulder retraction using red band: 2x10   -Shoulder extension using red band: 2x10   -Pulley's: 2x10 with 3 sec isometric hold in flexion and abd  -SL ER with towel under arm, 2x10 each  -supine press ups 2x10 on R UE (increased L shoulder pain)    Assessment/Plan  Pt with tenderness and some swelling over the anterior and lateral L shoulder. She was traveling over the weekend and sleeping in a different bed really flared her up. She had tenderness in the subacromial area, middle deltoid, supraspinatus tendon. Pain was improved post treatment         Timed:    Manual Therapy:         mins  79343;  Therapeutic Exercise:    30     mins  87912;     Neuromuscular Namita:        mins  10435;    Therapeutic Activity:          mins  69293;     Gait Training:           mins  02297;     Ultrasound:     10     mins  28353;    Electrical Stimulation:          mins  75592 ( );  Iontophoresis         mins 81037;  Aquatic Therapy         mins 76039;      Untimed:  Electrical Stimulation:         mins  99043 ( );  Traction:         mins  88650;   Dry Needling   (1-2 muscles)             mins 20560 (Self-pay)  Dry Needling (3-4 muscles)           mins 20561 (Self-pay)  Dry Needling Trial              mins DRYNDLTRIAL  (No Charge)    Timed Treatment:   40   mins   Total Treatment:     40   mins    Katie Medel PT, CDNT  Physical Therapist    [place] License:[id number]
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
## 2024-04-12 ENCOUNTER — TREATMENT (OUTPATIENT)
Dept: PHYSICAL THERAPY | Facility: CLINIC | Age: 44
End: 2024-04-12
Payer: MEDICARE

## 2024-04-12 DIAGNOSIS — R53.1 DECREASED STRENGTH: ICD-10-CM

## 2024-04-12 DIAGNOSIS — M25.512 CHRONIC PAIN OF BOTH SHOULDERS: Primary | ICD-10-CM

## 2024-04-12 DIAGNOSIS — M25.612 DECREASED ROM OF LEFT SHOULDER: ICD-10-CM

## 2024-04-12 DIAGNOSIS — M25.511 CHRONIC PAIN OF BOTH SHOULDERS: Primary | ICD-10-CM

## 2024-04-12 DIAGNOSIS — M75.41 SHOULDER IMPINGEMENT SYNDROME, RIGHT: ICD-10-CM

## 2024-04-12 DIAGNOSIS — M75.42 SHOULDER IMPINGEMENT SYNDROME, LEFT: ICD-10-CM

## 2024-04-12 DIAGNOSIS — R68.89 DECREASED FUNCTIONAL ACTIVITY TOLERANCE: ICD-10-CM

## 2024-04-12 DIAGNOSIS — G89.29 CHRONIC PAIN OF BOTH SHOULDERS: Primary | ICD-10-CM

## 2024-04-12 PROCEDURE — 97110 THERAPEUTIC EXERCISES: CPT | Performed by: PHYSICAL THERAPIST

## 2024-04-12 PROCEDURE — 97035 APP MDLTY 1+ULTRASOUND EA 15: CPT | Performed by: PHYSICAL THERAPIST

## 2024-04-12 PROCEDURE — 97140 MANUAL THERAPY 1/> REGIONS: CPT | Performed by: PHYSICAL THERAPIST

## 2024-04-12 NOTE — PROGRESS NOTES
Physical Therapy Daily Treatment Note    Patient: Bienvenido Carter   : 1980  Diagnosis/ICD-10 Code:  Chronic pain of both shoulders [M25.511, G89.29, M25.512]  Referring practitioner: Tino Randall MD  Date of Initial Visit: Type: THERAPY  Noted: 2024  Today's Date: 2024  Patient seen for 10 sessions    Visit Diagnoses:    ICD-10-CM ICD-9-CM   1. Chronic pain of both shoulders  M25.511 719.41    G89.29 338.29    M25.512    2. Decreased ROM of left shoulder  M25.612 719.51   3. Decreased functional activity tolerance  R68.89 780.99   4. Decreased strength  R53.1 780.79   5. Shoulder impingement syndrome, left  M75.42 726.2   6. Shoulder impingement syndrome, right  M75.41 726.2       [facility] [place] [address]  [phone] (phone)  [phone] (fax)         Subjective the L shoulder is still really painful, not sure if it is the weather or I am sleeping on it wrong    Objective     See Modality Logs for complete treatment.      Therapeutic Exercise/ Neuro-Re education/Manual:   -Shoulder retraction using red band: 2x10   -Shoulder extension using red band: 2x10   -Pulley's: 2x10 with 3 sec isometric hold in flexion and abd  -SL ER with towel under arm, 2x10 each  -SL shoulder abd R arm to 90 deg and L arm to 45 deg 2x10 each  -supine press ups 2x10 on R UE (increased L shoulder pain)    STM to L UT, levator, RC muscles, and pect, light retrograde massage to the upper arm for edema and pain.     Assessment/Plan  Pt has had more pain in the L shoulder over the past week. She was traveling ([place] to [place] and was sleeping in a different bed. The pain has persisted through the week. She is continuing to ice and rest the shoulder, some decrease after therapy with US treatment         Timed:    Manual Therapy:    10     mins  19831;  Therapeutic Exercise:    20     mins  47833;     Neuromuscular Namita:        mins  74398;     Therapeutic Activity:          mins  10649;     Gait Training:           mins  74754;     Ultrasound:     10     mins  42617;    Electrical Stimulation:         mins  70941 ( );  Iontophoresis         mins 02666;  Aquatic Therapy         mins 63829;      Untimed:  Electrical Stimulation:         mins  44400 ( );  Traction:         mins  78749;   Dry Needling   (1-2 muscles)             mins 20560 (Self-pay)  Dry Needling (3-4 muscles)           mins 20561 (Self-pay)  Dry Needling Trial              mins DRYNDLTRIAL  (No Charge)    Timed Treatment:   40   mins   Total Treatment:     40   mins    Katie Medel PT, CDNT  Physical Therapist    [place] License:[id number]

## 2024-04-15 ENCOUNTER — TREATMENT (OUTPATIENT)
Dept: PHYSICAL THERAPY | Facility: CLINIC | Age: 44
End: 2024-04-15
Payer: MEDICARE

## 2024-04-15 DIAGNOSIS — M25.511 CHRONIC PAIN OF BOTH SHOULDERS: Primary | ICD-10-CM

## 2024-04-15 DIAGNOSIS — M75.41 SHOULDER IMPINGEMENT SYNDROME, RIGHT: ICD-10-CM

## 2024-04-15 DIAGNOSIS — M75.42 SHOULDER IMPINGEMENT SYNDROME, LEFT: ICD-10-CM

## 2024-04-15 DIAGNOSIS — M25.512 CHRONIC PAIN OF BOTH SHOULDERS: Primary | ICD-10-CM

## 2024-04-15 DIAGNOSIS — M25.612 DECREASED ROM OF LEFT SHOULDER: ICD-10-CM

## 2024-04-15 DIAGNOSIS — R68.89 DECREASED FUNCTIONAL ACTIVITY TOLERANCE: ICD-10-CM

## 2024-04-15 DIAGNOSIS — G89.29 CHRONIC PAIN OF BOTH SHOULDERS: Primary | ICD-10-CM

## 2024-04-15 DIAGNOSIS — R53.1 DECREASED STRENGTH: ICD-10-CM

## 2024-04-15 PROCEDURE — 97110 THERAPEUTIC EXERCISES: CPT | Performed by: PHYSICAL THERAPIST

## 2024-04-15 PROCEDURE — 97035 APP MDLTY 1+ULTRASOUND EA 15: CPT | Performed by: PHYSICAL THERAPIST

## 2024-04-15 PROCEDURE — 97140 MANUAL THERAPY 1/> REGIONS: CPT | Performed by: PHYSICAL THERAPIST

## 2024-04-15 NOTE — PROGRESS NOTES
Physical Therapy Daily Treatment Note    Patient: Bienvenido Carter   : 1980  Diagnosis/ICD-10 Code:  Chronic pain of both shoulders [M25.511, G89.29, M25.512]  Referring practitioner: Tino Randall MD  Date of Initial Visit: Type: THERAPY  Noted: 2024  Today's Date: 4/15/2024  Patient seen for 11 sessions    Visit Diagnoses:    ICD-10-CM ICD-9-CM   1. Chronic pain of both shoulders  M25.511 719.41    G89.29 338.29    M25.512    2. Decreased ROM of left shoulder  M25.612 719.51   3. Decreased functional activity tolerance  R68.89 780.99   4. Decreased strength  R53.1 780.79   5. Shoulder impingement syndrome, left  M75.42 726.2   6. Shoulder impingement syndrome, right  M75.41 726.2       [facility] [place] [address]  [phone] (phone)  [phone] (fax)         Subjective the last few days the L shoulder has been really stiff and painful. It is prob about a 9/10 this am    Objective     See Modality Logs for complete treatment.      Therapeutic Exercise/ Neuro-Re education/Manual:   -Shoulder retraction using red band: 2x10   -Shoulder extension using red band: 2x10   -Pulley's: 2x10 with 3 sec isometric hold in flexion and abd  -SL ER with towel under arm, 2x10 each, added 1lb to R UE today  -SL shoulder abd R arm to 90 deg and L arm to 45 deg 2x10 each  -supine press ups 2x10 on R UE (increased L shoulder pain)     STM to L UT, levator, RC muscles, and pect, light retrograde massage to the upper arm for edema and pain.        Assessment/Plan  Pt continues with increased L shoulder pain, up to a 9/10 over the last few days. She has pain meds that she takes, but asked her to talk to MD for the ok to try and anti-inflammatory to see if that would help decrease her pain more effectively.          Timed:    Manual Therapy:    10     mins  47151;  Therapeutic Exercise:    23     mins  30683;     Neuromuscular Namita:        mins   66005;    Therapeutic Activity:          mins  48583;     Gait Training:           mins  00265;     Ultrasound:     10     mins  71115;    Electrical Stimulation:         mins  37332 ( );  Iontophoresis         mins 39437;  Aquatic Therapy         mins 38581;      Untimed:  Electrical Stimulation:         mins  94285 ( );  Traction:         mins  39569;   Dry Needling   (1-2 muscles)             mins 20560 (Self-pay)  Dry Needling (3-4 muscles)           mins 20561 (Self-pay)  Dry Needling Trial              mins DRYNDLTRIAL  (No Charge)    Timed Treatment:   43   mins   Total Treatment:     43   mins    Katie Medel PT, CDNT  Physical Therapist    [place] License:[id number]

## 2024-04-18 ENCOUNTER — TREATMENT (OUTPATIENT)
Dept: PHYSICAL THERAPY | Facility: CLINIC | Age: 44
End: 2024-04-18
Payer: MEDICARE

## 2024-04-18 DIAGNOSIS — M75.41 SHOULDER IMPINGEMENT SYNDROME, RIGHT: ICD-10-CM

## 2024-04-18 DIAGNOSIS — M25.511 CHRONIC PAIN OF BOTH SHOULDERS: Primary | ICD-10-CM

## 2024-04-18 DIAGNOSIS — M75.42 SHOULDER IMPINGEMENT SYNDROME, LEFT: ICD-10-CM

## 2024-04-18 DIAGNOSIS — G89.29 CHRONIC PAIN OF BOTH SHOULDERS: Primary | ICD-10-CM

## 2024-04-18 DIAGNOSIS — R68.89 DECREASED FUNCTIONAL ACTIVITY TOLERANCE: ICD-10-CM

## 2024-04-18 DIAGNOSIS — R53.1 DECREASED STRENGTH: ICD-10-CM

## 2024-04-18 DIAGNOSIS — M25.512 CHRONIC PAIN OF BOTH SHOULDERS: Primary | ICD-10-CM

## 2024-04-18 DIAGNOSIS — M25.612 DECREASED ROM OF LEFT SHOULDER: ICD-10-CM

## 2024-04-18 PROCEDURE — 97110 THERAPEUTIC EXERCISES: CPT | Performed by: PHYSICAL THERAPIST

## 2024-04-18 PROCEDURE — 97035 APP MDLTY 1+ULTRASOUND EA 15: CPT | Performed by: PHYSICAL THERAPIST

## 2024-04-18 PROCEDURE — 97140 MANUAL THERAPY 1/> REGIONS: CPT | Performed by: PHYSICAL THERAPIST

## 2024-04-18 NOTE — PROGRESS NOTES
Physical Therapy Daily Treatment Note    Patient: Bienvenido Carter   : 1980  Diagnosis/ICD-10 Code:  Chronic pain of both shoulders [M25.511, G89.29, M25.512]  Referring practitioner: Tino Randall MD  Date of Initial Visit: Type: THERAPY  Noted: 2024  Today's Date: 2024  Patient seen for 12 sessions    Visit Diagnoses:    ICD-10-CM ICD-9-CM   1. Chronic pain of both shoulders  M25.511 719.41    G89.29 338.29    M25.512    2. Decreased ROM of left shoulder  M25.612 719.51   3. Decreased functional activity tolerance  R68.89 780.99   4. Decreased strength  R53.1 780.79   5. Shoulder impingement syndrome, right  M75.41 726.2   6. Shoulder impingement syndrome, left  M75.42 726.2       [facility] [place] [address]  [phone] (phone)  [phone] (fax)         Subjective  trying to sleep in all different positions, but I wake up a lot with L shoulder pain    Objective     See Modality Logs for complete treatment     Therapeutic Exercise/ Neuro-Re education/Manual:   -Shoulder retraction using red band: 2x10   -Shoulder extension using red band: 2x10   -Pulley's: 2x10 with 3 sec isometric hold in flexion and abd  -SL ER with towel under arm, 2x10 each, added 1lb to R UE today  -SL shoulder abd R arm to 90 deg and L arm to 45 deg 2x10 each  -supine press ups 2x10 on R UE (increased L shoulder pain)  -KT taping to the L shoulder for supra/infraspinatus and middle deltoid using 2 Y strips   STM to L UT, levator, RC muscles, and pect, light retrograde massage to the upper arm for edema and pain..       Assessment/Plan  Pt continues with higher levels of L shoulder pain since her trip a little over a week ago. She does have some swelling in the upper arm. Did a trial of KT taping for assistance of motion of the deltoid and RC. Pt can wear for 2-3 days if it feels good, take it off if troublesome.          Timed:    Manual Therapy:     10     mins  00594;  Therapeutic Exercise:    25     mins  46301;     Neuromuscular Namita:        mins  49569;    Therapeutic Activity:          mins  45625;     Gait Training:           mins  37886;     Ultrasound:     10     mins  83844;    Electrical Stimulation:         mins  89227 ( );  Iontophoresis         mins 26683;  Aquatic Therapy         mins 58784;      Untimed:  Electrical Stimulation:         mins  77693 ( );  Traction:         mins  64382;   Dry Needling   (1-2 muscles)             mins 20560 (Self-pay)  Dry Needling (3-4 muscles)           mins 20561 (Self-pay)  Dry Needling Trial              mins DRYNDLTRIAL  (No Charge)    Timed Treatment:   45   mins   Total Treatment:     45   mins    Katie Medel PT, CDNT  Physical Therapist    [place] License:[id number]

## 2024-04-23 DIAGNOSIS — M79.7 FIBROMYALGIA: ICD-10-CM

## 2024-04-23 DIAGNOSIS — M79.2 NEUROPATHIC PAIN: ICD-10-CM

## 2024-04-23 RX ORDER — PREGABALIN 100 MG/1
200 CAPSULE ORAL 3 TIMES DAILY
Qty: 180 CAPSULE | Refills: 5 | Status: SHIPPED | OUTPATIENT
Start: 2024-04-23

## 2024-04-25 ENCOUNTER — TREATMENT (OUTPATIENT)
Dept: PHYSICAL THERAPY | Facility: CLINIC | Age: 44
End: 2024-04-25
Payer: MEDICARE

## 2024-04-25 DIAGNOSIS — G89.29 CHRONIC PAIN OF BOTH SHOULDERS: Primary | ICD-10-CM

## 2024-04-25 DIAGNOSIS — M25.512 CHRONIC PAIN OF BOTH SHOULDERS: Primary | ICD-10-CM

## 2024-04-25 DIAGNOSIS — M75.41 SHOULDER IMPINGEMENT SYNDROME, RIGHT: ICD-10-CM

## 2024-04-25 DIAGNOSIS — M75.42 SHOULDER IMPINGEMENT SYNDROME, LEFT: ICD-10-CM

## 2024-04-25 DIAGNOSIS — M25.612 DECREASED ROM OF LEFT SHOULDER: ICD-10-CM

## 2024-04-25 DIAGNOSIS — R53.1 DECREASED STRENGTH: ICD-10-CM

## 2024-04-25 DIAGNOSIS — M25.511 CHRONIC PAIN OF BOTH SHOULDERS: Primary | ICD-10-CM

## 2024-04-25 DIAGNOSIS — R68.89 DECREASED FUNCTIONAL ACTIVITY TOLERANCE: ICD-10-CM

## 2024-04-25 PROCEDURE — 97110 THERAPEUTIC EXERCISES: CPT | Performed by: PHYSICAL THERAPIST

## 2024-04-25 PROCEDURE — 97140 MANUAL THERAPY 1/> REGIONS: CPT | Performed by: PHYSICAL THERAPIST

## 2024-04-25 PROCEDURE — 97035 APP MDLTY 1+ULTRASOUND EA 15: CPT | Performed by: PHYSICAL THERAPIST

## 2024-04-25 NOTE — PROGRESS NOTES
Physical Therapy Daily Treatment Note    Patient: Bienvenido Carter   : 1980  Diagnosis/ICD-10 Code:  Chronic pain of both shoulders [M25.511, G89.29, M25.512]  Referring practitioner: Tino Randall MD  Date of Initial Visit: Type: THERAPY  Noted: 2024  Today's Date: 2024  Patient seen for 13 sessions    Visit Diagnoses:    ICD-10-CM ICD-9-CM   1. Chronic pain of both shoulders  M25.511 719.41    G89.29 338.29    M25.512    2. Decreased ROM of left shoulder  M25.612 719.51   3. Decreased functional activity tolerance  R68.89 780.99   4. Decreased strength  R53.1 780.79   5. Shoulder impingement syndrome, right  M75.41 726.2   6. Shoulder impingement syndrome, left  M75.42 726.2       [facility] [place] [address]  [phone] (phone)  [phone] (fax)         Subjective I do think that the taping helped the shoulder    Objective     See Modality Logs for complete treatment      Therapeutic Exercise/ Neuro-Re education/Manual:   -Shoulder retraction using red band: 2x10   -Shoulder extension using red band: 2x10   -Pulley's: 2x10 with 3 sec isometric hold in flexion and abd  -SL ER with towel under arm, 2x10 each, added 1lb to R UE today  -SL shoulder abd R arm to 90 deg and L arm to 45 deg 2x10 each  -supine press ups 2x10 on R UE (increased L shoulder pain)  -KT taping to the L shoulder for supra/infraspinatus and middle deltoid using 2 Y strips   STM to L UT, levator, RC muscles, and pect, light retrograde massage to the upper arm for edema and pain..        Assessment/Plan  Pt did seem to have some pain relief from the KT taping. She did get some for home and her father is going to help with application. Gave verbal instructions and showed her how to apply the tape with the correct amount of tension.          Timed:    Manual Therapy:    8     mins  04043;  Therapeutic Exercise:    25     mins  78314;     Neuromuscular  Namita:        mins  56460;    Therapeutic Activity:          mins  90978;     Gait Training:           mins  12310;     Ultrasound:     10     mins  25066;    Electrical Stimulation:         mins  38932 ( );  Iontophoresis         mins 11895;  Aquatic Therapy         mins 16556;      Untimed:  Electrical Stimulation:         mins  77671 ( );  Traction:         mins  69359;   Dry Needling   (1-2 muscles)             mins 20560 (Self-pay)  Dry Needling (3-4 muscles)           mins 20561 (Self-pay)  Dry Needling Trial              mins DRYNDLTRIAL  (No Charge)    Timed Treatment:   43   mins   Total Treatment:     43   mins    Katie Medel PT, CDNT  Physical Therapist    [place] License:[id number]

## 2024-04-29 ENCOUNTER — TREATMENT (OUTPATIENT)
Dept: PHYSICAL THERAPY | Facility: CLINIC | Age: 44
End: 2024-04-29
Payer: MEDICARE

## 2024-04-29 DIAGNOSIS — M25.612 DECREASED ROM OF LEFT SHOULDER: ICD-10-CM

## 2024-04-29 DIAGNOSIS — M25.511 CHRONIC PAIN OF BOTH SHOULDERS: Primary | ICD-10-CM

## 2024-04-29 DIAGNOSIS — R53.1 DECREASED STRENGTH: ICD-10-CM

## 2024-04-29 DIAGNOSIS — M75.41 SHOULDER IMPINGEMENT SYNDROME, RIGHT: ICD-10-CM

## 2024-04-29 DIAGNOSIS — M75.42 SHOULDER IMPINGEMENT SYNDROME, LEFT: ICD-10-CM

## 2024-04-29 DIAGNOSIS — R68.89 DECREASED FUNCTIONAL ACTIVITY TOLERANCE: ICD-10-CM

## 2024-04-29 DIAGNOSIS — G89.29 CHRONIC PAIN OF BOTH SHOULDERS: Primary | ICD-10-CM

## 2024-04-29 DIAGNOSIS — M25.512 CHRONIC PAIN OF BOTH SHOULDERS: Primary | ICD-10-CM

## 2024-04-29 PROCEDURE — 97110 THERAPEUTIC EXERCISES: CPT | Performed by: PHYSICAL THERAPIST

## 2024-04-29 PROCEDURE — 97140 MANUAL THERAPY 1/> REGIONS: CPT | Performed by: PHYSICAL THERAPIST

## 2024-04-29 PROCEDURE — 97035 APP MDLTY 1+ULTRASOUND EA 15: CPT | Performed by: PHYSICAL THERAPIST

## 2024-04-29 NOTE — PROGRESS NOTES
60-Day / 10-Visit Progress Note         Patient: Bienvenido Carter   : 1980  Diagnosis/ICD-10 Code:  Chronic pain of both shoulders [M25.511, G89.29, M25.512]  Referring practitioner: Tino Randall MD  Date of Initial Visit: Type: THERAPY  Noted: 2024  Today's Date: 2024  Patient seen for 14 sessions    Visit Diagnoses:    ICD-10-CM ICD-9-CM   1. Chronic pain of both shoulders  M25.511 719.41    G89.29 338.29    M25.512    2. Decreased ROM of left shoulder  M25.612 719.51   3. Decreased functional activity tolerance  R68.89 780.99   4. Decreased strength  R53.1 780.79   5. Shoulder impingement syndrome, right  M75.41 726.2   6. Shoulder impingement syndrome, left  M75.42 726.2       [facility] [place] [address]  [phone] (phone)  [phone] (fax)    Subjective:     Clinical Progress: improved on R, same or worse on L  Home Program Compliance: Yes  Treatment has included:  therapeutic exercise, manual therapy, neuro-muscular retraining , ultrasound, patient education with home exercise program , and dry needling     Subjective  the L shoulder is really hurting today, having a hard time moving it, driving. L shoulder pain - 9/10, R shoulder pain - 0/10    Objective          Palpation   Left   Hypertonic in the infraspinatus and teres minor.   Tenderness of the infraspinatus and teres minor.   Trigger point to infraspinatus and teres minor.     Tenderness     Left Shoulder   Tenderness in the infraspinatus tendon, subacromial bursa and supraspinatus tendon.     Active Range of Motion   Left Shoulder   Flexion: 70 degrees with pain  Abduction: 50 degrees with pain  External rotation BTH: T1 with pain  Internal rotation BTB: T12 with pain    Right Shoulder   Flexion: 150 degrees   Abduction: 135 degrees   External rotation BTH: T3   Internal rotation BTB: T9     Strength/Myotome Testing     Left Shoulder     Planes of  Motion   Flexion: 3   Abduction: 3   External rotation at 0°: 3+   Internal rotation at 0°: 3+     Right Shoulder     Planes of Motion   Flexion: 4+   Abduction: 4+   External rotation at 0°: 4   Internal rotation at 0°: 4+         See Modality Logs for complete treatment      Therapeutic Exercise/ Neuro-Re education/Manual:   -Shoulder retraction using red band: 2x10   -Shoulder extension using red band: 2x10   -Pulley's: 2x10 with 3 sec isometric hold in flexion and abd  -SL ER with towel under arm, 2x10 each, added 1lb to R UE today  -SL shoulder abd R arm to 90 deg and L arm to 45 deg 2x10 each  -supine press ups 2x10 on R UE added 2lb today(increased L shoulder pain)  -KT taping to the L shoulder for supra/infraspinatus and middle deltoid using 2 Y strips   DEFER  STM to L UT, levator, RC muscles, and pect, light retrograde massage to the upper arm for edema and pain, gentle PROM to L shoulder with overpressure at end ROM    Functional Outcome Score:   SPADI=80/130 or 61.5%    Assessment & Plan       Assessment  Assessment details: Bienvenido Carter has been seen for 14 physical therapy sessions for B shoulder pain.  Treatment has included therapeutic exercise, manual therapy, neuro-muscular retraining , ultrasound, patient education with home exercise program , and dry needling . Progress to physical therapy goals is fair. Pt is making good progress with her R shoulder with minimal pain complaints, improved strength, and function. She continues with L shoulder pain that can get up to 9/10 and is not usually below 7/10. She had difficulty today with any active movement away from her body, tenderness over the RC, and trigger points.  She will benefit from continued skilled physical therapy to address remaining impairments and functional limitations.     Prognosis: good    Goals  Plan Goals: ST weeks  1. Patient will be independent with education for symptom management, joint protection and strategies to  minimize stress on affected tissues (MET)   2. Pt to improve L shoulder ROM in flex from 74 degrees to 100 degrees, abd from 95 degrees to 120, ER from C5 to T1 and IR from L1 to T9 for improvement in overall functional mobility during ADL's. (PART MET)  (ONGOING)   3. Pt to improve pain complaints from 8/10 to no more than 4/10 with ADLs to demonstrate improvement in tolerance to activities. (PART MET) for R shoulder, no more than 9/10 for L shoulder     LT weeks  1. Pt to improve L shoulder ROM in flex from 100 degrees to 140, abd from 120 to 150, ER from T1 to T3 and IR from T9 to T5 to demonstrate improved ability to shower.  (ONGOING)   2. Pt to improve R shoulder AROM in flex=150 deg, abd=150 deg, ER=T2, and IR=T9 for greater ease with OH activities, dressing and showering. (PART MET) (ONGOING)   3. Pt to improve pain complaints from 4/10 to no more than 2/10 with ADLs to demonstrate improved ability to tolerate UE exercises at the gym. (ONGOING) (MET) for R shoulder  4. Pt to improve shoulder strength from 3+/5 to 4+/5 to demonstrate improved ability to carry groceries from her car into her condo. (ONGOING) progressing, (PART MET) for R shoulder  5. Pt to improve SPADI score from 83% to 40% for overall functional improvement with washing her hair, washing her back, putting on shirt, overhead reaching, and carrying an object that is greater than 10 pounds (ONGOING) improved to 61.5%  6. Patient will be independent and compliant with advanced home exercise program to facilitate self-management of symptoms. (ONGOING)             Plan  Therapy options: will be seen for skilled therapy services  Frequency: 2x week  Duration in weeks: 4  Treatment plan discussed with: patient           Recommendations: Continue as planned  Timeframe: 1 month  Prognosis to achieve goals: good    PT Signature: Katie Medel PT, CDNT    License Number: [id number]    Electronically signed by Katie Medel PT, 24, 9:03  SWETA EDT      Based upon review of the patient's progress and continued therapy plan, it is my medical opinion that Bienvenido Carter should continue physical therapy treatment at [facility] PHYSICAL THERAP[facility]  [address] [place] [address] [address] [place] [address] [phone].    Signature: __________________________________  Tino Randall MD    Timed:  Manual Therapy:    15     mins  69974;  Therapeutic Exercise:    20     mins  75418;     Neuromuscular Namita:        mins  91707;    Therapeutic Activity:          mins  12443;     Gait Training:           mins  68760;     Ultrasound:     10     mins  25485;    Iontophoresis         mins 67514;    Untimed:  Electrical Stimulation:         mins  30183 ( );  Traction:         mins  43689;   Dry Needling   (1-2 muscles)            mins 76757 (Self-pay)  Dry Needling (3-4 muscles)             mins 20561 (Self-pay)  Dry Needling Trial                 mins DRYNDLTRIAL  (No Charge)    Timed Treatment:   45   mins   Total Treatment:     45   mins

## 2024-05-02 ENCOUNTER — TREATMENT (OUTPATIENT)
Dept: PHYSICAL THERAPY | Facility: CLINIC | Age: 44
End: 2024-05-02
Payer: MEDICARE

## 2024-05-02 DIAGNOSIS — M25.511 CHRONIC PAIN OF BOTH SHOULDERS: Primary | ICD-10-CM

## 2024-05-02 DIAGNOSIS — M75.41 SHOULDER IMPINGEMENT SYNDROME, RIGHT: ICD-10-CM

## 2024-05-02 DIAGNOSIS — M25.512 CHRONIC PAIN OF BOTH SHOULDERS: Primary | ICD-10-CM

## 2024-05-02 DIAGNOSIS — M75.42 SHOULDER IMPINGEMENT SYNDROME, LEFT: ICD-10-CM

## 2024-05-02 DIAGNOSIS — R68.89 DECREASED FUNCTIONAL ACTIVITY TOLERANCE: ICD-10-CM

## 2024-05-02 DIAGNOSIS — R53.1 DECREASED STRENGTH: ICD-10-CM

## 2024-05-02 DIAGNOSIS — G89.29 CHRONIC PAIN OF BOTH SHOULDERS: Primary | ICD-10-CM

## 2024-05-02 DIAGNOSIS — M25.612 DECREASED ROM OF LEFT SHOULDER: ICD-10-CM

## 2024-05-02 PROCEDURE — 97140 MANUAL THERAPY 1/> REGIONS: CPT | Performed by: PHYSICAL THERAPIST

## 2024-05-02 PROCEDURE — 97110 THERAPEUTIC EXERCISES: CPT | Performed by: PHYSICAL THERAPIST

## 2024-05-02 PROCEDURE — 97035 APP MDLTY 1+ULTRASOUND EA 15: CPT | Performed by: PHYSICAL THERAPIST

## 2024-05-02 NOTE — PROGRESS NOTES
Physical Therapy Daily Treatment Note    Patient: Bienvenido Carter   : 1980  Diagnosis/ICD-10 Code:  Chronic pain of both shoulders [M25.511, G89.29, M25.512]  Referring practitioner: Tino Randall MD  Date of Initial Visit: Type: THERAPY  Noted: 2024  Today's Date: 2024  Patient seen for 15 sessions    Visit Diagnoses:    ICD-10-CM ICD-9-CM   1. Chronic pain of both shoulders  M25.511 719.41    G89.29 338.29    M25.512    2. Decreased ROM of left shoulder  M25.612 719.51   3. Decreased functional activity tolerance  R68.89 780.99   4. Decreased strength  R53.1 780.79   5. Shoulder impingement syndrome, right  M75.41 726.2   6. Shoulder impingement syndrome, left  M75.42 726.2       [facility] [place] [address]  [phone] (phone)  [phone] (fax)         Subjective the L shoulder is still really painful. My dad taped my shoulder yesterday    Objective   See Modality Logs for complete treatment      Therapeutic Exercise/ Neuro-Re education/Manual:   -Shoulder retraction using red band: 2x10 (yellow today)  -Shoulder extension using red band: 2x10 (yellow today)  -Pulley's: 2x10 with 3 sec isometric hold in flexion and abd  -SL ER with towel under arm, 2x10 each, added 1lb to R UE today  -SL shoulder abd R arm to 90 deg and L arm to 45 deg 2x10 each  -supine press ups 2x10 on R UE added 2lb today(increased L shoulder pain)  -KT taping to the L shoulder for supra/infraspinatus and middle deltoid using 2 Y strips   DEFER  STM to L UT, levator, RC muscles, and pect, light retrograde massage to the upper arm for edema and pain, gentle PROM to L shoulder with overpressure at end ROM      Assessment/Plan  Pt continues with high levels of pain in the L shoulder. She has been stretching it with the pulleys, but that has become a little more painful as of late. She has some trigger points in the RC (infra and teres minor) and plan  to dry needle next session to see if that will help reduce her pain          Timed:    Manual Therapy:    15     mins  30441;  Therapeutic Exercise:    20     mins  55910;     Neuromuscular Namita:        mins  44087;    Therapeutic Activity:          mins  72216;     Gait Training:           mins  91737;     Ultrasound:     10     mins  40681;    Electrical Stimulation:         mins  04154 ( );  Iontophoresis         mins 22952;  Aquatic Therapy         mins 55035;      Untimed:  Electrical Stimulation:         mins  27022 ( );  Traction:         mins  10089;   Dry Needling   (1-2 muscles)             mins 20560 (Self-pay)  Dry Needling (3-4 muscles)           mins 20561 (Self-pay)  Dry Needling Trial              mins DRYNDLTRIAL  (No Charge)    Timed Treatment:   45   mins   Total Treatment:     45   mins    Katie Medel PT, CDNT  Physical Therapist    [place] License:[id number]

## 2024-05-31 ENCOUNTER — TREATMENT (OUTPATIENT)
Dept: PHYSICAL THERAPY | Facility: CLINIC | Age: 44
End: 2024-05-31
Payer: MEDICARE

## 2024-05-31 DIAGNOSIS — R68.89 DECREASED FUNCTIONAL ACTIVITY TOLERANCE: ICD-10-CM

## 2024-05-31 DIAGNOSIS — M25.512 CHRONIC LEFT SHOULDER PAIN: Primary | ICD-10-CM

## 2024-05-31 DIAGNOSIS — M75.42 SHOULDER IMPINGEMENT SYNDROME, LEFT: ICD-10-CM

## 2024-05-31 DIAGNOSIS — R53.1 DECREASED STRENGTH: ICD-10-CM

## 2024-05-31 DIAGNOSIS — M25.612 DECREASED ROM OF LEFT SHOULDER: ICD-10-CM

## 2024-05-31 DIAGNOSIS — G89.29 CHRONIC LEFT SHOULDER PAIN: Primary | ICD-10-CM

## 2024-05-31 PROCEDURE — 97162 PT EVAL MOD COMPLEX 30 MIN: CPT | Performed by: PHYSICAL THERAPIST

## 2024-05-31 NOTE — PROGRESS NOTES
Physical Therapy Initial Evaluation and Plan of Care      Patient: Bienvenido Carter   : 1980  Diagnosis/ICD-10 Code:  Chronic left shoulder pain [M25.512, G89.29]  Referring practitioner: Tino Randall MD  Date of Initial Visit: 2024  Today's Date: 2024  Patient seen for 1 sessions    Visit Diagnoses:    ICD-10-CM ICD-9-CM   1. Chronic left shoulder pain  M25.512 719.41    G89.29 338.29   2. Decreased ROM of left shoulder  M25.612 719.51   3. Decreased functional activity tolerance  R68.89 780.99   4. Decreased strength  R53.1 780.79   5. Shoulder impingement syndrome, left  M75.42 726.2       [facility] [place] [address]  [phone] (phone)  [phone] (fax)         Subjective Evaluation    History of Present Illness  Date of onset: 2024  Mechanism of injury: Pt started with B shoulder pain in mid Feb. She was doing therapy for awhile, L shoulder has not been getting better, pain still severe at times. R shoulder is progressing well, minimal pain and able to perform ADLs. She has continued with her HEP  Pt has extensive medical history, reviewed in chart    Pain  Current pain ratin  At best pain ratin  At worst pain rating: 10  Location: L shoulder  Quality: sharp, knife-like and dull ache  Relieving factors: ice and rest (taking Advil PRN)  Aggravating factors: sleeping, outstretched reach, overhead activity, lifting and movement (donning [place] shirts, pulling up pants, donning socks, driving)  Symptom course: R improved, L worsening.    Social Support  Lives in: condominium  Lives with: alone    Hand dominance: right    Treatments  Previous treatment: physical therapy and injection treatment  Patient Goals  Patient goals for therapy: increased strength, decreased pain, return to sport/leisure activities and increased motion           Objective          Tenderness     Left Shoulder   Tenderness in the  biceps tendon (proximal), bicipital groove, infraspinatus tendon and supraspinatus tendon.     Active Range of Motion   Left Shoulder   Flexion: 70 degrees with pain  Abduction: 45 degrees with pain  External rotation BTH: C2   Internal rotation BTB: L3     Right Shoulder   Flexion: 155 degrees   Abduction: 135 degrees   External rotation BTH: T2   Internal rotation BTB: T10     Passive Range of Motion   Left Shoulder   Flexion: 145 degrees   Abduction: 135 degrees   External rotation 45°: 70 degrees   Internal rotation 45°: 60 degrees     Strength/Myotome Testing     Left Shoulder     Planes of Motion   Flexion: 3-   Abduction: 3-   External rotation at 0°: 3+   Internal rotation at 0°: 4-     Isolated Muscles   Biceps: 4   Triceps: 4-     Right Shoulder     Planes of Motion   Flexion: 5   Abduction: 5   External rotation at 0°: 4+   Internal rotation at 0°: 5     Isolated Muscles   Biceps: 5   Triceps: 4+     Additional Strength Details  MMT for L UE done in neutral position        Exercises:  -table slides  -RC and deltoid isometrics, towel under arm    Discussed MRI results, pt sees ortho today and will discuss options including surgery      Functional Outcome Score:   SPADI=80/130 or 61.5% (0% is no disability)        Assessment & Plan       Assessment  Impairments: abnormal or restricted ROM, activity intolerance, impaired physical strength, lacks appropriate home exercise program and pain with function   Functional limitations: carrying objects, lifting, sleeping, pulling, pushing, uncomfortable because of pain, moving in bed, reaching behind back, reaching overhead and unable to perform repetitive tasks   Assessment details: Bienvenido Carter is a 43 y.o. year-old female referred to physical therapy for left shoulder pain. She presents with a evolving clinical presentation.  She has comorbidities of RC tears, labral tear, and no personal factors that may affect her progress in the plan of care. Pt has  decreased AROM with pain, decreased PROM with pain at end ROM, and decreased strength. Pt would benefit from therapy to help improve her ability to drive, sleep, and dress with decreased pain.    Prognosis: good    Goals  Plan Goals: ST wks  1. Patient will be independent with education for symptom management, joint protection and strategies to minimize stress on affected tissues  2. Pt to improve L shoulder AROM in flex=100 deg, abd=,90 deg ER= C5 and IR=L1  3. Pt to improve pain complaints to no more than 8/10 with ADLs, sleeping, and driving    LT wks  1. Pt to improve L shoulder AROM in flex=130 deg, abd=120 deg, ER=T1 and IR=T12  2. Pt to improve pain complaints to no more than 5/10 with ADLs  3. Pt to improve L shoulder strength to 4/5 for greater ease with household chores and driving  4. Pt to improve SPADI score from 61.5% to 30% for overall functional improvement with sleeping, dressing, and groomihng  5. Patient will be independent and compliant with advanced home exercise program to facilitate self-management of symptoms.      Plan  Therapy options: will be seen for skilled therapy services  Planned modality interventions: high voltage pulsed current (pain management), TENS, ultrasound, cryotherapy and dry needling  Planned therapy interventions: ADL retraining, body mechanics training, flexibility, functional ROM exercises, home exercise program, IADL retraining, joint mobilization, manual therapy, postural training, soft tissue mobilization, strengthening, stretching, therapeutic activities, abdominal trunk stabilization and neuromuscular re-education  Frequency: 2x week  Duration in weeks: 12  Treatment plan discussed with: patient      Timed:  Manual Therapy:         mins  23149;  Therapeutic Exercise:    8     mins  79877;     Neuromuscular Namita:        mins  33052;    Therapeutic Activity:          mins  29554;     Gait Training:           mins  33757;     Ultrasound:          mins  59040;     Iontophoresis         mins 78302        Untimed:  Electrical Stimulation:         mins  98027 ( );  Traction:       mins  29776;   Dry Needling   (1-2 muscles)             mins 20560 (Self-pay)  Dry Needling (3-4 muscles)           mins 20561 (Self-pay)  Dry Needling Trial              mins DRYNDLTRIAL  (No Charge)    Low Eval          Mins  13934  Mod Eval     23     Mins  58258  High Eval                            Mins  87233    Timed Treatment:   8   mins   Total Treatment:     31   mins    PT SIGNATURE: Katie Medel PT, CDNT    License Number: [id number]    Electronically signed by Katie Medel PT, 05/31/24, 10:37 AM EDT    DATE TREATMENT INITIATED: 5/31/2024    Initial Certification  Certification Period: 8/29/2024  I certify that the therapy services are furnished while this patient is under my care.  The services outlined above are required by this patient, and will be reviewed every 90 days.     PHYSICIAN: Tino Randall MD   NPI: [id number]                                         DATE:     Please sign and return via fax to [phone] Thank you, [facility].

## 2024-06-03 ENCOUNTER — TREATMENT (OUTPATIENT)
Dept: PHYSICAL THERAPY | Facility: CLINIC | Age: 44
End: 2024-06-03
Payer: MEDICARE

## 2024-06-03 DIAGNOSIS — M75.42 SHOULDER IMPINGEMENT SYNDROME, LEFT: ICD-10-CM

## 2024-06-03 DIAGNOSIS — G89.29 CHRONIC LEFT SHOULDER PAIN: Primary | ICD-10-CM

## 2024-06-03 DIAGNOSIS — R53.1 DECREASED STRENGTH: ICD-10-CM

## 2024-06-03 DIAGNOSIS — R68.89 DECREASED FUNCTIONAL ACTIVITY TOLERANCE: ICD-10-CM

## 2024-06-03 DIAGNOSIS — M25.612 DECREASED ROM OF LEFT SHOULDER: ICD-10-CM

## 2024-06-03 DIAGNOSIS — M25.512 CHRONIC LEFT SHOULDER PAIN: Primary | ICD-10-CM

## 2024-06-03 PROCEDURE — 97110 THERAPEUTIC EXERCISES: CPT | Performed by: PHYSICAL THERAPIST

## 2024-06-03 PROCEDURE — 97140 MANUAL THERAPY 1/> REGIONS: CPT | Performed by: PHYSICAL THERAPIST

## 2024-06-03 PROCEDURE — 97035 APP MDLTY 1+ULTRASOUND EA 15: CPT | Performed by: PHYSICAL THERAPIST

## 2024-06-03 NOTE — PROGRESS NOTES
Physical Therapy Daily Treatment Note    Patient: Bienvenido Carter   : 1980  Diagnosis/ICD-10 Code:  Chronic left shoulder pain [M25.512, G89.29]  Referring practitioner: Tino Randall MD  Date of Initial Visit: Type: THERAPY  Noted: 2024  Today's Date: 6/3/2024  Patient seen for 2 sessions    Visit Diagnoses:    ICD-10-CM ICD-9-CM   1. Chronic left shoulder pain  M25.512 719.41    G89.29 338.29   2. Decreased ROM of left shoulder  M25.612 719.51   3. Decreased functional activity tolerance  R68.89 780.99   4. Decreased strength  R53.1 780.79   5. Shoulder impingement syndrome, left  M75.42 726.2       [facility] [place] [address]  [phone] (phone)  [phone] (fax)         Subjective  The md and I discussed surgery. I am not sure that I want to have it, but it may be the only thing that really helps me. Going to talk to my dad and figure out when it would be a good time to do it    Objective   See Modality Logs for complete treatment.     Exercises:  -table slides 10x 5 sec  -RC and deltoid isometrics, towel under arm, 10 x5 sec hold each    Manual:  Pt in R SL, STM to posterior RC muscles, posterior deltoid with ischemic pressure to trigger points        Assessment/Plan  Pt is very painful with all movements of her L shoulder. She has tenderness and trigger points in the infraspinatus and teres minor that did have some reduction in pain at the end of the session. Discussed trying dry needling next visit to try and reduce her pain         Timed:    Manual Therapy:    15     mins  92844;  Therapeutic Exercise:    15     mins  65987;     Neuromuscular Namita:        mins  59749;    Therapeutic Activity:          mins  10510;     Gait Training:           mins  42917;     Ultrasound:     10     mins  85308;    Electrical Stimulation:         mins  87816 ( );  Iontophoresis         mins 39935;  Aquatic Therapy         mins  12595;      Untimed:  Electrical Stimulation:         mins  68072 ( );  Traction:         mins  27177;   Dry Needling   (1-2 muscles)             mins 20560 (Self-pay)  Dry Needling (3-4 muscles)           mins 20561 (Self-pay)  Dry Needling Trial              mins DRYNDLTRIAL  (No Charge)    Timed Treatment:   40   mins   Total Treatment:     40   mins    Katie Medel PT, CDNT  Physical Therapist    [place] License:[id number]

## 2024-06-13 ENCOUNTER — TRANSCRIBE ORDERS (OUTPATIENT)
Dept: PHYSICAL THERAPY | Facility: CLINIC | Age: 44
End: 2024-06-13
Payer: MEDICARE

## 2024-06-13 ENCOUNTER — TREATMENT (OUTPATIENT)
Dept: PHYSICAL THERAPY | Facility: CLINIC | Age: 44
End: 2024-06-13

## 2024-06-13 DIAGNOSIS — R68.89 DECREASED FUNCTIONAL ACTIVITY TOLERANCE: ICD-10-CM

## 2024-06-13 DIAGNOSIS — M75.42 SHOULDER IMPINGEMENT SYNDROME, LEFT: ICD-10-CM

## 2024-06-13 DIAGNOSIS — M25.512 CHRONIC LEFT SHOULDER PAIN: ICD-10-CM

## 2024-06-13 DIAGNOSIS — G89.29 CHRONIC LEFT SHOULDER PAIN: ICD-10-CM

## 2024-06-13 DIAGNOSIS — M12.812 ROTATOR CUFF ARTHROPATHY OF LEFT SHOULDER: Primary | ICD-10-CM

## 2024-06-13 DIAGNOSIS — R53.1 DECREASED STRENGTH: ICD-10-CM

## 2024-06-13 DIAGNOSIS — M25.612 DECREASED ROM OF LEFT SHOULDER: Primary | ICD-10-CM

## 2024-06-13 NOTE — PROGRESS NOTES
Physical Therapy Daily Treatment Note    Patient: Bienvenido Carter   : 1980  Diagnosis/ICD-10 Code:  Decreased ROM of left shoulder [M25.612]  Referring practitioner: Tino Randall MD  Date of Initial Visit: Type: THERAPY  Noted: 2024  Today's Date: 2024  Patient seen for 3 sessions    Visit Diagnoses:    ICD-10-CM ICD-9-CM   1. Decreased ROM of left shoulder  M25.612 719.51   2. Decreased functional activity tolerance  R68.89 780.99   3. Decreased strength  R53.1 780.79   4. Shoulder impingement syndrome, left  M75.42 726.2   5. Chronic left shoulder pain  M25.512 719.41    G89.29 338.29       [facility] [place] [address]  [phone] (phone)  [phone] (fax)         Subjective shoulder and neck have both been bothering me    Objective     Dry needling, using threading and direct techniques, obtaining verbal consent to treat after discussing benefits and risks.   Patient position during treatment: supine and R SL. Muscles treated: B UT and levator, L infraspinatus and teres minor. Response: LTR.Clean needle technique observed at all times, precautions for lung fields, neurovascular structures observed. Manual palpation and assessment performed before, during, and after session.         Assessment/Plan  Pt seen for dry needling only today as we have not gotten authorization for more visits. She has tightness and trigger points in the areas above that did reduce well with needling.          Timed:    Manual Therapy:         mins  87206;  Therapeutic Exercise:         mins  87757;     Neuromuscular Namita:        mins  58266;    Therapeutic Activity:          mins  19139;     Gait Training:           mins  14125;     Ultrasound:          mins  96208;    Electrical Stimulation:         mins  23570 ( );  Iontophoresis         mins 80720;  Aquatic Therapy         mins 60217;      Untimed:  Electrical Stimulation:          mins  69418 ( );  Traction:         mins  46661;   Dry Needling   (1-2 muscles)     20        mins 20560 (Self-pay)  Dry Needling (3-4 muscles)           mins 20561 (Self-pay)  Dry Needling Trial              mins DRYNDLTRIAL  (No Charge)    Timed Treatment:   0   mins   Total Treatment:     20   mins    Katie Medel PT, CDNT  Physical Therapist    [place] License:[id number]

## 2024-06-21 ENCOUNTER — TREATMENT (OUTPATIENT)
Dept: PHYSICAL THERAPY | Facility: CLINIC | Age: 44
End: 2024-06-21

## 2024-06-21 DIAGNOSIS — R68.89 DECREASED FUNCTIONAL ACTIVITY TOLERANCE: ICD-10-CM

## 2024-06-21 DIAGNOSIS — M75.42 SHOULDER IMPINGEMENT SYNDROME, LEFT: ICD-10-CM

## 2024-06-21 DIAGNOSIS — M25.612 DECREASED ROM OF LEFT SHOULDER: ICD-10-CM

## 2024-06-21 DIAGNOSIS — G89.29 CHRONIC LEFT SHOULDER PAIN: Primary | ICD-10-CM

## 2024-06-21 DIAGNOSIS — R53.1 DECREASED STRENGTH: ICD-10-CM

## 2024-06-21 DIAGNOSIS — M25.512 CHRONIC LEFT SHOULDER PAIN: Primary | ICD-10-CM

## 2024-06-21 NOTE — PROGRESS NOTES
Physical Therapy Daily Treatment Note    Patient: Bienvenido Carter   : 1980  Diagnosis/ICD-10 Code:  Chronic left shoulder pain [M25.512, G89.29]  Referring practitioner: Tino Randall MD  Date of Initial Visit: Type: THERAPY  Noted: 2024  Today's Date: 2024  Patient seen for 4 sessions    Visit Diagnoses:    ICD-10-CM ICD-9-CM   1. Chronic left shoulder pain  M25.512 719.41    G89.29 338.29   2. Decreased ROM of left shoulder  M25.612 719.51   3. Decreased functional activity tolerance  R68.89 780.99   4. Decreased strength  R53.1 780.79   5. Shoulder impingement syndrome, left  M75.42 726.2       [facility] [place] [address]  [phone] (phone)  [phone] (fax)         Subjective shoulder has been really stiff, hard getting up and getting ready in the am    Objective     Dry Needling:  Dry needling, using threading and direct techniques, obtaining verbal consent to treat after discussing benefits and risks.   Patient position during treatment: supine and R SL. Muscles treated: B UT and levator, L infraspinatus and teres minor. Response: LTR.Clean needle technique observed at all times, precautions for lung fields, neurovascular structures observed. Manual palpation and assessment performed before, during, and after session.       Assessment/Plan  Pt is scheduled for surgery in early Aug. We are still waiting to see if we can get more approval for visits prior to the surgery to keep her ROM from getting too stiff. She has been stretching at home and doing what strengthening she can that does not aggravate the shoulder.          Timed:    Manual Therapy:         mins  33259;  Therapeutic Exercise:         mins  83943;     Neuromuscular Namita:        mins  86730;    Therapeutic Activity:          mins  02920;     Gait Training:           mins  07064;     Ultrasound:          mins  81950;    Electrical Stimulation:          mins  84526 ( );  Iontophoresis         mins 93744;  Aquatic Therapy         mins 92957;      Untimed:  Electrical Stimulation:         mins  31045 ( );  Traction:         mins  26281;   Dry Needling   (1-2 muscles)     20        mins 20560 (Self-pay)  Dry Needling (3-4 muscles)           mins 20561 (Self-pay)  Dry Needling Trial              mins DRYNDLTRIAL  (No Charge)    Timed Treatment:   0   mins   Total Treatment:     20   mins    Katie Medel PT, CDNT  Physical Therapist    [place] License:[id number]

## 2024-08-12 ENCOUNTER — TREATMENT (OUTPATIENT)
Dept: PHYSICAL THERAPY | Facility: CLINIC | Age: 44
End: 2024-08-12
Payer: MEDICARE

## 2024-08-12 DIAGNOSIS — R29.898 IMPAIRED STRENGTH OF SHOULDER MUSCLES: ICD-10-CM

## 2024-08-12 DIAGNOSIS — Z74.09 IMPAIRED FUNCTIONAL MOBILITY AND ACTIVITY TOLERANCE: ICD-10-CM

## 2024-08-12 DIAGNOSIS — M25.612 DECREASED RANGE OF MOTION OF LEFT SHOULDER: ICD-10-CM

## 2024-08-12 DIAGNOSIS — Z98.890 S/P LEFT ROTATOR CUFF REPAIR: ICD-10-CM

## 2024-08-12 DIAGNOSIS — M25.512 ACUTE PAIN OF LEFT SHOULDER: Primary | ICD-10-CM

## 2024-08-12 PROCEDURE — 97162 PT EVAL MOD COMPLEX 30 MIN: CPT | Performed by: PHYSICAL THERAPIST

## 2024-08-12 PROCEDURE — 97140 MANUAL THERAPY 1/> REGIONS: CPT | Performed by: PHYSICAL THERAPIST

## 2024-08-12 NOTE — PROGRESS NOTES
Physical Therapy Initial Evaluation and Plan of Care      Patient: Bienvenido Carter   : 1980  Diagnosis/ICD-10 Code:  Acute pain of left shoulder [M25.512]  Referring practitioner: Tino Randall MD  Date of Initial Visit: 2024  Today's Date: 2024  Patient seen for 1 sessions    Visit Diagnoses:    ICD-10-CM ICD-9-CM   1. Acute pain of left shoulder  M25.512 719.41   2. S/P left rotator cuff repair  Z98.890 V45.89   3. Decreased range of motion of left shoulder  M25.612 719.51   4. Impaired strength of shoulder muscles  R29.898 729.89   5. Impaired functional mobility and activity tolerance  Z74.09 V49.89       [facility] [place] [address]  [phone] (phone)  [phone] (fax)         Subjective Evaluation    History of Present Illness  Date of onset: 2024  Mechanism of injury: Bienvenido is s/p L RC repair with SAD. She is in a sling with an abd pillow. She is using the ice cooler machine a lot, which is helping the pain and bruising. She is taking pain meds, RTMD in 10 days. Her pain levels have been pretty high since the block wore off, some help with the meds. She has had SAD on the R shoulder years ago. She is staying at her parents home while she recovers, sleeping in a recliner. Needs A with showering and dressing right now.     PMHx: reviewed in chart      Patient Occupation: not currently working Pain  Current pain ratin  At best pain ratin  At worst pain ratin  Location: L shoulder  Relieving factors: ice and medications  Aggravating factors: prolonged positioning, sleeping, outstretched reach, repetitive movement, overhead activity and lifting    Social Support  Lives in: condominium  Lives with: alone    Hand dominance: right    Diagnostic Tests  MRI studies: abnormal    Treatments  Previous treatment: physical therapy and injection treatment  Patient Goals  Patient goals for therapy:  increased strength, decreased pain, increased motion, independence with ADLs/IADLs and return to sport/leisure activities             Objective          Observations     Additional Shoulder Observation Details  Pt in sling with abd pillow    Tenderness     Additional Tenderness Details  Global tenderness with palpation    Active Range of Motion     Right Shoulder   Flexion: 165 degrees   Abduction: 170 degrees   External rotation BTH: T2   Internal rotation BTB: T7     Additional Active Range of Motion Details  L not tested due to being 4 days post op    Passive Range of Motion   Left Shoulder   Flexion: 90 degrees   External rotation 45°: 38 degrees   Internal rotation 45°: 40 degrees     Right Shoulder   Flexion: 173 degrees   Abduction: 170 degrees   External rotation 45°: 85 degrees   Internal rotation 45°: 86 degrees         Manual:  PROM in flex, ER, and IR to L shoulder with gentle over pressure at end ROM, oscillations of the GH joint for relaxation       Functional Outcome Score:   TNCPX=286/130 or 88.4%        Assessment & Plan       Assessment  Impairments: abnormal muscle firing, abnormal or restricted ROM, activity intolerance, lacks appropriate home exercise program and pain with function   Functional limitations: carrying objects, lifting, sleeping, pulling, pushing, uncomfortable because of pain, moving in bed, reaching behind back, reaching overhead and unable to perform repetitive tasks   Assessment details: Bienvenido Carter is a 43 y.o. year-old female referred to physical therapy for left shoulder pain. She presents with a evolving clinical presentation.  She has comorbidities of recent RC repair with SAD and personal factors of needing a ride to therapy that may affect her progress in the plan of care. Pt has decreased PROM, decreased strength, and needs assistance with personal care. Pt would benefit from therapy to help improve her ability to return to her independent self care and  activities without pain.    Prognosis: good    Goals  Plan Goals: ST wks  1. Patient will be independent with education for symptom management, joint protection and strategies to minimize stress on affected tissues  2. Pt to improve L shoulder A/PROM in flex=110/150 deg, abd=110/150 deg, ER=C5/70 deg and IR=L3/70 deg for greater ease with don/doffing shirts  3. Pt to improve pain complaints to no more than 5/10 with showering and dressing independently    LT wks  1. Pt to improve L shoulder A/PROM in flex=160/170 deg, abd=160/170 deg, ER=T2/85 deg and IR=T11/85 deg for greater ease with reaching into cabinets and her closet at home  2. Pt to improve pain complaints to no more than 2/10 with sleeping in her bed  3. Pt to improve L shoulder strength to 4+/5 to be able to carry her groceries in/out of the home  4. Pt to improve SPADI score from 88.4% to 25% for overall functional improvement with house hold chores  5. Patient will be independent and compliant with advanced home exercise program to facilitate self-management of symptoms.      Plan  Therapy options: will be seen for skilled therapy services  Planned modality interventions: high voltage pulsed current (pain management), TENS, ultrasound, cryotherapy, dry needling and thermotherapy (hydrocollator packs)  Planned therapy interventions: ADL retraining, body mechanics training, flexibility, functional ROM exercises, home exercise program, IADL retraining, joint mobilization, manual therapy, postural training, soft tissue mobilization, strengthening, stretching, therapeutic activities, abdominal trunk stabilization, neuromuscular re-education and spinal/joint mobilization  Frequency: 2x week  Duration in weeks: 12  Treatment plan discussed with: patient        Timed:  Manual Therapy:    20     mins  65960;  Therapeutic Exercise:         mins  39634;     Neuromuscular Namita:        mins  26427;    Therapeutic Activity:          mins  32584;     Gait  Training:           mins  91330;     Ultrasound:          mins  33674;    Iontophoresis         mins 62061        Untimed:  Electrical Stimulation:         mins  91948 ( );  Traction:       mins  46953;   Dry Needling   (1-2 muscles)             mins 20560 (Self-pay)  Dry Needling (3-4 muscles)           mins 20561 (Self-pay)  Dry Needling Trial              mins DRYNDLTRIAL  (No Charge)    Low Eval          Mins  65120  Mod Eval     25     Mins  69714  High Eval                            Mins  72102    Timed Treatment:   20   mins   Total Treatment:     45   mins    PT SIGNATURE: Katie Medel PT, CDNT    License Number: [id number]    Electronically signed by Katie Medel PT, 08/12/24, 10:32 AM EDT    DATE TREATMENT INITIATED: 8/12/2024    Initial Certification  Certification Period: 11/10/2024  I certify that the therapy services are furnished while this patient is under my care.  The services outlined above are required by this patient, and will be reviewed every 90 days.     PHYSICIAN: Tino Randall MD   NPI: [id number]                                         DATE:     Please sign and return via fax to [phone] Thank you, [facility].

## 2024-08-14 ENCOUNTER — TREATMENT (OUTPATIENT)
Dept: PHYSICAL THERAPY | Facility: CLINIC | Age: 44
End: 2024-08-14
Payer: MEDICARE

## 2024-08-14 DIAGNOSIS — M25.512 ACUTE PAIN OF LEFT SHOULDER: Primary | ICD-10-CM

## 2024-08-14 DIAGNOSIS — Z98.890 S/P LEFT ROTATOR CUFF REPAIR: ICD-10-CM

## 2024-08-14 DIAGNOSIS — R29.898 IMPAIRED STRENGTH OF SHOULDER MUSCLES: ICD-10-CM

## 2024-08-14 DIAGNOSIS — Z74.09 IMPAIRED FUNCTIONAL MOBILITY AND ACTIVITY TOLERANCE: ICD-10-CM

## 2024-08-14 DIAGNOSIS — M25.612 DECREASED RANGE OF MOTION OF LEFT SHOULDER: ICD-10-CM

## 2024-08-14 PROCEDURE — 97140 MANUAL THERAPY 1/> REGIONS: CPT | Performed by: PHYSICAL THERAPIST

## 2024-08-14 PROCEDURE — 97110 THERAPEUTIC EXERCISES: CPT | Performed by: PHYSICAL THERAPIST

## 2024-08-14 NOTE — PROGRESS NOTES
Physical Therapy Daily Treatment Note    Patient: Bienvenido Carter   : 1980  Diagnosis/ICD-10 Code:  Acute pain of left shoulder [M25.512]  Referring practitioner: Tino Randall MD  Date of Initial Visit: Type: THERAPY  Noted: 2024  Today's Date: 2024  Patient seen for 2 sessions    Visit Diagnoses:    ICD-10-CM ICD-9-CM   1. Acute pain of left shoulder  M25.512 719.41   2. S/P left rotator cuff repair  Z98.890 V45.89   3. Decreased range of motion of left shoulder  M25.612 719.51   4. Impaired strength of shoulder muscles  R29.898 729.89   5. Impaired functional mobility and activity tolerance  Z74.09 V49.89       [facility] Physical Therapy [facility]  [address] [place] [address]  [phone] (phone)  [phone] (fax)         Subjective it is pretty sore, but the stretching on Monday actually felt really good    Objective     Exercises:  -shoulder rolls x20  -scap ret x20  -pendulums    Manual:  PROM in flex, ER, and IR to L shoulder with gentle over pressure at end ROM, oscillations of the GH joint for relaxation, STM to the L UT and levator for muscular relaxation       Assessment/Plan  Pt is not yet a week post op and is doing well. Her PROM is within the range that she should be this early on. She is using her ice machine often to help keep her pain and swelling under control.          Timed:    Manual Therapy:    30     mins  61872;  Therapeutic Exercise:    12     mins  89367;     Neuromuscular Namita:        mins  62814;    Therapeutic Activity:          mins  39742;     Gait Training:           mins  12506;     Ultrasound:          mins  31642;    Electrical Stimulation:         mins  99774 ( );  Iontophoresis         mins 90624;  Aquatic Therapy         mins 25771;      Untimed:  Electrical Stimulation:         mins  61488 ( );  Traction:         mins  15063;   Dry Needling   (1-2 muscles)             mins 81041 (Self-pay)  Dry Needling  (3-4 muscles)           mins 20561 (Self-pay)  Dry Needling Trial              mins DRYNDLTRIAL  (No Charge)    Timed Treatment:   42   mins   Total Treatment:     42   mins    Katie Medel PT, CDNT  Physical Therapist    [place] License:[id number]

## 2024-08-19 ENCOUNTER — TREATMENT (OUTPATIENT)
Dept: PHYSICAL THERAPY | Facility: CLINIC | Age: 44
End: 2024-08-19
Payer: MEDICARE

## 2024-08-19 DIAGNOSIS — R29.898 IMPAIRED STRENGTH OF SHOULDER MUSCLES: ICD-10-CM

## 2024-08-19 DIAGNOSIS — Z98.890 S/P LEFT ROTATOR CUFF REPAIR: ICD-10-CM

## 2024-08-19 DIAGNOSIS — M25.512 ACUTE PAIN OF LEFT SHOULDER: Primary | ICD-10-CM

## 2024-08-19 DIAGNOSIS — M25.612 DECREASED RANGE OF MOTION OF LEFT SHOULDER: ICD-10-CM

## 2024-08-19 DIAGNOSIS — Z74.09 IMPAIRED FUNCTIONAL MOBILITY AND ACTIVITY TOLERANCE: ICD-10-CM

## 2024-08-19 PROCEDURE — 97140 MANUAL THERAPY 1/> REGIONS: CPT | Performed by: PHYSICAL THERAPIST

## 2024-08-19 PROCEDURE — 97110 THERAPEUTIC EXERCISES: CPT | Performed by: PHYSICAL THERAPIST

## 2024-08-19 NOTE — PROGRESS NOTES
Physical Therapy Daily Treatment Note    Patient: Bienvenido Carter   : 1980  Diagnosis/ICD-10 Code:  Acute pain of left shoulder [M25.512]  Referring practitioner: Tino Randall MD  Date of Initial Visit: Type: THERAPY  Noted: 2024  Today's Date: 2024  Patient seen for 3 sessions    Visit Diagnoses:    ICD-10-CM ICD-9-CM   1. Acute pain of left shoulder  M25.512 719.41   2. S/P left rotator cuff repair  Z98.890 V45.89   3. Decreased range of motion of left shoulder  M25.612 719.51   4. Impaired strength of shoulder muscles  R29.898 729.89   5. Impaired functional mobility and activity tolerance  Z74.09 V49.89       [facility] Physical Therapy [facility]  [address] [place] [address]  [phone] (phone)  [phone] (fax)         Subjective does pretty well with therapy, having some soreness in the pect area. Sees MD today    Objective          Passive Range of Motion   Left Shoulder   Flexion: 115 degrees   Abduction: 80 degrees   External rotation 45°: 40 degrees   Internal rotation 45°: 50 degrees         Exercises:  -shoulder rolls x20  -scap ret x20  -forearm and wrist motions, x20  -pendulums     Manual:  PROM in flex, ER, and IR to L shoulder with gentle over pressure at end ROM, oscillations of the GH joint for relaxation, STM to the L UT, levator, and pect for muscular relaxation  with pect lift      Assessment/Plan  Pt is progressing very well with her PROM. She has difficulty and pain in the am trying to get up and ready for her day (showering and dressing), but overall the shoulder is feeling better.          Timed:    Manual Therapy:    25     mins  93663;  Therapeutic Exercise:    20     mins  88077;     Neuromuscular Namita:        mins  51283;    Therapeutic Activity:          mins  90941;     Gait Training:           mins  96231;     Ultrasound:          mins  79680;    Electrical Stimulation:         mins  77318 ( );  Iontophoresis          mins 55345;  Aquatic Therapy         mins 54623;      Untimed:  Electrical Stimulation:         mins  01106 ( );  Traction:         mins  00968;   Dry Needling   (1-2 muscles)             mins 20560 (Self-pay)  Dry Needling (3-4 muscles)           mins 20561 (Self-pay)  Dry Needling Trial              mins DRYNDLTRIAL  (No Charge)    Timed Treatment:   45   mins   Total Treatment:     45   mins    Katie Medel PT, CDNT  Physical Therapist    [place] License:[id number]

## 2024-08-21 ENCOUNTER — TREATMENT (OUTPATIENT)
Dept: PHYSICAL THERAPY | Facility: CLINIC | Age: 44
End: 2024-08-21
Payer: MEDICARE

## 2024-08-21 DIAGNOSIS — R29.898 IMPAIRED STRENGTH OF SHOULDER MUSCLES: ICD-10-CM

## 2024-08-21 DIAGNOSIS — Z74.09 IMPAIRED FUNCTIONAL MOBILITY AND ACTIVITY TOLERANCE: ICD-10-CM

## 2024-08-21 DIAGNOSIS — M25.512 ACUTE PAIN OF LEFT SHOULDER: Primary | ICD-10-CM

## 2024-08-21 DIAGNOSIS — M25.612 DECREASED RANGE OF MOTION OF LEFT SHOULDER: ICD-10-CM

## 2024-08-21 DIAGNOSIS — Z98.890 S/P LEFT ROTATOR CUFF REPAIR: ICD-10-CM

## 2024-08-21 PROCEDURE — 97140 MANUAL THERAPY 1/> REGIONS: CPT | Performed by: PHYSICAL THERAPIST

## 2024-08-21 PROCEDURE — 97110 THERAPEUTIC EXERCISES: CPT | Performed by: PHYSICAL THERAPIST

## 2024-08-21 NOTE — PROGRESS NOTES
Physical Therapy Daily Treatment Note    Patient: Bienvenido Carter   : 1980  Diagnosis/ICD-10 Code:  Acute pain of left shoulder [M25.512]  Referring practitioner: Tino Randall MD  Date of Initial Visit: Type: THERAPY  Noted: 2024  Today's Date: 2024  Patient seen for 4 sessions    Visit Diagnoses:    ICD-10-CM ICD-9-CM   1. Acute pain of left shoulder  M25.512 719.41   2. S/P left rotator cuff repair  Z98.890 V45.89   3. Decreased range of motion of left shoulder  M25.612 719.51   4. Impaired strength of shoulder muscles  R29.898 729.89   5. Impaired functional mobility and activity tolerance  Z74.09 V49.89       [facility] Physical Therapy [facility]  [address] [place] [address]  [phone] (phone)  [phone] (fax)         Subjective pt is now able to get out of her sling and return to driving    Objective     Exercises:  -shoulder rolls x20  -scap ret x20  -forearm and wrist motions, x20  -biceps curls, x20  -table slides with assist of R UE for flexion 10x 10 sec hold  -pendulums     Manual:  PROM in flex, ER, and IR to L shoulder with gentle over pressure at end ROM, oscillations of the GH joint for relaxation, STM to the L UT, levator, and pect for muscular relaxation  with pect lift      Assessment/Plan  MD DC her shoulder sling so that her shoulder gets more mobility. She is now 2 wks post op and is starting to do some AAROM stretching into flexion.          Timed:    Manual Therapy:    28     mins  35609;  Therapeutic Exercise:    15     mins  84915;     Neuromuscular Namita:        mins  09746;    Therapeutic Activity:          mins  50381;     Gait Training:           mins  23440;     Ultrasound:          mins  85211;    Electrical Stimulation:         mins  72145 ( );  Iontophoresis         mins 81480;  Aquatic Therapy         mins 34019;      Untimed:  Electrical Stimulation:         mins  64507 ( );  Traction:         mins  84458;    Dry Needling   (1-2 muscles)             mins 20560 (Self-pay)  Dry Needling (3-4 muscles)           mins 20561 (Self-pay)  Dry Needling Trial              mins DRYNDLTRIAL  (No Charge)    Timed Treatment:   43   mins   Total Treatment:     43   mins    Katie Medel PT, CDNT  Physical Therapist    [place] License:[id number]

## 2024-08-26 ENCOUNTER — TREATMENT (OUTPATIENT)
Dept: PHYSICAL THERAPY | Facility: CLINIC | Age: 44
End: 2024-08-26
Payer: MEDICARE

## 2024-08-26 DIAGNOSIS — M25.512 ACUTE PAIN OF LEFT SHOULDER: Primary | ICD-10-CM

## 2024-08-26 DIAGNOSIS — Z98.890 S/P LEFT ROTATOR CUFF REPAIR: ICD-10-CM

## 2024-08-26 DIAGNOSIS — M25.612 DECREASED RANGE OF MOTION OF LEFT SHOULDER: ICD-10-CM

## 2024-08-26 DIAGNOSIS — Z74.09 IMPAIRED FUNCTIONAL MOBILITY AND ACTIVITY TOLERANCE: ICD-10-CM

## 2024-08-26 DIAGNOSIS — R29.898 IMPAIRED STRENGTH OF SHOULDER MUSCLES: ICD-10-CM

## 2024-08-26 PROCEDURE — 97140 MANUAL THERAPY 1/> REGIONS: CPT | Performed by: PHYSICAL THERAPIST

## 2024-08-26 PROCEDURE — 97110 THERAPEUTIC EXERCISES: CPT | Performed by: PHYSICAL THERAPIST

## 2024-08-26 NOTE — PROGRESS NOTES
Physical Therapy Daily Treatment Note    Patient: Bienvenido Carter   : 1980  Diagnosis/ICD-10 Code:  Acute pain of left shoulder [M25.512]  Referring practitioner: Tino Randall MD  Date of Initial Visit: Type: THERAPY  Noted: 2024  Today's Date: 2024  Patient seen for 5 sessions    Visit Diagnoses:    ICD-10-CM ICD-9-CM   1. Acute pain of left shoulder  M25.512 719.41   2. S/P left rotator cuff repair  Z98.890 V45.89   3. Decreased range of motion of left shoulder  M25.612 719.51   4. Impaired strength of shoulder muscles  R29.898 729.89   5. Impaired functional mobility and activity tolerance  Z74.09 V49.89       [facility] [place] [address]  [phone] (phone)  [phone] (fax)         Subjective The shoulder is bothering me this am, didn't wake me up last night, but really sore this am, 6/10 pain rating.     Objective     Exercises:  -shoulder rolls x20  -scap ret x20  -forearm and wrist motions, x20  -biceps curls, x20 (defer - hurting at LH biceps tendon)  -table slides with assist of R UE for flexion 10x 10 sec hold  -AAROM ER with cane, 10x 10 sec  -pendulums     Manual:  PROM in flex, ER, and IR to L shoulder with gentle over pressure at end ROM, oscillations of the GH joint for relaxation, STM to the L UT, levator, and pect for muscular relaxation  with pect lift.       Assessment/Plan  Pt has had increased pain in the LH biceps over the weekend. She has DC the sling and is using it more than she should right now. She did have reduced pain following the session. Advised to ice and use sling when out in the community and with chores she may be tempted to use the L UE in         Timed:    Manual Therapy:    23     mins  69063;  Therapeutic Exercise:    20     mins  04158;     Neuromuscular Namita:        mins  17804;    Therapeutic Activity:          mins  33278;     Gait Training:           mins  03496;      Ultrasound:          mins  70219;    Electrical Stimulation:         mins  94774 ( );  Iontophoresis         mins 57840;  Aquatic Therapy         mins 77164;      Untimed:  Electrical Stimulation:         mins  31095 ( );  Traction:         mins  79143;   Dry Needling   (1-2 muscles)             mins 20560 (Self-pay)  Dry Needling (3-4 muscles)           mins 20561 (Self-pay)  Dry Needling Trial              mins DRYNDLTRIAL  (No Charge)    Timed Treatment:   43   mins   Total Treatment:     43   mins    Katie Medel PT, CDNT  Physical Therapist    [place] License:[id number]

## 2024-08-28 ENCOUNTER — TREATMENT (OUTPATIENT)
Dept: PHYSICAL THERAPY | Facility: CLINIC | Age: 44
End: 2024-08-28
Payer: MEDICARE

## 2024-08-28 DIAGNOSIS — R29.898 IMPAIRED STRENGTH OF SHOULDER MUSCLES: ICD-10-CM

## 2024-08-28 DIAGNOSIS — Z74.09 IMPAIRED FUNCTIONAL MOBILITY AND ACTIVITY TOLERANCE: ICD-10-CM

## 2024-08-28 DIAGNOSIS — M25.612 DECREASED RANGE OF MOTION OF LEFT SHOULDER: ICD-10-CM

## 2024-08-28 DIAGNOSIS — Z98.890 S/P LEFT ROTATOR CUFF REPAIR: ICD-10-CM

## 2024-08-28 DIAGNOSIS — M25.512 ACUTE PAIN OF LEFT SHOULDER: Primary | ICD-10-CM

## 2024-08-28 PROCEDURE — 97112 NEUROMUSCULAR REEDUCATION: CPT | Performed by: PHYSICAL THERAPIST

## 2024-08-28 PROCEDURE — 97110 THERAPEUTIC EXERCISES: CPT | Performed by: PHYSICAL THERAPIST

## 2024-08-28 PROCEDURE — 97140 MANUAL THERAPY 1/> REGIONS: CPT | Performed by: PHYSICAL THERAPIST

## 2024-08-28 NOTE — PROGRESS NOTES
Physical Therapy Daily Treatment Note    Patient: Bienvenido Carter   : 1980  Diagnosis/ICD-10 Code:  Acute pain of left shoulder [M25.512]  Referring practitioner: Tino Randall MD  Date of Initial Visit: Type: THERAPY  Noted: 2024  Today's Date: 2024  Patient seen for 6 sessions    Visit Diagnoses:    ICD-10-CM ICD-9-CM   1. Acute pain of left shoulder  M25.512 719.41   2. S/P left rotator cuff repair  Z98.890 V45.89   3. Decreased range of motion of left shoulder  M25.612 719.51   4. Impaired strength of shoulder muscles  R29.898 729.89   5. Impaired functional mobility and activity tolerance  Z74.09 V49.89       [facility] [place] [address]  [phone] (phone)  [phone] (fax)         Subjective a little sore at times, some spasms in the shoulder blade when I am just sitting there    Objective          Passive Range of Motion   Left Shoulder   Flexion: 138 degrees with pain  Abduction: 90 degrees with pain  External rotation 45°: 65 degrees   Internal rotation 45°: 55 degrees         Exercises/NM re-ed:  -shoulder rolls x20  -scap ret x20  -biceps curls, x20 (defer - hurting at LH biceps tendon)  -table slides with assist of R UE for flexion 10x 10 sec hold  -deltoid isometrics 10x 5 sec each with towel under the arm  -AAROM ER with cane, 10x 10 sec  V/t cues for form with exercises, keeping isometrics submax and to not lean into the press    [employer] Access Code: [email or web address]     Manual:  PROM in flex, ER, and IR to L shoulder with gentle over pressure at end ROM, oscillations of the GH joint for relaxation, STM to the L UT, levator, and pect for muscular relaxation  with pect lift.      Education on positioning when resting/sitting. Put a pillow under the L UE to have it more supported      Assessment/Plan  Pt has made good gains in her PROM over the last week. She is out of the sling and continuing to caution her on  overusing the L UE         Timed:    Manual Therapy:    22     mins  13409;  Therapeutic Exercise:    15     mins  06993;     Neuromuscular Namita:    8    mins  90985;    Therapeutic Activity:          mins  63179;     Gait Training:           mins  50880;     Ultrasound:          mins  94689;    Electrical Stimulation:         mins  63855 ( );  Iontophoresis         mins 94440;  Aquatic Therapy         mins 89330;      Untimed:  Electrical Stimulation:         mins  43430 ( );  Traction:         mins  41229;   Dry Needling   (1-2 muscles)             mins 20560 (Self-pay)  Dry Needling (3-4 muscles)           mins 20561 (Self-pay)  Dry Needling Trial              mins DRYNDLTRIAL  (No Charge)    Timed Treatment:   45   mins   Total Treatment:     45   mins    Katie Medel PT, CDNT  Physical Therapist    [place] License:[id number]

## 2024-09-03 ENCOUNTER — TREATMENT (OUTPATIENT)
Dept: PHYSICAL THERAPY | Facility: CLINIC | Age: 44
End: 2024-09-03
Payer: MEDICARE

## 2024-09-03 DIAGNOSIS — R29.898 IMPAIRED STRENGTH OF SHOULDER MUSCLES: ICD-10-CM

## 2024-09-03 DIAGNOSIS — M25.512 ACUTE PAIN OF LEFT SHOULDER: Primary | ICD-10-CM

## 2024-09-03 DIAGNOSIS — M25.612 DECREASED RANGE OF MOTION OF LEFT SHOULDER: ICD-10-CM

## 2024-09-03 DIAGNOSIS — Z74.09 IMPAIRED FUNCTIONAL MOBILITY AND ACTIVITY TOLERANCE: ICD-10-CM

## 2024-09-03 DIAGNOSIS — Z98.890 S/P LEFT ROTATOR CUFF REPAIR: ICD-10-CM

## 2024-09-03 PROCEDURE — 97110 THERAPEUTIC EXERCISES: CPT | Performed by: PHYSICAL THERAPIST

## 2024-09-03 PROCEDURE — 97112 NEUROMUSCULAR REEDUCATION: CPT | Performed by: PHYSICAL THERAPIST

## 2024-09-03 PROCEDURE — 97140 MANUAL THERAPY 1/> REGIONS: CPT | Performed by: PHYSICAL THERAPIST

## 2024-09-03 NOTE — PROGRESS NOTES
Physical Therapy Daily Treatment Note    Patient: Bienvenido Carter   : 1980  Diagnosis/ICD-10 Code:  Acute pain of left shoulder [M25.512]  Referring practitioner: Tino Randall MD  Date of Initial Visit: Type: THERAPY  Noted: 2024  Today's Date: 9/3/2024  Patient seen for 7 sessions    Visit Diagnoses:    ICD-10-CM ICD-9-CM   1. Acute pain of left shoulder  M25.512 719.41   2. S/P left rotator cuff repair  Z98.890 V45.89   3. Decreased range of motion of left shoulder  M25.612 719.51   4. Impaired strength of shoulder muscles  R29.898 729.89   5. Impaired functional mobility and activity tolerance  Z74.09 V49.89       [facility] [place] [address]  [phone] (phone)  [phone] (fax)         Subjective I have been waking up with the shoulder locking up    Objective     Exercises/NM re-ed:  -shoulder rolls x20  -scap ret x20  -biceps curls, x20 1/2 lb  -table slides with assist of R UE for flexion 10x 10 sec hold  -deltoid isometrics 10x 5 sec each with towel under the arm  -AAROM ER with cane, 10x 10 sec  V/t cues for form with exercises, keeping isometrics submax and to not lean into the press     [employer] Access Code: [email or web address]     Manual:  PROM in flex, ER, and IR to L shoulder with gentle over pressure at end ROM, oscillations of the GH joint for relaxation, STM to the L UT, levator, and pect for muscular relaxation  with pect lift.      Assessment/Plan  Pt has been waking up in the fetal position during the night with the L shoulder pain, feeling like it is locked up. She has been doing to pendulums to try and loosen it up, but it does not help. We discussed taking an anti inflammatory and continuing to ice for pain control. Use sling when in a situation where she may want to use the L arm too much and to prevent sudden movements         Timed:    Manual Therapy:    22     mins  00081;  Therapeutic Exercise:    15      mins  17395;     Neuromuscular Namita:    8    mins  49247;    Therapeutic Activity:          mins  95527;     Gait Training:           mins  51430;     Ultrasound:          mins  73979;    Electrical Stimulation:         mins  53453 ( );  Iontophoresis         mins 83293;  Aquatic Therapy         mins 11677;      Untimed:  Electrical Stimulation:         mins  86830 ( );  Traction:         mins  48727;   Dry Needling   (1-2 muscles)             mins 20560 (Self-pay)  Dry Needling (3-4 muscles)           mins 20561 (Self-pay)  Dry Needling Trial              mins DRYNDLTRIAL  (No Charge)    Timed Treatment:   45   mins   Total Treatment:     45   mins    Katie Medel PT, CDNT  Physical Therapist    [place] License:[id number]

## 2024-09-05 ENCOUNTER — TREATMENT (OUTPATIENT)
Dept: PHYSICAL THERAPY | Facility: CLINIC | Age: 44
End: 2024-09-05
Payer: MEDICARE

## 2024-09-05 DIAGNOSIS — Z74.09 IMPAIRED FUNCTIONAL MOBILITY AND ACTIVITY TOLERANCE: ICD-10-CM

## 2024-09-05 DIAGNOSIS — R29.898 IMPAIRED STRENGTH OF SHOULDER MUSCLES: ICD-10-CM

## 2024-09-05 DIAGNOSIS — M25.612 DECREASED RANGE OF MOTION OF LEFT SHOULDER: ICD-10-CM

## 2024-09-05 DIAGNOSIS — M25.512 ACUTE PAIN OF LEFT SHOULDER: Primary | ICD-10-CM

## 2024-09-05 DIAGNOSIS — Z98.890 S/P LEFT ROTATOR CUFF REPAIR: ICD-10-CM

## 2024-09-05 PROCEDURE — 97112 NEUROMUSCULAR REEDUCATION: CPT | Performed by: PHYSICAL THERAPIST

## 2024-09-05 PROCEDURE — 97140 MANUAL THERAPY 1/> REGIONS: CPT | Performed by: PHYSICAL THERAPIST

## 2024-09-05 PROCEDURE — 97110 THERAPEUTIC EXERCISES: CPT | Performed by: PHYSICAL THERAPIST

## 2024-09-05 NOTE — PROGRESS NOTES
Physical Therapy Daily Treatment Note    Patient: Bienvenido Carter   : 1980  Diagnosis/ICD-10 Code:  Acute pain of left shoulder [M25.512]  Referring practitioner: Tino Randall MD  Date of Initial Visit: Type: THERAPY  Noted: 2024  Today's Date: 2024  Patient seen for 8 sessions    Visit Diagnoses:    ICD-10-CM ICD-9-CM   1. Acute pain of left shoulder  M25.512 719.41   2. S/P left rotator cuff repair  Z98.890 V45.89   3. Decreased range of motion of left shoulder  M25.612 719.51   4. Impaired strength of shoulder muscles  R29.898 729.89   5. Impaired functional mobility and activity tolerance  Z74.09 V49.89       [facility] Physical Therapy [facility]  [address] [place] [address]  [phone] (phone)  [phone] (fax)         Subjective still hurts but not as much    Objective          Passive Range of Motion     Right Shoulder   Flexion: 153 degrees   Abduction: 120 degrees with pain  External rotation 45°: 70 degrees   Internal rotation 45°: 63 degrees         Exercises/NM re-ed:  -pulleys 3 min  -scap ret, yellow band, 2x10  -shoulder extension, yellow band, 2x10  -SL shoulder ER w/towel to neutral, 2x10  -biceps curls, x20 1/2 lb  -AAROM ER with cane, 10x 10 sec  -AAROM supine press ups with cane, 2x10  V/t cues for form with exercises, scapular setting        Manual:  PROM in flex, ER, and IR to L shoulder with gentle over pressure at end ROM, oscillations of the GH joint for relaxation, STM to the L UT, levator, and pect for muscular relaxation  with pect lift.       Assessment/Plan  Pt with some pain with PROM at end range and in the 90 deg area of elevation. She does have mild tightness of the joint capsule. She has pulleys at home and is going to start using these daily         Timed:    Manual Therapy:    15     mins  69905;  Therapeutic Exercise:    20     mins  76698;     Neuromuscular Namita:    10    mins  69754;    Therapeutic Activity:           mins  96786;     Gait Training:           mins  83813;     Ultrasound:          mins  22712;    Electrical Stimulation:         mins  25556 ( );  Iontophoresis         mins 13126;  Aquatic Therapy         mins 84559;      Untimed:  Electrical Stimulation:         mins  49038 ( );  Traction:         mins  74220;   Dry Needling   (1-2 muscles)             mins 20560 (Self-pay)  Dry Needling (3-4 muscles)           mins 20561 (Self-pay)  Dry Needling Trial              mins DRYNDLTRIAL  (No Charge)    Timed Treatment:   45   mins   Total Treatment:     45   mins    Katie Medel PT, CDNT  Physical Therapist    [place] License:[id number]

## 2024-09-09 ENCOUNTER — TREATMENT (OUTPATIENT)
Dept: PHYSICAL THERAPY | Facility: CLINIC | Age: 44
End: 2024-09-09
Payer: MEDICARE

## 2024-09-09 DIAGNOSIS — R29.898 IMPAIRED STRENGTH OF SHOULDER MUSCLES: ICD-10-CM

## 2024-09-09 DIAGNOSIS — M25.612 DECREASED RANGE OF MOTION OF LEFT SHOULDER: ICD-10-CM

## 2024-09-09 DIAGNOSIS — Z74.09 IMPAIRED FUNCTIONAL MOBILITY AND ACTIVITY TOLERANCE: ICD-10-CM

## 2024-09-09 DIAGNOSIS — Z98.890 S/P LEFT ROTATOR CUFF REPAIR: ICD-10-CM

## 2024-09-09 DIAGNOSIS — M25.512 ACUTE PAIN OF LEFT SHOULDER: Primary | ICD-10-CM

## 2024-09-09 PROCEDURE — 97140 MANUAL THERAPY 1/> REGIONS: CPT | Performed by: PHYSICAL THERAPIST

## 2024-09-09 PROCEDURE — 97110 THERAPEUTIC EXERCISES: CPT | Performed by: PHYSICAL THERAPIST

## 2024-09-09 PROCEDURE — 97112 NEUROMUSCULAR REEDUCATION: CPT | Performed by: PHYSICAL THERAPIST

## 2024-09-09 NOTE — PROGRESS NOTES
Physical Therapy Daily Treatment Note    Patient: Bienvenido Carter   : 1980  Diagnosis/ICD-10 Code:  Acute pain of left shoulder [M25.512]  Referring practitioner: Tino Randall MD  Date of Initial Visit: Type: THERAPY  Noted: 2024  Today's Date: 2024  Patient seen for 9 sessions    Visit Diagnoses:    ICD-10-CM ICD-9-CM   1. Acute pain of left shoulder  M25.512 719.41   2. S/P left rotator cuff repair  Z98.890 V45.89   3. Decreased range of motion of left shoulder  M25.612 719.51   4. Impaired strength of shoulder muscles  R29.898 729.89   5. Impaired functional mobility and activity tolerance  Z74.09 V49.89       [facility] [address]  [phone] (phone)  [phone] (fax)         Subjective  occasional sharp pain in the front of the shoulder near the incision, but otherwise it is doing well.     Objective     Exercises/NM re-ed:  -pulleys 3 min  -scap ret, yellow band, 2x10  -shoulder extension, yellow band, 2x10  -wall slides 2x5  -SL shoulder ER w/towel to neutral, 2x10  -SL shoulder abd to 90 deg, 2x10  -biceps curls, x20 1 lb  -AAROM ER with cane, 10x 10 sec  - supine press ups, 2x10  V/t cues for form with exercises, scapular setting         Manual:  PROM in flex, abd, ER, and IR to L shoulder with gentle over pressure at end ROM, inferior GH glides, oscillations of the GH joint for relaxation, STM to the L UT, levator, and pect for muscular relaxation  with pect lift.        Assessment/Plan  Pt is progressing well with her pain and function. She is tolerating light resistive and AROM exercises she has some tightness in abd and IR         Timed:    Manual Therapy:    15     mins  75267;  Therapeutic Exercise:    22     mins  58362;     Neuromuscular Namita:    8    mins  55074;    Therapeutic Activity:          mins  02830;     Gait Training:           mins  98658;     Ultrasound:          mins  48177;     Electrical Stimulation:         mins  75558 ( );  Iontophoresis         mins 56829;  Aquatic Therapy         mins 60490;      Untimed:  Electrical Stimulation:         mins  32365 ( );  Traction:         mins  35192;   Dry Needling   (1-2 muscles)             mins 20560 (Self-pay)  Dry Needling (3-4 muscles)           mins 20561 (Self-pay)  Dry Needling Trial              mins DRYNDLTRIAL  (No Charge)    Timed Treatment:   45   mins   Total Treatment:     45   mins    Katie Medel PT, CDNT  Physical Therapist    [place] License:[id number]

## 2024-09-11 ENCOUNTER — TREATMENT (OUTPATIENT)
Dept: PHYSICAL THERAPY | Facility: CLINIC | Age: 44
End: 2024-09-11
Payer: MEDICARE

## 2024-09-11 DIAGNOSIS — R29.898 IMPAIRED STRENGTH OF SHOULDER MUSCLES: ICD-10-CM

## 2024-09-11 DIAGNOSIS — Z98.890 S/P LEFT ROTATOR CUFF REPAIR: ICD-10-CM

## 2024-09-11 DIAGNOSIS — Z74.09 IMPAIRED FUNCTIONAL MOBILITY AND ACTIVITY TOLERANCE: ICD-10-CM

## 2024-09-11 DIAGNOSIS — M25.612 DECREASED RANGE OF MOTION OF LEFT SHOULDER: ICD-10-CM

## 2024-09-11 DIAGNOSIS — M25.512 ACUTE PAIN OF LEFT SHOULDER: Primary | ICD-10-CM

## 2024-09-11 PROCEDURE — 97112 NEUROMUSCULAR REEDUCATION: CPT | Performed by: PHYSICAL THERAPIST

## 2024-09-11 PROCEDURE — 97140 MANUAL THERAPY 1/> REGIONS: CPT | Performed by: PHYSICAL THERAPIST

## 2024-09-11 PROCEDURE — 97110 THERAPEUTIC EXERCISES: CPT | Performed by: PHYSICAL THERAPIST

## 2024-09-11 NOTE — PROGRESS NOTES
Physical Therapy Daily Treatment Note    Patient: Bienvenido Carter   : 1980  Diagnosis/ICD-10 Code:  Acute pain of left shoulder [M25.512]  Referring practitioner: Tino Randall MD  Date of Initial Visit: Type: THERAPY  Noted: 2024  Today's Date: 2024  Patient seen for 10 sessions    Visit Diagnoses:    ICD-10-CM ICD-9-CM   1. Acute pain of left shoulder  M25.512 719.41   2. S/P left rotator cuff repair  Z98.890 V45.89   3. Decreased range of motion of left shoulder  M25.612 719.51   4. Impaired strength of shoulder muscles  R29.898 729.89   5. Impaired functional mobility and activity tolerance  Z74.09 V49.89       [facility]  [address] [place] [address]  [phone] (phone)  [phone] (fax)         Subjective     Objective          Passive Range of Motion   Left Shoulder   Flexion: 158 degrees   Abduction: 162 degrees   External rotation 45°: 73 degrees   Internal rotation 90°: 74 degrees         Exercises/NM re-ed:  -pulleys 3 min  -scap ret, yellow band, 2x10  -shoulder extension, yellow band, 2x10  -wall slides 2x5  -SL shoulder ER w/towel to neutral, 2x10  -SL shoulder abd to 90 deg, 2x10  -biceps curls, x20 1 lb  -AAROM ER with cane, 10x 10 sec  - supine press ups, 2x10  V/t cues for form with exercises, scapular setting         Manual:  PROM in flex, abd, ER, and IR to L shoulder with gentle over pressure at end ROM, inferior GH glides, oscillations of the GH joint for relaxation, STM to the L UT, levator, and pect for muscular relaxation  with pect lift.        Assessment/Plan  Pt continues to progress her PROM and is doing well for 5 wks post op. Some cues for scapular setting with her exercises         Timed:    Manual Therapy:    20     mins  74239;  Therapeutic Exercise:    17     mins  29948;     Neuromuscular Namita:    8    mins  44165;    Therapeutic Activity:          mins  76480;     Gait Training:           mins   51915;     Ultrasound:          mins  92018;    Electrical Stimulation:         mins  41587 ( );  Iontophoresis         mins 94463;  Aquatic Therapy         mins 92195;      Untimed:  Electrical Stimulation:         mins  27654 ( );  Traction:         mins  40460;   Dry Needling   (1-2 muscles)             mins 20560 (Self-pay)  Dry Needling (3-4 muscles)           mins 20561 (Self-pay)  Dry Needling Trial              mins DRYNDLTRIAL  (No Charge)    Timed Treatment:   45   mins   Total Treatment:     45   mins    Katie Medel PT, CDNT  Physical Therapist    [place] License:[id number]

## 2024-09-16 ENCOUNTER — TREATMENT (OUTPATIENT)
Dept: PHYSICAL THERAPY | Facility: CLINIC | Age: 44
End: 2024-09-16
Payer: MEDICARE

## 2024-09-16 DIAGNOSIS — M25.512 ACUTE PAIN OF LEFT SHOULDER: Primary | ICD-10-CM

## 2024-09-16 DIAGNOSIS — Z98.890 S/P LEFT ROTATOR CUFF REPAIR: ICD-10-CM

## 2024-09-16 DIAGNOSIS — R29.898 IMPAIRED STRENGTH OF SHOULDER MUSCLES: ICD-10-CM

## 2024-09-16 DIAGNOSIS — M25.612 DECREASED RANGE OF MOTION OF LEFT SHOULDER: ICD-10-CM

## 2024-09-16 DIAGNOSIS — Z74.09 IMPAIRED FUNCTIONAL MOBILITY AND ACTIVITY TOLERANCE: ICD-10-CM

## 2024-09-16 PROCEDURE — 97112 NEUROMUSCULAR REEDUCATION: CPT | Performed by: PHYSICAL THERAPIST

## 2024-09-16 PROCEDURE — 97140 MANUAL THERAPY 1/> REGIONS: CPT | Performed by: PHYSICAL THERAPIST

## 2024-09-16 PROCEDURE — 97110 THERAPEUTIC EXERCISES: CPT | Performed by: PHYSICAL THERAPIST

## 2024-09-18 ENCOUNTER — TREATMENT (OUTPATIENT)
Dept: PHYSICAL THERAPY | Facility: CLINIC | Age: 44
End: 2024-09-18
Payer: MEDICARE

## 2024-09-18 DIAGNOSIS — R29.898 IMPAIRED STRENGTH OF SHOULDER MUSCLES: ICD-10-CM

## 2024-09-18 DIAGNOSIS — Z74.09 IMPAIRED FUNCTIONAL MOBILITY AND ACTIVITY TOLERANCE: ICD-10-CM

## 2024-09-18 DIAGNOSIS — Z98.890 S/P LEFT ROTATOR CUFF REPAIR: ICD-10-CM

## 2024-09-18 DIAGNOSIS — M25.612 DECREASED RANGE OF MOTION OF LEFT SHOULDER: ICD-10-CM

## 2024-09-18 DIAGNOSIS — M25.512 ACUTE PAIN OF LEFT SHOULDER: Primary | ICD-10-CM

## 2024-09-18 PROCEDURE — 97110 THERAPEUTIC EXERCISES: CPT | Performed by: PHYSICAL THERAPIST

## 2024-09-18 PROCEDURE — 97140 MANUAL THERAPY 1/> REGIONS: CPT | Performed by: PHYSICAL THERAPIST

## 2024-09-23 ENCOUNTER — TREATMENT (OUTPATIENT)
Dept: PHYSICAL THERAPY | Facility: CLINIC | Age: 44
End: 2024-09-23
Payer: MEDICARE

## 2024-09-23 DIAGNOSIS — Z98.890 S/P LEFT ROTATOR CUFF REPAIR: ICD-10-CM

## 2024-09-23 DIAGNOSIS — R29.898 IMPAIRED STRENGTH OF SHOULDER MUSCLES: ICD-10-CM

## 2024-09-23 DIAGNOSIS — M25.512 ACUTE PAIN OF LEFT SHOULDER: Primary | ICD-10-CM

## 2024-09-23 DIAGNOSIS — M25.612 DECREASED RANGE OF MOTION OF LEFT SHOULDER: ICD-10-CM

## 2024-09-23 DIAGNOSIS — Z74.09 IMPAIRED FUNCTIONAL MOBILITY AND ACTIVITY TOLERANCE: ICD-10-CM

## 2024-09-23 PROCEDURE — 97140 MANUAL THERAPY 1/> REGIONS: CPT | Performed by: PHYSICAL THERAPIST

## 2024-09-23 PROCEDURE — 97112 NEUROMUSCULAR REEDUCATION: CPT | Performed by: PHYSICAL THERAPIST

## 2024-09-23 PROCEDURE — 97110 THERAPEUTIC EXERCISES: CPT | Performed by: PHYSICAL THERAPIST

## 2024-09-25 ENCOUNTER — TREATMENT (OUTPATIENT)
Dept: PHYSICAL THERAPY | Facility: CLINIC | Age: 44
End: 2024-09-25
Payer: MEDICARE

## 2024-09-25 DIAGNOSIS — Z98.890 S/P LEFT ROTATOR CUFF REPAIR: ICD-10-CM

## 2024-09-25 DIAGNOSIS — Z74.09 IMPAIRED FUNCTIONAL MOBILITY AND ACTIVITY TOLERANCE: ICD-10-CM

## 2024-09-25 DIAGNOSIS — R29.898 IMPAIRED STRENGTH OF SHOULDER MUSCLES: ICD-10-CM

## 2024-09-25 DIAGNOSIS — M25.512 ACUTE PAIN OF LEFT SHOULDER: Primary | ICD-10-CM

## 2024-09-25 DIAGNOSIS — M25.612 DECREASED RANGE OF MOTION OF LEFT SHOULDER: ICD-10-CM

## 2024-09-25 PROCEDURE — 97112 NEUROMUSCULAR REEDUCATION: CPT | Performed by: PHYSICAL THERAPIST

## 2024-09-25 PROCEDURE — 97110 THERAPEUTIC EXERCISES: CPT | Performed by: PHYSICAL THERAPIST

## 2024-09-25 PROCEDURE — 97140 MANUAL THERAPY 1/> REGIONS: CPT | Performed by: PHYSICAL THERAPIST

## 2024-10-07 ENCOUNTER — TREATMENT (OUTPATIENT)
Dept: PHYSICAL THERAPY | Facility: CLINIC | Age: 44
End: 2024-10-07
Payer: MEDICARE

## 2024-10-07 DIAGNOSIS — R29.898 IMPAIRED STRENGTH OF SHOULDER MUSCLES: ICD-10-CM

## 2024-10-07 DIAGNOSIS — Z74.09 IMPAIRED FUNCTIONAL MOBILITY AND ACTIVITY TOLERANCE: ICD-10-CM

## 2024-10-07 DIAGNOSIS — Z98.890 S/P LEFT ROTATOR CUFF REPAIR: ICD-10-CM

## 2024-10-07 DIAGNOSIS — M25.512 ACUTE PAIN OF LEFT SHOULDER: Primary | ICD-10-CM

## 2024-10-07 DIAGNOSIS — M25.612 DECREASED RANGE OF MOTION OF LEFT SHOULDER: ICD-10-CM

## 2024-10-07 PROCEDURE — 97140 MANUAL THERAPY 1/> REGIONS: CPT | Performed by: PHYSICAL THERAPIST

## 2024-10-07 PROCEDURE — 97112 NEUROMUSCULAR REEDUCATION: CPT | Performed by: PHYSICAL THERAPIST

## 2024-10-07 PROCEDURE — 97110 THERAPEUTIC EXERCISES: CPT | Performed by: PHYSICAL THERAPIST

## 2024-10-07 NOTE — PROGRESS NOTES
Physical Therapy Daily Treatment Note    [facility]  [address] [place] [address]  [phone] (phone)  [phone] (fax)    Patient: Bienvenido Carter   : 1980  Diagnosis/ICD-10 Code:  Acute pain of left shoulder [M25.512]  Referring practitioner: Tino Randall MD  Today's Date: 10/7/2024  Patient seen for 15 sessions    Visit Diagnoses:    ICD-10-CM ICD-9-CM   1. Acute pain of left shoulder  M25.512 719.41   2. S/P left rotator cuff repair  Z98.890 V45.89   3. Decreased range of motion of left shoulder  M25.612 719.51   4. Impaired strength of shoulder muscles  R29.898 729.89   5. Impaired functional mobility and activity tolerance  Z74.09 V49.89              Subjective:  Bienvenido stated that she has mild pain intermittently.  She is limited with active elevation and she notices that placing objects in her pantry, clothing closets, cupboards are still difficult to reach overhead.     Objective     Treatment    Therapeutic exercise  AAROM, pulley flexion and scaption x 15 each  Scapular row, red theraband, 10 x 2  Shoulder extension, red theraband, 10 x 2  ER PRE yellow theraband, 10 x 2, B, roll under arm  With therapeutic ball shoulder at 90 degrees of flexion, ER and IR circles x 10 each  Shoulder wall slides x 10  Scaption, 1 lb., x 10  After manual therapy  8. AAROM for L shoulder ER, with wand, x 10  9. L shoulder sleeper stretch, x 10, 5s hold    NMR: verbal cues for exercise technique included general technique, with the scapular exercises the direction to pull the scapula, with ER PRE and scaption PRE, to retract the scapula and for general technique with each exercise.     Manual therapy:  PROM of the L shoulder, supine, for ER, flexion, scaption and IR. L GH joint mobilizations to include anterior and inferior glides grade 3-4 x 10    Assessment & Plan       Assessment  Assessment details: Bienvenido progressed her HEP with the  sleeper stretch.  She required verbal cues for exercise technique and tolerated treatment well.     Plan  Plan details: Continue per treatment plan.                Timed:    Manual Therapy:    10     mins  88231;  Therapeutic Exercise:    25     mins  00131;     Neuromuscular Namita:    10    mins  55377;    Therapeutic Activity:          mins  13173;     Gait Training:           mins  54457;     Ultrasound:          mins  05914;    Aquatic Therapy         mins 87810;  Self Care                            mins   59759        Untimed:  Electrical Stimulation:         mins  24793 ( );  Traction:         mins  12217;   Dry Needling  (1-2 muscles)                 mins 20560 (Self-pay)  Dry Needling (3-4 muscles)      20561 (Self-pay)  Dry Needling Trial         DRYNDLTRIAL  (No Charge)    Timed Treatment:   45   mins   Total Treatment:     45   mins    Tonio Mckeon PT  Physical Therapist    [place] License:[id number]

## 2024-10-14 ENCOUNTER — TREATMENT (OUTPATIENT)
Dept: PHYSICAL THERAPY | Facility: CLINIC | Age: 44
End: 2024-10-14
Payer: MEDICARE

## 2024-10-14 DIAGNOSIS — M25.612 DECREASED RANGE OF MOTION OF LEFT SHOULDER: ICD-10-CM

## 2024-10-14 DIAGNOSIS — M25.512 ACUTE PAIN OF LEFT SHOULDER: Primary | ICD-10-CM

## 2024-10-14 DIAGNOSIS — Z74.09 IMPAIRED FUNCTIONAL MOBILITY AND ACTIVITY TOLERANCE: ICD-10-CM

## 2024-10-14 DIAGNOSIS — R29.898 IMPAIRED STRENGTH OF SHOULDER MUSCLES: ICD-10-CM

## 2024-10-14 DIAGNOSIS — Z98.890 S/P LEFT ROTATOR CUFF REPAIR: ICD-10-CM

## 2024-10-14 PROCEDURE — 97112 NEUROMUSCULAR REEDUCATION: CPT | Performed by: PHYSICAL THERAPIST

## 2024-10-14 PROCEDURE — 97110 THERAPEUTIC EXERCISES: CPT | Performed by: PHYSICAL THERAPIST

## 2024-10-14 NOTE — PROGRESS NOTES
Physical Therapy Daily Treatment Note    [facility]  [address] [place] [address]  [phone] (phone)  [phone] (fax)    Patient: Bienvenido Carter   : 1980  Diagnosis/ICD-10 Code:  Acute pain of left shoulder [M25.512]  Referring practitioner: Tino Randall MD  Today's Date: 10/14/2024  Patient seen for 16 sessions    Visit Diagnoses:    ICD-10-CM ICD-9-CM   1. Acute pain of left shoulder  M25.512 719.41   2. S/P left rotator cuff repair  Z98.890 V45.89   3. Decreased range of motion of left shoulder  M25.612 719.51   4. Impaired strength of shoulder muscles  R29.898 729.89   5. Impaired functional mobility and activity tolerance  Z74.09 V49.89              Subjective:  Bienvenido is noticing better mobility and strength with her L shoulder.  Pain is decreasing in intensity and frequency.     Objective     Treatment      Therapeutic exercise  AAROM, pulley flexion and scaption x 15 each  Scapular row, red theraband, 10 x 2  Shoulder extension, red theraband, 10 x 2  ER PRE yellow theraband, 10 x 2, B, roll under arm  Scaption, 1 lb., 10 x 2, B  With therapeutic ball shoulder at 90 degrees of flexion, ER and IR circles x 10 each  Scapular protraction, hook lying, 2 and 3 lbs., each x 10, B  After manual therapy  7. Wand AAROM, ER, x 5, L UE  8. Wand AAROM flexion x 5  9. L sleeper stretch x 5  10 Wall slides, L UE x 10    NMR: verbal cues for exercise technique included general technique, with the scapular exercises the direction to pull the scapula, with ER PRE and scaption PRE, to retract the scapula and for general technique with each exercise.      Manual therapy:  PROM of the L shoulder, supine, for ER, flexion, scaption and IR. L GH joint mobilizations to include anterior, posterior and inferior glides grade 3-4 x 10    Self care: I added the sleeper stretch to her HEP.      Assessment & Plan       Assessment  Assessment details:  Bienvenido required verbal cues for exercise technique and is slowly progressing her exercise program.  She is doing well thus far.     Plan  Plan details: Continue per treatment plan.                Timed:    Manual Therapy:    5     mins  87301;  Therapeutic Exercise:    32     mins  88728;     Neuromuscular Namita:    8    mins  05315;    Therapeutic Activity:          mins  04974;     Gait Training:           mins  39992;     Ultrasound:          mins  04810;    Aquatic Therapy         mins 73102;  Self Care                            mins   87501        Untimed:  Electrical Stimulation:         mins  72542 ( );  Traction:         mins  89178;   Dry Needling  (1-2 muscles)                 mins 20560 (Self-pay)  Dry Needling (3-4 muscles)      20561 (Self-pay)  Dry Needling Trial         DRYNDLTRIAL  (No Charge)    Timed Treatment:   45   mins   Total Treatment:     45   mins    Tonio Mckeon PT  Physical Therapist    [place] License:[id number]

## 2024-10-18 ENCOUNTER — TRANSCRIBE ORDERS (OUTPATIENT)
Dept: PHYSICAL THERAPY | Facility: CLINIC | Age: 44
End: 2024-10-18
Payer: MEDICARE

## 2024-10-18 DIAGNOSIS — S76.012A HIP STRAIN, LEFT, INITIAL ENCOUNTER: Primary | ICD-10-CM

## 2024-10-21 ENCOUNTER — TREATMENT (OUTPATIENT)
Dept: PHYSICAL THERAPY | Facility: CLINIC | Age: 44
End: 2024-10-21
Payer: MEDICARE

## 2024-10-21 DIAGNOSIS — R29.898 IMPAIRED STRENGTH OF SHOULDER MUSCLES: ICD-10-CM

## 2024-10-21 DIAGNOSIS — Z98.890 S/P LEFT ROTATOR CUFF REPAIR: ICD-10-CM

## 2024-10-21 DIAGNOSIS — M25.512 ACUTE PAIN OF LEFT SHOULDER: Primary | ICD-10-CM

## 2024-10-21 DIAGNOSIS — Z74.09 IMPAIRED FUNCTIONAL MOBILITY AND ACTIVITY TOLERANCE: ICD-10-CM

## 2024-10-21 DIAGNOSIS — M25.612 DECREASED RANGE OF MOTION OF LEFT SHOULDER: ICD-10-CM

## 2024-10-21 PROCEDURE — 97112 NEUROMUSCULAR REEDUCATION: CPT | Performed by: PHYSICAL THERAPIST

## 2024-10-21 PROCEDURE — 97110 THERAPEUTIC EXERCISES: CPT | Performed by: PHYSICAL THERAPIST
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
## 2024-10-28 ENCOUNTER — TREATMENT (OUTPATIENT)
Dept: PHYSICAL THERAPY | Facility: CLINIC | Age: 44
End: 2024-10-28
Payer: MEDICARE

## 2024-10-28 DIAGNOSIS — Z98.890 S/P LEFT ROTATOR CUFF REPAIR: ICD-10-CM

## 2024-10-28 DIAGNOSIS — Z74.09 IMPAIRED FUNCTIONAL MOBILITY AND ACTIVITY TOLERANCE: ICD-10-CM

## 2024-10-28 DIAGNOSIS — R29.898 IMPAIRED STRENGTH OF SHOULDER MUSCLES: ICD-10-CM

## 2024-10-28 DIAGNOSIS — M25.512 ACUTE PAIN OF LEFT SHOULDER: Primary | ICD-10-CM

## 2024-10-28 DIAGNOSIS — M25.612 DECREASED RANGE OF MOTION OF LEFT SHOULDER: ICD-10-CM

## 2024-10-28 PROCEDURE — 97112 NEUROMUSCULAR REEDUCATION: CPT | Performed by: PHYSICAL THERAPIST

## 2024-10-28 PROCEDURE — 97110 THERAPEUTIC EXERCISES: CPT | Performed by: PHYSICAL THERAPIST

## 2024-10-28 NOTE — PROGRESS NOTES
-Symptoms currently improved with up titration of medical therapy  -At this time patient does not wish to undergo cardiac catheterization however after discussion is agreeable to speaking with interventional team about potential risks and benefits.  -Will have office staff set up hopeful telemedicine visit with Dr. Franco to discuss options of treatment from an invasive procedure standpoint possible PCI  -Patient can continue current medical therapy and seems to be tolerating uptitrated twice daily dosing of nifedipine therefore we will continue at this time as well  -Counseled patient on dietary and lifestyle modifications including sodium restriction along with tobacco cessation and weight reduction  -Appreciate palliative care assistance.     Physical Therapy Daily Treatment Note    [facility]  [address] [place] [address]  [phone] (phone)  [phone] (fax)    Patient: Bienvenido Carter   : 1980  Diagnosis/ICD-10 Code:  Acute pain of left shoulder [M25.512]  Referring practitioner: Tino Randall MD  Today's Date: 10/28/2024  Patient seen for 18 sessions    Visit Diagnoses:    ICD-10-CM ICD-9-CM   1. Acute pain of left shoulder  M25.512 719.41   2. S/P left rotator cuff repair  Z98.890 V45.89   3. Decreased range of motion of left shoulder  M25.612 719.51   4. Impaired strength of shoulder muscles  R29.898 729.89   5. Impaired functional mobility and activity tolerance  Z74.09 V49.89              Subjective: Bienvenido stated that she felt ready to do things independently and is good to be discharged from P.T..      Objective     Treatment   Therapeutic exercise  AAROM, pulley flexion and scaption x 15 each  Scapular row, red theraband, 10 x 2  Shoulder extension, red theraband, 10 x 2  ER PRE yellow theraband, 10 x 2, B, roll under arm  Scaption, 1 lb., 10 x 2, B  Scapular protraction, hook lying, 3 lbs., 10 x 2  7. Wand AAROM, ER, x 10, L UE  8. Wand AAROM flexion x 10  9. L sleeper stretch, x 10  10. Wand AAROM shoulder extension x 10    NMR: verbal cues for each exercise to improve exercise technique were given.     Self care: instructions for the PREs were given and I issued green and yellow theraband for her to use.     Reassessment  FOS: 62/130=48% deficit    Functional AROM of the L shoulder  Elevation in the scapular plane: 154 degrees  2. Reach behind neck: C7 spinous process  3. Reach behind back: T9 spinous process    MMT: L shoulder  Flexion: 4+/5  Abduction: 4/5  ER 4/5  IR 4+ to 5/5  Extension 4 to 4+/5    Assessment & Plan       Assessment  Assessment details: Bienvenido Carter was seen for 18 physical therapy sessions for Acute pain of L shoulder.  Treatment  included therapeutic exercise, manual therapy, neuro-muscular retraining , and patient education with home exercise program . Progress to physical therapy goals was good.  She was discharged to an independent [facility] and provided patient education to self-manage condition.        Goals  Plan Goals: ST wks  1. Patient will be independent with education for symptom management, joint protection and strategies to minimize stress on affected tissues (MET)   2. Pt to improve L shoulder A/PROM in flex=110/150 deg, abd=110/150 deg, ER=C5/70 deg and IR=L3/70 deg for greater ease with don/doffing shirts (MET)   3. Pt to improve pain complaints to no more than 5/10 with showering and dressing independently (PART MET)      LT wks  1. Pt to improve L shoulder A/PROM in flex=160/170 deg, abd=160/170 deg, ER=T2/85 deg and IR=T11/85 deg for greater ease with reaching into cabinets and her closet at home (Partially met)  2. Pt to improve pain complaints to no more than 2/10 with sleeping in her bed (MET)   3. Pt to improve L shoulder strength to 4+/5 to be able to carry her groceries in/out of the home (ONGOING) progressing   4. Pt to improve SPADI score from 88.4% to 25% for overall functional improvement with house hold chores (NOT MET)  5. Patient will be independent and compliant with advanced home exercise program to facilitate self-management of symptoms. (MET)         Plan  Plan details: Discharged to independent self care.                Timed:    Manual Therapy:         mins  31558;  Therapeutic Exercise:    35     mins  26722;     Neuromuscular Namita:    8    mins  72552;    Therapeutic Activity:          mins  19739;     Gait Training:           mins  40137;     Ultrasound:          mins  42080;    Aquatic Therapy           mins     81621;  Self Care                        2       mins   08688        Untimed:  Electrical Stimulation:           mins  75977 ( );  Traction:           mins  80003;   Dry Needling   (1-2 muscles)                  mins 20560 (Self-pay)  Dry Needling (3-4 muscles)  ____  20561 (Self-pay)  Dry Needling Trial             DRYNDLTRIAL  (No Charge)    Timed Treatment:   45   mins   Total Treatment:     45   mins    Tonio Mckeon PT  Physical Therapist    [place] License:[id number]

## 2024-11-04 DIAGNOSIS — M79.2 NEUROPATHIC PAIN: ICD-10-CM

## 2024-11-04 DIAGNOSIS — M79.7 FIBROMYALGIA: ICD-10-CM

## 2024-11-04 RX ORDER — PREGABALIN 100 MG/1
200 CAPSULE ORAL 3 TIMES DAILY
Qty: 180 CAPSULE | Refills: 0 | Status: SHIPPED | OUTPATIENT
Start: 2024-11-04 | End: 2024-11-11 | Stop reason: SDUPTHER

## 2024-11-11 ENCOUNTER — OFFICE VISIT (OUTPATIENT)
Dept: NEUROLOGY | Facility: CLINIC | Age: 44
End: 2024-11-11
Payer: MEDICARE

## 2024-11-11 VITALS
SYSTOLIC BLOOD PRESSURE: 98 MMHG | BODY MASS INDEX: 17.57 KG/M2 | HEART RATE: 92 BPM | DIASTOLIC BLOOD PRESSURE: 66 MMHG | WEIGHT: 87 LBS | OXYGEN SATURATION: 99 %

## 2024-11-11 DIAGNOSIS — M79.2 NEUROPATHIC PAIN: ICD-10-CM

## 2024-11-11 DIAGNOSIS — M79.7 FIBROMYALGIA: ICD-10-CM

## 2024-11-11 PROBLEM — F43.10 POSTTRAUMATIC STRESS DISORDER: Status: ACTIVE | Noted: 2020-12-16

## 2024-11-11 PROBLEM — M75.31 CALCIFIC TENDINITIS OF RIGHT SHOULDER: Status: ACTIVE | Noted: 2018-11-05

## 2024-11-11 PROBLEM — Z51.89 ENCOUNTER FOR BLOOD TRANSFUSION: Status: ACTIVE | Noted: 2024-11-11

## 2024-11-11 PROBLEM — M25.519 SHOULDER PAIN: Status: ACTIVE | Noted: 2018-07-24

## 2024-11-11 PROBLEM — M75.102 ROTATOR CUFF SYNDROME OF LEFT SHOULDER: Status: ACTIVE | Noted: 2024-06-05

## 2024-11-11 PROBLEM — M81.0 OSTEOPOROSIS: Status: ACTIVE | Noted: 2023-12-04

## 2024-11-11 PROBLEM — R15.9 FECAL INCONTINENCE: Status: ACTIVE | Noted: 2024-11-11

## 2024-11-11 PROBLEM — K31.89 OTHER DISEASES OF STOMACH AND DUODENUM: Status: ACTIVE | Noted: 2022-11-14

## 2024-11-11 PROBLEM — E46 MALNUTRITION: Status: ACTIVE | Noted: 2024-01-25

## 2024-11-11 PROBLEM — F32.9 MAJOR DEPRESSIVE DISORDER, SINGLE EPISODE, UNSPECIFIED: Status: ACTIVE | Noted: 2023-12-04

## 2024-11-11 PROBLEM — G25.81 RESTLESS LEGS: Status: ACTIVE | Noted: 2017-01-11

## 2024-11-11 PROBLEM — Z79.899 MEDICATION MANAGEMENT: Status: ACTIVE | Noted: 2019-09-04

## 2024-11-11 PROCEDURE — 1160F RVW MEDS BY RX/DR IN RCRD: CPT | Performed by: PSYCHIATRY & NEUROLOGY

## 2024-11-11 PROCEDURE — 1159F MED LIST DOCD IN RCRD: CPT | Performed by: PSYCHIATRY & NEUROLOGY

## 2024-11-11 PROCEDURE — 99214 OFFICE O/P EST MOD 30 MIN: CPT | Performed by: PSYCHIATRY & NEUROLOGY

## 2024-11-11 RX ORDER — PREGABALIN 100 MG/1
200 CAPSULE ORAL 3 TIMES DAILY
Qty: 180 CAPSULE | Refills: 0 | Status: SHIPPED | OUTPATIENT
Start: 2024-11-11

## 2024-11-11 RX ORDER — PROMETHAZINE HYDROCHLORIDE 25 MG/1
1 TABLET ORAL EVERY 6 HOURS PRN
COMMUNITY
Start: 2024-08-06

## 2024-11-11 RX ORDER — OXYCODONE HYDROCHLORIDE 10 MG/1
1 TABLET ORAL 3 TIMES DAILY
COMMUNITY
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
## 2025-01-19 DIAGNOSIS — M79.7 FIBROMYALGIA: ICD-10-CM

## 2025-01-19 DIAGNOSIS — M79.2 NEUROPATHIC PAIN: ICD-10-CM

## 2025-01-20 ENCOUNTER — OFFICE VISIT (OUTPATIENT)
Dept: NEUROSURGERY | Facility: CLINIC | Age: 45
End: 2025-01-20
Payer: MEDICARE

## 2025-01-20 VITALS
SYSTOLIC BLOOD PRESSURE: 112 MMHG | BODY MASS INDEX: 17.54 KG/M2 | DIASTOLIC BLOOD PRESSURE: 66 MMHG | WEIGHT: 87 LBS | RESPIRATION RATE: 20 BRPM | HEIGHT: 59 IN

## 2025-01-20 DIAGNOSIS — Z98.1 HISTORY OF FUSION OF CERVICAL SPINE: Primary | ICD-10-CM

## 2025-01-20 DIAGNOSIS — R20.0 BILATERAL HAND NUMBNESS: ICD-10-CM

## 2025-01-20 DIAGNOSIS — M51.360 DEGENERATION OF INTERVERTEBRAL DISC OF LUMBAR REGION WITH DISCOGENIC BACK PAIN: ICD-10-CM

## 2025-01-20 DIAGNOSIS — R29.898 WEAKNESS OF LEFT LEG: ICD-10-CM

## 2025-01-20 PROCEDURE — 1159F MED LIST DOCD IN RCRD: CPT | Performed by: NEUROLOGICAL SURGERY

## 2025-01-20 PROCEDURE — 1160F RVW MEDS BY RX/DR IN RCRD: CPT | Performed by: NEUROLOGICAL SURGERY

## 2025-01-20 PROCEDURE — 99213 OFFICE O/P EST LOW 20 MIN: CPT | Performed by: NEUROLOGICAL SURGERY

## 2025-01-20 RX ORDER — PREGABALIN 100 MG/1
200 CAPSULE ORAL 3 TIMES DAILY
Qty: 180 CAPSULE | Refills: 5 | Status: SHIPPED | OUTPATIENT
Start: 2025-01-20

## 2025-05-07 DIAGNOSIS — M79.2 NEUROPATHIC PAIN: ICD-10-CM

## 2025-05-07 DIAGNOSIS — M79.7 FIBROMYALGIA: ICD-10-CM

## 2025-05-07 RX ORDER — PREGABALIN 100 MG/1
200 CAPSULE ORAL 3 TIMES DAILY
Qty: 180 CAPSULE | Refills: 5 | Status: SHIPPED | OUTPATIENT
Start: 2025-05-07

## 2025-05-07 NOTE — TELEPHONE ENCOUNTER
MATT WEBSTER (Key: [id number])  PA Case ID #: [id number]  Rx #: [id number]  Need Help? Call us at [phone]  Status  sent iconSent to Plan today  Drug  Pregabalin 100MG capsules  ePA cloud logo  Form  [employer] Electronic PA Form (2017 NCPDP)  Original Claim Info  [phone]

## 2025-05-07 NOTE — TELEPHONE ENCOUNTER
Pregabalin reordered.  Unable to find PMPD in the patient's chart. In the Rx request in the task basket message the drop-down on the toolbar does list the PMPD however the box to sign off after I had reviewed it was grayed out.    KAYLEIGH

## 2025-05-12 NOTE — TELEPHONE ENCOUNTER
Apparently to access Magan through the chart you must be in an encounter of certain type.  I went through my last office visit and was able to access Magan that way and sign off.  MARTHAS

## 2025-05-15 ENCOUNTER — OFFICE VISIT (OUTPATIENT)
Dept: NEUROLOGY | Facility: CLINIC | Age: 45
End: 2025-05-15
Payer: MEDICARE

## 2025-05-15 VITALS
SYSTOLIC BLOOD PRESSURE: 98 MMHG | OXYGEN SATURATION: 96 % | HEIGHT: 59 IN | HEART RATE: 76 BPM | DIASTOLIC BLOOD PRESSURE: 64 MMHG | BODY MASS INDEX: 17.56 KG/M2

## 2025-05-15 DIAGNOSIS — G60.9 IDIOPATHIC PERIPHERAL NEUROPATHY: ICD-10-CM

## 2025-05-15 DIAGNOSIS — M79.2 NEUROPATHIC PAIN: Primary | ICD-10-CM

## 2025-05-15 PROBLEM — S69.91XA INJURY OF RIGHT HAND: Status: ACTIVE | Noted: 2019-06-21

## 2025-05-15 PROBLEM — D05.02 BREAST NEOPLASM, TIS (LCIS), LEFT: Status: ACTIVE | Noted: 2023-09-15

## 2025-05-15 PROBLEM — E21.3 HPTH (HYPERPARATHYROIDISM): Status: ACTIVE | Noted: 2024-01-25

## 2025-05-15 PROBLEM — Z72.0 VAPES NICOTINE CONTAINING SUBSTANCE: Status: ACTIVE | Noted: 2025-03-08

## 2025-05-15 PROBLEM — Z30.41 ENCOUNTER FOR SURVEILLANCE OF CONTRACEPTIVE PILLS: Status: ACTIVE | Noted: 2024-01-29

## 2025-05-15 PROBLEM — I10 HIGH BLOOD PRESSURE: Status: ACTIVE | Noted: 2023-12-04

## 2025-05-15 PROBLEM — K22.2 ESOPHAGEAL OBSTRUCTION: Status: ACTIVE | Noted: 2022-11-14

## 2025-05-15 PROBLEM — N93.9 ABNORMAL UTERINE BLEEDING (AUB): Status: ACTIVE | Noted: 2025-03-08

## 2025-05-15 PROBLEM — Z87.42 HISTORY OF OVARIAN CYST: Status: ACTIVE | Noted: 2025-03-08

## 2025-05-15 PROBLEM — N94.89 ADNEXAL MASS: Status: ACTIVE | Noted: 2023-12-09
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
## 2025-06-16 ENCOUNTER — TRANSCRIBE ORDERS (OUTPATIENT)
Dept: PHYSICAL THERAPY | Facility: CLINIC | Age: 45
End: 2025-06-16
Payer: MEDICARE

## 2025-06-16 DIAGNOSIS — M87.052 AVASCULAR NECROSIS OF HIP, LEFT: Primary | ICD-10-CM

## 2025-06-19 ENCOUNTER — TREATMENT (OUTPATIENT)
Dept: PHYSICAL THERAPY | Facility: CLINIC | Age: 45
End: 2025-06-19
Payer: MEDICARE

## 2025-06-19 DIAGNOSIS — M87.052 AVASCULAR NECROSIS OF BONE OF LEFT HIP: ICD-10-CM

## 2025-06-19 DIAGNOSIS — M25.552 PAIN OF LEFT HIP: Primary | ICD-10-CM

## 2025-06-19 DIAGNOSIS — R29.898 WEAKNESS OF LEFT HIP: ICD-10-CM

## 2025-06-19 PROCEDURE — 97162 PT EVAL MOD COMPLEX 30 MIN: CPT | Performed by: PHYSICAL THERAPIST

## 2025-06-19 PROCEDURE — 97110 THERAPEUTIC EXERCISES: CPT | Performed by: PHYSICAL THERAPIST
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
## 2025-07-11 ENCOUNTER — TREATMENT (OUTPATIENT)
Dept: PHYSICAL THERAPY | Facility: CLINIC | Age: 45
End: 2025-07-11
Payer: MEDICARE

## 2025-07-11 DIAGNOSIS — M25.552 PAIN OF LEFT HIP: Primary | ICD-10-CM

## 2025-07-11 DIAGNOSIS — R29.898 WEAKNESS OF LEFT HIP: ICD-10-CM

## 2025-07-11 DIAGNOSIS — M87.052 AVASCULAR NECROSIS OF BONE OF LEFT HIP: ICD-10-CM

## 2025-07-11 PROCEDURE — 97110 THERAPEUTIC EXERCISES: CPT | Performed by: PHYSICAL THERAPIST

## 2025-07-11 PROCEDURE — 97112 NEUROMUSCULAR REEDUCATION: CPT | Performed by: PHYSICAL THERAPIST

## 2025-07-11 NOTE — PROGRESS NOTES
Physical Therapy Daily Treatment Note    [facility]  [address] [place] [address]  [phone] (phone)  [phone] (fax)    Patient: Bienvenido Carter  : 1980  Diagnosis/ICD-10 Code:  Pain of left hip [M25.552]  Referring practitioner: Juan Diego Ceron MD  Date of Initial Visit: Type: THERAPY  Noted: 2025  Today's Date: 2025  Patient seen for 2 session    Visit Diagnoses:    ICD-10-CM ICD-9-CM   1. Pain of left hip  M25.552 719.45   2. Avascular necrosis of bone of left hip  M87.052 733.42   3. Weakness of left hip  R29.898 729.89            [place]    Objective     Therapeutic Exercise/Therapeutic Activity/NM re-ed:  -bridge 2x10  -hip add iso w/ball 2x10x 5 sec hold  -hip abd/ER with red band, x20, single leg x15 each  -supine march with red band on thighs, 2x10  -SL clam 2x10  -SL hip abd 2x10  -seated hamstring stretch 3x20 sec     [employer] Access Code [email or web address]   HEP printed out and updated    Assessment/Plan  Review of initial HEP and addition of new mat exercises. Keep in a small ROM to reduce hip pain.          Timed:    Manual Therapy:         mins  42776;  Therapeutic Exercise:    32     mins  49635;     Neuromuscular Namita:    8    mins  31776;    Therapeutic Activity:          mins  51561;     Gait Training:           mins  09632;     Ultrasound:          mins  12081;    Electrical Stimulation:         mins  67002 ( );  Iontophoresis         mins 63815;  Aquatic Therapy         mins 22061;  Group Therapy                   mins 57332  Self Care           min  73528    Untimed:  Electrical Stimulation:         mins  48421 ( );  Traction:         mins  28171;   Dry Needling   (1-2 muscles)             mins  (Self-pay)  Dry Needling (3-4 muscles)           mins  (Self-pay)  Dry Needling Trial              mins DRYNDLTRIAL  (No Charge)    Timed Treatment:   40   mins    Total Treatment:     40   mins    Katie Medel PT, CDNT  Physical Therapist    [place] License:[id number]

## 2025-07-24 ENCOUNTER — TREATMENT (OUTPATIENT)
Dept: PHYSICAL THERAPY | Facility: CLINIC | Age: 45
End: 2025-07-24
Payer: MEDICARE

## 2025-07-24 DIAGNOSIS — M87.052 AVASCULAR NECROSIS OF BONE OF LEFT HIP: ICD-10-CM

## 2025-07-24 DIAGNOSIS — R29.898 WEAKNESS OF LEFT HIP: ICD-10-CM

## 2025-07-24 DIAGNOSIS — M25.552 PAIN OF LEFT HIP: Primary | ICD-10-CM

## 2025-07-24 PROCEDURE — 97110 THERAPEUTIC EXERCISES: CPT | Performed by: PHYSICAL THERAPIST

## 2025-07-24 PROCEDURE — 97530 THERAPEUTIC ACTIVITIES: CPT | Performed by: PHYSICAL THERAPIST

## 2025-07-24 NOTE — PROGRESS NOTES
30-Day / 10-Visit Progress Note         [facility]  [address] [place] [address]  [phone] (phone)  [phone] (fax)    Patient: Bienvenido Carter  : 1980  Diagnosis/ICD-10 Code:  Pain of left hip [M25.552]  Referring practitioner: Juan Diego Ceron MD  Date of Initial Visit: Type: THERAPY  Noted: 2025  Today's Date: 2025  Patient seen for 3 session    Visit Diagnoses:    ICD-10-CM ICD-9-CM   1. Pain of left hip  M25.552 719.45   2. Avascular necrosis of bone of left hip  M87.052 733.42   3. Weakness of left hip  R29.898 729.89       Subjective:     Clinical Progress: improved  Home Program Compliance: Yes  Treatment has included:  therapeutic exercise, therapeutic activity, neuro-muscular retraining , and patient education with home exercise program     Subjective  pt was  on vacation in [place] visiting family. Hip did ok, when walking on it a lot and on the beach on uneven ground it was more sore. Pain up to 5/10.     Objective          Active Range of Motion   Left Hip   Flexion: 96 degrees   External rotation (90/90): 20 degrees     Strength/Myotome Testing     Left Hip   Planes of Motion   Flexion: 4-  Extension: 4  Abduction: 4  Adduction: 4  External rotation: 4-        Therapeutic Exercise/Therapeutic Activity/NM re-ed:  -bridge 2x10  -hip add iso w/ball 2x10x 5 sec hold  -hip abd/ER with red band, x20, single leg x15 each  -supine march with red band on thighs, 2x10  -SL clam 2x10  -SL hip abd 2x10  -4 inch step up x10 each  -4 inch lateral step up x10 each  -seated hamstring stretch 3x20 sec     [employer] Access Code [email or web address]      Functional Outcome Score:   LEFS=50/80 or 62.5%    Assessment & Plan       Assessment  Assessment details: Bienveniod Catrer has been seen for 3 physical therapy sessions for L hip pain from AVN.  Treatment has included therapeutic exercise, therapeutic activity, neuro-muscular retraining , and  patient education with home exercise program . Progress to physical therapy goals is good. Pt has improved her ROM and strength over the last month. She continues with pain with prolonged walking, standing, and sitting, making it difficult to ride in the car or travel. She will benefit from continued skilled physical therapy to address remaining impairments and functional limitations.       Prognosis: good    Goals  Plan Goals: ST wks  1. Patient will be independent with education for symptom management, joint protection and strategies to minimize stress on affected tissues (PART MET)   2. Patient report no more than 6/10 pain with sitting/driving in the car for 45 min (ONGOING) pain still inc with prolonged sitting  3. Pt to improve L hip ROM to in flex=90 deg and ER=20 deg for greater ease with showering/dressing (MET)      LT wks  1. Pt to improve L hip ROM in flexion to 105 deg for greater ease getting in/out of the car (ONGOING)   2. Pt to improve score on LEFS from 59% to 72% for overall functional improvement (ONGOING) improved to 62.5%  3. Patient will increase hip strength to 4+ to 5/5 to improve functional mobility (ONGOING)   4. Patient will negotiate stairs reciprocally with rail support as needed with no more than 4/10 pain for ease with carrying groceries up to her condo (ONGOING)   5. Patient will demonstrate an independent HEP for core and hip strength and flexibility/ROM. (ONGOING)       Plan  Therapy options: will be seen for skilled therapy services  Frequency: 2x week  Duration in weeks: 8  Treatment plan discussed with: patient           Recommendations: Continue as planned  Timeframe: 2 months  Prognosis to achieve goals: good    PT Signature: Katie Medel PT, CDNT    License Number: [id number]    Electronically signed by Katie Medel PT, 25, 7:33 AM EDT      Based upon review of the patient's progress and continued therapy plan, it is my medical opinion that Bienvenido  Emma should continue physical therapy treatment at [facility]  [address]   [place] [address] [phone].    Signature: __________________________________  Juan Diego Ceron MD    Timed:  Manual Therapy:         mins  73337;  Therapeutic Exercise:    35     mins  96474;     Neuromuscular Namita:        mins  55063;    Therapeutic Activity:     8     mins  15396;     Gait Training:           mins  29625;     Ultrasound:          mins  40201;    Iontophoresis         mins 31082;  Group therapy                   mins 10740  Self Care           min  08720  Self Pay                               min    Untimed:  Electrical Stimulation:         mins  02068 ( );  Traction:         mins  16274;   Dry Needling   (1-2 muscles)            mins 61498 (Self-pay)  Dry Needling (3-4 muscles)             mins 91427 (Self-pay)  Dry Needling Trial                 mins DRYNDLTRIAL  (No Charge)    Timed Treatment:   43   mins   Total Treatment:     43   mins

## 2025-07-31 ENCOUNTER — TREATMENT (OUTPATIENT)
Dept: PHYSICAL THERAPY | Facility: CLINIC | Age: 45
End: 2025-07-31
Payer: MEDICARE

## 2025-07-31 DIAGNOSIS — R29.898 WEAKNESS OF LEFT HIP: ICD-10-CM

## 2025-07-31 DIAGNOSIS — M25.552 PAIN OF LEFT HIP: Primary | ICD-10-CM

## 2025-07-31 DIAGNOSIS — M87.052 AVASCULAR NECROSIS OF BONE OF LEFT HIP: ICD-10-CM

## 2025-07-31 PROCEDURE — 97110 THERAPEUTIC EXERCISES: CPT | Performed by: PHYSICAL THERAPIST

## 2025-07-31 NOTE — PROGRESS NOTES
Physical Therapy Daily Treatment Note    [facility]  [address] [place] [address]  [phone] (phone)  [phone] (fax)    Patient: Bienvenido Carter  : 1980  Diagnosis/ICD-10 Code:  Pain of left hip [M25.552]  Referring practitioner: Juan Diego Ceron MD  Date of Initial Visit: Type: THERAPY  Noted: 2025  Today's Date: 2025  Patient seen for 4 session    Visit Diagnoses:    ICD-10-CM ICD-9-CM   1. Pain of left hip  M25.552 719.45   2. Avascular necrosis of bone of left hip  M87.052 733.42   3. Weakness of left hip  R29.898 729.89            Subjective pt had some blood work done by PCP. Her WBC count was 19K. Saw ID MD and they admitted her for a few days, did IV abx, it came back down but don't know the source of any infection or reason. Being sent to a hematologist, doesn't have an appt yet. May have a bone marrow biopsy.     Objective     Therapeutic Exercise/Therapeutic Activity/NM re-ed:  -bridge 2x10  -hip add iso w/ball 2x10x 5 sec hold  -hip abd/ER with green band, x20, single leg x15 each  -supine march with green band on thighs, 2x10  -SL clam 2x10  -SL hip abd 2x10  -4 inch step up x10 each DEFER  -4 inch lateral step up x10 each  DEFER  -seated hamstring stretch 3x20 sec     [employer] Access Code [email or web address]       Assessment/Plan  Pt was in the hospital over the weekend after getting some routine blood work back and having a high WBC count. They did not find a source of any infection, did IV abx. WBC is in normal range now but she is anemic., She has been referred to a hematologist. Her hip pain has been moderate, sometimes a little higher, and may be a little more than a month ago. It can radiate into the thigh at times. She doesn't have to follow up with Dr. Ceron unless pain persists or worsens         Timed:    Manual Therapy:         mins  13159;  Therapeutic Exercise:    42     mins  83899;     Neuromuscular Namita:        mins   53204;    Therapeutic Activity:          mins  23737;     Gait Training:           mins  64066;     Ultrasound:          mins  14079;    Electrical Stimulation:         mins  84715 ( );  Iontophoresis         mins 05496;  Aquatic Therapy         mins 57489;  Group Therapy                   mins 39638  Self Care           min  02139  Self Pay  PTSPMIN2              min (1-29 min)  Self Pay PTSPVT             min (30 min or more)    Untimed:  Electrical Stimulation:         mins  05922 ( );  Traction:         mins  92892;   Dry Needling   (1-2 muscles)             mins 20560 (Self-pay)  Dry Needling (3-4 muscles)           mins 20561 (Self-pay)  Dry Needling Trial              mins DRYNDLTRIAL  (No Charge)    Timed Treatment:   42   mins   Total Treatment:     42   mins    Katie Medel PT, CDNT  Physical Therapist    [place] License:[id number]

## 2025-08-11 ENCOUNTER — OFFICE VISIT (OUTPATIENT)
Dept: NEUROLOGY | Facility: CLINIC | Age: 45
End: 2025-08-11
Payer: MEDICARE

## 2025-08-11 VITALS
OXYGEN SATURATION: 98 % | WEIGHT: 88 LBS | DIASTOLIC BLOOD PRESSURE: 70 MMHG | HEIGHT: 59 IN | SYSTOLIC BLOOD PRESSURE: 98 MMHG | HEART RATE: 90 BPM | BODY MASS INDEX: 17.74 KG/M2

## 2025-08-11 DIAGNOSIS — M79.2 NEUROPATHIC PAIN: ICD-10-CM

## 2025-08-11 DIAGNOSIS — Z72.0 TOBACCO USE: Primary | ICD-10-CM

## 2025-08-11 DIAGNOSIS — M79.7 FIBROMYALGIA: ICD-10-CM

## 2025-08-11 PROBLEM — G57.92 UNSPECIFIED MONONEUROPATHY OF LEFT LOWER LIMB: Status: ACTIVE | Noted: 2025-08-11

## 2025-08-11 PROBLEM — M19.90 IDIOPATHIC OSTEOARTHRITIS: Status: ACTIVE | Noted: 2025-08-11

## 2025-08-11 PROBLEM — M87.00 AVASCULAR NECROSIS: Status: ACTIVE | Noted: 2025-08-11

## 2025-08-11 PROBLEM — M96.1 POSTLAMINECTOMY SYNDROME, NOT ELSEWHERE CLASSIFIED: Status: ACTIVE | Noted: 2021-10-13

## 2025-08-11 PROBLEM — R78.81 BACTEREMIA: Status: ACTIVE | Noted: 2025-07-27

## 2025-08-11 PROBLEM — T81.9XXA COMPLICATION OF SURGICAL PROCEDURE: Status: ACTIVE | Noted: 2025-08-11

## 2025-08-11 PROCEDURE — 1160F RVW MEDS BY RX/DR IN RCRD: CPT | Performed by: PSYCHIATRY & NEUROLOGY

## 2025-08-11 PROCEDURE — 99214 OFFICE O/P EST MOD 30 MIN: CPT | Performed by: PSYCHIATRY & NEUROLOGY

## 2025-08-11 PROCEDURE — 3074F SYST BP LT 130 MM HG: CPT | Performed by: PSYCHIATRY & NEUROLOGY

## 2025-08-11 PROCEDURE — 3078F DIAST BP <80 MM HG: CPT | Performed by: PSYCHIATRY & NEUROLOGY

## 2025-08-11 PROCEDURE — 1159F MED LIST DOCD IN RCRD: CPT | Performed by: PSYCHIATRY & NEUROLOGY

## 2025-08-11 RX ORDER — ACETAMINOPHEN AND CODEINE PHOSPHATE 120; 12 MG/5ML; MG/5ML
1 SOLUTION ORAL DAILY
COMMUNITY

## 2025-08-11 RX ORDER — DIVALPROEX SODIUM 250 MG/1
250 TABLET, FILM COATED, EXTENDED RELEASE ORAL DAILY
COMMUNITY
Start: 2025-07-23 | End: 2025-10-21

## 2025-08-11 RX ORDER — PREGABALIN 100 MG/1
200 CAPSULE ORAL 3 TIMES DAILY
Qty: 180 CAPSULE | Refills: 5 | Status: SHIPPED | OUTPATIENT
Start: 2025-08-11

## 2025-08-11 RX ORDER — LISDEXAMFETAMINE DIMESYLATE 30 MG/1
30 CAPSULE ORAL EVERY MORNING
COMMUNITY
Start: 2025-07-31 | End: 2025-08-30

## 2025-08-20 ENCOUNTER — TREATMENT (OUTPATIENT)
Dept: PHYSICAL THERAPY | Facility: CLINIC | Age: 45
End: 2025-08-20
Payer: MEDICARE

## 2025-08-20 DIAGNOSIS — R29.898 WEAKNESS OF LEFT HIP: ICD-10-CM

## 2025-08-20 DIAGNOSIS — M25.552 PAIN OF LEFT HIP: Primary | ICD-10-CM

## 2025-08-20 DIAGNOSIS — M87.052 AVASCULAR NECROSIS OF BONE OF LEFT HIP: ICD-10-CM

## 2025-08-20 PROCEDURE — 97116 GAIT TRAINING THERAPY: CPT | Performed by: PHYSICAL THERAPIST

## 2025-08-20 PROCEDURE — 97110 THERAPEUTIC EXERCISES: CPT | Performed by: PHYSICAL THERAPIST

## 2025-08-27 ENCOUNTER — TREATMENT (OUTPATIENT)
Dept: PHYSICAL THERAPY | Facility: CLINIC | Age: 45
End: 2025-08-27
Payer: MEDICARE

## 2025-08-27 DIAGNOSIS — M25.552 PAIN OF LEFT HIP: Primary | ICD-10-CM

## 2025-08-27 DIAGNOSIS — M87.052 AVASCULAR NECROSIS OF BONE OF LEFT HIP: ICD-10-CM

## 2025-08-27 DIAGNOSIS — R29.898 WEAKNESS OF LEFT HIP: ICD-10-CM

## 2025-08-27 PROCEDURE — 97110 THERAPEUTIC EXERCISES: CPT | Performed by: PHYSICAL THERAPIST

## (undated) DEVICE — DRP MICROSCOPE 4 BINOCULAR CV 54X150IN

## (undated) DEVICE — LO CONTOUR COLLAR: Brand: DEROYAL

## (undated) DEVICE — GLV SURG PREMIERPRO ORTHO LTX PF SZ8 BRN

## (undated) DEVICE — DRAPE,MINOR PROC,6X6 FEN, STER: Brand: MEDLINE

## (undated) DEVICE — CONN TBG Y 5 IN 1 LF STRL

## (undated) DEVICE — BNDR ABD PREMIUM/UNIV 10IN 27TO48IN

## (undated) DEVICE — PK MINOR PROCEDURE 46

## (undated) DEVICE — GOWN,NON-REINFORCED,SIRUS,SET IN SLV,XXL: Brand: MEDLINE

## (undated) DEVICE — PENCL E/S PLUMEPEN 9.5MM 10FT LF

## (undated) DEVICE — ELECTRD BLD EXT EDGE 1P COAT 6.5IN STRL

## (undated) DEVICE — DRILL BIT 7080510 11 MM DRILL BIT S

## (undated) DEVICE — 3M™ STERI-STRIP™ REINFORCED ADHESIVE SKIN CLOSURES, R1547, 1/2 IN X 4 IN (12 MM X 100 MM), 6 STRIPS/ENVELOPE: Brand: 3M™ STERI-STRIP™

## (undated) DEVICE — Device

## (undated) DEVICE — SUT SILK 3/0 TIES 18IN A184H

## (undated) DEVICE — APPL CHLORAPREP HI/LITE 26ML ORNG

## (undated) DEVICE — SUT VIC 3/0 SH 27IN J416H

## (undated) DEVICE — UNDYED BRAIDED (POLYGLACTIN 910), SYNTHETIC ABSORBABLE SUTURE: Brand: COATED VICRYL

## (undated) DEVICE — ADHS SKIN SURG TISS VISC PREMIERPRO EXOFIN HI/VISC FAST/DRY

## (undated) DEVICE — DRSNG WND BORDR/ADHS NONADHR/GZ LF 4X4IN STRL

## (undated) DEVICE — LIMB HOLDER, WRIST/ANKLE: Brand: DEROYAL

## (undated) DEVICE — PK NEURO SPINE 40

## (undated) DEVICE — GLV SURG BIOGEL LTX PF 6 1/2

## (undated) DEVICE — 3.0MM NEURO (MATCH HEAD) LESS AGGRESSIVE

## (undated) DEVICE — 9165 UNIVERSAL PATIENT PLATE: Brand: 3M™

## (undated) DEVICE — VIOLET BRAIDED (POLYGLACTIN 910), SYNTHETIC ABSORBABLE SUTURE: Brand: COATED VICRYL

## (undated) DEVICE — NEEDLE, QUINCKE, 20GX3.5": Brand: MEDLINE

## (undated) DEVICE — GLV SURG PREMIERPRO ORTHO LTX PF SZ6.5 BRN

## (undated) DEVICE — DISPOSABLE BIPOLAR FORCEPS 7 3/4" (19.7CM) SCOVILLE BAYONET, 1.5MM TIP AND 12 FT. (3.6M) CABLE: Brand: KIRWAN